# Patient Record
Sex: FEMALE | Race: BLACK OR AFRICAN AMERICAN | Employment: FULL TIME | URBAN - METROPOLITAN AREA
[De-identification: names, ages, dates, MRNs, and addresses within clinical notes are randomized per-mention and may not be internally consistent; named-entity substitution may affect disease eponyms.]

---

## 2017-01-09 ENCOUNTER — TELEPHONE (OUTPATIENT)
Dept: CARDIOLOGY CLINIC | Age: 62
End: 2017-01-09

## 2017-01-29 ENCOUNTER — APPOINTMENT (OUTPATIENT)
Dept: GENERAL RADIOLOGY | Age: 62
End: 2017-01-29
Attending: EMERGENCY MEDICINE
Payer: COMMERCIAL

## 2017-01-29 ENCOUNTER — HOSPITAL ENCOUNTER (EMERGENCY)
Age: 62
Discharge: LEFT BEFORE DISCHARGE | End: 2017-01-30
Attending: EMERGENCY MEDICINE | Admitting: EMERGENCY MEDICINE
Payer: COMMERCIAL

## 2017-01-29 DIAGNOSIS — R07.9 ACUTE CHEST PAIN: Primary | ICD-10-CM

## 2017-01-29 LAB
ALBUMIN SERPL BCP-MCNC: 4.1 G/DL (ref 3.4–5)
ALBUMIN/GLOB SERPL: 1.2 {RATIO} (ref 0.8–1.7)
ALP SERPL-CCNC: 85 U/L (ref 45–117)
ALT SERPL-CCNC: 54 U/L (ref 13–56)
ANION GAP BLD CALC-SCNC: 5 MMOL/L (ref 3–18)
AST SERPL W P-5'-P-CCNC: 19 U/L (ref 15–37)
BASOPHILS # BLD AUTO: 0.1 K/UL (ref 0–0.1)
BASOPHILS # BLD: 1 % (ref 0–2)
BILIRUB SERPL-MCNC: 0.3 MG/DL (ref 0.2–1)
BUN SERPL-MCNC: 10 MG/DL (ref 7–18)
BUN/CREAT SERPL: 14 (ref 12–20)
CALCIUM SERPL-MCNC: 8.9 MG/DL (ref 8.5–10.1)
CHLORIDE SERPL-SCNC: 104 MMOL/L (ref 100–108)
CK MB CFR SERPL CALC: 1 % (ref 0–4)
CK MB SERPL-MCNC: 1.3 NG/ML (ref 0.5–3.6)
CK SERPL-CCNC: 124 U/L (ref 26–192)
CO2 SERPL-SCNC: 31 MMOL/L (ref 21–32)
CREAT SERPL-MCNC: 0.73 MG/DL (ref 0.6–1.3)
DIFFERENTIAL METHOD BLD: ABNORMAL
EOSINOPHIL # BLD: 0.2 K/UL (ref 0–0.4)
EOSINOPHIL NFR BLD: 2 % (ref 0–5)
ERYTHROCYTE [DISTWIDTH] IN BLOOD BY AUTOMATED COUNT: 15 % (ref 11.6–14.5)
GLOBULIN SER CALC-MCNC: 3.4 G/DL (ref 2–4)
GLUCOSE SERPL-MCNC: 133 MG/DL (ref 74–99)
HCT VFR BLD AUTO: 34.9 % (ref 35–45)
HGB BLD-MCNC: 11.3 G/DL (ref 12–16)
LYMPHOCYTES # BLD AUTO: 49 % (ref 21–52)
LYMPHOCYTES # BLD: 4.4 K/UL (ref 0.9–3.6)
MCH RBC QN AUTO: 23.3 PG (ref 24–34)
MCHC RBC AUTO-ENTMCNC: 32.4 G/DL (ref 31–37)
MCV RBC AUTO: 71.8 FL (ref 74–97)
MONOCYTES # BLD: 0.4 K/UL (ref 0.05–1.2)
MONOCYTES NFR BLD AUTO: 5 % (ref 3–10)
NEUTS SEG # BLD: 3.8 K/UL (ref 1.8–8)
NEUTS SEG NFR BLD AUTO: 43 % (ref 40–73)
PLATELET # BLD AUTO: 205 K/UL (ref 135–420)
PLATELET COMMENTS,PCOM: ABNORMAL
PMV BLD AUTO: 11.3 FL (ref 9.2–11.8)
POTASSIUM SERPL-SCNC: 3.7 MMOL/L (ref 3.5–5.5)
PROT SERPL-MCNC: 7.5 G/DL (ref 6.4–8.2)
RBC # BLD AUTO: 4.86 M/UL (ref 4.2–5.3)
RBC MORPH BLD: ABNORMAL
SODIUM SERPL-SCNC: 140 MMOL/L (ref 136–145)
TROPONIN I SERPL-MCNC: <0.02 NG/ML (ref 0–0.04)
WBC # BLD AUTO: 8.9 K/UL (ref 4.6–13.2)

## 2017-01-29 PROCEDURE — 93005 ELECTROCARDIOGRAM TRACING: CPT

## 2017-01-29 PROCEDURE — 80053 COMPREHEN METABOLIC PANEL: CPT | Performed by: EMERGENCY MEDICINE

## 2017-01-29 PROCEDURE — 99284 EMERGENCY DEPT VISIT MOD MDM: CPT

## 2017-01-29 PROCEDURE — 85025 COMPLETE CBC W/AUTO DIFF WBC: CPT | Performed by: EMERGENCY MEDICINE

## 2017-01-29 PROCEDURE — 82550 ASSAY OF CK (CPK): CPT | Performed by: EMERGENCY MEDICINE

## 2017-01-29 PROCEDURE — 71010 XR CHEST PORT: CPT

## 2017-01-30 ENCOUNTER — TELEPHONE (OUTPATIENT)
Dept: CARDIOLOGY CLINIC | Age: 62
End: 2017-01-30

## 2017-01-30 VITALS
HEIGHT: 59 IN | OXYGEN SATURATION: 99 % | RESPIRATION RATE: 21 BRPM | DIASTOLIC BLOOD PRESSURE: 49 MMHG | WEIGHT: 100 LBS | TEMPERATURE: 98 F | BODY MASS INDEX: 20.16 KG/M2 | SYSTOLIC BLOOD PRESSURE: 149 MMHG | HEART RATE: 67 BPM

## 2017-01-30 LAB
ATRIAL RATE: 71 BPM
CALCULATED P AXIS, ECG09: 71 DEGREES
CALCULATED R AXIS, ECG10: -3 DEGREES
CALCULATED T AXIS, ECG11: 61 DEGREES
DIAGNOSIS, 93000: NORMAL
P-R INTERVAL, ECG05: 176 MS
Q-T INTERVAL, ECG07: 382 MS
QRS DURATION, ECG06: 94 MS
QTC CALCULATION (BEZET), ECG08: 415 MS
VENTRICULAR RATE, ECG03: 71 BPM

## 2017-01-30 NOTE — ED NOTES
Pt out of bed of cardiac monitor. Pt states \"Im walking around because I'm cold. How much longer is it going to be?\"  Pt redirected in room and placed back on cardiac monitor. Pt informed labs would be repeated at 0130 to check cardiac enzymes. Pt states, \"I don't know if I can stay. \"

## 2017-01-30 NOTE — ED PROVIDER NOTES
HPI Comments: Patient came in with complaints of chest pain. Prior to being seen patient came up to desk and stated she needed to leave. I discussed with patient I was coming to see her shortly but she states she is feeling better and has an appointment with her cardiologist tomorrow so she is going to go home and get some rest. Denies any pain. I have requested that patient stay for an exam due to risks of her complaint and her past history however pt states verbal understanding and agrees to return if she changes her mind. Patient left prior to signing [employer] paperwork. Romulo Saxena MD      Patient is a 62 y.o. female presenting with chest pain. Chest Pain (Angina)           Past Medical History:   Diagnosis Date    Allergic rhinitis     Cardiac echocardiogram 06/11/2014     EF 60%. No RWMA. RVSP 30 mmHg. Mild AI.  Cardiac Holter monitor, normal 11/05/2014     Benign 48-hr Holter study.  Cardiac nuclear imaging test, mod risk 08/14/2015     Intermediate risk. High anterior artifact vs diagonal artery ischemia. Following Lexiscan, 0.5-mm downsloping inferolateral ST depression, resolving 10 min 30 sec of recovery. Arm heaviness noted.  Cardiovascular lower extremity arterial duplex 07/15/2016     Right leg: Mod peripheral arterial disease in femoral segment at rest.  Left leg:  Mild arterial disease at rest.  R KAREN 0.58. L KAREN 0.95. Similar to study of 10/15/14.  Cardiovascular renal angiography 10/27/2014     Bilateral patent renal arteries. Right CFA occluded but collateralized.  Cardiovascular renal duplex 10/13/2014     Aorta w/irregular walls. Severe >60% bilateral renal artery stenosis. Bilateral intrinsic/med renal disease.  Carotid duplex 10/17/2014     Mild <50% DAVID stenosis. Biphasic signals in both subclavians.     Cervical disc disease 09/2015     MRI of C-spine at [facility] hypertension     Hyperlipidemia     Hypertension     Ill-defined condition     Migraine     Sinusitis, chronic     Spontaneous pneumothorax 1999     2 episodes on the right within several months of each other       Past Surgical History:   Procedure Laterality Date    Hx breast reduction      Hx colposcopy      Hx heart catheterization Bilateral 10/27/14     bilateral lower extremity angiography     Hx cholecystectomy  11/8/14    Hx other surgical Left      shave biopsy ( thigh area)         Family History:   Problem Relation Age of Onset    Deep Vein Thrombosis Father     Coronary Artery Disease Father     Pacemaker Father     Diabetes Father     High Cholesterol Father     High Cholesterol Mother     Hypertension Mother     Heart Attack Brother 40    Heart Attack Maternal Grandmother 100    Heart Attack Other 48    Hypertension Other     Hypertension Maternal Aunt        Social History     Social History    Marital status: SINGLE     Spouse name: N/A    Number of children: N/A    Years of education: N/A     Occupational History    Not on file. Social History Main Topics    Smoking status: Current Every Day Smoker     Packs/day: 0.25     Years: 20.00    Smokeless tobacco: Never Used      Comment: now down to 3 cigarettes per day    Alcohol use No    Drug use: No    Sexual activity: Not on file     Other Topics Concern    Not on file     Social History Narrative         ALLERGIES: Latex; Keflex [cephalexin]; Epinephrine; and Tetracyclines    Review of Systems   Cardiovascular: Positive for chest pain.        Vitals:    01/29/17 2211 01/29/17 2311 01/29/17 2315   BP: 189/62 168/51    Pulse: 73  73   Resp: 16  22   Temp: 98 °F (36.7 °C)     SpO2: 100%  100%   Weight: 45.4 kg (100 lb)     Height: 4' 11\" (1.499 m)              Physical Exam     [facility]  ED Course       Procedures

## 2017-01-30 NOTE — ED TRIAGE NOTES
Pt states she is having an aching in her heart and her blood pressure is high, she is concerned she may be having a stroke.

## 2017-01-30 NOTE — TELEPHONE ENCOUNTER
Pt called to state she had an issue with blood pressure low one morning 93/44. Pt states she held her medications and recheck her blood pressure in one hour. /50 then it steadily went up throughout the day and into the night 205/50 and she went to the ED. BP was still elevated at that time. ED did ekg, labs, and all normal. Pt left hospital due to being tried. Pt went home and slept for 3 hours before going to work. Verbal order and read back per Zahraa Garzon DO  Since it was the first time this has happened to the patient, she should monitor her blood pressure just once a day , 2 hours after her medications.  If bp is consistently elevated then pt will need to call back the office to have medications adjusted      Pt verbalized understanding of instructions

## 2017-04-25 ENCOUNTER — TELEPHONE (OUTPATIENT)
Dept: CARDIOLOGY CLINIC | Age: 62
End: 2017-04-25

## 2017-04-25 DIAGNOSIS — R00.2 PALPITATIONS: ICD-10-CM

## 2017-04-25 DIAGNOSIS — E78.5 HYPERLIPIDEMIA, UNSPECIFIED HYPERLIPIDEMIA TYPE: Primary | ICD-10-CM

## 2017-05-20 ENCOUNTER — HOSPITAL ENCOUNTER (OUTPATIENT)
Dept: LAB | Age: 62
Discharge: HOME | End: 2017-05-20
Payer: COMMERCIAL

## 2017-05-20 DIAGNOSIS — E78.5 HYPERLIPIDEMIA, UNSPECIFIED HYPERLIPIDEMIA TYPE: ICD-10-CM

## 2017-05-20 LAB
ALBUMIN SERPL BCP-MCNC: 3.9 G/DL (ref 3.4–5)
ALBUMIN/GLOB SERPL: 1.3 {RATIO} (ref 0.8–1.7)
ALP SERPL-CCNC: 79 U/L (ref 45–117)
ALT SERPL-CCNC: 33 U/L (ref 13–56)
AST SERPL W P-5'-P-CCNC: 14 U/L (ref 15–37)
BILIRUB DIRECT SERPL-MCNC: 0.1 MG/DL (ref 0–0.2)
BILIRUB SERPL-MCNC: 0.5 MG/DL (ref 0.2–1)
CHOLEST SERPL-MCNC: 102 MG/DL
GLOBULIN SER CALC-MCNC: 3.1 G/DL (ref 2–4)
HDLC SERPL-MCNC: 37 MG/DL (ref 40–60)
HDLC SERPL: 2.8 {RATIO} (ref 0–5)
LDLC SERPL CALC-MCNC: 53.4 MG/DL (ref 0–100)
LIPID PROFILE,FLP: ABNORMAL
PROT SERPL-MCNC: 7 G/DL (ref 6.4–8.2)
TRIGL SERPL-MCNC: 58 MG/DL (ref ?–150)
VLDLC SERPL CALC-MCNC: 11.6 MG/DL

## 2017-05-20 PROCEDURE — 80076 HEPATIC FUNCTION PANEL: CPT | Performed by: INTERNAL MEDICINE

## 2017-05-20 PROCEDURE — 36415 COLL VENOUS BLD VENIPUNCTURE: CPT | Performed by: INTERNAL MEDICINE

## 2017-05-20 PROCEDURE — 80061 LIPID PANEL: CPT | Performed by: INTERNAL MEDICINE

## 2017-05-22 NOTE — PROGRESS NOTES
Per your last office note, \"Her latest lipid profile which was completed on November 19, 2016 showed some cholesterol 96 with an HDL of 37, LDL of 44.8, VLDL of 14.2, and triglycerides of 71 which I think is excellent control of her cholesterol and Lipitor 40 mg daily and I would make any changes in that regard. \"

## 2017-05-25 ENCOUNTER — OFFICE VISIT (OUTPATIENT)
Dept: CARDIOLOGY CLINIC | Age: 62
End: 2017-05-25

## 2017-05-25 VITALS
BODY MASS INDEX: 21.57 KG/M2 | SYSTOLIC BLOOD PRESSURE: 138 MMHG | DIASTOLIC BLOOD PRESSURE: 52 MMHG | HEIGHT: 59 IN | HEART RATE: 63 BPM | WEIGHT: 107 LBS | OXYGEN SATURATION: 98 %

## 2017-05-25 DIAGNOSIS — I10 ESSENTIAL HYPERTENSION: Primary | ICD-10-CM

## 2017-05-25 DIAGNOSIS — M50.90 CERVICAL DISC DISEASE: ICD-10-CM

## 2017-05-25 DIAGNOSIS — E78.5 HYPERLIPIDEMIA, UNSPECIFIED HYPERLIPIDEMIA TYPE: ICD-10-CM

## 2017-05-25 NOTE — PROGRESS NOTES
This lady's lipid profile is excellent and I will discuss it with her when I see her in the office later today.  ES

## 2017-05-25 NOTE — PROGRESS NOTES
Review of Systems   Constitutional: Negative. Respiratory: Positive for cough. Negative for hemoptysis, sputum production, shortness of breath and wheezing. Cardiovascular: Positive for claudication. Negative for chest pain, palpitations, orthopnea, leg swelling and PND. Gastrointestinal: Negative. Musculoskeletal: Negative.

## 2017-05-25 NOTE — PROGRESS NOTES
HPI: I saw Fridaa Handler Eduardo Hernandez in my office today in cardiovascular evaluation regarding her past history of anginal type chest discomfort in the setting of peripheral vascular disease and hypercholesterolemia. Mr. Eduardo Hernandez is a pleasant, small, 62year old  female with history of hypertension, hyperlipidemia and peripheral vascular disease with a femoral bruit and known totally occluded right common femoral artery documented in a catheterization to rule out renal artery stenosis, which was completed on 10/27/14 and demonstrated no evidence of renal artery stenosis with both renal arteries widely patent. She does have history of some bilateral arm heaviness when I saw her in July of 2015, which sounded atypical for angina, but we did do a nuclear myocardial perfusion study on 8/14/15, which was mildly abnormal, showing a mild partially transient perfusion defect involving the high anterior wall with relative sparing of the apex and interventricular septum, most consistent with either soft tissue breast attenuation with breast shifting or possibly some mild ischemia in a diagonal coronary artery. The gated SPECT imaging portion of the study demonstrated normal left ventricular function with an ejection fraction of 71%, but although the stress portion of the test was negative for chest pain, she did develop some downsloping ST depression inferolaterally and complained of some arm heaviness for about five minutes into recovery with EKG changes lasting for 10 minutes and recovery suggesting this might be truly positive. We actually added Toprol XL 25 mg to her regimen for the possibility that this was anginal, and also for her palpitations, even though she was already on Normodyne, which is beta blocker, alpha blocker for her blood pressure, and we gave her some sublingual nitroglycerin for administration for recurrent arm or chest pain.      Interestingly she told me at the time of her 10/8/15 visit that she went to the [facility] in [place] and had an MRI of her cervical spine, which demonstrated multilevel disc disease, most prominent in C6-C7, where there was a moderate spinal canal stenosis and severe bilateral neural foraminal narrowings, which would explain her arm pain issues.       She comes into the office today and relates that she is doing very well. She is having no further problems with palpitations or any chest pain issues. She does relate that she was diagnosed with an echocardiogram at the [facility] of having some aortic insufficiency although on examination I do not hear any significant aortic insufficiency suggesting that if present it is mild. Encounter Diagnoses   Name Primary?  Essential hypertension Yes    Hyperlipidemia, unspecified hyperlipidemia type     Cervical disc disease        Discussion: This patient appears to be doing about as well as we could expect and really have no recommendations for change at this time. She is not having any symptoms to suggest the development of heart failure and her examination does not suggest any aortic insufficiency despite her history of having an echocardiogram at the [facility] which showed aortic insufficiency. Certainly if aortic insufficiency is present it is quite mild. She is not having any further problems with chest pain or palpitations and I do not feel that she needs any further cardiac workup at this time. Is currently on Lipitor 40 mg daily for cholesterol and her latest lipid profile which was completed on May 20, 2017 showed total cholesterol 102 with triglycerides of 58, HDL 37, LDL of 53.4, and VLDL of 11.6 which I think is excellent control and I would make any changes at this time. Her blood pressure appears to be adequately controlled today and her EKG is completely stable so I am simply going to plan to see her again in several months or as needed if any new cardiovascular symptoms surface in the interim.     PCP: Parth Singh, MD      Past Medical History:   Diagnosis Date    Allergic rhinitis     Cardiac echocardiogram 06/11/2014    EF 60%. No RWMA. RVSP 30 mmHg. Mild AI.  Cardiac Holter monitor, normal 11/05/2014    Benign 48-hr Holter study.  Cardiac nuclear imaging test, mod risk 08/14/2015    Intermediate risk. High anterior artifact vs diagonal artery ischemia. Following Lexiscan, 0.5-mm downsloping inferolateral ST depression, resolving 10 min 30 sec of recovery. Arm heaviness noted.  Cardiovascular lower extremity arterial duplex 07/15/2016    Right leg: Mod peripheral arterial disease in femoral segment at rest.  Left leg:  Mild arterial disease at rest.  R KAREN 0.58. L KAREN 0.95. Similar to study of 10/15/14.  Cardiovascular renal angiography 10/27/2014    Bilateral patent renal arteries. Right CFA occluded but collateralized.  Cardiovascular renal duplex 10/13/2014    Aorta w/irregular walls. Severe >60% bilateral renal artery stenosis. Bilateral intrinsic/med renal disease.  Carotid duplex 10/17/2014    Mild <50% DAVID stenosis. Biphasic signals in both subclavians.  Carotid duplex 12/05/2016    Mild < 50% DAVID stenosis.  Cervical disc disease 09/2015    [facility] hypertension     Hyperlipidemia     Hypertension     Ill-defined condition     Migraine     Sinusitis, chronic     Spontaneous pneumothorax 1999    2 episodes on the right within several months of each other       Past Surgical History:   Procedure Laterality Date    HX BREAST REDUCTION      HX CHOLECYSTECTOMY  11/8/14    HX COLPOSCOPY      HX HEART CATHETERIZATION Bilateral 10/27/14    bilateral lower extremity angiography     HX OTHER SURGICAL Left     shave biopsy ( thigh area)       Current Outpatient Rx   Name  Route  Sig  Dispense  Refill    atorvastatin (LIPITOR) 40 mg tablet    Oral    Take 1 Tab by mouth daily.     30 Tab    6      metoprolol succinate (TOPROL-XL) 25 mg XL tablet    Oral    Take 1 Tab by mouth daily. 90 Tab    3      nitroglycerin (NITROSTAT) 0.4 mg SL tablet    SubLINGual    1 Tab by SubLINGual route every five (5) minutes as needed for Chest Pain. 25 Tab    3      cetirizine (ZYRTEC) 10 mg tablet    Oral    Take 10 mg by mouth nightly.  labetalol (NORMODYNE) 100 mg tablet    Oral    Take 100 mg by mouth two (2) times a day.  zolmitriptan (ZOMIG) 2.5 mg tablet    Oral    Take 2.5 mg by mouth as needed for Migraine.  multivitamin (ONE A DAY) tablet    Oral    Take 1 Tab by mouth daily. Allergies   Allergen Reactions    Latex Rash    Keflex [Cephalexin] Rash    Epinephrine Other (comments)     tachycardia    Tetracyclines Nausea Only       Social History :  Social History   Substance Use Topics    Smoking status: Current Every Day Smoker     Packs/day: 0.25     Years: 20.00    Smokeless tobacco: Never Used      Comment: now down to 3 cigarettes per day    Alcohol use No        Family History: family history includes Coronary Artery Disease in her father; Deep Vein Thrombosis in her father; Diabetes in her father; Heart Attack (age of onset: 80) in her maternal grandmother; Heart Attack (age of onset: 40) in her brother; Heart Attack (age of onset: 48) in an other family member; High Cholesterol in her father and mother; Hypertension in her maternal aunt, mother, and another family member; Pacemaker in her father. Review of Systems:  Constitutional: Negative. Respiratory: Positive for cough. Negative for hemoptysis, sputum production, shortness of breath and wheezing. Cardiovascular: Positive for claudication. Negative for chest pain, palpitations, orthopnea, leg swelling and PND. Gastrointestinal: Negative. Musculoskeletal: Negative. Physical Exam:    The patient is a cooperative, alert, well developed, well nourished 62 y.o.   female who is in no acute distress at the time of the examination. Visit Vitals    /52    Pulse 63    Ht 4' 11\" (1.499 m)    Wt 48.5 kg (107 lb)    SpO2 98%    BMI 21.61 kg/m2       HEENT: Conjuctiva white, mucosa moist, no pallor or cyanosis. NECK: Supple without masses, tenderness or thyromegaly. There was no jugular venous distention. Carotid are full bilaterally with bilateral  bruits vs. radiation of murmur from the heart. CHEST: Symmetrical with good excursion. LUNGS: Clear to auscultation in all fields. HEART: The apex is not displaced. There were no lifts, thrills or heaves. There is a normal S1 and S2. There is a grade 2/6 systolic ejection murmur along the left sternal border with radiation to the base without appreciable diastolic murmurs, rubs, clicks, or gallops auscultated. ABDOMEN: Soft without masses, tenderness or organomegaly. There is a fairly loud bruit just above the umbilicus in the midline consistent with possible renal arteries stenosis as well as significant bifemoral bruits  EXTREMITIES: Full peripheral pulses on the left with decrease PT and DP pulses on the right without peripheral edema. Review of Data: See PMH and Cardiology and Imaging sections for cardiac testing      Results for orders placed or performed in visit on 05/25/17   AMB POC EKG ROUTINE W/ 12 LEADS, INTER & REP     Status: None    Narrative    Normal sinus rhythm, rate 63. There is an RSR prime normal variant in leads V1 and V2. This EKG is within normal limits and similar to the EKG of January 29, 2017. Thiago Hawley D.O., F.A.C.C. [facility]  [facility]  [address] [place] [address]. [address]    PLEASE NOTE:  This document has been produced using voice recognition software. Unrecognized errors in transcription may be present.

## 2017-05-25 NOTE — PROGRESS NOTES
1. Have you been to the ER, urgent care clinic since your last visit? Hospitalized since your last visit? Yes 01/29/2017 chest pain     2. Have you seen or consulted any other health care providers outside of the [address] [place] [address] since your last visit? Include any pap smears or colon screening.  No

## 2017-05-25 NOTE — MR AVS SNAPSHOT
Visit Information Date & Time Provider Department Dept. Phone Encounter #  
 5/25/2017  3:20 PM Tyshawn Fraser, [address] [facility] [id number] Follow-up Instructions Return in about 6 months (around 11/25/2017), or if symptoms worsen or fail to improve. Upcoming Health Maintenance Date Due Hepatitis C Screening 1955 Pneumococcal 19-64 Medium Risk (1 of 1 - PPSV23) 10/2/1975 DTaP/Tdap/Td series (1 - Tdap) 10/2/1977 FOBT Q 1 YEAR AGE 50-75 10/2/2006 PAP AKA CERVICAL CYTOLOGY 7/11/2015 ZOSTER VACCINE AGE 60> 10/2/2016 INFLUENZA AGE 9 TO ADULT 8/1/2017 BREAST CANCER SCRN MAMMOGRAM 8/30/2018 Allergies as of 5/25/2017  Review Complete On: 5/25/2017 By: Tyshawn Fraser DO Severity Noted Reaction Type Reactions Latex  10/27/2014   Topical Rash Keflex [Cephalexin] Medium 11/16/2016    Rash Epinephrine    Other (comments)  
 tachycardia Tetracyclines    Nausea Only Current Immunizations  Never Reviewed No immunizations on file. Not reviewed this visit You Were Diagnosed With   
  
 Codes Comments Essential hypertension    -  Primary ICD-10-CM: I10 
ICD-9-CM: 401.9 Hyperlipidemia, unspecified hyperlipidemia type     ICD-10-CM: E78.5 ICD-9-CM: 272.4 Transient cerebral ischemia, unspecified type     ICD-10-CM: G45.9 ICD-9-CM: 435.9 Dizziness     ICD-10-CM: Z14 ICD-9-CM: 780.4 PAD (peripheral artery disease) (HCC)     ICD-10-CM: I73.9 ICD-9-CM: 443. 9 Vitals BP Pulse Height(growth percentile) Weight(growth percentile) SpO2 BMI  
 138/52 63 4' 11\" (1.499 m) 107 lb (48.5 kg) 98% 21.61 kg/m2 OB Status Smoking Status Postmenopausal Current Every Day Smoker Vitals History BMI and BSA Data Body Mass Index Body Surface Area  
 21.61 kg/m 2 1.42 m 2 Preferred Pharmacy Pharmacy Name Phone [facility] [address], [address] [phone] Your Updated Medication List  
  
   
This list is accurate as of: 5/25/17  3:49 PM.  Always use your most recent med list.  
  
  
  
  
 atorvastatin 40 mg tablet Commonly known as:  LIPITOR  
TAKE ONE TABLET BY MOUTH DAILY AUGMENTIN 875-125 mg per tablet Generic drug:  amoxicillin-clavulanate Take  by mouth every twelve (12) hours. carboxymethylcellulose sodium 1 % Dlgl Apply  to eye. ERYTHROMYCIN OP Apply  to eye. FLONASE 50 mcg/actuation nasal spray Generic drug:  fluticasone 2 Sprays by Both Nostrils route daily. labetalol 100 mg tablet Commonly known as:  Fawn Coffin Take 100 mg by mouth two (2) times a day. lamoTRIgine 25 mg tablet Commonly known as: LaMICtal  
Take 1 Tab by mouth two (2) times a day. metoprolol succinate 25 mg XL tablet Commonly known as:  TOPROL-XL Take 1 Tab by mouth daily. montelukast 10 mg tablet Commonly known as:  SINGULAIR Take 10 mg by mouth daily. multivitamin tablet Commonly known as:  ONE A DAY Take 1 Tab by mouth daily. nitroglycerin 0.4 mg SL tablet Commonly known as:  NITROSTAT  
1 Tab by SubLINGual route every five (5) minutes as needed for Chest Pain. VIGAMOX 0.5 % ophthalmic solution Generic drug:  moxifloxacin 1 Drop three (3) times daily. ZyrTEC 10 mg tablet Generic drug:  cetirizine Take 10 mg by mouth nightly. We Performed the Following AMB POC EKG ROUTINE W/ 12 LEADS, INTER & REP [34168 CPT(R)] Follow-up Instructions Return in about 6 months (around 11/25/2017), or if symptoms worsen or fail to improve. Introducing [facility]! Dear Erika Hamilton: Thank you for requesting a Materia account. Our records indicate that you already have an active Materia account. You can access your account anytime at [email or web address] Did you know that you can access your hospital and ER discharge instructions at any time in StowThat? You can also review all of your test results from your hospital stay or ER visit. Additional Information If you have questions, please visit the Frequently Asked Questions section of the StowThat website at [email or web address] Remember, StowThat is NOT to be used for urgent needs. For medical emergencies, dial 911. Now available from your iPhone and Android! Please provide this summary of care documentation to your next provider. Your primary care clinician is listed as Berenice Denise. If you have any questions after today's visit, please call [phone].

## 2017-06-20 DIAGNOSIS — I10 ESSENTIAL HYPERTENSION WITH GOAL BLOOD PRESSURE LESS THAN 130/80: ICD-10-CM

## 2017-06-21 RX ORDER — METOPROLOL SUCCINATE 25 MG/1
TABLET, EXTENDED RELEASE ORAL
Qty: 90 TAB | Refills: 2 | Status: SHIPPED | OUTPATIENT
Start: 2017-06-21 | End: 2017-12-19 | Stop reason: SDUPTHER

## 2017-08-10 ENCOUNTER — TELEPHONE (OUTPATIENT)
Dept: INTERNAL MEDICINE CLINIC | Age: 62
End: 2017-08-10

## 2017-08-10 DIAGNOSIS — I10 ESSENTIAL HYPERTENSION: ICD-10-CM

## 2017-08-10 DIAGNOSIS — E78.5 HYPERLIPIDEMIA, UNSPECIFIED HYPERLIPIDEMIA TYPE: Primary | ICD-10-CM

## 2017-08-10 NOTE — TELEPHONE ENCOUNTER
Patient would like orders put in for lab work   So she can get it Done at [facility] view on 08/12/2017  She is requesting     Liver Profile ALT AST  BLOOD SUGAR  A1C  CBC  CHOLESTEROL   CHEMESTY LABS  URINE     Please call when done [phone]

## 2017-08-11 NOTE — TELEPHONE ENCOUNTER
Confirmed with patient that she had to keep appointment to review labs being ordered--patient confirmed

## 2017-08-12 ENCOUNTER — HOSPITAL ENCOUNTER (OUTPATIENT)
Dept: LAB | Age: 62
Discharge: HOME | End: 2017-08-12
Payer: COMMERCIAL

## 2017-08-12 DIAGNOSIS — E78.5 HYPERLIPIDEMIA, UNSPECIFIED HYPERLIPIDEMIA TYPE: ICD-10-CM

## 2017-08-12 DIAGNOSIS — I10 ESSENTIAL HYPERTENSION: ICD-10-CM

## 2017-08-12 LAB
ALBUMIN SERPL BCP-MCNC: 4.3 G/DL (ref 3.4–5)
ALBUMIN/GLOB SERPL: 1.3 {RATIO} (ref 0.8–1.7)
ALP SERPL-CCNC: 84 U/L (ref 45–117)
ALT SERPL-CCNC: 35 U/L (ref 13–56)
ANION GAP BLD CALC-SCNC: 3 MMOL/L (ref 3–18)
APPEARANCE UR: CLEAR
AST SERPL W P-5'-P-CCNC: 15 U/L (ref 15–37)
BASOPHILS # BLD AUTO: 0.1 K/UL (ref 0–0.06)
BASOPHILS # BLD: 1 % (ref 0–2)
BILIRUB SERPL-MCNC: 0.4 MG/DL (ref 0.2–1)
BILIRUB UR QL: NEGATIVE
BUN SERPL-MCNC: 14 MG/DL (ref 7–18)
BUN/CREAT SERPL: 25 (ref 12–20)
CALCIUM SERPL-MCNC: 9.4 MG/DL (ref 8.5–10.1)
CHLORIDE SERPL-SCNC: 106 MMOL/L (ref 100–108)
CHOLEST SERPL-MCNC: 117 MG/DL
CO2 SERPL-SCNC: 32 MMOL/L (ref 21–32)
COLOR UR: YELLOW
CREAT SERPL-MCNC: 0.57 MG/DL (ref 0.6–1.3)
DIFFERENTIAL METHOD BLD: ABNORMAL
EOSINOPHIL # BLD: 0.2 K/UL (ref 0–0.4)
EOSINOPHIL NFR BLD: 2 % (ref 0–5)
ERYTHROCYTE [DISTWIDTH] IN BLOOD BY AUTOMATED COUNT: 15.2 % (ref 11.6–14.5)
GLOBULIN SER CALC-MCNC: 3.3 G/DL (ref 2–4)
GLUCOSE SERPL-MCNC: 92 MG/DL (ref 74–99)
GLUCOSE UR STRIP.AUTO-MCNC: NEGATIVE MG/DL
HCT VFR BLD AUTO: 36.2 % (ref 35–45)
HDLC SERPL-MCNC: 39 MG/DL (ref 40–60)
HDLC SERPL: 3 {RATIO} (ref 0–5)
HGB BLD-MCNC: 11.6 G/DL (ref 12–16)
HGB UR QL STRIP: NEGATIVE
KETONES UR QL STRIP.AUTO: NEGATIVE MG/DL
LDLC SERPL CALC-MCNC: 64.8 MG/DL (ref 0–100)
LEUKOCYTE ESTERASE UR QL STRIP.AUTO: NEGATIVE
LIPID PROFILE,FLP: ABNORMAL
LYMPHOCYTES # BLD AUTO: 37 % (ref 21–52)
LYMPHOCYTES # BLD: 3.2 K/UL (ref 0.9–3.6)
MCH RBC QN AUTO: 23.2 PG (ref 24–34)
MCHC RBC AUTO-ENTMCNC: 32 G/DL (ref 31–37)
MCV RBC AUTO: 72.5 FL (ref 74–97)
MONOCYTES # BLD: 0.6 K/UL (ref 0.05–1.2)
MONOCYTES NFR BLD AUTO: 7 % (ref 3–10)
NEUTS SEG # BLD: 4.6 K/UL (ref 1.8–8)
NEUTS SEG NFR BLD AUTO: 53 % (ref 40–73)
NITRITE UR QL STRIP.AUTO: NEGATIVE
PH UR STRIP: 5.5 [PH] (ref 5–8)
PLATELET # BLD AUTO: 235 K/UL (ref 135–420)
PMV BLD AUTO: 12.2 FL (ref 9.2–11.8)
POTASSIUM SERPL-SCNC: 4.5 MMOL/L (ref 3.5–5.5)
PROT SERPL-MCNC: 7.6 G/DL (ref 6.4–8.2)
PROT UR STRIP-MCNC: NEGATIVE MG/DL
RBC # BLD AUTO: 4.99 M/UL (ref 4.2–5.3)
SODIUM SERPL-SCNC: 141 MMOL/L (ref 136–145)
SP GR UR REFRACTOMETRY: 1.02 (ref 1–1.03)
TRIGL SERPL-MCNC: 66 MG/DL (ref ?–150)
UROBILINOGEN UR QL STRIP.AUTO: 0.2 EU/DL (ref 0.2–1)
VLDLC SERPL CALC-MCNC: 13.2 MG/DL
WBC # BLD AUTO: 8.7 K/UL (ref 4.6–13.2)

## 2017-08-12 PROCEDURE — 85025 COMPLETE CBC W/AUTO DIFF WBC: CPT | Performed by: INTERNAL MEDICINE

## 2017-08-12 PROCEDURE — 81003 URINALYSIS AUTO W/O SCOPE: CPT | Performed by: INTERNAL MEDICINE

## 2017-08-12 PROCEDURE — 36415 COLL VENOUS BLD VENIPUNCTURE: CPT | Performed by: INTERNAL MEDICINE

## 2017-08-12 PROCEDURE — 80061 LIPID PANEL: CPT | Performed by: INTERNAL MEDICINE

## 2017-08-12 PROCEDURE — 80053 COMPREHEN METABOLIC PANEL: CPT | Performed by: INTERNAL MEDICINE

## 2017-08-25 ENCOUNTER — OFFICE VISIT (OUTPATIENT)
Dept: INTERNAL MEDICINE CLINIC | Age: 62
End: 2017-08-25

## 2017-08-25 VITALS
WEIGHT: 110 LBS | TEMPERATURE: 96.9 F | DIASTOLIC BLOOD PRESSURE: 60 MMHG | HEART RATE: 78 BPM | SYSTOLIC BLOOD PRESSURE: 118 MMHG | RESPIRATION RATE: 16 BRPM | BODY MASS INDEX: 22.18 KG/M2 | HEIGHT: 59 IN | OXYGEN SATURATION: 98 %

## 2017-08-25 DIAGNOSIS — R33.9 URINARY RETENTION: Primary | ICD-10-CM

## 2017-08-25 DIAGNOSIS — I10 ESSENTIAL HYPERTENSION: ICD-10-CM

## 2017-08-25 DIAGNOSIS — I73.9 PAD (PERIPHERAL ARTERY DISEASE) (HCC): ICD-10-CM

## 2017-08-25 RX ORDER — ASPIRIN 81 MG/1
TABLET ORAL
COMMUNITY

## 2017-08-25 RX ORDER — TOPIRAMATE 25 MG/1
TABLET ORAL
Qty: 30 TAB | Refills: 2 | Status: SHIPPED | OUTPATIENT
Start: 2017-08-25 | End: 2018-04-18 | Stop reason: ALTCHOICE

## 2017-08-25 NOTE — PATIENT INSTRUCTIONS
Health Maintenance Due   Topic Date Due    Hepatitis C Test  1955    Pneumococcal Vaccine (1 of 1 - PPSV23) 10/02/1975    DTaP/Tdap/Td  (1 - Tdap) 10/02/1977    Stool testing for trace blood  10/02/2006    Cervical Cancer Screening  07/11/2015    Shingles Vaccine  08/02/2016    Flu Vaccine  08/01/2017

## 2017-08-25 NOTE — MR AVS SNAPSHOT
Visit Information Date & Time Provider Department Dept. Phone Encounter #  
 8/25/2017  3:30 PM Lolis Watson MD [facility] INTERNAL MEDICINE OF Shai Couch [phone] [id number] Your Appointments 10/12/2017  1:15 PM  
Follow Up with Lolis Watson MD  
[facility] INTERNAL MEDICINE OF [facility]) [address], [address] [facility] [address]  
[phone]  
  
   
 [address], [address] [facility] [address]  
  
    
 12/12/2017  3:40 PM  
Follow Up with Merle Harris DO [facility]) Appt Note: 6 month f/up Colby Rao [id number]  
[phone] [address] [place] [address] [address] Upcoming Health Maintenance Date Due Hepatitis C Screening 1955 Pneumococcal 19-64 Medium Risk (1 of 1 - PPSV23) 10/2/1975 DTaP/Tdap/Td series (1 - Tdap) 10/2/1977 FOBT Q 1 YEAR AGE 50-75 10/2/2006 PAP AKA CERVICAL CYTOLOGY 7/11/2015 ZOSTER VACCINE AGE 60> 8/2/2016 INFLUENZA AGE 9 TO ADULT 8/1/2017 BREAST CANCER SCRN MAMMOGRAM 8/30/2018 Allergies as of 8/25/2017  Review Complete On: 8/25/2017 By: Lolis Watson MD  
  
 Severity Noted Reaction Type Reactions Latex  10/27/2014   Topical Rash Keflex [Cephalexin] Medium 11/16/2016    Rash Epinephrine    Other (comments)  
 tachycardia Tetracyclines    Nausea Only Current Immunizations  Never Reviewed No immunizations on file. Not reviewed this visit You Were Diagnosed With   
  
 Codes Comments Urinary retention    -  Primary ICD-10-CM: R33.9 ICD-9-CM: 788.20 Vitals BP Pulse Temp Resp Height(growth percentile)  
 118/60 (BP 1 Location: Left arm, BP Patient Position: Sitting) 78 96.9 °F (36.1 °C) (Tympanic) 16 4' 11\" (1.499 m) Weight(growth percentile) SpO2 BMI OB Status Smoking Status 110 lb (49.9 kg) 98% 22.22 kg/m2 Postmenopausal Current Every Day Smoker BMI and BSA Data Body Mass Index Body Surface Area  
 22.22 kg/m 2 1.44 m 2 Preferred Pharmacy Pharmacy Name Phone [facility] [address], [facility] [phone] Your Updated Medication List  
  
   
This list is accurate as of: 17  4:10 PM.  Always use your most recent med list.  
  
  
  
  
 aspirin delayed-release 81 mg tablet Take  by mouth daily. atorvastatin 40 mg tablet Commonly known as:  LIPITOR  
TAKE ONE TABLET BY MOUTH DAILY  
  
 carboxymethylcellulose sodium 1 % Dlgl Apply  to eye. ERYTHROMYCIN OP Apply  to eye. FLONASE 50 mcg/actuation nasal spray Generic drug:  fluticasone 2 Sprays by Both Nostrils route daily. labetalol 100 mg tablet Commonly known as:  [place] Precious Take 100 mg by mouth two (2) times a day. metoprolol succinate 25 mg XL tablet Commonly known as:  TOPROL-XL  
TAKE ONE TABLET BY MOUTH DAILY  
  
 montelukast 10 mg tablet Commonly known as:  SINGULAIR Take 10 mg by mouth daily. multivitamin tablet Commonly known as:  ONE A DAY Take 1 Tab by mouth daily. nitroglycerin 0.4 mg SL tablet Commonly known as:  NITROSTAT  
1 Tab by SubLINGual route every five (5) minutes as needed for Chest Pain. topiramate 25 mg tablet Commonly known as:  TOPAMAX  
1 tablet twice per day VIGAMOX 0.5 % ophthalmic solution Generic drug:  moxifloxacin 1 Drop three (3) times daily. ZyrTEC 10 mg tablet Generic drug:  cetirizine Take 10 mg by mouth nightly. Prescriptions Sent to Pharmacy Refills  
 topiramate (TOPAMAX) 25 mg tablet 2 Si tablet twice per day Class: Normal  
 Pharmacy: [facility] [address], [address] [place] [address] Road Ph #: [phone] We Performed the Following REFERRAL TO UROLOGY [KMS241 Custom] Comments: Please evaluate for urinary retention To-Do List   
 09/05/2017 5:00 PM  
  Appointment with [facility] 1 at [address] [place] [address] ([phone]) OUTSIDE FILMS  - Any outside films related to the study being scheduled should be brought with you on the day of the exam.  If this cannot be done there may be a delay in the reading of the study. MEDICATIONS  - Patient must bring a complete list of all medications currently taking to include prescriptions, over-the-counter meds, herbals, vitamins & any dietary supplements  GENERAL INSTRUCTIONS  - On the day of your exam do not use any bath powder, deodorant or lotions on the armpit area. -Tenderness of breasts may cause an increase of discomfort during procedure. If you are experiencing breast tenderness on the day of your appointment and would like to reschedule, please call [phone]. Referral Information Referral ID Referred By Referred To  
  
 [id number] Peewee Jacobson MD   
   [address] [place] [address] , [address] [place] [address] [address] Phone: [phone] Fax: [phone] Visits Status Start Date End Date 1 New Request 8/25/17 8/25/18 If your referral has a status of pending review or denied, additional information will be sent to support the outcome of this decision. Patient Instructions Health Maintenance Due Topic Date Due  
 Hepatitis C Test  1955  Pneumococcal Vaccine (1 of 1 - PPSV23) 10/02/1975  
 DTaP/Tdap/Td  (1 - Tdap) 10/02/1977  Stool testing for trace blood  10/02/2006  Cervical Cancer Screening  07/11/2015  Shingles Vaccine  08/02/2016  Flu Vaccine  08/01/2017 [facility]! Dear Amelia Sheridan: Thank you for requesting a [employer] account. Our records indicate that you already have an active [employer] account.   You can access your account anytime at [email or web address] Did you know that you can access your hospital and ER discharge instructions at any time in [employer]? You can also review all of your test results from your hospital stay or ER visit. Additional Information If you have questions, please visit the Frequently Asked Questions section of the [employer] website at [email or web address] Remember, [employer] is NOT to be used for urgent needs. For medical emergencies, dial 911. Now available from your iPhone and Android! Please provide this summary of care documentation to your next provider. Your primary care clinician is listed as Janae Christopher. If you have any questions after today's visit, please call [phone].

## 2017-08-25 NOTE — PROGRESS NOTES
The patient presents to the office today with the chief complaint of difficulty voiding    HPI    The patient remains on medications for hypertension. She is doing well on the medications. The patient complains of finding that she does not totall empty her bladder. The patient has peripheral vascular disease. She is doing well. The patient is a cigarette smoker but she is cutting back dramatically      Review of Systems   Respiratory: Negative for shortness of breath. Cardiovascular: Negative for chest pain and leg swelling. Genitourinary:        As per HPI       Allergies   Allergen Reactions    Latex Rash    Keflex [Cephalexin] Rash    Epinephrine Other (comments)     tachycardia    Tetracyclines Nausea Only       Current Outpatient Prescriptions   Medication Sig Dispense Refill    aspirin delayed-release 81 mg tablet Take  by mouth daily.  topiramate (TOPAMAX) 25 mg tablet 1 tablet twice per day 30 Tab 2    carboxymethylcellulose sodium 1 % dlgl Apply  to eye.  ERYTHROMYCIN BASE (ERYTHROMYCIN OP) Apply  to eye.  moxifloxacin (VIGAMOX) 0.5 % ophthalmic solution 1 Drop three (3) times daily.  montelukast (SINGULAIR) 10 mg tablet Take 10 mg by mouth daily.  fluticasone (FLONASE) 50 mcg/actuation nasal spray 2 Sprays by Both Nostrils route daily.  atorvastatin (LIPITOR) 40 mg tablet TAKE ONE TABLET BY MOUTH DAILY 90 Tab 5    nitroglycerin (NITROSTAT) 0.4 mg SL tablet 1 Tab by SubLINGual route every five (5) minutes as needed for Chest Pain. 25 Tab 3    cetirizine (ZYRTEC) 10 mg tablet Take 10 mg by mouth nightly.  labetalol (NORMODYNE) 100 mg tablet Take 100 mg by mouth two (2) times a day.  multivitamin (ONE A DAY) tablet Take 1 Tab by mouth daily.  metoprolol succinate (TOPROL-XL) 25 mg XL tablet TAKE ONE TABLET BY MOUTH DAILY 90 Tab 2       Past Medical History:   Diagnosis Date    Allergic rhinitis     Cardiac echocardiogram 06/11/2014    EF 60%. No RWMA. RVSP 30 mmHg. Mild AI.  Cardiac Holter monitor, normal 11/05/2014    Benign 48-hr Holter study.  Cardiac nuclear imaging test, mod risk 08/14/2015    Intermediate risk. High anterior artifact vs diagonal artery ischemia. Following Lexiscan, 0.5-mm downsloping inferolateral ST depression, resolving 10 min 30 sec of recovery. Arm heaviness noted.  Cardiovascular lower extremity arterial duplex 07/15/2016    Right leg: Mod peripheral arterial disease in femoral segment at rest.  Left leg:  Mild arterial disease at rest.  R KAREN 0.58. L KAREN 0.95. Similar to study of 10/15/14.  Cardiovascular renal angiography 10/27/2014    Bilateral patent renal arteries. Right CFA occluded but collateralized.  Cardiovascular renal duplex 10/13/2014    Aorta w/irregular walls. Severe >60% bilateral renal artery stenosis. Bilateral intrinsic/med renal disease.  Carotid duplex 10/17/2014    Mild <50% DAVID stenosis. Biphasic signals in both subclavians.  Carotid duplex 12/05/2016    Mild < 50% DAVID stenosis.  Cervical disc disease 09/2015    [facility]spine [facility] 54 hypertension     Hyperlipidemia     Hypertension     Ill-defined condition     Migraine     Sinusitis, chronic     Spontaneous pneumothorax 1999    2 episodes on the right within several months of each other       Past Surgical History:   Procedure Laterality Date    HX BREAST REDUCTION      HX CHOLECYSTECTOMY  11/8/14    HX COLPOSCOPY      HX HEART CATHETERIZATION Bilateral 10/27/14    bilateral lower extremity angiography     HX OTHER SURGICAL Left     shave biopsy ( thigh area)       Social History     Social History    Marital status: SINGLE     Spouse name: N/A    Number of children: N/A    Years of education: N/A     Occupational History    Not on file.      Social History Main Topics    Smoking status: Current Every Day Smoker     Packs/day: 0.25     Years: 20.00    Smokeless tobacco: Never Used      Comment: now down to 3 cigarettes per day    Alcohol use No    Drug use: No    Sexual activity: No     Other Topics Concern    Not on file     Social History Narrative       Patient does not have an advanced directive on file    Visit Vitals    /60 (BP 1 Location: Left arm, BP Patient Position: Sitting)    Pulse 78    Temp 96.9 °F (36.1 °C) (Tympanic)    Resp 16    Ht 4' 11\" (1.499 m)    Wt 110 lb (49.9 kg)    SpO2 98%    BMI 22.22 kg/m2       Physical Exam   No Cervical Lymphadenopathy  No Supraclavicular Lymphadenopathy  Thyroid is Normal  Lungs are clear to ausculation and percussion  Heart:  S1 S2 are normal, No gallops, No mummers  No Carotid Bruits  Abdomen:  Normal Bowel Sounds. No tenderness. No masses. No Hepatomegaly or Splenomegly  LE:  Strong Pedal Pulses. No Edema      BMI:  [facility] Outpatient Visit on 08/12/2017   Component Date Value Ref Range Status    Sodium 08/12/2017 141  136 - 145 mmol/L Final    Potassium 08/12/2017 4.5  3.5 - 5.5 mmol/L Final    Chloride 08/12/2017 106  100 - 108 mmol/L Final    CO2 08/12/2017 32  21 - 32 mmol/L Final    Anion gap 08/12/2017 3  3.0 - 18 mmol/L Final    Glucose 08/12/2017 92  74 - 99 mg/dL Final    BUN 08/12/2017 14  7.0 - 18 MG/DL Final    Creatinine 08/12/2017 0.57* 0.6 - 1.3 MG/DL Final    BUN/Creatinine ratio 08/12/2017 25* 12 - 20   Final    GFR est AA 08/12/2017 >60  >60 ml/min/1.73m2 Final    GFR est non-AA 08/12/2017 >60  >60 ml/min/1.73m2 Final    Comment: (NOTE)  Estimated GFR is calculated using the Modification of Diet in Renal   Disease (MDRD) Study equation, reported for both  Americans   (GFRAA) and non- Americans (GFRNA), and normalized to 1.73m2   body surface area. The physician must decide which value applies to   the patient. The MDRD study equation should only be used in   individuals age 25 or older.  It has not been validated for the   following: pregnant women, patients with serious comorbid conditions,   or on certain medications, or persons with extremes of body size,   muscle mass, or nutritional status.  Calcium 08/12/2017 9.4  8.5 - 10.1 MG/DL Final    Bilirubin, total 08/12/2017 0.4  0.2 - 1.0 MG/DL Final    ALT (SGPT) 08/12/2017 35  13 - 56 U/L Final    AST (SGOT) 08/12/2017 15  15 - 37 U/L Final    Alk. phosphatase 08/12/2017 84  45 - 117 U/L Final    Protein, total 08/12/2017 7.6  6.4 - 8.2 g/dL Final    Albumin 08/12/2017 4.3  3.4 - 5.0 g/dL Final    Globulin 08/12/2017 3.3  2.0 - 4.0 g/dL Final    A-G Ratio 08/12/2017 1.3  0.8 - 1.7   Final    LIPID PROFILE 08/12/2017        Final    Cholesterol, total 08/12/2017 117  <200 MG/DL Final    Triglyceride 08/12/2017 66  <150 MG/DL Final    Comment: The drugs N-acetylcysteine (NAC) and  Metamiszole have been found to cause falsely  low results in this chemical assay. Please  be sure to submit blood samples obtained  BEFORE administration of either of these  drugs to assure correct results.  HDL Cholesterol 08/12/2017 39* 40 - 60 MG/DL Final    LDL, calculated 08/12/2017 64.8  0 - 100 MG/DL Final    VLDL, calculated 08/12/2017 13.2  MG/DL Final    CHOL/HDL Ratio 08/12/2017 3.0  0 - 5.0   Final    WBC 08/12/2017 8.7  4.6 - 13.2 K/uL Final    RBC 08/12/2017 4.99  4.20 - 5.30 M/uL Final    HGB 08/12/2017 11.6* 12.0 - 16.0 g/dL Final    HCT 08/12/2017 36.2  35.0 - 45.0 % Final    MCV 08/12/2017 72.5* 74.0 - 97.0 FL Final    MCH 08/12/2017 23.2* 24.0 - 34.0 PG Final    MCHC 08/12/2017 32.0  31.0 - 37.0 g/dL Final    RDW 08/12/2017 15.2* 11.6 - 14.5 % Final    PLATELET 17/56/9811 079  135 - 420 K/uL Final    MPV 08/12/2017 12.2* 9.2 - 11.8 FL Final    NEUTROPHILS 08/12/2017 53  40 - 73 % Final    LYMPHOCYTES 08/12/2017 37  21 - 52 % Final    MONOCYTES 08/12/2017 7  3 - 10 % Final    EOSINOPHILS 08/12/2017 2  0 - 5 % Final    BASOPHILS 08/12/2017 1  0 - 2 % Final    ABS.  NEUTROPHILS 08/12/2017 4.6  1.8 - 8.0 K/UL Final    ABS. LYMPHOCYTES 08/12/2017 3.2  0.9 - 3.6 K/UL Final    ABS. MONOCYTES 08/12/2017 0.6  0.05 - 1.2 K/UL Final    ABS. EOSINOPHILS 08/12/2017 0.2  0.0 - 0.4 K/UL Final    ABS. BASOPHILS 08/12/2017 0.1* 0.0 - 0.06 K/UL Final    DF 08/12/2017 AUTOMATED    Final    Color 08/12/2017 YELLOW    Final    Appearance 08/12/2017 CLEAR    Final    Specific gravity 08/12/2017 1.017  1.005 - 1.030   Final    pH (UA) 08/12/2017 5.5  5.0 - 8.0   Final    Protein 08/12/2017 NEGATIVE   NEG mg/dL Final    Glucose 08/12/2017 NEGATIVE   NEG mg/dL Final    Ketone 08/12/2017 NEGATIVE   NEG mg/dL Final    Bilirubin 08/12/2017 NEGATIVE   NEG   Final    Blood 08/12/2017 NEGATIVE   NEG   Final    Urobilinogen 08/12/2017 0.2  0.2 - 1.0 EU/dL Final    Nitrites 08/12/2017 NEGATIVE   NEG   Final    Leukocyte Esterase 08/12/2017 NEGATIVE   NEG   Final       .No results found for any visits on 08/25/17. Assessment / Plan      ICD-10-CM ICD-9-CM    1. Urinary retention R33.9 788.20 REFERRAL TO UROLOGY   2. Essential hypertension I10 401.9    3. PAD (peripheral artery disease) (HCC) I73.9 443.9      she was advised to continue her maintenance medications  Labs  To urology  The patient was counseled on the dangers of tobacco use, and was advised to quit. Reviewed strategies to maximize success, including removing cigarettes and smoking materials from environment. Follow-up Disposition:  Return in about 4 months (around 12/25/2017). I asked Amelia Yuan if she has any questions and I answered the questions. Gladys Yuan states that she understands the treatment plan and agrees with the treatment plan              \

## 2017-08-25 NOTE — PROGRESS NOTES
1. Have you been to the ER, urgent care clinic since your last visit? Hospitalized since your last visit? No    2. Have you seen or consulted any other health care providers outside of the [address] [place] [address] since your last visit? Include any pap smears or colon screening.  No

## 2017-08-30 ENCOUNTER — OFFICE VISIT (OUTPATIENT)
Dept: UROLOGY | Age: 62
End: 2017-08-30

## 2017-08-30 VITALS
HEART RATE: 67 BPM | WEIGHT: 112 LBS | BODY MASS INDEX: 22.58 KG/M2 | HEIGHT: 59 IN | DIASTOLIC BLOOD PRESSURE: 61 MMHG | SYSTOLIC BLOOD PRESSURE: 160 MMHG | OXYGEN SATURATION: 97 % | TEMPERATURE: 98.8 F

## 2017-08-30 DIAGNOSIS — R35.1 NOCTURIA: ICD-10-CM

## 2017-08-30 DIAGNOSIS — N34.2 ATROPHIC URETHRITIS: ICD-10-CM

## 2017-08-30 DIAGNOSIS — N81.11 MIDLINE CYSTOCELE: ICD-10-CM

## 2017-08-30 DIAGNOSIS — N95.2 ATROPHIC VAGINITIS: ICD-10-CM

## 2017-08-30 DIAGNOSIS — R35.0 FREQUENT URINATION: Primary | ICD-10-CM

## 2017-08-30 LAB
BILIRUB UR QL STRIP: NEGATIVE
GLUCOSE UR-MCNC: NEGATIVE MG/DL
KETONES P FAST UR STRIP-MCNC: NEGATIVE MG/DL
PH UR STRIP: 5.5 [PH] (ref 4.6–8)
PROT UR QL STRIP: NEGATIVE MG/DL
SP GR UR STRIP: 1.01 (ref 1–1.03)
UA UROBILINOGEN AMB POC: NORMAL (ref 0.2–1)
URINALYSIS CLARITY POC: CLEAR
URINALYSIS COLOR POC: YELLOW
URINE BLOOD POC: NEGATIVE
URINE LEUKOCYTES POC: NEGATIVE
URINE NITRITES POC: NEGATIVE

## 2017-08-30 NOTE — MR AVS SNAPSHOT
Visit Information Date & Time Provider Department Dept. Phone Encounter #  
 8/30/2017  2:15 PM Maine Rossi, [facility] [phone] [id number] Your Appointments 10/12/2017  1:15 PM  
Follow Up with Julien Verma MD  
[facility] [place] [address]) Appt Note: 2 mo f/u  
 [address], [address] [facility] [address]  
[phone]  
  
   
 [address], [address] [facility] [address]  
  
    
 12/12/2017  3:40 PM  
Follow Up with Silvia Cabrera DO [facility] \[facility]\"" ([address]) Appt Note: 6 month f/up [place] [address] [address] [address] [phone] [address] Upcoming Health Maintenance Date Due Hepatitis C Screening 1955 Pneumococcal 19-64 Medium Risk (1 of 1 - PPSV23) 10/2/1975 DTaP/Tdap/Td series (1 - Tdap) 10/2/1977 FOBT Q 1 YEAR AGE 50-75 10/2/2006 PAP AKA CERVICAL CYTOLOGY 7/11/2015 ZOSTER VACCINE AGE 60> 8/2/2016 INFLUENZA AGE 9 TO ADULT 8/1/2017 BREAST CANCER SCRN MAMMOGRAM 8/30/2018 Allergies as of 8/30/2017  Review Complete On: 8/30/2017 By: Dawood Mock LPN Severity Noted Reaction Type Reactions Latex  10/27/2014   Topical Rash Keflex [Cephalexin] Medium 11/16/2016    Rash Epinephrine    Other (comments)  
 tachycardia Tetracyclines    Nausea Only Current Immunizations  Never Reviewed No immunizations on file. Not reviewed this visit You Were Diagnosed With   
  
 Codes Comments Frequent urination    -  Primary ICD-10-CM: R35.0 ICD-9-CM: 788.41 Nocturia     ICD-10-CM: R35.1 ICD-9-CM: 788.43 Vitals  BP Pulse Temp Height(growth percentile) Weight(growth percentile) SpO2  
 160/61 (BP 1 Location: Left arm, BP Patient Position: Sitting) 67 98.8 °F (37.1 °C) 4' 11\" (1.499 m) 112 lb (50.8 kg) 97% BMI OB Status Smoking Status 22.62 kg/m2 Postmenopausal Current Every Day Smoker Vitals History BMI and BSA Data Body Mass Index Body Surface Area  
 22.62 kg/m 2 1.45 m 2 Preferred Pharmacy Pharmacy Name Phone [facility], [address] [phone] Your Updated Medication List  
  
   
This list is accurate as of: 8/30/17  3:12 PM.  Always use your most recent med list.  
  
  
  
  
 aspirin delayed-release 81 mg tablet Take  by mouth daily. atorvastatin 40 mg tablet Commonly known as:  LIPITOR  
TAKE ONE TABLET BY MOUTH DAILY  
  
 carboxymethylcellulose sodium 1 % Dlgl Apply  to eye. ERYTHROMYCIN OP Apply  to eye. FLONASE 50 mcg/actuation nasal spray Generic drug:  fluticasone 2 Sprays by Both Nostrils route daily. labetalol 100 mg tablet Commonly known as:  [place] Take 100 mg by mouth two (2) times a day. metoprolol succinate 25 mg XL tablet Commonly known as:  TOPROL-XL  
TAKE ONE TABLET BY MOUTH DAILY  
  
 montelukast 10 mg tablet Commonly known as:  SINGULAIR Take 10 mg by mouth daily. multivitamin tablet Commonly known as:  ONE A DAY Take 1 Tab by mouth daily. nitroglycerin 0.4 mg SL tablet Commonly known as:  NITROSTAT  
1 Tab by SubLINGual route every five (5) minutes as needed for Chest Pain. topiramate 25 mg tablet Commonly known as:  TOPAMAX  
1 tablet twice per day VIGAMOX 0.5 % ophthalmic solution Generic drug:  moxifloxacin 1 Drop three (3) times daily. ZyrTEC 10 mg tablet Generic drug:  cetirizine Take 10 mg by mouth nightly. We Performed the Following AMB POC URINALYSIS DIP STICK AUTO W/O MICRO [05855 CPT(R)] ME GUERITA,POST-VOID RES,US,NON-IMAGING [id number] CPT(R)] To-Do List   
 09/05/2017 5:00 PM  
 Appointment with ROS MAYER RM 1 at [address] [place] [address] ([phone]) OUTSIDE FILMS  - Any outside films related to the study being scheduled should be brought with you on the day of the exam.  If this cannot be done there may be a delay in the reading of the study. MEDICATIONS  - Patient must bring a complete list of all medications currently taking to include prescriptions, over-the-counter meds, herbals, vitamins & any dietary supplements  GENERAL INSTRUCTIONS  - On the day of your exam do not use any bath powder, deodorant or lotions on the armpit area. -Tenderness of breasts may cause an increase of discomfort during procedure. If you are experiencing breast tenderness on the day of your appointment and would like to reschedule, please call [phone]. Patient Instructions Urine Test: About This Test 
What is it? A urine test checks the color, clarity (clear or cloudy), odor, concentration, and acidity (pH) of your urine. It also checks your levels of protein, sugar, blood cells, or other substances in your urine. This test is sometimes called a urinalysis. Why is this test done? A urine test may be done: · To check for a disease or infection of the urinary tract. The urinary tract includes the kidneys, the tubes that carry urine from the kidneys to the bladder (ureters), and the bladder. It also includes the tube that carries urine from the bladder to outside the body (urethra). · To check the treatment of conditions such as diabetes, kidney stones, a urinary tract infection (UTI), high blood pressure, or some kidney or liver diseases. How can you prepare for the test? 
· Before the test, don't eat foods that can change the color of your urine. Examples of these include blackberries, beets, and rhubarb.  
· Don't do heavy exercise before the test. 
· Tell your doctor if you are menstruating or close to starting your period. Your doctor may want to wait to do the test. 
· Tell your doctor about all the nonprescription and prescription medicines and herbs or other supplements you take. Some of these can affect the results of this test. 
What happens during the test? 
A urine test can be done in your doctor's office, clinic, or lab. Or you may be asked to collect a urine sample at home. Then you can take it to the office or lab for testing. Clean-catch midstream urine collection · Wash your hands before you start. · If the cup you are given has a lid, remove it carefully. Set it down with the inner surface up. Don't touch the inside of the cup with your fingers. · Clean the area around your genitals. ¨ For men: Pull back the foreskin, if present. Clean the head of your penis with medicated towelettes or swabs. ¨ For women: Spread open the genital folds of skin with one hand. Then use medicated towelettes or swabs in your other hand to clean the area where urine comes out (the urethra). Wipe the area from front to back. · Start urinating into the toilet or urinal. A woman should hold apart the genital folds of skin while she urinates. · After the urine has flowed for several seconds, place the cup into the urine stream. Collect about 2 ounces of urine without stopping your flow of urine. · Don't touch the rim of the cup to your genital area. Don't get toilet paper, pubic hair, stool (feces), menstrual blood, or anything else in the urine sample. · Finish urinating into the toilet or urinal. 
· Carefully replace and tighten the lid on the cup, and then return it to the lab. If you are collecting the urine at home and can't get it to the lab in an hour, refrigerate it. Double-voided urine sample collection This method collects the urine your body is making right now. · Urinate into the toilet or urinal. Don't collect any of this urine. · Drink a large glass of water, and wait about 30 to 40 minutes. · Then get a urine sample. Follow the instructions above for collecting a clean-catch urine sample. · Take the urine sample to the lab. If you are collecting the urine at home and can't get it to the lab in an hour, refrigerate it. Follow-up care is a key part of your treatment and safety. Be sure to make and go to all appointments, and call your doctor if you are having problems. It's also a good idea to keep a list of the medicines you take. Ask your doctor when you can expect to have your test results. Where can you learn more? Go to http://dirk-amaris.info/. Enter R266 in the search box to learn more about \"Urine Test: About This Test.\" Current as of: October 14, 2016 Content Version: 11.3 © 0019-5892 [employer]. Care instructions adapted under license by [employer] (which disclaims liability or warranty for this information). If you have questions about a medical condition or this instruction, always ask your healthcare professional. [place] [address] any warranty or liability for your use of this information. Introducing [facility]! Dear Valentino Cota: Thank you for requesting a [employer] account. Our records indicate that you already have an active [employer] account. You can access your account anytime at [email or web address] Did you know that you can access your hospital and ER discharge instructions at any time in [employer]? You can also review all of your test results from your hospital stay or ER visit. Additional Information If you have questions, please visit the Frequently Asked Questions section of the [employer] website at [email or web address] Remember, [employer] is NOT to be used for urgent needs. For medical emergencies, dial 911. Now available from your iPhone and Android! Please provide this summary of care documentation to your next provider. Your primary care clinician is listed as Hedrick Buys. If you have any questions after today's visit, please call [phone].

## 2017-08-30 NOTE — PROGRESS NOTES
Chief Complaint   Patient presents with    New Patient    Urinary Frequency    Nocturia       HISTORY OF PRESENT ILLNESS:  Jennifer Kim is a 62 y.o. female presents to the office today with a long history of some urinary frequency, urgency, double voiding, and hesitancy. She has nocturia ×2 times and occasionally 3 times. She has never had a urinary infection nor has she had any hematuria. She has never had a hysterectomy or oophorectomy and is not on any hormone placement therapy. She has no significant incontinence and does not wear any pads. She is concerned about the urgency and double voiding. Past Medical History:   Diagnosis Date    Allergic rhinitis     Blurred vision     Cardiac echocardiogram 06/11/2014    EF 60%. No RWMA. RVSP 30 mmHg. Mild AI.  Cardiac Holter monitor, normal 11/05/2014    Benign 48-hr Holter study.  Cardiac nuclear imaging test, mod risk 08/14/2015    Intermediate risk. High anterior artifact vs diagonal artery ischemia. Following Lexiscan, 0.5-mm downsloping inferolateral ST depression, resolving 10 min 30 sec of recovery. Arm heaviness noted.  Cardiovascular lower extremity arterial duplex 07/15/2016    Right leg: Mod peripheral arterial disease in femoral segment at rest.  Left leg:  Mild arterial disease at rest.  R KAREN 0.58. L KAREN 0.95. Similar to study of 10/15/14.  Cardiovascular renal angiography 10/27/2014    Bilateral patent renal arteries. Right CFA occluded but collateralized.  Cardiovascular renal duplex 10/13/2014    Aorta w/irregular walls. Severe >60% bilateral renal artery stenosis. Bilateral intrinsic/med renal disease.  Carotid duplex 10/17/2014    Mild <50% DAVID stenosis. Biphasic signals in both subclavians.  Carotid duplex 12/05/2016    Mild < 50% DAVID stenosis.       Cervical disc disease 09/2015    MRI of C-spine at [facility] JO FARAH Dizziness     Ear ache     Essential hypertension     Fainting spell  Frequent headaches     Frequent nosebleeds     Hemorrhoid     Hyperlipidemia     Hypertension     Ill-defined condition     Migraine     Sinus problem     Sinusitis, chronic     Spontaneous pneumothorax 1999    2 episodes on the right within several months of each other    Stomach pain     Weakness of right leg        Past Surgical History:   Procedure Laterality Date    HX BREAST REDUCTION      HX CHOLECYSTECTOMY  11/8/14    HX COLPOSCOPY      HX HEART CATHETERIZATION Bilateral 10/27/14    bilateral lower extremity angiography     HX OTHER SURGICAL Left     shave biopsy ( thigh area)       Social History   Substance Use Topics    Smoking status: Current Every Day Smoker     Packs/day: 0.25     Years: 20.00    Smokeless tobacco: Never Used      Comment: now down to 3 cigarettes per day    Alcohol use No       Allergies   Allergen Reactions    Latex Rash    Keflex [Cephalexin] Rash    Epinephrine Other (comments)     tachycardia    Tetracyclines Nausea Only       Family History   Problem Relation Age of Onset    Deep Vein Thrombosis Father     Coronary Artery Disease Father     Pacemaker Father     Diabetes Father     High Cholesterol Father     Hypertension Father     High Cholesterol Mother     Hypertension Mother     Heart Attack Brother 40    Cancer Brother     Heart Attack Maternal Grandmother 100    Heart Attack Other 48    Hypertension Other     Hypertension Maternal Aunt        Current Outpatient Prescriptions   Medication Sig Dispense Refill    aspirin delayed-release 81 mg tablet Take  by mouth daily.  topiramate (TOPAMAX) 25 mg tablet 1 tablet twice per day 30 Tab 2    metoprolol succinate (TOPROL-XL) 25 mg XL tablet TAKE ONE TABLET BY MOUTH DAILY 90 Tab 2    fluticasone (FLONASE) 50 mcg/actuation nasal spray 2 Sprays by Both Nostrils route daily.       atorvastatin (LIPITOR) 40 mg tablet TAKE ONE TABLET BY MOUTH DAILY 90 Tab 5    labetalol (NORMODYNE) 100 mg tablet Take 100 mg by mouth two (2) times a day.  carboxymethylcellulose sodium 1 % dlgl Apply  to eye.  ERYTHROMYCIN BASE (ERYTHROMYCIN OP) Apply  to eye.  moxifloxacin (VIGAMOX) 0.5 % ophthalmic solution 1 Drop three (3) times daily.  montelukast (SINGULAIR) 10 mg tablet Take 10 mg by mouth daily.  nitroglycerin (NITROSTAT) 0.4 mg SL tablet 1 Tab by SubLINGual route every five (5) minutes as needed for Chest Pain. 25 Tab 3    cetirizine (ZYRTEC) 10 mg tablet Take 10 mg by mouth nightly.  multivitamin (ONE A DAY) tablet Take 1 Tab by mouth daily. REVIEW OF SYSTEMS:   All documented on the chart, refer to that for details. PHYSICAL EXAMINATION:     Visit Vitals    /61 (BP 1 Location: Left arm, BP Patient Position: Sitting)    Pulse 67    Temp 98.8 °F (37.1 °C)    Ht 4' 11\" (1.499 m)    Wt 112 lb (50.8 kg)    SpO2 97%    BMI 22.62 kg/m2     Constitutional: Well developed, well-nourished female in no acute distress. CV:  No peripheral swelling noted  Respiratory: No respiratory distress or difficulties  Abdomen:  Soft and nontender. No masses. No hepatosplenomegaly.  Female: BSU and external genitalia are remarkable primarily for advanced atrophic changes. She does have a little bit of a urethral caruncle but she does not have any significant hyperemia or bleeding associated with that. She does have a moderate midline cystocele but no stress urinary incontinence and she actually has excellent preservation of the vesicourethral angle. I do not detect any pelvic masses, there is no blood in the vagina, and I do not feel any adnexal pathology. Skin:  Normal color. No evidence of jaundice. Neuro/Psych:  Patient with appropriate affect. Alert and oriented. Lymphatic:   No enlargement of supraclavicular lymph nodes.       Results for orders placed or performed in visit on 08/30/17   AMB POC URINALYSIS DIP STICK AUTO W/O MICRO   Result Value Ref Range    Color (UA POC) Yellow     Clarity (UA POC) Clear     Glucose (UA POC) Negative Negative    Bilirubin (UA POC) Negative Negative    Ketones (UA POC) Negative Negative    Specific gravity (UA POC) 1.010 1.001 - 1.035    Blood (UA POC) Negative Negative    pH (UA POC) 5.5 4.6 - 8.0    Protein (UA POC) Negative Negative mg/dL    Urobilinogen (UA POC) 0.2 mg/dL 0.2 - 1    Nitrites (UA POC) Negative Negative    Leukocyte esterase (UA POC) Negative Negative         REVIEW OF LABS AND IMAGING: A bladder scan shows only about 5 cc of postvoid residual.  I do not see any other bladder pathology. Imaging Report Reviewed? YES      Images Reviewed? YES           Other Lab Data Reviewed? YES    ASSESSMENT:     ICD-10-CM ICD-9-CM    1. Frequent urination R35.0 788.41 ID GUERITA,POST-VOID RES,US,NON-IMAGING      AMB POC URINALYSIS DIP STICK AUTO W/O MICRO   2. Nocturia R35.1 788.43 ID GUERITA,POST-VOID RES,US,NON-IMAGING      AMB POC URINALYSIS DIP STICK AUTO W/O MICRO   3. Atrophic vaginitis N95.2 627.3    4. 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 Midline cystocele N81.11 618.01                 PLAN/DISCUSSION: I have gone over with her the findings of her pelvic exam and with her history I think she simply has some mild cystocele and double avoiding associated with that. She has no significant urinary incontinence and my recommendation overall to her would be to simply follow this along. Her last Pap smear and July was perfectly normal and she has an upcoming colonoscopy. I do not think she needs estrogen replacement therapy and she does not want any. I also offered her the option of trying oxybutynin to cut down on the frequency but she preferred to wait on that until she has further problems. In summary I think her changes are on the basis of advanced atrophic vaginitis and urethritis and some pelvic relaxation.   I do not believe any of these are serious at this time and certainly not serious enough to warrant consideration of any surgery. Patient voices understanding and agreement to the plan. Isak Mike MD on 8/30/2017           Please note: This document has been produced using voice recognition software. Unrecognized errors in transcription may be present.

## 2017-08-30 NOTE — PROGRESS NOTES
Ms. Margaux Shukla has a reminder for a \"due or due soon\" health maintenance. I have asked that she contact her primary care provider for follow-up on this health maintenance.

## 2017-08-30 NOTE — PATIENT INSTRUCTIONS
Urine Test: About This Test  What is it? A urine test checks the color, clarity (clear or cloudy), odor, concentration, and acidity (pH) of your urine. It also checks your levels of protein, sugar, blood cells, or other substances in your urine. This test is sometimes called a urinalysis. Why is this test done? A urine test may be done:  · To check for a disease or infection of the urinary tract. The urinary tract includes the kidneys, the tubes that carry urine from the kidneys to the bladder (ureters), and the bladder. It also includes the tube that carries urine from the bladder to outside the body (urethra). · To check the treatment of conditions such as diabetes, kidney stones, a urinary tract infection (UTI), high blood pressure, or some kidney or liver diseases. How can you prepare for the test?  · Before the test, don't eat foods that can change the color of your urine. Examples of these include blackberries, beets, and rhubarb. · Don't do heavy exercise before the test.  · Tell your doctor if you are menstruating or close to starting your period. Your doctor may want to wait to do the test.  · Tell your doctor about all the nonprescription and prescription medicines and herbs or other supplements you take. Some of these can affect the results of this test.  What happens during the test?  A urine test can be done in your doctor's office, clinic, or lab. Or you may be asked to collect a urine sample at home. Then you can take it to the office or lab for testing. Clean-catch midstream urine collection  · Wash your hands before you start. · If the cup you are given has a lid, remove it carefully. Set it down with the inner surface up. Don't touch the inside of the cup with your fingers. · Clean the area around your genitals. ¨ For men: Pull back the foreskin, if present. Clean the head of your penis with medicated towelettes or swabs.   ¨ For women: Spread open the genital folds of skin with one hand. Then use medicated towelettes or swabs in your other hand to clean the area where urine comes out (the urethra). Wipe the area from front to back. · Start urinating into the toilet or urinal. A woman should hold apart the genital folds of skin while she urinates. · After the urine has flowed for several seconds, place the cup into the urine stream. Collect about 2 ounces of urine without stopping your flow of urine. · Don't touch the rim of the cup to your genital area. Don't get toilet paper, pubic hair, stool (feces), menstrual blood, or anything else in the urine sample. · Finish urinating into the toilet or urinal.  · Carefully replace and tighten the lid on the cup, and then return it to the lab. If you are collecting the urine at home and can't get it to the lab in an hour, refrigerate it. Double-voided urine sample collection  This method collects the urine your body is making right now. · Urinate into the toilet or urinal. Don't collect any of this urine. · Drink a large glass of water, and wait about 30 to 40 minutes. · Then get a urine sample. Follow the instructions above for collecting a clean-catch urine sample. · Take the urine sample to the lab. If you are collecting the urine at home and can't get it to the lab in an hour, refrigerate it. Follow-up care is a key part of your treatment and safety. Be sure to make and go to all appointments, and call your doctor if you are having problems. It's also a good idea to keep a list of the medicines you take. Ask your doctor when you can expect to have your test results. Where can you learn more? Go to http://dirk-amaris.info/. Enter R266 in the search box to learn more about \"Urine Test: About This Test.\"  Current as of: October 14, 2016  Content Version: 11.3  © 0202-1067 [employer]  Care instructions adapted under license by [employer] (which disclaims liability or warranty for this information). If you have questions about a medical condition or this instruction, always ask your healthcare professional. [place] [address] any warranty or liability for your use of this information.

## 2017-09-05 ENCOUNTER — HOSPITAL ENCOUNTER (OUTPATIENT)
Dept: MAMMOGRAPHY | Age: 62
Discharge: HOME | End: 2017-09-05
Attending: NURSE PRACTITIONER
Payer: COMMERCIAL

## 2017-09-05 DIAGNOSIS — Z12.31 VISIT FOR SCREENING MAMMOGRAM: ICD-10-CM

## 2017-09-05 PROCEDURE — 77063 BREAST TOMOSYNTHESIS BI: CPT

## 2017-10-12 ENCOUNTER — OFFICE VISIT (OUTPATIENT)
Dept: INTERNAL MEDICINE CLINIC | Age: 62
End: 2017-10-12

## 2017-10-12 VITALS
TEMPERATURE: 96 F | DIASTOLIC BLOOD PRESSURE: 60 MMHG | RESPIRATION RATE: 16 BRPM | HEART RATE: 65 BPM | BODY MASS INDEX: 22.18 KG/M2 | HEIGHT: 59 IN | WEIGHT: 110 LBS | SYSTOLIC BLOOD PRESSURE: 130 MMHG | OXYGEN SATURATION: 98 %

## 2017-10-12 DIAGNOSIS — J01.01 ACUTE RECURRENT MAXILLARY SINUSITIS: ICD-10-CM

## 2017-10-12 DIAGNOSIS — E78.5 HYPERLIPIDEMIA, UNSPECIFIED HYPERLIPIDEMIA TYPE: ICD-10-CM

## 2017-10-12 DIAGNOSIS — I10 ESSENTIAL HYPERTENSION: ICD-10-CM

## 2017-10-12 DIAGNOSIS — R42 DIZZINESS: Primary | ICD-10-CM

## 2017-10-12 RX ORDER — CLARITHROMYCIN 500 MG/1
TABLET, FILM COATED ORAL
Qty: 20 TAB | Refills: 0 | Status: SHIPPED | OUTPATIENT
Start: 2017-10-12 | End: 2017-11-16

## 2017-10-12 RX ORDER — PREDNISONE 20 MG/1
TABLET ORAL
Qty: 20 TAB | Refills: 0 | Status: SHIPPED | OUTPATIENT
Start: 2017-10-12 | End: 2017-11-16

## 2017-10-16 NOTE — PROGRESS NOTES
The patient presents to the office today with the chief complaint of dizziness    HPI    The pateint complains of intermittent dizziness. The work up was negative except for maxillary sinusitis. The patient remains on medications for hypertension and hyperlipidemia. The patient is tolerate ring the medications well. Review of Systems   Respiratory: Negative for shortness of breath. Cardiovascular: Negative for chest pain and leg swelling. Neurological: Positive for dizziness. Allergies   Allergen Reactions    Latex Rash    Keflex [Cephalexin] Rash    Epinephrine Other (comments)     tachycardia    Tetracyclines Nausea Only       Current Outpatient Prescriptions   Medication Sig Dispense Refill    clarithromycin (BIAXIN) 500 mg tablet 1 tablet twice per day with food (Hold Atvorostatin while on Biaxin) 20 Tab 0    predniSONE (DELTASONE) 20 mg tablet 3 tabs daily x 3 days, 2 tabs daily x 3 days, 1 tab daily x 3 days, 1/2 tab daily x 3 days 20 Tab 0    aspirin delayed-release 81 mg tablet Take  by mouth daily.  topiramate (TOPAMAX) 25 mg tablet 1 tablet twice per day 30 Tab 2    metoprolol succinate (TOPROL-XL) 25 mg XL tablet TAKE ONE TABLET BY MOUTH DAILY 90 Tab 2    fluticasone (FLONASE) 50 mcg/actuation nasal spray 2 Sprays by Both Nostrils route daily.  atorvastatin (LIPITOR) 40 mg tablet TAKE ONE TABLET BY MOUTH DAILY 90 Tab 5    nitroglycerin (NITROSTAT) 0.4 mg SL tablet 1 Tab by SubLINGual route every five (5) minutes as needed for Chest Pain. 25 Tab 3    cetirizine (ZYRTEC) 10 mg tablet Take 10 mg by mouth nightly.  labetalol (NORMODYNE) 100 mg tablet Take 100 mg by mouth two (2) times a day.  multivitamin (ONE A DAY) tablet Take 1 Tab by mouth daily.  carboxymethylcellulose sodium 1 % dlgl Apply  to eye.  ERYTHROMYCIN BASE (ERYTHROMYCIN OP) Apply  to eye.  moxifloxacin (VIGAMOX) 0.5 % ophthalmic solution 1 Drop three (3) times daily.       montelukast (SINGULAIR) 10 mg tablet Take 10 mg by mouth daily. Past Medical History:   Diagnosis Date    Allergic rhinitis     Blurred vision     Cardiac echocardiogram 06/11/2014    EF 60%. No RWMA. RVSP 30 mmHg. Mild AI.  Cardiac Holter monitor, normal 11/05/2014    Benign 48-hr Holter study.  Cardiac nuclear imaging test, mod risk 08/14/2015    Intermediate risk. High anterior artifact vs diagonal artery ischemia. Following Lexiscan, 0.5-mm downsloping inferolateral ST depression, resolving 10 min 30 sec of recovery. Arm heaviness noted.  Cardiovascular lower extremity arterial duplex 07/15/2016    Right leg: Mod peripheral arterial disease in femoral segment at rest.  Left leg:  Mild arterial disease at rest.  R KAREN 0.58. L KAREN 0.95. Similar to study of 10/15/14.  Cardiovascular renal angiography 10/27/2014    Bilateral patent renal arteries. Right CFA occluded but collateralized.  Cardiovascular renal duplex 10/13/2014    Aorta w/irregular walls. Severe >60% bilateral renal artery stenosis. Bilateral intrinsic/med renal disease.  Carotid duplex 10/17/2014    Mild <50% DAVID stenosis. Biphasic signals in both subclavians.  Carotid duplex 12/05/2016    Mild < 50% DAVID stenosis.       Cervical disc disease 09/2015    MRI of C-spine at [facility] Dizziness     Ear ache     Essential hypertension     Fainting spell     Frequent headaches     Frequent nosebleeds     Hemorrhoid     Hyperlipidemia     Hypertension     Ill-defined condition     Migraine     Sinus problem     Sinusitis, chronic     Spontaneous pneumothorax 1999    2 episodes on the right within several months of each other    Stomach pain     Weakness of right leg        Past Surgical History:   Procedure Laterality Date    HX BREAST REDUCTION      HX CHOLECYSTECTOMY  11/8/14    HX COLPOSCOPY      HX HEART CATHETERIZATION Bilateral 10/27/14    bilateral lower extremity angiography     HX OTHER SURGICAL Left     shave biopsy ( thigh area)       Social History     Social History    Marital status: SINGLE     Spouse name: N/A    Number of children: N/A    Years of education: N/A     Occupational History    Not on file. Social History Main Topics    Smoking status: Current Every Day Smoker     Packs/day: 0.25     Years: 20.00    Smokeless tobacco: Never Used      Comment: now down to 3 cigarettes per day    Alcohol use No    Drug use: No    Sexual activity: No     Other Topics Concern    Not on file     Social History Narrative       Patient does not have an advanced directive on file    Visit Vitals    /60 (BP 1 Location: Left arm, BP Patient Position: Sitting)    Pulse 65    Temp 96 °F (35.6 °C) (Tympanic)    Resp 16    Ht 4' 11\" (1.499 m)    Wt 110 lb (49.9 kg)    SpO2 98%    BMI 22.22 kg/m2       Physical Exam   No Cervical Lymphadenopathy  No Supraclavicular Lymphadenopathy  Thyroid is Normal  Lungs are clear to ausculation and percussion  Heart:  S1 S2 are normal, No gallops, No mummers  No Carotid Bruits  Abdomen:  Normal Bowel Sounds. No tenderness. No masses. No Hepatomegaly or Splenomegly  LE:  Strong Pedal Pulses.   No Edema      BMI:  OK    Office Visit on 08/30/2017   Component Date Value Ref Range Status    Color (UA POC) 08/30/2017 Yellow   Final    Clarity (UA POC) 08/30/2017 Clear   Final    Glucose (UA POC) 08/30/2017 Negative  Negative Final    Bilirubin (UA POC) 08/30/2017 Negative  Negative Final    Ketones (UA POC) 08/30/2017 Negative  Negative Final    Specific gravity (UA POC) 08/30/2017 1.010  1.001 - 1.035 Final    Blood (UA POC) 08/30/2017 Negative  Negative Final    pH (UA POC) 08/30/2017 5.5  4.6 - 8.0 Final    Protein (UA POC) 08/30/2017 Negative  Negative mg/dL Final    Urobilinogen (UA POC) 08/30/2017 0.2 mg/dL  0.2 - 1 Final    Nitrites (UA POC) 08/30/2017 Negative  Negative Final    Leukocyte esterase (UA POC) 08/30/2017 Negative  Negative Final   Hospital Outpatient Visit on 08/12/2017   Component Date Value Ref Range Status    Sodium 08/12/2017 141  136 - 145 mmol/L Final    Potassium 08/12/2017 4.5  3.5 - 5.5 mmol/L Final    Chloride 08/12/2017 106  100 - 108 mmol/L Final    CO2 08/12/2017 32  21 - 32 mmol/L Final    Anion gap 08/12/2017 3  3.0 - 18 mmol/L Final    Glucose 08/12/2017 92  74 - 99 mg/dL Final    BUN 08/12/2017 14  7.0 - 18 MG/DL Final    Creatinine 08/12/2017 0.57* 0.6 - 1.3 MG/DL Final    BUN/Creatinine ratio 08/12/2017 25* 12 - 20   Final    GFR est AA 08/12/2017 >60  >60 ml/min/1.73m2 Final    GFR est non-AA 08/12/2017 >60  >60 ml/min/1.73m2 Final    Comment: (NOTE)  Estimated GFR is calculated using the Modification of Diet in Renal   Disease (MDRD) Study equation, reported for both  Americans   (GFRAA) and non- Americans (GFRNA), and normalized to 1.73m2   body surface area. The physician must decide which value applies to   the patient. The MDRD study equation should only be used in   individuals age 25 or older. It has not been validated for the   following: pregnant women, patients with serious comorbid conditions,   or on certain medications, or persons with extremes of body size,   muscle mass, or nutritional status.  Calcium 08/12/2017 9.4  8.5 - 10.1 MG/DL Final    Bilirubin, total 08/12/2017 0.4  0.2 - 1.0 MG/DL Final    ALT (SGPT) 08/12/2017 35  13 - 56 U/L Final    AST (SGOT) 08/12/2017 15  15 - 37 U/L Final    Alk.  phosphatase 08/12/2017 84  45 - 117 U/L Final    Protein, total 08/12/2017 7.6  6.4 - 8.2 g/dL Final    Albumin 08/12/2017 4.3  3.4 - 5.0 g/dL Final    Globulin 08/12/2017 3.3  2.0 - 4.0 g/dL Final    A-G Ratio 08/12/2017 1.3  0.8 - 1.7   Final    LIPID PROFILE 08/12/2017        Final    Cholesterol, total 08/12/2017 117  <200 MG/DL Final    Triglyceride 08/12/2017 66  <150 MG/DL Final    Comment: The drugs N-acetylcysteine (NAC) and  Metamiszole have been found to cause falsely  low results in this chemical assay. Please  be sure to submit blood samples obtained  BEFORE administration of either of these  drugs to assure correct results.  HDL Cholesterol 08/12/2017 39* 40 - 60 MG/DL Final    LDL, calculated 08/12/2017 64.8  0 - 100 MG/DL Final    VLDL, calculated 08/12/2017 13.2  MG/DL Final    CHOL/HDL Ratio 08/12/2017 3.0  0 - 5.0   Final    WBC 08/12/2017 8.7  4.6 - 13.2 K/uL Final    RBC 08/12/2017 4.99  4.20 - 5.30 M/uL Final    HGB 08/12/2017 11.6* 12.0 - 16.0 g/dL Final    HCT 08/12/2017 36.2  35.0 - 45.0 % Final    MCV 08/12/2017 72.5* 74.0 - 97.0 FL Final    MCH 08/12/2017 23.2* 24.0 - 34.0 PG Final    MCHC 08/12/2017 32.0  31.0 - 37.0 g/dL Final    RDW 08/12/2017 15.2* 11.6 - 14.5 % Final    PLATELET 55/32/3582 790  135 - 420 K/uL Final    MPV 08/12/2017 12.2* 9.2 - 11.8 FL Final    NEUTROPHILS 08/12/2017 53  40 - 73 % Final    LYMPHOCYTES 08/12/2017 37  21 - 52 % Final    MONOCYTES 08/12/2017 7  3 - 10 % Final    EOSINOPHILS 08/12/2017 2  0 - 5 % Final    BASOPHILS 08/12/2017 1  0 - 2 % Final    ABS. NEUTROPHILS 08/12/2017 4.6  1.8 - 8.0 K/UL Final    ABS. LYMPHOCYTES 08/12/2017 3.2  0.9 - 3.6 K/UL Final    ABS. MONOCYTES 08/12/2017 0.6  0.05 - 1.2 K/UL Final    ABS. EOSINOPHILS 08/12/2017 0.2  0.0 - 0.4 K/UL Final    ABS.  BASOPHILS 08/12/2017 0.1* 0.0 - 0.06 K/UL Final    DF 08/12/2017 AUTOMATED    Final    Color 08/12/2017 YELLOW    Final    Appearance 08/12/2017 CLEAR    Final    Specific gravity 08/12/2017 1.017  1.005 - 1.030   Final    pH (UA) 08/12/2017 5.5  5.0 - 8.0   Final    Protein 08/12/2017 NEGATIVE   NEG mg/dL Final    Glucose 08/12/2017 NEGATIVE   NEG mg/dL Final    Ketone 08/12/2017 NEGATIVE   NEG mg/dL Final    Bilirubin 08/12/2017 NEGATIVE   NEG   Final    Blood 08/12/2017 NEGATIVE   NEG   Final    Urobilinogen 08/12/2017 0.2  0.2 - 1.0 EU/dL Final    Nitrites 08/12/2017 NEGATIVE   NEG   Final    Leukocyte Esterase 08/12/2017 NEGATIVE   NEG   Final       .No results found for any visits on 10/12/17. Assessment / Plan      ICD-10-CM ICD-9-CM    1. Dizziness R42 780.4    2. Hyperlipidemia, unspecified hyperlipidemia type E78.5 272.4    3. Essential hypertension I10 401.9    4. Acute recurrent maxillary sinusitis J01.01 461.0      Biaxin  Prednisone  she was advised to continue her maintenance medications    Follow-up Disposition:  Return in about 5 months (around 3/12/2018). I asked Vinny Servin if she has any questions and I answered the questions. Gladys Servin states that she understands the treatment plan and agrees with the treatment plan

## 2017-10-25 ENCOUNTER — TELEPHONE (OUTPATIENT)
Dept: INTERNAL MEDICINE CLINIC | Age: 62
End: 2017-10-25

## 2017-10-25 DIAGNOSIS — G43.909 MIGRAINE WITHOUT STATUS MIGRAINOSUS, NOT INTRACTABLE, UNSPECIFIED MIGRAINE TYPE: Primary | ICD-10-CM

## 2017-10-26 ENCOUNTER — TELEPHONE (OUTPATIENT)
Dept: INTERNAL MEDICINE CLINIC | Age: 62
End: 2017-10-26

## 2017-10-26 DIAGNOSIS — R06.83 SNORING: Primary | ICD-10-CM

## 2017-11-18 ENCOUNTER — HOSPITAL ENCOUNTER (EMERGENCY)
Age: 62
Discharge: HOME | End: 2017-11-18
Attending: EMERGENCY MEDICINE
Payer: COMMERCIAL

## 2017-11-18 VITALS
DIASTOLIC BLOOD PRESSURE: 76 MMHG | HEART RATE: 84 BPM | SYSTOLIC BLOOD PRESSURE: 102 MMHG | RESPIRATION RATE: 18 BRPM | TEMPERATURE: 98.1 F | OXYGEN SATURATION: 97 %

## 2017-11-18 DIAGNOSIS — I49.1 PAC (PREMATURE ATRIAL CONTRACTION): Primary | ICD-10-CM

## 2017-11-18 DIAGNOSIS — R00.2 PALPITATIONS: ICD-10-CM

## 2017-11-18 LAB
ANION GAP SERPL CALC-SCNC: 8 MMOL/L (ref 3–18)
ATRIAL RATE: 99 BPM
BASOPHILS # BLD: 0 K/UL (ref 0–0.1)
BASOPHILS NFR BLD: 0 % (ref 0–2)
BUN SERPL-MCNC: 10 MG/DL (ref 7–18)
BUN/CREAT SERPL: 15 (ref 12–20)
CALCIUM SERPL-MCNC: 9.2 MG/DL (ref 8.5–10.1)
CALCULATED P AXIS, ECG09: 72 DEGREES
CALCULATED T AXIS, ECG11: 43 DEGREES
CHLORIDE SERPL-SCNC: 110 MMOL/L (ref 100–108)
CO2 SERPL-SCNC: 27 MMOL/L (ref 21–32)
CREAT SERPL-MCNC: 0.67 MG/DL (ref 0.6–1.3)
DIAGNOSIS, 93000: NORMAL
DIFFERENTIAL METHOD BLD: ABNORMAL
EOSINOPHIL # BLD: 0 K/UL (ref 0–0.4)
EOSINOPHIL NFR BLD: 0 % (ref 0–5)
ERYTHROCYTE [DISTWIDTH] IN BLOOD BY AUTOMATED COUNT: 14.9 % (ref 11.6–14.5)
GLUCOSE SERPL-MCNC: 113 MG/DL (ref 74–99)
HCT VFR BLD AUTO: 32.3 % (ref 35–45)
HGB BLD-MCNC: 10.7 G/DL (ref 12–16)
LYMPHOCYTES # BLD: 1.1 K/UL (ref 0.9–3.6)
LYMPHOCYTES NFR BLD: 8 % (ref 21–52)
MAGNESIUM SERPL-MCNC: 2.1 MG/DL (ref 1.6–2.6)
MCH RBC QN AUTO: 23.6 PG (ref 24–34)
MCHC RBC AUTO-ENTMCNC: 33.1 G/DL (ref 31–37)
MCV RBC AUTO: 71.3 FL (ref 74–97)
MONOCYTES # BLD: 1 K/UL (ref 0.05–1.2)
MONOCYTES NFR BLD: 7 % (ref 3–10)
NEUTS SEG # BLD: 12.2 K/UL (ref 1.8–8)
NEUTS SEG NFR BLD: 85 % (ref 40–73)
P-R INTERVAL, ECG05: 192 MS
PLATELET # BLD AUTO: 206 K/UL (ref 135–420)
POTASSIUM SERPL-SCNC: 3.9 MMOL/L (ref 3.5–5.5)
Q-T INTERVAL, ECG07: 344 MS
QRS DURATION, ECG06: 90 MS
QTC CALCULATION (BEZET), ECG08: 441 MS
RBC # BLD AUTO: 4.53 M/UL (ref 4.2–5.3)
RBC MORPH BLD: ABNORMAL
SODIUM SERPL-SCNC: 145 MMOL/L (ref 136–145)
VENTRICULAR RATE, ECG03: 99 BPM
WBC # BLD AUTO: 14.3 K/UL (ref 4.6–13.2)

## 2017-11-18 PROCEDURE — 93005 ELECTROCARDIOGRAM TRACING: CPT

## 2017-11-18 PROCEDURE — 83735 ASSAY OF MAGNESIUM: CPT | Performed by: EMERGENCY MEDICINE

## 2017-11-18 PROCEDURE — 80048 BASIC METABOLIC PNL TOTAL CA: CPT | Performed by: EMERGENCY MEDICINE

## 2017-11-18 PROCEDURE — 85025 COMPLETE CBC W/AUTO DIFF WBC: CPT | Performed by: EMERGENCY MEDICINE

## 2017-11-18 PROCEDURE — 99283 EMERGENCY DEPT VISIT LOW MDM: CPT

## 2017-11-18 NOTE — ED TRIAGE NOTES
Pt in ED on stretcher on monitor in gown with c/o irregular heart beat. Pt had colonoscopy yesterday pt stated she has had diarrhea from the bowel prep. Denies nausea, or vomiting at this time.

## 2017-11-18 NOTE — DISCHARGE INSTRUCTIONS
Cardiac Arrhythmia: Care Instructions  Your Care Instructions    A cardiac arrhythmia is a change in the normal rhythm of the heart. Your heart may beat too fast or too slow or beat with an irregular or skipping rhythm. A change in the heart's rhythm may feel like a really strong heartbeat or a fluttering in your chest. A severe heart rhythm problem can keep the body from getting the blood it needs. This can result in shortness of breath, lightheadedness, and fainting. You may take medicine to treat your condition. Your doctor may recommend a pacemaker or recommend catheter ablation to destroy small parts of the heart that are causing a rhythm problem. Another possible treatment is an implantable cardioverter-defibrillator (ICD). An ICD is a device that gives the heart a shock to return the heart to a normal rhythm. Follow-up care is a key part of your treatment and safety. Be sure to make and go to all appointments, and call your doctor if you are having problems. It's also a good idea to know your test results and keep a list of the medicines you take. How can you care for yourself at home? ?General care  ? · Be safe with medicines. Take your medicines exactly as prescribed. Call your doctor if you think you are having a problem with your medicine. You will get more details on the specific medicines your doctor prescribes. ? · If you received a pacemaker or an ICD, you will get a fact sheet about it. ? · Wear medical alert jewelry that says you have an abnormal heart rhythm. You can buy this at most drugstores. ? Lifestyle changes  ? · Eat a heart-healthy diet. ? · Stay at a healthy weight. Lose weight if you need to. ? · Avoid nicotine, too much alcohol, and illegal drugs (meth, speed, and cocaine). Also, get enough sleep and do not overeat. ? · Ask your doctor whether you can take over-the-counter medicines (such as decongestants).  These can make your heart beat fast.   ? · Talk to your doctor about any limits to activities, such as driving, or tasks where you use power tools or ladders. Activity  ? · Start light exercise if your doctor says you can. Even a small amount will help you get stronger, have more energy, and manage your stress. ? · Get regular exercise. Try for 30 minutes on most days of the week. Ask your doctor what level of exercise is safe for you. If activity is not likely to cause health problems, you probably do not have limits on the type or level of activity that you can do. You may want to walk, swim, bike, or do other activities. ? · When you exercise, watch for signs that your heart is working too hard. You are pushing too hard if you cannot talk while you exercise. If you become short of breath or dizzy or have chest pain, sit down and rest.   ? · Check your pulse daily. Place two fingers on the artery at the palm side of your wrist, in line with your thumb. If your heartbeat seems uneven, talk to your doctor. When should you call for help? Call 911 anytime you think you may need emergency care. For example, call if:  ? · You have symptoms of sudden heart failure. These may include:  ¨ Severe trouble breathing. ¨ A fast or irregular heartbeat. ¨ Coughing up pink, foamy mucus. ¨ You passed out. ? · You have signs of a stroke. These include:  ¨ Sudden numbness, paralysis, or weakness in your face, arm, or leg, especially on only one side of your body. ¨ New problems with walking or balance. ¨ Sudden vision changes. ¨ Drooling or slurred speech. ¨ New problems speaking or understanding simple statements, or feeling confused. ¨ A sudden, severe headache that is different from past headaches. ?Call your doctor now or seek immediate medical care if:  ? · You have new or changed symptoms of heart failure, such as:  ¨ New or increased shortness of breath. ¨ New or worse swelling in your legs, ankles, or feet.   ¨ Sudden weight gain, such as more than 2 to 3 pounds in a day or 5 pounds in a week. (Your doctor may suggest a different range of weight gain.)  ¨ Feeling dizzy or lightheaded or like you may faint. ¨ Feeling so tired or weak that you cannot do your usual activities. ¨ Not sleeping well. Shortness of breath wakes you at night. You need extra pillows to prop yourself up to breathe easier. ? Watch closely for changes in your health, and be sure to contact your doctor if:  ? · You do not get better as expected. Where can you learn more? Go to http://dirk-amaris.info/. Enter A893 in the search box to learn more about \"Cardiac Arrhythmia: Care Instructions. \"  Current as of: September 21, 2016  Content Version: 11.4  © 0702-4199 [employer]. Care instructions adapted under license by [employer] (which disclaims liability or warranty for this information). If you have questions about a medical condition or this instruction, always ask your healthcare professional. [place] [address] any warranty or liability for your use of this information.

## 2017-11-18 NOTE — ED PROVIDER NOTES
HPI Comments: 2:40 AM Grover Santos is a 65 y.o. female with h/o HTN who presents to ED complaining of heart palpatations onset today. Pt states it feels like her heart is pausing every few beats. She states this has happened before but this is the longest an episode of this has lasted she is followed by Dr Elver Trejo. She also had a colonoscopy yesterday and she reports having a burning sensation on her tounge and lips when she was taking the Polyethylene Glycol as part of the prep for it. Pt admits to tobacco use. Pt denies ETOH use, drug use. No other concerns or symptoms at this time. PCP: Lamberto Friedman MD      The history is provided by the patient. Past Medical History:   Diagnosis Date    Allergic rhinitis     Blurred vision     Cardiac echocardiogram 06/11/2014    EF 60%. No RWMA. RVSP 30 mmHg. Mild AI.  Cardiac Holter monitor, normal 11/05/2014    Benign 48-hr Holter study.  Cardiac nuclear imaging test, mod risk 08/14/2015    Intermediate risk. High anterior artifact vs diagonal artery ischemia. Following Lexiscan, 0.5-mm downsloping inferolateral ST depression, resolving 10 min 30 sec of recovery. Arm heaviness noted.  Cardiovascular lower extremity arterial duplex 07/15/2016    Right leg: Mod peripheral arterial disease in femoral segment at rest.  Left leg:  Mild arterial disease at rest.  R KAREN 0.58. L KAREN 0.95. Similar to study of 10/15/14.  Cardiovascular renal angiography 10/27/2014    Bilateral patent renal arteries. Right CFA occluded but collateralized.  Cardiovascular renal duplex 10/13/2014    Aorta w/irregular walls. Severe >60% bilateral renal artery stenosis. Bilateral intrinsic/med renal disease.  Carotid duplex 10/17/2014    Mild <50% DAVID stenosis. Biphasic signals in both subclavians.  Carotid duplex 12/05/2016    Mild < 50% DAVID stenosis.       Cervical disc disease 09/2015    [facility]     Essential hypertension     Fainting spell     Frequent headaches     Frequent nosebleeds     Hemorrhoid     Hyperlipidemia     Hypertension     Ill-defined condition     Migraine     Sinus problem     Sinusitis, chronic     Spontaneous pneumothorax 1999    2 episodes on the right within several months of each other    Stomach pain     Weakness of right leg        Past Surgical History:   Procedure Laterality Date    HX BREAST REDUCTION      HX CHOLECYSTECTOMY  11/8/14    HX COLPOSCOPY      HX HEART CATHETERIZATION Bilateral 10/27/14    bilateral lower extremity angiography     HX OTHER SURGICAL Left     shave biopsy ( thigh area)         Family History:   Problem Relation Age of Onset    Deep Vein Thrombosis Father     Coronary Artery Disease Father     Pacemaker Father     Diabetes Father     High Cholesterol Father     Hypertension Father     High Cholesterol Mother     Hypertension Mother     Heart Attack Brother 40    Cancer Brother     Heart Attack Maternal Grandmother 100    Heart Attack Other 48    Hypertension Other     Hypertension Maternal Aunt        Social History     Social History    Marital status: SINGLE     Spouse name: N/A    Number of children: N/A    Years of education: N/A     Occupational History    Not on file. Social History Main Topics    Smoking status: Current Every Day Smoker     Packs/day: 0.25     Years: 20.00    Smokeless tobacco: Never Used      Comment: now down to 3 cigarettes per day    Alcohol use No    Drug use: No    Sexual activity: No     Other Topics Concern    Not on file     Social History Narrative         ALLERGIES: Latex; Keflex [cephalexin]; Epinephrine; and Tetracyclines    Review of Systems   Constitutional: Negative. Negative for activity change and appetite change. HENT: Negative for congestion, ear discharge, ear pain, facial swelling, nosebleeds, postnasal drip, sinus pressure, sneezing and tinnitus. Eyes: Negative for pain, discharge, redness and visual disturbance. Respiratory: Negative for apnea, cough, choking, chest tightness, shortness of breath, wheezing and stridor. Cardiovascular: Positive for palpitations. Negative for chest pain and leg swelling. Gastrointestinal: Negative for abdominal distention, abdominal pain, anal bleeding, blood in stool, constipation, diarrhea, nausea and vomiting. Genitourinary: Negative for decreased urine volume, difficulty urinating, dyspareunia, dysuria, flank pain, frequency, hematuria, pelvic pain, urgency, vaginal bleeding and vaginal discharge. Musculoskeletal: Negative for arthralgias, back pain, gait problem, joint swelling, myalgias, neck pain and neck stiffness. Skin: Negative for color change. Neurological: Negative for dizziness, tremors, seizures, speech difficulty, weakness, numbness and headaches. Hematological: Negative for adenopathy. Does not bruise/bleed easily. Psychiatric/Behavioral: Negative for agitation, dysphoric mood and self-injury. The patient is not nervous/anxious. All other systems reviewed and are negative. Vitals:    11/18/17 0200   BP: 102/76   Pulse: 84   Resp: 18   Temp: 98.1 °F (36.7 °C)   SpO2: 97%            Physical Exam   Constitutional: She is oriented to person, place, and time. She appears well-developed and well-nourished. HENT:   Head: Normocephalic and atraumatic. Right Ear: External ear normal.   Left Ear: External ear normal.   Nose: Nose normal.   Mouth/Throat: Oropharynx is clear and moist.   Eyes: Conjunctivae and EOM are normal. Pupils are equal, round, and reactive to light. Neck: Normal range of motion. Neck supple. Cardiovascular: Regular rhythm, normal heart sounds and intact distal pulses. Pulmonary/Chest: Effort normal and breath sounds normal. No respiratory distress. She has no wheezes. She has no rales. She exhibits no tenderness. Abdominal: Soft.  Bowel sounds are normal. She exhibits no distension and no mass. There is no tenderness. There is no rebound and no guarding. Musculoskeletal: Normal range of motion. She exhibits no edema. Neurological: She is alert and oriented to person, place, and time. Skin: Skin is warm and dry. No rash noted. No erythema. Psychiatric: She has a normal mood and affect. Her behavior is normal. Judgment normal.   Nursing note and vitals reviewed. MDM  Number of Diagnoses or Management Options  Diagnosis management comments: Palpitations differential diagnosis includes, but is not limited to CAD, blood clot, anemia, thyroid disease, infection, other etiologies Patient began a new med,Diflucan  She sees Dr. Tanesha Menjivar for palpitations   I will begin the workup, obtain labs and diagnostics, treat as indicated and follow the the patient's condition and response.            Amount and/or Complexity of Data Reviewed  Clinical lab tests: ordered and reviewed      ED Course       Procedures    Vitals:  Patient Vitals for the past 12 hrs:   Temp Pulse Resp BP SpO2   11/18/17 0200 98.1 °F (36.7 °C) 84 18 102/76 97 %       Medications ordered:   Medications - No data to display      Lab findings:  Recent Results (from the past 12 hour(s))   EKG, 12 LEAD, INITIAL    Collection Time: 11/18/17 12:58 AM   Result Value Ref Range    Ventricular Rate 99 BPM    Atrial Rate 99 BPM    P-R Interval 192 ms    QRS Duration 90 ms    Q-T Interval 344 ms    QTC Calculation (Bezet) 441 ms    Calculated P Axis 72 degrees    Calculated T Axis 43 degrees    Diagnosis       Sinus rhythm with marked sinus arrhythmia with premature atrial complexes  Otherwise normal ECG  When compared with ECG of 29-JAN-2017 22:25,  premature atrial complexes are now present         EKG interpretation by ED Physician:  01:03 sinus arrhythmia with PAC, no STEMI     X-Ray, CT or other radiology findings or impressions:  No orders to display       Progress notes, Consult notes or additional Procedure notes:     Reevaluation of patient:   I have reassessed the patient. Patient is feeling better. Patient was willing to wait for the results of her studies     Disposition:  Diagnosis: No diagnosis found. Disposition: discharge    Follow-up Information     None           Patient's Medications   Start Taking    No medications on file   Continue Taking    ASPIRIN DELAYED-RELEASE 81 MG TABLET    Take  by mouth nightly. ATORVASTATIN (LIPITOR) 40 MG TABLET    TAKE ONE TABLET BY MOUTH DAILY    CETIRIZINE (ZYRTEC) 10 MG TABLET    Take 10 mg by mouth nightly. FLUTICASONE (FLONASE) 50 MCG/ACTUATION NASAL SPRAY    2 Sprays by Both Nostrils route daily. LABETALOL (NORMODYNE) 100 MG TABLET    Take 100 mg by mouth two (2) times a day. METOPROLOL SUCCINATE (TOPROL-XL) 25 MG XL TABLET    TAKE ONE TABLET BY MOUTH DAILY    NITROGLYCERIN (NITROSTAT) 0.4 MG SL TABLET    1 Tab by SubLINGual route every five (5) minutes as needed for Chest Pain. TOPIRAMATE (TOPAMAX) 25 MG TABLET    1 tablet twice per day   These Medications have changed    No medications on file   Stop Taking    No medications on file         Scribe Kim Vasquez 89. acting as a scribe for and in the presence of Shayy Peralta MD      November 18, 2017 at 2:38 AM       Provider Attestation:      I personally performed the services described in the documentation, reviewed the documentation, as recorded by the scribe in my presence, and it accurately and completely records my words and actions.  November 18, 2017 at 2:38 AM - Shayy Peralta MD

## 2017-11-18 NOTE — ED NOTES
I have reviewed discharge instructions with the patient. The patient verbalized understanding. Patient armband removed and shredded. Pt signed paper discharge instructions removed all belongings and ambulated without distress or discomfort.

## 2017-11-21 ENCOUNTER — OFFICE VISIT (OUTPATIENT)
Dept: INTERNAL MEDICINE CLINIC | Age: 62
End: 2017-11-21

## 2017-11-21 VITALS
RESPIRATION RATE: 16 BRPM | BODY MASS INDEX: 21.37 KG/M2 | DIASTOLIC BLOOD PRESSURE: 62 MMHG | HEART RATE: 64 BPM | OXYGEN SATURATION: 98 % | HEIGHT: 59 IN | WEIGHT: 106 LBS | SYSTOLIC BLOOD PRESSURE: 120 MMHG | TEMPERATURE: 97.6 F

## 2017-11-21 DIAGNOSIS — J06.9 ACUTE URI: Primary | ICD-10-CM

## 2017-11-21 DIAGNOSIS — R00.2 PALPITATIONS: ICD-10-CM

## 2017-11-21 DIAGNOSIS — I10 ESSENTIAL HYPERTENSION: ICD-10-CM

## 2017-11-21 RX ORDER — AZITHROMYCIN 250 MG/1
TABLET, FILM COATED ORAL
Qty: 6 TAB | Refills: 0 | Status: SHIPPED | OUTPATIENT
Start: 2017-11-21 | End: 2017-11-26

## 2017-11-21 NOTE — PROGRESS NOTES
1. Have you been to the ER, urgent care clinic since your last visit? Hospitalized since your last visit? Yes When: last week Where: [facility] Reason for visit: atrial contractions    2. Have you seen or consulted any other health care providers outside of the [address] [place] [address] since your last visit? Include any pap smears or colon screening.  Yes--colo done within last month

## 2017-11-23 NOTE — PROGRESS NOTES
The patient presents to the office today with the chief complaint of palpitations    HPI    The patient complains of palpitations. A long standing problem but it became worse 3 days ago. The patient was seen in the ER with findings of sinus arrhythmia and PACs. The patient complains of sinus congestion and sinus drainage      Review of Systems   HENT: Positive for congestion. Respiratory: Negative for shortness of breath. Cardiovascular: Positive for palpitations. Negative for chest pain and leg swelling. Allergies   Allergen Reactions    Latex Rash    Keflex [Cephalexin] Rash    Epinephrine Other (comments)     tachycardia    Tetracyclines Nausea Only       Current Outpatient Prescriptions   Medication Sig Dispense Refill    azithromycin (ZITHROMAX) 250 mg tablet Take 2 tablets today, then take 1 tablet daily 6 Tab 0    aspirin delayed-release 81 mg tablet Take  by mouth nightly.  topiramate (TOPAMAX) 25 mg tablet 1 tablet twice per day 30 Tab 2    metoprolol succinate (TOPROL-XL) 25 mg XL tablet TAKE ONE TABLET BY MOUTH DAILY 90 Tab 2    fluticasone (FLONASE) 50 mcg/actuation nasal spray 2 Sprays by Both Nostrils route daily.  atorvastatin (LIPITOR) 40 mg tablet TAKE ONE TABLET BY MOUTH DAILY (Patient taking differently: hs) 90 Tab 5    nitroglycerin (NITROSTAT) 0.4 mg SL tablet 1 Tab by SubLINGual route every five (5) minutes as needed for Chest Pain. 25 Tab 3    cetirizine (ZYRTEC) 10 mg tablet Take 10 mg by mouth nightly.  labetalol (NORMODYNE) 100 mg tablet Take 100 mg by mouth two (2) times a day. Past Medical History:   Diagnosis Date    Allergic rhinitis     Blurred vision     Cardiac echocardiogram 06/11/2014    EF 60%. No RWMA. RVSP 30 mmHg. Mild AI.  Cardiac Holter monitor, normal 11/05/2014    Benign 48-hr Holter study.  Cardiac nuclear imaging test, mod risk 08/14/2015    Intermediate risk.   High anterior artifact vs diagonal artery ischemia. Following Lexiscan, 0.5-mm downsloping inferolateral ST depression, resolving 10 min 30 sec of recovery. Arm heaviness noted.  Cardiovascular lower extremity arterial duplex 07/15/2016    Right leg: Mod peripheral arterial disease in femoral segment at rest.  Left leg:  Mild arterial disease at rest.  R KAREN 0.58. L KAREN 0.95. Similar to study of 10/15/14.  Cardiovascular renal angiography 10/27/2014    Bilateral patent renal arteries. Right CFA occluded but collateralized.  Cardiovascular renal duplex 10/13/2014    Aorta w/irregular walls. Severe >60% bilateral renal artery stenosis. Bilateral intrinsic/med renal disease.  Carotid duplex 10/17/2014    Mild <50% DAVID stenosis. Biphasic signals in both subclavians.  Carotid duplex 12/05/2016    Mild < 50% DAVID stenosis.  Cervical disc disease 09/2015    [facility] D[facility]     Ear ache     Essential hypertension     Fainting spell     Frequent headaches     Frequent nosebleeds     Hemorrhoid     Hyperlipidemia     Hypertension     Ill-defined condition     Migraine     Sinus problem     Sinusitis, chronic     Spontaneous pneumothorax 1999    2 episodes on the right within several months of each other    Stomach pain     Weakness of right leg        Past Surgical History:   Procedure Laterality Date    COLONOSCOPY N/A 11/17/2017    COLONOSCOPY, SCREENING with hot bx polypectomy performed by Ivan Simmons MD at [address] [place] [address] HX BREAST REDUCTION      HX CHOLECYSTECTOMY  11/8/14    HX COLPOSCOPY      HX HEART CATHETERIZATION Bilateral 10/27/14    bilateral lower extremity angiography     HX OTHER SURGICAL Left     shave biopsy ( thigh area)       Social History     Social History    Marital status: SINGLE     Spouse name: N/A    Number of children: N/A    Years of education: N/A     Occupational History    Not on file.      Social History Main Topics    Smoking status: Current Every Day Smoker     Packs/day: 0.25     Years: 20.00    Smokeless tobacco: Never Used      Comment: now down to 3 cigarettes per day    Alcohol use No    Drug use: No    Sexual activity: No     Other Topics Concern    Not on file     Social History Narrative       Patient does not have an advanced directive on file    Visit Vitals    /62 (BP 1 Location: Left arm, BP Patient Position: Sitting)    Pulse 64    Temp 97.6 °F (36.4 °C) (Tympanic)    Resp 16    Ht 4' 11\" (1.499 m)    Wt 106 lb (48.1 kg)    SpO2 98%    BMI 21.41 kg/m2       Physical Exam   Ears:  Normal  Oral pharynx:  Normal  No Cervical Lymphadenopathy  No Supraclavicular Lymphadenopathy  Thyroid is Normal  Lungs are normal to percussion. Clear to auscultation   Heart:  S1 S2 are normal, No gallops, No mummers  No Carotid Bruits  Abdomen:  Normal Bowel Sounds. No tenderness. No masses. No Hepatomegaly or Splenomegly  LE:  Strong Pedal Pulses.   No Edema      BMI:  OK    Admission on 11/18/2017, Discharged on 11/18/2017   Component Date Value Ref Range Status    Ventricular Rate 11/18/2017 99  BPM Final    Atrial Rate 11/18/2017 99  BPM Final    P-R Interval 11/18/2017 192  ms Final    QRS Duration 11/18/2017 90  ms Final    Q-T Interval 11/18/2017 344  ms Final    QTC Calculation (Bezet) 11/18/2017 441  ms Final    Calculated P Axis 11/18/2017 72  degrees Final    Calculated T Axis 11/18/2017 43  degrees Final    Diagnosis 11/18/2017    Final                    Value:Sinus rhythm with marked sinus arrhythmia with premature atrial complexes  Otherwise normal ECG  When compared with ECG of 29-JAN-2017 22:25,  premature atrial complexes are now present  Confirmed by Jaya Conteh MD, Poonam Benavidez ([id number]) on 11/18/2017 8:56:11 AM      WBC 11/18/2017 14.3* 4.6 - 13.2 K/uL Final    RBC 11/18/2017 4.53  4.20 - 5.30 M/uL Final    HGB 11/18/2017 10.7* 12.0 - 16.0 g/dL Final    HCT 11/18/2017 32.3* 35.0 - 45.0 % Final    MCV 11/18/2017 71.3* 74.0 - 97.0 FL Final    MCH 11/18/2017 23.6* 24.0 - 34.0 PG Final    MCHC 11/18/2017 33.1  31.0 - 37.0 g/dL Final    RDW 11/18/2017 14.9* 11.6 - 14.5 % Final    PLATELET 41/46/0048 382  135 - 420 K/uL Final    VERIFIED BY SMEAR    NEUTROPHILS 11/18/2017 85* 40 - 73 % Final    LYMPHOCYTES 11/18/2017 8* 21 - 52 % Final    MONOCYTES 11/18/2017 7  3 - 10 % Final    EOSINOPHILS 11/18/2017 0  0 - 5 % Final    BASOPHILS 11/18/2017 0  0 - 2 % Final    ABS. NEUTROPHILS 11/18/2017 12.2* 1.8 - 8.0 K/UL Final    ABS. LYMPHOCYTES 11/18/2017 1.1  0.9 - 3.6 K/UL Final    ABS. MONOCYTES 11/18/2017 1.0  0.05 - 1.2 K/UL Final    ABS. EOSINOPHILS 11/18/2017 0.0  0.0 - 0.4 K/UL Final    ABS. BASOPHILS 11/18/2017 0.0  0.0 - 0.1 K/UL Final    DF 11/18/2017 AUTOMATED    Final    SMEAR SCANNED    RBC COMMENTS 11/18/2017     Final                    Value:MICROCYTOSIS  2+      RBC COMMENTS 11/18/2017     Final                    Value:HYPOCHROMIA  1+      RBC COMMENTS 11/18/2017     Final                    Value:TARGET CELLS  1+      Sodium 11/18/2017 145  136 - 145 mmol/L Final    Potassium 11/18/2017 3.9  3.5 - 5.5 mmol/L Final    Chloride 11/18/2017 110* 100 - 108 mmol/L Final    CO2 11/18/2017 27  21 - 32 mmol/L Final    Anion gap 11/18/2017 8  3.0 - 18 mmol/L Final    Glucose 11/18/2017 113* 74 - 99 mg/dL Final    BUN 11/18/2017 10  7.0 - 18 MG/DL Final    Creatinine 11/18/2017 0.67  0.6 - 1.3 MG/DL Final    BUN/Creatinine ratio 11/18/2017 15  12 - 20   Final    GFR est AA 11/18/2017 >60  >60 ml/min/1.73m2 Final    GFR est non-AA 11/18/2017 >60  >60 ml/min/1.73m2 Final    Comment: (NOTE)  Estimated GFR is calculated using the Modification of Diet in Renal   Disease (MDRD) Study equation, reported for both  Americans   (GFRAA) and non- Americans (GFRNA), and normalized to 1.73m2   body surface area. The physician must decide which value applies to   the patient.  The MDRD study equation should only be used in   individuals age 25 or older. It has not been validated for the   following: pregnant women, patients with serious comorbid conditions,   or on certain medications, or persons with extremes of body size,   muscle mass, or nutritional status.  Calcium 11/18/2017 9.2  8.5 - 10.1 MG/DL Final    Magnesium 11/18/2017 2.1  1.6 - 2.6 mg/dL Final   Office Visit on 08/30/2017   Component Date Value Ref Range Status    Color (UA POC) 08/30/2017 Yellow   Final    Clarity (UA POC) 08/30/2017 Clear   Final    Glucose (UA POC) 08/30/2017 Negative  Negative Final    Bilirubin (UA POC) 08/30/2017 Negative  Negative Final    Ketones (UA POC) 08/30/2017 Negative  Negative Final    Specific gravity (UA POC) 08/30/2017 1.010  1.001 - 1.035 Final    Blood (UA POC) 08/30/2017 Negative  Negative Final    pH (UA POC) 08/30/2017 5.5  4.6 - 8.0 Final    Protein (UA POC) 08/30/2017 Negative  Negative mg/dL Final    Urobilinogen (UA POC) 08/30/2017 0.2 mg/dL  0.2 - 1 Final    Nitrites (UA POC) 08/30/2017 Negative  Negative Final    Leukocyte esterase (UA POC) 08/30/2017 Negative  Negative Final       .No results found for any visits on 11/21/17. Assessment / Plan      ICD-10-CM ICD-9-CM    1. Acute URI J06.9 465.9    2. Essential hypertension I10 401.9    3. Palpitations R00.2 785.1      Z Emmanuel  she was advised to continue her maintenance medications  she is to call if symptoms persist over five days. Follow-up Disposition:  Return in about 4 months (around 3/21/2018). I asked Molli Essex I. Kara Hoit if she has any questions and I answered the questions. Gladys Ordoñez states that she understands the treatment plan and agrees with the treatment plan

## 2017-11-29 ENCOUNTER — TELEPHONE (OUTPATIENT)
Dept: CARDIOLOGY CLINIC | Age: 62
End: 2017-11-29

## 2017-11-29 NOTE — TELEPHONE ENCOUNTER
Spoke with patient . .. She states that she is feeling fine now and just wants to wait until her appointment with Dr. Anselmo Alegria on 12/12/17 to be seen. Nina Galicia, [address]  Female, 65 y.o., 1955  Weight:   48.1 kg (106 lb)  Phone:   [phone]  PCP:   Lamberto Friedman MD  MRN:   [id number]  MyChart: Active  Next Appt (With Me)  None  Next Appt (Any Provider)  12/12/2017    Follow up  Received:  Today       Elsie Holley, DO Angel De Anda                     This patient I believe is a patient of mine who apparently went to the ER with PACs so we need to take a look at having her followed up probably with Manju florentino. ES

## 2017-12-12 ENCOUNTER — OFFICE VISIT (OUTPATIENT)
Dept: CARDIOLOGY CLINIC | Age: 62
End: 2017-12-12

## 2017-12-12 VITALS
SYSTOLIC BLOOD PRESSURE: 134 MMHG | OXYGEN SATURATION: 97 % | BODY MASS INDEX: 22.18 KG/M2 | HEIGHT: 59 IN | HEART RATE: 63 BPM | WEIGHT: 110 LBS | DIASTOLIC BLOOD PRESSURE: 62 MMHG

## 2017-12-12 DIAGNOSIS — M48.02 SPINAL STENOSIS OF CERVICAL REGION: ICD-10-CM

## 2017-12-12 DIAGNOSIS — R00.2 PALPITATIONS: ICD-10-CM

## 2017-12-12 DIAGNOSIS — I35.1 NONRHEUMATIC AORTIC VALVE INSUFFICIENCY: ICD-10-CM

## 2017-12-12 DIAGNOSIS — I10 ESSENTIAL HYPERTENSION: Primary | ICD-10-CM

## 2017-12-12 DIAGNOSIS — J32.9 CHRONIC SINUSITIS, UNSPECIFIED LOCATION: ICD-10-CM

## 2017-12-12 DIAGNOSIS — E78.5 HYPERLIPIDEMIA, UNSPECIFIED HYPERLIPIDEMIA TYPE: ICD-10-CM

## 2017-12-12 NOTE — PROGRESS NOTES
HPI: I saw Kirti Keen in my office today in cardiovascular evaluation regarding her past history of anginal type chest discomfort in the setting of peripheral vascular disease and hypercholesterolemia. Mr. Keron Keen is a pleasant, small, 65year old  female with history of hypertension, hyperlipidemia and peripheral vascular disease with a femoral bruit and known totally occluded right common femoral artery documented in a catheterization to rule out renal artery stenosis, which was completed on 10/27/14 and demonstrated no evidence of renal artery stenosis with both renal arteries widely patent.      She does have history of some bilateral arm heaviness when I saw her in July of 2015, which sounded atypical for angina, but we did do a nuclear myocardial perfusion study on 8/14/15, which was mildly abnormal, showing a mild partially transient perfusion defect involving the high anterior wall with relative sparing of the apex and interventricular septum, most consistent with either soft tissue breast attenuation with breast shifting or possibly some mild ischemia in a diagonal coronary artery. The gated SPECT imaging portion of the study demonstrated normal left ventricular function with an ejection fraction of 71%, but although the stress portion of the test was negative for chest pain, she did develop some downsloping ST depression inferolaterally and complained of some arm heaviness for about five minutes into recovery with EKG changes lasting for 10 minutes and recovery suggesting this might be truly positive.      We actually added Toprol XL 25 mg to her regimen for the possibility that this was anginal, and also for her palpitations, even though she was already on Normodyne, which is beta blocker, alpha blocker for her blood pressure, and we gave her some sublingual nitroglycerin for administration for recurrent arm or chest pain.      Interestingly, she told me at the time of her 10/8/15 visit that she went to the [facility] in [place] and had an MRI of her cervical spine, which demonstrated multilevel disc disease, most prominent in C6-C7, where there was a moderate spinal canal stenosis and severe bilateral neural foraminal narrowings, which would explain her arm pain issues. She comes in today and relates that she is doing reasonably well, although she does complain of some fluttering in her chest from time to time. These are not terribly frequent, but she did bring me an article about the fact that sinusitis can cause arrhythmias and she has been having a lot of problems with sinusitis for which a rather extensive surgery is being contemplated by Dr. Zunilda Levy. It should be noted that she has continued on Toprol 25 mg daily as well as Normodyne 100 mg twice daily for her palpitations and blood pressure. She denies any anginal type chest discomforts or any other cardiovascular complaints at this time. It should note that the patient did have an echocardiogram completed through the [facility] on December 6, 2017 which was essentially completely normal except for what appeared to be moderate aortic insufficiency. Encounter Diagnoses   Name Primary?  Essential hypertension Yes    Hyperlipidemia, unspecified hyperlipidemia type     Palpitations     Spinal stenosis of cervical region     Chronic sinusitis, unspecified location     Nonrheumatic aortic valve insufficiency, mild to moderate        Discussion: This patient appears to be doing reasonably well at this juncture without any symptomatology to suggest the development of symptomatic obstructive coronary artery disease and I do not feel that she should be at any significant increased cardiovascular risk at the time of her planned extensive sinus surgery that is being tentatively planned by Dr. Cecilio Flores.     She did ask me if there would be any problem in her going to [employer] to help with some relief work for couple of weeks and I do not see any cardiovascular reason that she could not do that. Her latest lipid profile that have a copy of was completed on August 12, 2017 which demonstrated total cholesterol 117 with triglycerides of 66, HDL 39, LDL of 64.8, and VLDL of 13.2 which I think is excellent control on Lipitor 40 mg daily. She reportedly had moderate aortic insufficiency on her recent echocardiogram as indicated above, but on examination I only hear a grade 1 diastolic murmur and her heart size and function was completely normal at the time of the echo so at this time I do not think her aortic insufficiency is of any clinical concern. Her blood pressure is well-controlled today and her EKG is stable so I am simply going to plan to see her again in several months or as needed if any new cardiovascular symptoms surface in the interim. PCP: Martina Slaughter MD      Past Medical History:   Diagnosis Date    Allergic rhinitis     Blurred vision     Cardiac echocardiogram 06/11/2014    EF 60%. No RWMA. RVSP 30 mmHg. Mild AI.  Cardiac Holter monitor, normal 11/05/2014    Benign 48-hr Holter study.  Cardiac nuclear imaging test, mod risk 08/14/2015    Intermediate risk. High anterior artifact vs diagonal artery ischemia. Following Lexiscan, 0.5-mm downsloping inferolateral ST depression, resolving 10 min 30 sec of recovery. Arm heaviness noted.  Cardiovascular lower extremity arterial duplex 07/15/2016    Right leg: Mod peripheral arterial disease in femoral segment at rest.  Left leg:  Mild arterial disease at rest.  R KAREN 0.58. L KAREN 0.95. Similar to study of 10/15/14.  Cardiovascular renal angiography 10/27/2014    Bilateral patent renal arteries. Right CFA occluded but collateralized.  Cardiovascular renal duplex 10/13/2014    Aorta w/irregular walls. Severe >60% bilateral renal artery stenosis. Bilateral intrinsic/med renal disease.     Carotid duplex 10/17/2014    Mild <50% DAVID stenosis. Biphasic signals in both subclavians.  Carotid duplex 12/05/2016    Mild < 50% DAVID stenosis.  Cervical disc disease 09/2015    MRI of C-spine at [facility] JO FARAH Dizziness     Ear ache     Essential hypertension     Fainting spell     Frequent headaches     Frequent nosebleeds     Hemorrhoid     Hyperlipidemia     Hypertension     Ill-defined condition     Migraine     Sinus problem     Sinusitis, chronic     Spontaneous pneumothorax 1999    2 episodes on the right within several months of each other    Stomach pain     Weakness of right leg        Past Surgical History:   Procedure Laterality Date    COLONOSCOPY N/A 11/17/2017    COLONOSCOPY, SCREENING with hot bx polypectomy performed by Elizabeth Combs MD at [address] [place] [address] HX CHOLECYSTECTOMY  11/8/14    HX COLPOSCOPY      HX HEART CATHETERIZATION Bilateral 10/27/14    bilateral lower extremity angiography     HX OTHER SURGICAL Left     shave biopsy ( thigh area)       Current Outpatient Rx   Name  Route  Sig  Dispense  Refill    atorvastatin (LIPITOR) 40 mg tablet    Oral    Take 1 Tab by mouth daily. 30 Tab    6      metoprolol succinate (TOPROL-XL) 25 mg XL tablet    Oral    Take 1 Tab by mouth daily. 90 Tab    3      nitroglycerin (NITROSTAT) 0.4 mg SL tablet    SubLINGual    1 Tab by SubLINGual route every five (5) minutes as needed for Chest Pain. 25 Tab    3      cetirizine (ZYRTEC) 10 mg tablet    Oral    Take 10 mg by mouth nightly.  labetalol (NORMODYNE) 100 mg tablet    Oral    Take 100 mg by mouth two (2) times a day.  zolmitriptan (ZOMIG) 2.5 mg tablet    Oral    Take 2.5 mg by mouth as needed for Migraine.  multivitamin (ONE A DAY) tablet    Oral    Take 1 Tab by mouth daily.                  Allergies   Allergen Reactions    Latex Rash    Keflex [Cephalexin] Rash    Epinephrine Other (comments)     tachycardia    Tetracyclines Nausea Only       Social History :  Social History   Substance Use Topics    Smoking status: Current Every Day Smoker     Packs/day: 0.25     Years: 20.00    Smokeless tobacco: Never Used      Comment: now down to 3 cigarettes per day    Alcohol use No        Family History: family history includes Cancer in her brother; Coronary Artery Disease in her father; Deep Vein Thrombosis in her father; Diabetes in her father; Heart Attack (age of onset: Juan Ramon Stuart 1560) in her maternal grandmother; Heart Attack (age of onset: 40) in her brother; Heart Attack (age of onset: 48) in an other family member; High Cholesterol in her father and mother; Hypertension in her father, maternal aunt, mother, and another family member; Pacemaker in her father. Review of Systems:  Constitutional: Negative for chills, fever, malaise/fatigue and weight loss. Respiratory: Negative for cough, hemoptysis, shortness of breath and wheezing. Cardiovascular: Positive for palpitations. Negative for chest pain, orthopnea and leg swelling. Gastrointestinal: Negative. Musculoskeletal: Negative for falls, joint pain and myalgias. Neurological: Negative for dizziness. Physical Exam:    The patient is a cooperative, alert, well developed, well nourished 65 y.o.  female who is in no acute distress at the time of the examination. Visit Vitals    /62    Pulse 63    Ht 4' 11\" (1.499 m)    Wt 49.9 kg (110 lb)    SpO2 97%    BMI 22.22 kg/m2       HEENT: Conjuctiva white, mucosa moist, no pallor or cyanosis. NECK: Supple without masses, tenderness or thyromegaly. There was no jugular venous distention. Carotid are full bilaterally with bilateral  bruits vs. radiation of murmur from the heart. CHEST: Symmetrical with good excursion. LUNGS: Clear to auscultation in all fields. HEART: The apex is not displaced. There were no lifts, thrills or heaves. There is a normal S1 and S2.  There is a grade 2/6 systolic ejection murmur along the left sternal border with radiation to the base with a grade 1/6 diastolic decrescendo murmur along the LSB without appreciable rubs, clicks, or gallops auscultated. ABDOMEN: Soft without masses, tenderness or organomegaly. There is a fairly loud bruit just above the umbilicus in the midline consistent with possible renal arteries stenosis as well as significant bifemoral bruits  EXTREMITIES: Full peripheral pulses on the left with decrease PT and DP pulses on the right without peripheral edema. Review of Data: See PMH and Cardiology and Imaging sections for cardiac testing      Results for orders placed or performed in visit on 12/12/17   AMB POC EKG ROUTINE W/ 12 LEADS, INTER & REP     Status: None    Narrative    Normal sinus rhythm, rate 63. This EKG is within normal limits. Compared to the EKG of November 18, 2017 the frequent PACs at that time are no longer seen. John Latham D.O., F.A.C.C. [facility]  [facility] [place]  [place] [address]. [address] [address]    PLEASE NOTE:  This document has been produced using voice recognition software. Unrecognized errors in transcription may be present.

## 2017-12-12 NOTE — PROGRESS NOTES
Review of Systems   Constitutional: Negative for chills, fever, malaise/fatigue and weight loss. Respiratory: Negative for cough, hemoptysis, shortness of breath and wheezing. Cardiovascular: Positive for palpitations. Negative for chest pain, orthopnea and leg swelling. Gastrointestinal: Negative. Musculoskeletal: Negative for falls, joint pain and myalgias. Neurological: Negative for dizziness.

## 2017-12-12 NOTE — MR AVS SNAPSHOT
Visit Information Date & Time Provider Department Dept. Phone Encounter #  
 12/12/2017  3:40 PM Mackenzie Janet, [address] [place] [facility] [address] [id number] Your Appointments 12/19/2017  4:15 PM  
Office Visit with Yarely Murrieta MD  
[facility] INTERNAL MEDICINE OF [facility]) Appt Note: 2MONTH F/U  
 [address], [address] [address]  
  
   
 [address], [address] [place] [address] Upcoming Health Maintenance Date Due Hepatitis C Screening 1955 Pneumococcal 19-64 Medium Risk (1 of 1 - PPSV23) 10/2/1975 DTaP/Tdap/Td series (1 - Tdap) 10/2/1977 ZOSTER VACCINE AGE 60> 8/2/2016 Influenza Age 5 to Adult 8/1/2017 PAP AKA CERVICAL CYTOLOGY 7/31/2020 COLONOSCOPY 11/17/2027 Allergies as of 12/12/2017  Review Complete On: 11/23/2017 By: Yarely Murrieta MD  
  
 Severity Noted Reaction Type Reactions Latex  10/27/2014   Topical Rash Keflex [Cephalexin] Medium 11/16/2016    Rash Epinephrine    Other (comments)  
 tachycardia Tetracyclines    Nausea Only Current Immunizations  Reviewed on 10/10/2017 No immunizations on file. Not reviewed this visit You Were Diagnosed With   
  
 Codes Comments Essential hypertension    -  Primary ICD-10-CM: I10 
ICD-9-CM: 401.9 Hyperlipidemia, unspecified hyperlipidemia type     ICD-10-CM: E78.5 ICD-9-CM: 272.4 Palpitations     ICD-10-CM: R00.2 ICD-9-CM: 785.1 Vitals BP Pulse Height(growth percentile) Weight(growth percentile) SpO2 BMI  
 134/62 63 4' 11\" (1.499 m) 110 lb (49.9 kg) 97% 22.22 kg/m2 OB Status Smoking Status Postmenopausal Current Every Day Smoker Vitals History BMI and BSA Data Body Mass Index Body Surface Area  
 22.22 kg/m 2 1.44 m 2 Preferred Pharmacy Pharmacy Name Phone [facility] [address], [address] [phone] Your Updated Medication List  
  
   
This list is accurate as of: 12/12/17  4:14 PM.  Always use your most recent med list.  
  
  
  
  
 aspirin delayed-release 81 mg tablet Take  by mouth nightly. atorvastatin 40 mg tablet Commonly known as:  LIPITOR  
TAKE ONE TABLET BY MOUTH DAILY  
  
 FLONASE 50 mcg/actuation nasal spray Generic drug:  fluticasone 2 Sprays by Both Nostrils route daily. labetalol 100 mg tablet Commonly known as:  Charline Bump Take 100 mg by mouth two (2) times a day. metoprolol succinate 25 mg XL tablet Commonly known as:  TOPROL-XL  
TAKE ONE TABLET BY MOUTH DAILY  
  
 nitroglycerin 0.4 mg SL tablet Commonly known as:  NITROSTAT  
1 Tab by SubLINGual route every five (5) minutes as needed for Chest Pain. topiramate 25 mg tablet Commonly known as:  TOPAMAX  
1 tablet twice per day ZyrTEC 10 mg tablet Generic drug:  cetirizine Take 10 mg by mouth nightly. We Performed the Following AMB POC EKG ROUTINE W/ 12 LEADS, INTER & REP [13038 CPT(R)] Introducing [facility]! Dear Ruby Kidd: Thank you for requesting a [employer] account. Our records indicate that you already have an active [employer] account. You can access your account anytime at [email or web address] Did you know that you can access your hospital and ER discharge instructions at any time in [employer]? You can also review all of your test results from your hospital stay or ER visit. Additional Information If you have questions, please visit the Frequently Asked Questions section of the [employer] website at [email or web address] Remember, [employer] is NOT to be used for urgent needs. For medical emergencies, dial 911. Now available from your iPhone and Android! Please provide this summary of care documentation to your next provider. Your primary care clinician is listed as Keven Celis. If you have any questions after today's visit, please call [phone].

## 2017-12-12 NOTE — PROGRESS NOTES
1. Have you been to the ER, urgent care clinic since your last visit? Hospitalized since your last visit? yes, irregular heart beat 11-18-17, screening of malignant neoplasm of colon    2. Have you seen or consulted any other health care providers outside of the [address] [place] [address] since your last visit? Include any pap smears or colon screening.  no

## 2017-12-12 NOTE — LETTER
12/12/2017 4:11 PM 
 
Ms. Gerry Denton [address] [facility] [address] Gerry Denton was seen in our office on 12/12/2017 for cardiac evaluation. From a cardiac standpoint she is ok to work and to travel out of the country. Please feel free to contact our office if you have any questions regarding this patient.   
 
 
Sincerely, 
 
 
Kory Sterling, DO

## 2017-12-13 ENCOUNTER — DOCUMENTATION ONLY (OUTPATIENT)
Dept: CARDIOLOGY CLINIC | Age: 62
End: 2017-12-13

## 2017-12-13 NOTE — PROGRESS NOTES
Office note sent . .. Per Dr. Triny Centeno. Gricelda Ordoñez, [address]  Female, 65 y.o., 1955  Weight:   49.9 kg (110 lb)  Phone:   [phone]  PCP:   Gregory Liriano MD  MRN:   [id number]  MyChart:    Active  Next Appt (With Me)  None  Next Appt (Any Provider)  12/19/2017    Message  Received: Yesterday       Zahraa Garzon, DO Lili De Anda                     Copy to Sammie Silva MD              Office Visit for Chest Pain

## 2017-12-19 ENCOUNTER — OFFICE VISIT (OUTPATIENT)
Dept: INTERNAL MEDICINE CLINIC | Age: 62
End: 2017-12-19

## 2017-12-19 VITALS
HEIGHT: 59 IN | OXYGEN SATURATION: 98 % | DIASTOLIC BLOOD PRESSURE: 50 MMHG | WEIGHT: 110 LBS | SYSTOLIC BLOOD PRESSURE: 124 MMHG | RESPIRATION RATE: 16 BRPM | HEART RATE: 68 BPM | TEMPERATURE: 98.2 F | BODY MASS INDEX: 22.18 KG/M2

## 2017-12-19 DIAGNOSIS — J01.01 ACUTE RECURRENT MAXILLARY SINUSITIS: ICD-10-CM

## 2017-12-19 DIAGNOSIS — I10 ESSENTIAL HYPERTENSION WITH GOAL BLOOD PRESSURE LESS THAN 130/80: ICD-10-CM

## 2017-12-19 DIAGNOSIS — I10 ESSENTIAL HYPERTENSION: ICD-10-CM

## 2017-12-19 DIAGNOSIS — E78.00 PURE HYPERCHOLESTEROLEMIA: ICD-10-CM

## 2017-12-19 DIAGNOSIS — R09.89 FEMORAL BRUIT: ICD-10-CM

## 2017-12-19 DIAGNOSIS — R42 DIZZINESS: Primary | ICD-10-CM

## 2017-12-19 RX ORDER — MECLIZINE HYDROCHLORIDE 25 MG/1
TABLET ORAL
Qty: 60 TAB | Refills: 1 | Status: SHIPPED | OUTPATIENT
Start: 2017-12-19 | End: 2019-05-03 | Stop reason: ALTCHOICE

## 2017-12-19 RX ORDER — CLINDAMYCIN HYDROCHLORIDE 300 MG/1
300 CAPSULE ORAL 3 TIMES DAILY
Qty: 30 CAP | Refills: 0 | Status: SHIPPED | OUTPATIENT
Start: 2017-12-19 | End: 2017-12-29

## 2017-12-19 RX ORDER — PREDNISONE 20 MG/1
TABLET ORAL
Qty: 20 TAB | Refills: 0 | Status: SHIPPED | OUTPATIENT
Start: 2017-12-19 | End: 2018-01-28 | Stop reason: ALTCHOICE

## 2017-12-19 NOTE — PATIENT INSTRUCTIONS
Health Maintenance Due   Topic    Hepatitis C Screening     Pneumococcal 19-64 Medium Risk (1 of 1 - PPSV23)    DTaP/Tdap/Td series (1 - Tdap)    ZOSTER VACCINE AGE 60>     Influenza Age 5 to Adult

## 2017-12-19 NOTE — PROGRESS NOTES
1. Have you been to the ER, urgent care clinic since your last visit? Hospitalized since your last visit? No    2. Have you seen or consulted any other health care providers outside of the [address] [place] [address] since your last visit? Include any pap smears or colon screening.  ENT - Sinus

## 2017-12-19 NOTE — MR AVS SNAPSHOT
Sukhjinder Huerta 
 
 
 [address] [facility], [address] [facility] [address] 
[phone] Patient: Katrin Rea MRN: [id number] :1955 Visit Information Date & Time Provider Department Dept. Phone Encounter #  
 2017  4:15 PM Jessi Lira MD [facility] INTERNAL MEDICINE OF [address] [id number] Follow-up Instructions Return in about 3 months (around 3/19/2018). Follow-up and Disposition History Your Appointments 2018  4:15 PM  
Follow Up with Jessi Lira MD  
[facility] INTERNAL MEDICINE OF [address]) Appt Note: 1 mo f/u  
 [address] [facility], [address] [place] [address]  
[phone]  
  
   
 [address] [facility], [address] [facility] [address]  
  
    
 2018  3:00 PM  
Follow Up with Alexandra Mars DO [facility] ([facility]) Appt Note: 6 month follow up [place] [address] [address] [address] [phone] [address] [place] [address] [address] Upcoming Health Maintenance Date Due Hepatitis C Screening 1955 Pneumococcal 19-64 Medium Risk (1 of 1 - PPSV23) 10/2/1975 DTaP/Tdap/Td series (1 - Tdap) 10/2/1977 ZOSTER VACCINE AGE 60> 2016 Influenza Age 5 to Adult 2017 BREAST CANCER SCRN MAMMOGRAM 2019 PAP AKA CERVICAL CYTOLOGY 2020 COLONOSCOPY 2027 Allergies as of 2017  Review Complete On: 2017 By: Jessi Lira MD  
  
 Severity Noted Reaction Type Reactions Latex  10/27/2014   Topical Rash Keflex [Cephalexin] Medium 2016    Rash Epinephrine    Other (comments)  
 tachycardia Tetracyclines    Nausea Only Current Immunizations  Reviewed on 10/10/2017 No immunizations on file. Not reviewed this visit You Were Diagnosed With   
  
 Codes Comments  Dizziness    -  Primary ICD-10-CM: X76 
 ICD-9-CM: 780.4 Essential hypertension     ICD-10-CM: I10 
ICD-9-CM: 401.9 Acute recurrent maxillary sinusitis     ICD-10-CM: J01.01 
ICD-9-CM: 461.0 Vitals BP Pulse Temp Resp Height(growth percentile) Weight(growth percentile) 124/50 (BP 1 Location: Left arm, BP Patient Position: Sitting) 68 98.2 °F (36.8 °C) 16 4' 11\" (1.499 m) 110 lb (49.9 kg) SpO2 BMI OB Status Smoking Status 98% 22.22 kg/m2 Postmenopausal Current Every Day Smoker Vitals History BMI and BSA Data Body Mass Index Body Surface Area  
 22.22 kg/m 2 1.44 m 2 Preferred Pharmacy Pharmacy Name Phone [facility] [address], [facility] [phone] Your Updated Medication List  
  
   
This list is accurate as of: 12/19/17 11:59 PM.  Always use your most recent med list.  
  
  
  
  
 aspirin delayed-release 81 mg tablet Take  by mouth nightly. atorvastatin 40 mg tablet Commonly known as:  LIPITOR  
TAKE ONE TABLET BY MOUTH DAILY  
  
 clindamycin 300 mg capsule Commonly known as:  CLEOCIN Take 1 Cap by mouth three (3) times daily for 10 days. FLONASE 50 mcg/actuation nasal spray Generic drug:  fluticasone 2 Sprays by Both Nostrils route daily. labetalol 100 mg tablet Commonly known as:  Kylie President Take 100 mg by mouth two (2) times a day. meclizine 25 mg tablet Commonly known as:  ANTIVERT  
1 tablet three times per day  
  
 metoprolol succinate 25 mg XL tablet Commonly known as:  TOPROL-XL  
TAKE ONE TABLET BY MOUTH DAILY  
  
 nitroglycerin 0.4 mg SL tablet Commonly known as:  NITROSTAT  
1 Tab by SubLINGual route every five (5) minutes as needed for Chest Pain. predniSONE 20 mg tablet Commonly known as:  DELTASONE  
3 tabs daily x 3 days, 2 tabs daily x 3 days, 1 tab daily x 3 days, 1/2 tab daily x 3 days  
  
 topiramate 25 mg tablet Commonly known as:  TOPAMAX  
1 tablet twice per day ZyrTEC 10 mg tablet Generic drug:  cetirizine Take 10 mg by mouth nightly. Prescriptions Sent to Pharmacy Refills  
 clindamycin (CLEOCIN) 300 mg capsule 0 Sig: Take 1 Cap by mouth three (3) times daily for 10 days. Class: Normal  
 Pharmacy: T[facility] [address] [facility], [address] [place] [address] Ph #: [phone] Route: Oral  
 predniSONE (DELTASONE) 20 mg tablet 0 Sig: 3 tabs daily x 3 days, 2 tabs daily x 3 days, 1 tab daily x 3 days, 1/2 tab daily x 3 days Class: Normal  
 Pharmacy: [facility]ana Montes Ph #: [phone]  
 meclizine (ANTIVERT) 25 mg tablet 1 Si tablet three times per day Class: Normal  
 Pharmacy: Tana Suggs [facility] [address] [facility], [address] [place] [address] Ph #: [phone] Follow-up Instructions Return in about 3 months (around 3/19/2018). To-Do List   
 2018 8:30 PM  
  Appointment with [address] [place] [address] Road [address] at [address] [place], [place] [phone] [address] [facility]:      [facility] [phone]: [facility], 4th floor, [place], [facility] [address]      DR. LYS [facility] [phone]; [address] [place] [address], [address]  Patient instructions ·  Please do not arrive prior to your scheduled appointment time as your room may not be ready. ·  Avoid afternoon naps, caffeine and alcoholic beverages the day of your study. ·  Please bring pajamas men bottoms, women tops and bottoms. We   ask that you do not bring a one piece nightgown to sleep in. ·  Please do not apply lotion after shower the day of your appointment  ·  Please do not apply leave in hair products, such as, oils, conditioners or hairspray. ·  Remove any hairpieces, such as, extensions, weaves & sewn in wigs prior to your appointment. If you arrive with sewn in hairpieces, we will   reschedule your procedure.   The [facility] are outpatient testing department. ·  We encourage you to bring a non-alcoholic/ non-caffeinated beverage and snack, if desired. The cafeteria is closed at night. ·  Please bring any medications that are routinely taken prior to bed. If you have been given a sedative for the study,  DO NOT TAKE THE SEDATIVE BEFORE ARRIVAL. Be advised that if the sedative is taken, we recommend that you not drive for 10 hours after taking it. ·  Diabetic patients should bring testing device, snack and any medications that may be needed. ·  Patients who require breathing treatments should bring the unit with them. ·  The person having the sleep study is the only person allowed in the testing room. If another individual needs to be present throughout the night to assist in the patients care, arrangements must be made prior to the scheduled study date. ·  During the study, we encourage a time free environment. Please refrain from checking the time. ·  The technologist will ask you to turn off your cell phone. ·  Televisions are available in each room but cannot remain on during the study; it interferes with monitoring equipment. ·  During the study, the technologist will ask you to sleep on your back for a portion of the night. ·  Showers are available following your sleep study, please bring any toiletry items. We will provide washcloths and towels. Thank you for choosing the [address] [facility]. If you have any questions prior to your appointment, please do not hesitate to contact us at [phone].  
  
 03/06/2018 7:30 AM  
  Appointment with [address] at [address] [place] ([phone]) Patient Instructions Health Maintenance Due Topic  Hepatitis C Screening  Pneumococcal 19-64 Medium Risk (1 of 1 - PPSV23)  DTaP/Tdap/Td series (1 - Tdap)  ZOSTER VACCINE AGE 60>   
 Influenza Age 5 to Adult Introducing [facility]!    
 Dear Ruby Kidd: 
 Thank you for requesting a Swarm account. Our records indicate that you already have an active Swarm account. You can access your account anytime at [email or web address] Did you know that you can access your hospital and ER discharge instructions at any time in Swarm? You can also review all of your test results from your hospital stay or ER visit. Additional Information If you have questions, please visit the Frequently Asked Questions section of the Swarm website at [email or web address] Remember, Swarm is NOT to be used for urgent needs. For medical emergencies, dial 911. Now available from your iPhone and Android! Please provide this summary of care documentation to your next provider. Your primary care clinician is listed as Ellen Peñaloza. If you have any questions after today's visit, please call [phone].

## 2017-12-21 RX ORDER — METOPROLOL SUCCINATE 25 MG/1
TABLET, EXTENDED RELEASE ORAL
Qty: 90 TAB | Refills: 3 | Status: SHIPPED | OUTPATIENT
Start: 2017-12-21 | End: 2019-01-12 | Stop reason: SDUPTHER

## 2017-12-21 RX ORDER — ATORVASTATIN CALCIUM 40 MG/1
TABLET, FILM COATED ORAL
Qty: 90 TAB | Refills: 3 | Status: SHIPPED | OUTPATIENT
Start: 2017-12-21 | End: 2019-01-12 | Stop reason: SDUPTHER

## 2017-12-21 NOTE — PROGRESS NOTES
The patient presents to the office today with the chief complaint of Sinus Congestion    HPI    The patient complains of sinus congestion with facial fullness. she denies fever. The patient continues with cigarette use. ROS    Allergies   Allergen Reactions    Latex Rash    Keflex [Cephalexin] Rash    Epinephrine Other (comments)     tachycardia    Tetracyclines Nausea Only       Current Outpatient Prescriptions   Medication Sig Dispense Refill    clindamycin (CLEOCIN) 300 mg capsule Take 1 Cap by mouth three (3) times daily for 10 days. 30 Cap 0    predniSONE (DELTASONE) 20 mg tablet 3 tabs daily x 3 days, 2 tabs daily x 3 days, 1 tab daily x 3 days, 1/2 tab daily x 3 days 20 Tab 0    meclizine (ANTIVERT) 25 mg tablet 1 tablet three times per day 60 Tab 1    aspirin delayed-release 81 mg tablet Take  by mouth nightly.  topiramate (TOPAMAX) 25 mg tablet 1 tablet twice per day 30 Tab 2    metoprolol succinate (TOPROL-XL) 25 mg XL tablet TAKE ONE TABLET BY MOUTH DAILY 90 Tab 2    fluticasone (FLONASE) 50 mcg/actuation nasal spray 2 Sprays by Both Nostrils route daily.  atorvastatin (LIPITOR) 40 mg tablet TAKE ONE TABLET BY MOUTH DAILY (Patient taking differently: hs) 90 Tab 5    nitroglycerin (NITROSTAT) 0.4 mg SL tablet 1 Tab by SubLINGual route every five (5) minutes as needed for Chest Pain. 25 Tab 3    cetirizine (ZYRTEC) 10 mg tablet Take 10 mg by mouth nightly.  labetalol (NORMODYNE) 100 mg tablet Take 100 mg by mouth two (2) times a day. Past Medical History:   Diagnosis Date    Allergic rhinitis     Blurred vision     Cardiac echocardiogram 06/11/2014    EF 60%. No RWMA. RVSP 30 mmHg. Mild AI.  Cardiac Holter monitor, normal 11/05/2014    Benign 48-hr Holter study.  Cardiac nuclear imaging test, mod risk 08/14/2015    Intermediate risk. High anterior artifact vs diagonal artery ischemia.   Following Lexiscan, 0.5-mm downsloping inferolateral ST depression, resolving 10 min 30 sec of recovery. Arm heaviness noted.  Cardiovascular lower extremity arterial duplex 07/15/2016    Right leg: Mod peripheral arterial disease in femoral segment at rest.  Left leg:  Mild arterial disease at rest.  R KAREN 0.58. L KAREN 0.95. Similar to study of 10/15/14.  Cardiovascular renal angiography 10/27/2014    Bilateral patent renal arteries. Right CFA occluded but collateralized.  Cardiovascular renal duplex 10/13/2014    Aorta w/irregular walls. Severe >60% bilateral renal artery stenosis. Bilateral intrinsic/med renal disease.  Carotid duplex 10/17/2014    Mild <50% DAVID stenosis. Biphasic signals in both subclavians.  Carotid duplex 12/05/2016    Mild < 50% DAVID stenosis.  Cervical disc disease 09/2015    [facility] D[facility]     Ear ache     Essential hypertension     Fainting spell     Frequent headaches     Frequent nosebleeds     Hemorrhoid     Hyperlipidemia     Hypertension     Ill-defined condition     Migraine     Sinus problem     Sinusitis, chronic     Spontaneous pneumothorax 1999    2 episodes on the right within several months of each other    Stomach pain     Weakness of right leg        Past Surgical History:   Procedure Laterality Date    COLONOSCOPY N/A 11/17/2017    COLONOSCOPY, SCREENING with hot bx polypectomy performed by Chinedu Greer MD at [address] [place] [address] HX BREAST REDUCTION      HX CHOLECYSTECTOMY  11/8/14    HX COLPOSCOPY      HX HEART CATHETERIZATION Bilateral 10/27/14    bilateral lower extremity angiography     HX OTHER SURGICAL Left     shave biopsy ( thigh area)       Social History     Social History    Marital status: SINGLE     Spouse name: N/A    Number of children: N/A    Years of education: N/A     Occupational History    Not on file.      Social History Main Topics    Smoking status: Current Every Day Smoker     Packs/day: 0.25     Years: 20.00    Smokeless tobacco: Never Used      Comment: now down to 3 cigarettes per day    Alcohol use No    Drug use: No    Sexual activity: No     Other Topics Concern    Not on file     Social History Narrative       Patient does not have an advanced directive on file    Visit Vitals    /50 (BP 1 Location: Left arm, BP Patient Position: Sitting)    Pulse 68    Temp 98.2 °F (36.8 °C)    Resp 16    Ht 4' 11\" (1.499 m)    Wt 110 lb (49.9 kg)    SpO2 98%    BMI 22.22 kg/m2       Physical Exam   Ears:  Normal on the left. Fluid behind right TM  No Cervical Lymphadenopathy  No Supraclavicular Lymphadenopathy  Thyroid is Normal  Lungs are normal to percussion. Clear to auscultation   Heart:  S1 S2 are normal, No gallops, No mummers  No Carotid Bruits  Abdomen:  Normal Bowel Sounds. No tenderness. No masses. No Hepatomegaly or Splenomegly  LE:  Strong Pedal Pulses.   No Edema      BMI:  OK    Admission on 11/18/2017, Discharged on 11/18/2017   Component Date Value Ref Range Status    Ventricular Rate 11/18/2017 99  BPM Final    Atrial Rate 11/18/2017 99  BPM Final    P-R Interval 11/18/2017 192  ms Final    QRS Duration 11/18/2017 90  ms Final    Q-T Interval 11/18/2017 344  ms Final    QTC Calculation (Bezet) 11/18/2017 441  ms Final    Calculated P Axis 11/18/2017 72  degrees Final    Calculated T Axis 11/18/2017 43  degrees Final    Diagnosis 11/18/2017    Final                    Value:Sinus rhythm with marked sinus arrhythmia with premature atrial complexes  Otherwise normal ECG  When compared with ECG of 29-JAN-2017 22:25,  premature atrial complexes are now present  Confirmed by Javier Keane MD, Roger Peña ([id number]) on 11/18/2017 8:56:11 AM      WBC 11/18/2017 14.3* 4.6 - 13.2 K/uL Final    RBC 11/18/2017 4.53  4.20 - 5.30 M/uL Final    HGB 11/18/2017 10.7* 12.0 - 16.0 g/dL Final    HCT 11/18/2017 32.3* 35.0 - 45.0 % Final    MCV 11/18/2017 71.3* 74.0 - 97.0 FL Final    MCH 11/18/2017 23.6* 24.0 - 34.0 PG Final  MCHC 11/18/2017 33.1  31.0 - 37.0 g/dL Final    RDW 11/18/2017 14.9* 11.6 - 14.5 % Final    PLATELET 86/06/6935 213  135 - 420 K/uL Final    VERIFIED BY SMEAR    NEUTROPHILS 11/18/2017 85* 40 - 73 % Final    LYMPHOCYTES 11/18/2017 8* 21 - 52 % Final    MONOCYTES 11/18/2017 7  3 - 10 % Final    EOSINOPHILS 11/18/2017 0  0 - 5 % Final    BASOPHILS 11/18/2017 0  0 - 2 % Final    ABS. NEUTROPHILS 11/18/2017 12.2* 1.8 - 8.0 K/UL Final    ABS. LYMPHOCYTES 11/18/2017 1.1  0.9 - 3.6 K/UL Final    ABS. MONOCYTES 11/18/2017 1.0  0.05 - 1.2 K/UL Final    ABS. EOSINOPHILS 11/18/2017 0.0  0.0 - 0.4 K/UL Final    ABS. BASOPHILS 11/18/2017 0.0  0.0 - 0.1 K/UL Final    DF 11/18/2017 AUTOMATED    Final    SMEAR SCANNED    RBC COMMENTS 11/18/2017     Final                    Value:MICROCYTOSIS  2+      RBC COMMENTS 11/18/2017     Final                    Value:HYPOCHROMIA  1+      RBC COMMENTS 11/18/2017     Final                    Value:TARGET CELLS  1+      Sodium 11/18/2017 145  136 - 145 mmol/L Final    Potassium 11/18/2017 3.9  3.5 - 5.5 mmol/L Final    Chloride 11/18/2017 110* 100 - 108 mmol/L Final    CO2 11/18/2017 27  21 - 32 mmol/L Final    Anion gap 11/18/2017 8  3.0 - 18 mmol/L Final    Glucose 11/18/2017 113* 74 - 99 mg/dL Final    BUN 11/18/2017 10  7.0 - 18 MG/DL Final    Creatinine 11/18/2017 0.67  0.6 - 1.3 MG/DL Final    BUN/Creatinine ratio 11/18/2017 15  12 - 20   Final    GFR est AA 11/18/2017 >60  >60 ml/min/1.73m2 Final    GFR est non-AA 11/18/2017 >60  >60 ml/min/1.73m2 Final    Comment: (NOTE)  Estimated GFR is calculated using the Modification of Diet in Renal   Disease (MDRD) Study equation, reported for both  Americans   (GFRAA) and non- Americans (GFRNA), and normalized to 1.73m2   body surface area. The physician must decide which value applies to   the patient. The MDRD study equation should only be used in   individuals age 25 or older.  It has not been validated for the   following: pregnant women, patients with serious comorbid conditions,   or on certain medications, or persons with extremes of body size,   muscle mass, or nutritional status.  Calcium 11/18/2017 9.2  8.5 - 10.1 MG/DL Final    Magnesium 11/18/2017 2.1  1.6 - 2.6 mg/dL Final       .No results found for any visits on 12/19/17. Assessment / Plan      ICD-10-CM ICD-9-CM    1. Dizziness R42 780.4    2. Essential hypertension I10 401.9    3. Acute recurrent maxillary sinusitis J01.01 461.0        Augmentin  Prednisone  Meclizine  she was advised to continue her maintenance medications  The patient was counseled on the dangers of tobacco use, and was advised to quit. Reviewed strategies to maximize success, including substitution of other forms of reinforcement. Follow-up Disposition:  Return in about 3 months (around 3/19/2018). I asked Myrna Marnie Calzada Reveal if she has any questions and I answered the questions. Gladys De Jesus states that she understands the treatment plan and agrees with the treatment plan

## 2018-01-26 ENCOUNTER — OFFICE VISIT (OUTPATIENT)
Dept: INTERNAL MEDICINE CLINIC | Age: 63
End: 2018-01-26

## 2018-01-26 VITALS
HEIGHT: 59 IN | BODY MASS INDEX: 22.18 KG/M2 | HEART RATE: 60 BPM | WEIGHT: 110 LBS | OXYGEN SATURATION: 99 % | TEMPERATURE: 97.6 F | SYSTOLIC BLOOD PRESSURE: 100 MMHG | DIASTOLIC BLOOD PRESSURE: 60 MMHG | RESPIRATION RATE: 16 BRPM

## 2018-01-26 DIAGNOSIS — G43.909 MIGRAINE WITHOUT STATUS MIGRAINOSUS, NOT INTRACTABLE, UNSPECIFIED MIGRAINE TYPE: ICD-10-CM

## 2018-01-26 DIAGNOSIS — I73.9 PAD (PERIPHERAL ARTERY DISEASE) (HCC): Primary | ICD-10-CM

## 2018-01-26 DIAGNOSIS — E78.00 PURE HYPERCHOLESTEROLEMIA: ICD-10-CM

## 2018-01-26 DIAGNOSIS — I10 ESSENTIAL HYPERTENSION: ICD-10-CM

## 2018-01-26 RX ORDER — DIVALPROEX SODIUM 500 MG/1
TABLET, DELAYED RELEASE ORAL 3 TIMES DAILY
COMMUNITY
End: 2018-09-20 | Stop reason: ALTCHOICE

## 2018-01-26 NOTE — PROGRESS NOTES
1. Have you been to the ER, urgent care clinic since your last visit? Hospitalized since your last visit? Yes When: jan 25 Where: [facility] Reason for visit: dizziness and fell    2. Have you seen or consulted any other health care providers outside of the [facility] since your last visit? Include any pap smears or colon screening.  No

## 2018-01-27 ENCOUNTER — HOSPITAL ENCOUNTER (OUTPATIENT)
Dept: LAB | Age: 63
Discharge: HOME | End: 2018-01-27
Payer: COMMERCIAL

## 2018-01-27 DIAGNOSIS — I73.9 PAD (PERIPHERAL ARTERY DISEASE) (HCC): ICD-10-CM

## 2018-01-27 DIAGNOSIS — E78.00 PURE HYPERCHOLESTEROLEMIA: ICD-10-CM

## 2018-01-27 DIAGNOSIS — I10 ESSENTIAL HYPERTENSION: ICD-10-CM

## 2018-01-27 DIAGNOSIS — G43.909 MIGRAINE WITHOUT STATUS MIGRAINOSUS, NOT INTRACTABLE, UNSPECIFIED MIGRAINE TYPE: ICD-10-CM

## 2018-01-27 LAB
ALBUMIN SERPL-MCNC: 4 G/DL (ref 3.4–5)
ALBUMIN/GLOB SERPL: 1.4 {RATIO} (ref 0.8–1.7)
ALP SERPL-CCNC: 73 U/L (ref 45–117)
ALT SERPL-CCNC: 32 U/L (ref 13–56)
ANION GAP SERPL CALC-SCNC: 4 MMOL/L (ref 3–18)
AST SERPL-CCNC: 16 U/L (ref 15–37)
BASOPHILS # BLD: 0 K/UL (ref 0–0.06)
BASOPHILS NFR BLD: 1 % (ref 0–2)
BILIRUB SERPL-MCNC: 0.5 MG/DL (ref 0.2–1)
BUN SERPL-MCNC: 14 MG/DL (ref 7–18)
BUN/CREAT SERPL: 26 (ref 12–20)
CALCIUM SERPL-MCNC: 9 MG/DL (ref 8.5–10.1)
CHLORIDE SERPL-SCNC: 109 MMOL/L (ref 100–108)
CHOLEST SERPL-MCNC: 96 MG/DL
CO2 SERPL-SCNC: 30 MMOL/L (ref 21–32)
CREAT SERPL-MCNC: 0.54 MG/DL (ref 0.6–1.3)
DIFFERENTIAL METHOD BLD: ABNORMAL
EOSINOPHIL # BLD: 0.2 K/UL (ref 0–0.4)
EOSINOPHIL NFR BLD: 2 % (ref 0–5)
ERYTHROCYTE [DISTWIDTH] IN BLOOD BY AUTOMATED COUNT: 15.2 % (ref 11.6–14.5)
GLOBULIN SER CALC-MCNC: 2.9 G/DL (ref 2–4)
GLUCOSE SERPL-MCNC: 94 MG/DL (ref 74–99)
HCT VFR BLD AUTO: 35.3 % (ref 35–45)
HDLC SERPL-MCNC: 42 MG/DL (ref 40–60)
HDLC SERPL: 2.3 {RATIO} (ref 0–5)
HGB BLD-MCNC: 11.2 G/DL (ref 12–16)
LDLC SERPL CALC-MCNC: 41.4 MG/DL (ref 0–100)
LIPID PROFILE,FLP: NORMAL
LYMPHOCYTES # BLD: 2.7 K/UL (ref 0.9–3.6)
LYMPHOCYTES NFR BLD: 30 % (ref 21–52)
MCH RBC QN AUTO: 23.1 PG (ref 24–34)
MCHC RBC AUTO-ENTMCNC: 31.7 G/DL (ref 31–37)
MCV RBC AUTO: 72.8 FL (ref 74–97)
MONOCYTES # BLD: 0.6 K/UL (ref 0.05–1.2)
MONOCYTES NFR BLD: 6 % (ref 3–10)
NEUTS SEG # BLD: 5.4 K/UL (ref 1.8–8)
NEUTS SEG NFR BLD: 61 % (ref 40–73)
PLATELET # BLD AUTO: 222 K/UL (ref 135–420)
POTASSIUM SERPL-SCNC: 4.4 MMOL/L (ref 3.5–5.5)
PROT SERPL-MCNC: 6.9 G/DL (ref 6.4–8.2)
RBC # BLD AUTO: 4.85 M/UL (ref 4.2–5.3)
SODIUM SERPL-SCNC: 143 MMOL/L (ref 136–145)
TRIGL SERPL-MCNC: 63 MG/DL (ref ?–150)
VALPROATE SERPL-MCNC: <3 UG/ML (ref 50–100)
VLDLC SERPL CALC-MCNC: 12.6 MG/DL
WBC # BLD AUTO: 8.8 K/UL (ref 4.6–13.2)

## 2018-01-27 PROCEDURE — 36415 COLL VENOUS BLD VENIPUNCTURE: CPT | Performed by: INTERNAL MEDICINE

## 2018-01-27 PROCEDURE — 80061 LIPID PANEL: CPT | Performed by: INTERNAL MEDICINE

## 2018-01-27 PROCEDURE — 85025 COMPLETE CBC W/AUTO DIFF WBC: CPT | Performed by: INTERNAL MEDICINE

## 2018-01-27 PROCEDURE — 80164 ASSAY DIPROPYLACETIC ACD TOT: CPT | Performed by: INTERNAL MEDICINE

## 2018-01-27 PROCEDURE — 80053 COMPREHEN METABOLIC PANEL: CPT | Performed by: INTERNAL MEDICINE

## 2018-01-28 NOTE — PROGRESS NOTES
The patient presents to the office today with the chief complaint of dizziness    HPI    The patient has had 2 other episodes of vertigo. One occurred at work. The patient was seen at the ER with a negative work up. She has been seen in follow up by neurology with a differential diagnosis of of migraines vs seizure disorder. The patient is now on Depakote. The patient remains on medications for hypertension and hyperlipidemia. The patient has peripheral vascular disease. She denies claudication. The patient continues with cigarette use. Review of Systems   Respiratory: Negative for shortness of breath. Cardiovascular: Negative for chest pain and leg swelling. Neurological: Positive for dizziness. Allergies   Allergen Reactions    Latex Rash    Keflex [Cephalexin] Rash    Epinephrine Other (comments)     tachycardia    Tetracyclines Nausea Only       Current Outpatient Prescriptions   Medication Sig Dispense Refill    divalproex DR (DEPAKOTE) 500 mg tablet Take  by mouth three (3) times daily.  atorvastatin (LIPITOR) 40 mg tablet TAKE ONE TABLET BY MOUTH DAILY 90 Tab 3    metoprolol succinate (TOPROL-XL) 25 mg XL tablet TAKE ONE TABLET BY MOUTH DAILY 90 Tab 3    meclizine (ANTIVERT) 25 mg tablet 1 tablet three times per day 60 Tab 1    aspirin delayed-release 81 mg tablet Take  by mouth nightly.  topiramate (TOPAMAX) 25 mg tablet 1 tablet twice per day 30 Tab 2    fluticasone (FLONASE) 50 mcg/actuation nasal spray 2 Sprays by Both Nostrils route daily.  nitroglycerin (NITROSTAT) 0.4 mg SL tablet 1 Tab by SubLINGual route every five (5) minutes as needed for Chest Pain. 25 Tab 3    cetirizine (ZYRTEC) 10 mg tablet Take 10 mg by mouth nightly.  labetalol (NORMODYNE) 100 mg tablet Take 100 mg by mouth two (2) times a day. Past Medical History:   Diagnosis Date    Allergic rhinitis     Blurred vision     Cardiac echocardiogram 06/11/2014    EF 60%.   No RWMA.  RVSP 30 mmHg. Mild AI.  Cardiac Holter monitor, normal 11/05/2014    Benign 48-hr Holter study.  Cardiac nuclear imaging test, mod risk 08/14/2015    Intermediate risk. High anterior artifact vs diagonal artery ischemia. Following Lexiscan, 0.5-mm downsloping inferolateral ST depression, resolving 10 min 30 sec of recovery. Arm heaviness noted.  Cardiovascular lower extremity arterial duplex 07/15/2016    Right leg: Mod peripheral arterial disease in femoral segment at rest.  Left leg:  Mild arterial disease at rest.  R KAREN 0.58. L KAREN 0.95. Similar to study of 10/15/14.  Cardiovascular renal angiography 10/27/2014    Bilateral patent renal arteries. Right CFA occluded but collateralized.  Cardiovascular renal duplex 10/13/2014    Aorta w/irregular walls. Severe >60% bilateral renal artery stenosis. Bilateral intrinsic/med renal disease.  Carotid duplex 10/17/2014    Mild <50% DAVID stenosis. Biphasic signals in both subclavians.  Carotid duplex 12/05/2016    Mild < 50% DAVID stenosis.       Cervical disc disease 09/2015    [facility]spine [facility] Dizziness     Ear ache     Essential hypertension     Fainting spell     Frequent headaches     Frequent nosebleeds     Hemorrhoid     Hyperlipidemia     Hypertension     Ill-defined condition     Migraine     Sinus problem     Sinusitis, chronic     Spontaneous pneumothorax 1999    2 episodes on the right within several months of each other    Stomach pain     Weakness of right leg        Past Surgical History:   Procedure Laterality Date    COLONOSCOPY N/A 11/17/2017    COLONOSCOPY, SCREENING with hot bx polypectomy performed by Jarad Sabillon MD at [address] [place] [address] HX CHOLECYSTECTOMY  11/8/14    HX COLPOSCOPY      HX HEART CATHETERIZATION Bilateral 10/27/14    bilateral lower extremity angiography     HX OTHER SURGICAL Left     shave biopsy ( thigh area)       Social History     Social History    Marital status: SINGLE     Spouse name: N/A    Number of children: N/A    Years of education: N/A     Occupational History    Not on file. Social History Main Topics    Smoking status: Current Every Day Smoker     Packs/day: 0.25     Years: 20.00    Smokeless tobacco: Never Used      Comment: now down to 3 cigarettes per day    Alcohol use No    Drug use: No    Sexual activity: No     Other Topics Concern    Not on file     Social History Narrative       Patient does not have an advanced directive on file    Visit Vitals    /60 (BP 1 Location: Right arm, BP Patient Position: Sitting)    Pulse 60    Temp 97.6 °F (36.4 °C) (Tympanic)    Resp 16    Ht 4' 11\" (1.499 m)    Wt 110 lb (49.9 kg)    SpO2 99%    BMI 22.22 kg/m2       Physical Exam   No Cervical Lymphadenopathy  No Supraclavicular Lymphadenopathy  Thyroid is Normal  Lungs are normal to percussion. Clear to auscultation   Heart:  S1 S2 are normal, No gallops, No mummers  No Carotid Bruits  Abdomen:  Normal Bowel Sounds. No tenderness. No masses. No Hepatomegaly or Splenomegly  LE:  Weak Pedal Pulses.   No Edema      BMI:  OK    Admission on 11/18/2017, Discharged on 11/18/2017   Component Date Value Ref Range Status    Ventricular Rate 11/18/2017 99  BPM Final    Atrial Rate 11/18/2017 99  BPM Final    P-R Interval 11/18/2017 192  ms Final    QRS Duration 11/18/2017 90  ms Final    Q-T Interval 11/18/2017 344  ms Final    QTC Calculation (Bezet) 11/18/2017 441  ms Final    Calculated P Axis 11/18/2017 72  degrees Final    Calculated T Axis 11/18/2017 43  degrees Final    Diagnosis 11/18/2017    Final                    Value:Sinus rhythm with marked sinus arrhythmia with premature atrial complexes  Otherwise normal ECG  When compared with ECG of 29-JAN-2017 22:25,  premature atrial complexes are now present  Confirmed by Ariadna Menchaca MD, Miguel Angel Dodge ([id number]) on 11/18/2017 8:56:11 AM      WBC 11/18/2017 14.3* 4.6 - 13.2 K/uL Final    RBC 11/18/2017 4.53  4.20 - 5.30 M/uL Final    HGB 11/18/2017 10.7* 12.0 - 16.0 g/dL Final    HCT 11/18/2017 32.3* 35.0 - 45.0 % Final    MCV 11/18/2017 71.3* 74.0 - 97.0 FL Final    MCH 11/18/2017 23.6* 24.0 - 34.0 PG Final    MCHC 11/18/2017 33.1  31.0 - 37.0 g/dL Final    RDW 11/18/2017 14.9* 11.6 - 14.5 % Final    PLATELET 48/97/0172 850  135 - 420 K/uL Final    VERIFIED BY SMEAR    NEUTROPHILS 11/18/2017 85* 40 - 73 % Final    LYMPHOCYTES 11/18/2017 8* 21 - 52 % Final    MONOCYTES 11/18/2017 7  3 - 10 % Final    EOSINOPHILS 11/18/2017 0  0 - 5 % Final    BASOPHILS 11/18/2017 0  0 - 2 % Final    ABS. NEUTROPHILS 11/18/2017 12.2* 1.8 - 8.0 K/UL Final    ABS. LYMPHOCYTES 11/18/2017 1.1  0.9 - 3.6 K/UL Final    ABS. MONOCYTES 11/18/2017 1.0  0.05 - 1.2 K/UL Final    ABS. EOSINOPHILS 11/18/2017 0.0  0.0 - 0.4 K/UL Final    ABS.  BASOPHILS 11/18/2017 0.0  0.0 - 0.1 K/UL Final    DF 11/18/2017 AUTOMATED    Final    SMEAR SCANNED    RBC COMMENTS 11/18/2017     Final                    Value:MICROCYTOSIS  2+      RBC COMMENTS 11/18/2017     Final                    Value:HYPOCHROMIA  1+      RBC COMMENTS 11/18/2017     Final                    Value:TARGET CELLS  1+      Sodium 11/18/2017 145  136 - 145 mmol/L Final    Potassium 11/18/2017 3.9  3.5 - 5.5 mmol/L Final    Chloride 11/18/2017 110* 100 - 108 mmol/L Final    CO2 11/18/2017 27  21 - 32 mmol/L Final    Anion gap 11/18/2017 8  3.0 - 18 mmol/L Final    Glucose 11/18/2017 113* 74 - 99 mg/dL Final    BUN 11/18/2017 10  7.0 - 18 MG/DL Final    Creatinine 11/18/2017 0.67  0.6 - 1.3 MG/DL Final    BUN/Creatinine ratio 11/18/2017 15  12 - 20   Final    GFR est AA 11/18/2017 >60  >60 ml/min/1.73m2 Final    GFR est non-AA 11/18/2017 >60  >60 ml/min/1.73m2 Final    Comment: (NOTE)  Estimated GFR is calculated using the Modification of Diet in Renal   Disease (MDRD) Study equation, reported for both  Americans   (GFRAA) and non- Americans (GFRNA), and normalized to 1.73m2   body surface area. The physician must decide which value applies to   the patient. The MDRD study equation should only be used in   individuals age 25 or older. It has not been validated for the   following: pregnant women, patients with serious comorbid conditions,   or on certain medications, or persons with extremes of body size,   muscle mass, or nutritional status.  Calcium 11/18/2017 9.2  8.5 - 10.1 MG/DL Final    Magnesium 11/18/2017 2.1  1.6 - 2.6 mg/dL Final       .No results found for any visits on 01/26/18. Assessment / Plan      ICD-10-CM ICD-9-CM    1. PAD (peripheral artery disease) (HCC) I73.9 443.9 CBC WITH AUTOMATED DIFF      METABOLIC PANEL, COMPREHENSIVE   2. Migraine without status migrainosus, not intractable, unspecified migraine type G43.909 346.90 VALPROIC ACID   3. Essential hypertension I10 401.9 CBC WITH AUTOMATED DIFF      METABOLIC PANEL, COMPREHENSIVE   4. Pure hypercholesterolemia E78.00 272.0 LIPID PANEL       Labs  she was advised to continue her maintenance medications  The patient was counseled on the dangers of tobacco use, and was advised to quit. Reviewed strategies to maximize success, including removing cigarettes and smoking materials from environment. Follow-up Disposition:  Return in about 4 weeks (around 2/23/2018). I asked Edin López if she has any questions and I answered the questions. Gladys López states that she understands the treatment plan and agrees with the treatment plan

## 2018-03-05 ENCOUNTER — HOSPITAL ENCOUNTER (OUTPATIENT)
Dept: SLEEP MEDICINE | Age: 63
Discharge: HOME | End: 2018-03-05
Payer: COMMERCIAL

## 2018-03-05 VITALS — HEART RATE: 74 BPM | DIASTOLIC BLOOD PRESSURE: 54 MMHG | SYSTOLIC BLOOD PRESSURE: 118 MMHG

## 2018-03-05 DIAGNOSIS — G47.10 HYPERSOMNOLENCE: ICD-10-CM

## 2018-03-05 DIAGNOSIS — R06.83 SNORING: ICD-10-CM

## 2018-03-05 PROCEDURE — 95810 POLYSOM 6/> YRS 4/> PARAM: CPT

## 2018-03-06 ENCOUNTER — HOSPITAL ENCOUNTER (OUTPATIENT)
Dept: SLEEP MEDICINE | Age: 63
Discharge: HOME | End: 2018-03-06
Payer: COMMERCIAL

## 2018-03-06 VITALS — HEART RATE: 80 BPM | DIASTOLIC BLOOD PRESSURE: 61 MMHG | SYSTOLIC BLOOD PRESSURE: 132 MMHG

## 2018-03-06 DIAGNOSIS — G47.10 HYPERSOMNOLENCE: ICD-10-CM

## 2018-03-06 LAB
AMPHET UR QL SCN: NEGATIVE
BARBITURATES UR QL SCN: NEGATIVE
BENZODIAZ UR QL: NEGATIVE
CANNABINOIDS UR QL SCN: NEGATIVE
COCAINE UR QL SCN: NEGATIVE
HDSCOM,HDSCOM: NORMAL
METHADONE UR QL: NEGATIVE
OPIATES UR QL: NEGATIVE
PCP UR QL: NEGATIVE

## 2018-03-06 PROCEDURE — 95805 MULTIPLE SLEEP LATENCY TEST: CPT

## 2018-03-06 PROCEDURE — 80307 DRUG TEST PRSMV CHEM ANLYZR: CPT | Performed by: INTERNAL MEDICINE

## 2018-03-07 ENCOUNTER — OFFICE VISIT (OUTPATIENT)
Dept: INTERNAL MEDICINE CLINIC | Age: 63
End: 2018-03-07

## 2018-03-07 DIAGNOSIS — I10 ESSENTIAL HYPERTENSION: ICD-10-CM

## 2018-03-07 DIAGNOSIS — E78.00 PURE HYPERCHOLESTEROLEMIA: ICD-10-CM

## 2018-03-07 DIAGNOSIS — J40 BRONCHITIS: Primary | ICD-10-CM

## 2018-03-07 RX ORDER — CLARITHROMYCIN 250 MG/1
250 TABLET, FILM COATED ORAL 2 TIMES DAILY
Qty: 20 TAB | Refills: 0 | Status: SHIPPED | OUTPATIENT
Start: 2018-03-07 | End: 2018-03-17

## 2018-03-07 RX ORDER — IBUPROFEN 200 MG
1 TABLET ORAL EVERY 24 HOURS
Qty: 30 PATCH | Refills: 0 | Status: SHIPPED | OUTPATIENT
Start: 2018-03-07 | End: 2018-04-06

## 2018-03-07 NOTE — MR AVS SNAPSHOT
[facility] 
 
 
 [address], [address] [facility] [address] 
[phone] Patient: Dot Goldmann MRN: [id number] Visit Information Date & Time Provider Department Dept. Phone Encounter #  
 3/7/2018 12:30 PM Ana Bush MD [facility] [place] [address] [phone] [id number] Follow-up Instructions Return in about 3 months (around 2018). Follow-up and Disposition History Your Appointments 5/10/2018  9:00 AM  
Follow Up with Ana Bush MD  
[address] [place] [address] [facility] Appt Note: 1mo  
 [address], [address] [place] [address]  
[phone]  
  
   
 [address], [address] [facility] [address]  
  
    
 2018  3:00 PM  
Follow Up with Braydon Fulton DO [facility] Appt Note: 6 month follow up; Rescheduling Appointment From 2018 [place] [phone]  
[phone] [address] [place] [address] [address] Upcoming Health Maintenance Date Due Hepatitis C Screening 1955 Pneumococcal 19-64 Medium Risk (1 of 1 - PPSV23) 10/2/1975 DTaP/Tdap/Td series (1 - Tdap) 10/2/1977 ZOSTER VACCINE AGE 60> 2016 Influenza Age 5 to Adult 2017 BREAST CANCER SCRN MAMMOGRAM 2019 PAP AKA CERVICAL CYTOLOGY 2020 COLONOSCOPY 2027 Allergies as of 3/7/2018  Review Complete On: 3/7/2018 By: Ana Bush MD  
  
 Severity Noted Reaction Type Reactions Latex  10/27/2014   Topical Rash Keflex [Cephalexin] Medium 2016    Rash Epinephrine    Other (comments)  
 tachycardia Tetracyclines    Nausea Only Current Immunizations  Reviewed on 10/10/2017 No immunizations on file. Not reviewed this visit You Were Diagnosed With   
  
 Codes Comments Bronchitis    -  Primary ICD-10-CM: W48 ICD-9-CM: 155 Essential hypertension     ICD-10-CM: I10 
ICD-9-CM: 401.9 Pure hypercholesterolemia     ICD-10-CM: E78.00 ICD-9-CM: 272.0 Vitals BP Pulse Height(growth percentile) Weight(growth percentile) BMI OB Status 142/78 (BP 1 Location: Right arm, BP Patient Position: Sitting) 78 4' 11\" (1.499 m) 111 lb (50.3 kg) 22.42 kg/m2 Postmenopausal  
 Smoking Status Current Every Day Smoker BMI and BSA Data Body Mass Index Body Surface Area  
 22.42 kg/m 2 1.45 m 2 Preferred Pharmacy Pharmacy Name Phone [facility] [address], [address] [phone] Your Updated Medication List  
  
   
This list is accurate as of 3/7/18 11:59 PM.  Always use your most recent med list.  
  
  
  
  
 aspirin delayed-release 81 mg tablet Take  by mouth nightly. atorvastatin 40 mg tablet Commonly known as:  LIPITOR  
TAKE ONE TABLET BY MOUTH DAILY  
  
 clarithromycin 250 mg tablet Commonly known as:  Sylvia Fent Take 1 Tab by mouth two (2) times a day for 10 days. divalproex  mg tablet Commonly known as:  DEPAKOTE Take  by mouth three (3) times daily. FLONASE 50 mcg/actuation nasal spray Generic drug:  fluticasone 2 Sprays by Both Nostrils route daily. labetalol 100 mg tablet Commonly known as:  Pamplico Caroli Take 100 mg by mouth two (2) times a day. meclizine 25 mg tablet Commonly known as:  ANTIVERT  
1 tablet three times per day  
  
 metoprolol succinate 25 mg XL tablet Commonly known as:  TOPROL-XL  
TAKE ONE TABLET BY MOUTH DAILY  
  
 nicotine 21 mg/24 hr  
Commonly known as:  NICODERM CQ  
1 Patch by TransDERmal route every twenty-four (24) hours for 30 days. nitroglycerin 0.4 mg SL tablet Commonly known as:  NITROSTAT  
1 Tab by SubLINGual route every five (5) minutes as needed for Chest Pain. topiramate 25 mg tablet Commonly known as:  TOPAMAX  
1 tablet twice per day ZyrTEC 10 mg tablet Generic drug:  cetirizine Take 10 mg by mouth nightly. Prescriptions Sent to Pharmacy Refills  
 clarithromycin (BIAXIN) 250 mg tablet 0 Sig: Take 1 Tab by mouth two (2) times a day for 10 days. Class: Normal  
 Pharmacy: [facility] [address], [facility] Ph #: [phone] Route: Oral  
 nicotine (NICODERM CQ) 21 mg/24 hr 0 Si Patch by TransDERmal route every twenty-four (24) hours for 30 days. Class: Normal  
 Pharmacy: [facility] [address], [address] Ph #: [phone] Route: TransDERmal  
  
Follow-up Instructions Return in about 3 months (around 2018). Introducing [facility] SERVICES! Dear Jordi Lopez: Thank you for requesting a [employer] account. Our records indicate that you already have an active [employer] account. You can access your account anytime at [email or web address] Did you know that you can access your hospital and ER discharge instructions at any time in [employer]? You can also review all of your test results from your hospital stay or ER visit. Additional Information If you have questions, please visit the Frequently Asked Questions section of the [employer] website at [email or web address] Remember, [employer] is NOT to be used for urgent needs. For medical emergencies, dial 911. Now available from your iPhone and Android! Please provide this summary of care documentation to your next provider. Your primary care clinician is listed as Ashley Mueller. If you have any questions after today's visit, please call [phone].

## 2018-03-09 VITALS
HEART RATE: 78 BPM | SYSTOLIC BLOOD PRESSURE: 142 MMHG | DIASTOLIC BLOOD PRESSURE: 78 MMHG | WEIGHT: 111 LBS | BODY MASS INDEX: 22.38 KG/M2 | HEIGHT: 59 IN

## 2018-03-10 NOTE — PROGRESS NOTES
The patient presents to the office today with the chief complaint of cough    HPI    The patient continues with cigarette use. The patient complains of chest congestion with a cough. The problem has gone on for one week. The patient remains on medications for hypertension and hyplipidemia  She is tolerating the medications well. Review of Systems   Respiratory: Positive for cough. Negative for shortness of breath. Cardiovascular: Negative for chest pain and leg swelling. Allergies   Allergen Reactions    Latex Rash    Keflex [Cephalexin] Rash    Epinephrine Other (comments)     tachycardia    Tetracyclines Nausea Only       Current Outpatient Prescriptions   Medication Sig Dispense Refill    clarithromycin (BIAXIN) 250 mg tablet Take 1 Tab by mouth two (2) times a day for 10 days. 20 Tab 0    nicotine (NICODERM CQ) 21 mg/24 hr 1 Patch by TransDERmal route every twenty-four (24) hours for 30 days. 30 Patch 0    divalproex DR (DEPAKOTE) 500 mg tablet Take  by mouth three (3) times daily.  atorvastatin (LIPITOR) 40 mg tablet TAKE ONE TABLET BY MOUTH DAILY 90 Tab 3    metoprolol succinate (TOPROL-XL) 25 mg XL tablet TAKE ONE TABLET BY MOUTH DAILY 90 Tab 3    meclizine (ANTIVERT) 25 mg tablet 1 tablet three times per day 60 Tab 1    aspirin delayed-release 81 mg tablet Take  by mouth nightly.  topiramate (TOPAMAX) 25 mg tablet 1 tablet twice per day 30 Tab 2    fluticasone (FLONASE) 50 mcg/actuation nasal spray 2 Sprays by Both Nostrils route daily.  nitroglycerin (NITROSTAT) 0.4 mg SL tablet 1 Tab by SubLINGual route every five (5) minutes as needed for Chest Pain. 25 Tab 3    cetirizine (ZYRTEC) 10 mg tablet Take 10 mg by mouth nightly.  labetalol (NORMODYNE) 100 mg tablet Take 100 mg by mouth two (2) times a day. Past Medical History:   Diagnosis Date    Allergic rhinitis     Blurred vision     Cardiac echocardiogram 06/11/2014    EF 60%. No RWMA.   RVSP 30 mmHg.  Mild AI.  Cardiac Holter monitor, normal 11/05/2014    Benign 48-hr Holter study.  Cardiac nuclear imaging test, mod risk 08/14/2015    Intermediate risk. High anterior artifact vs diagonal artery ischemia. Following Lexiscan, 0.5-mm downsloping inferolateral ST depression, resolving 10 min 30 sec of recovery. Arm heaviness noted.  Cardiovascular lower extremity arterial duplex 07/15/2016    Right leg: Mod peripheral arterial disease in femoral segment at rest.  Left leg:  Mild arterial disease at rest.  R KAREN 0.58. L KAREN 0.95. Similar to study of 10/15/14.  Cardiovascular renal angiography 10/27/2014    Bilateral patent renal arteries. Right CFA occluded but collateralized.  Cardiovascular renal duplex 10/13/2014    Aorta w/irregular walls. Severe >60% bilateral renal artery stenosis. Bilateral intrinsic/med renal disease.  Carotid duplex 10/17/2014    Mild <50% DAVID stenosis. Biphasic signals in both subclavians.  Carotid duplex 12/05/2016    Mild < 50% DAVID stenosis.       Cervical disc disease 09/2015    [facility] C[facility]spine at [facility]AN Dizziness     Ear ache     Essential hypertension     Fainting spell     Frequent headaches     Frequent nosebleeds     Hemorrhoid     Hyperlipidemia     Hypertension     Ill-defined condition     Migraine     Sinus problem     Sinusitis, chronic     Spontaneous pneumothorax 1999    2 episodes on the right within several months of each other    Stomach pain     Weakness of right leg        Past Surgical History:   Procedure Laterality Date    COLONOSCOPY N/A 11/17/2017    COLONOSCOPY, SCREENING with hot bx polypectomy performed by Foster Inman MD at [address] [place] [address] HX CHOLECYSTECTOMY  11/8/14    HX COLPOSCOPY      HX HEART CATHETERIZATION Bilateral 10/27/14    bilateral lower extremity angiography     HX OTHER SURGICAL Left     shave biopsy ( thigh area)       Social History     Social History    Marital status: SINGLE     Spouse name: N/A    Number of children: N/A    Years of education: N/A     Occupational History    Not on file. Social History Main Topics    Smoking status: Current Every Day Smoker     Packs/day: 0.25     Years: 20.00    Smokeless tobacco: Never Used      Comment: now down to 3 cigarettes per day    Alcohol use No    Drug use: No    Sexual activity: No     Other Topics Concern    Not on file     Social History Narrative       Patient does not have an advanced directive on file    Visit Vitals    /78 (BP 1 Location: Right arm, BP Patient Position: Sitting)    Pulse 78    Ht 4' 11\" (1.499 m)    Wt 111 lb (50.3 kg)    BMI 22.42 kg/m2       Physical Exam   No Cervical Lymphadenopathy  No Supraclavicular Lymphadenopathy  Thyroid is Normal  Lungs are normal to percussion. Scattered rhonci to auscultation   Heart:  S1 S2 are normal, No gallops, No mummers  No Carotid Bruits  Abdomen:  Normal Bowel Sounds. No tenderness. No masses. No Hepatomegaly or Splenomegly  LE:  Strong Pedal Pulses. No Edema      BMI:  [facility] Outpatient Visit on 03/06/2018   Component Date Value Ref Range Status    BENZODIAZEPINES 03/06/2018 NEGATIVE   NEG   Final    BARBITURATES 03/06/2018 NEGATIVE   NEG   Final    THC (TH-CANNABINOL) 03/06/2018 NEGATIVE   NEG   Final    OPIATES 03/06/2018 NEGATIVE   NEG   Final    PCP(PHENCYCLIDINE) 03/06/2018 NEGATIVE   NEG   Final    COCAINE 03/06/2018 NEGATIVE   NEG   Final    AMPHETAMINES 03/06/2018 NEGATIVE   NEG   Final    METHADONE 03/06/2018 NEGATIVE   NEG   Final    HDSCOM 03/06/2018 (NOTE)   Final    Comment: Specimen analysis was performed without chain of custody handling. These results should be used for medical purposes only and not for   legal or employment purposes. Unconfirmed screening results must not   be used for non-medical purposes.     The cut-off concentration for positive results are as follows:    AMPH 1000 ng/mL  ASHISH      200 ng/mL  WAQAS      200 ng/mL  WHIT       300 ng/mL  METH      300 ng/mL  OPI       300 ng/mL  PCP        25 ng/mL  THC        50 ng/mL       Hospital Outpatient Visit on 01/27/2018   Component Date Value Ref Range Status    WBC 01/27/2018 8.8  4.6 - 13.2 K/uL Final    RBC 01/27/2018 4.85  4.20 - 5.30 M/uL Final    HGB 01/27/2018 11.2* 12.0 - 16.0 g/dL Final    HCT 01/27/2018 35.3  35.0 - 45.0 % Final    MCV 01/27/2018 72.8* 74.0 - 97.0 FL Final    MCH 01/27/2018 23.1* 24.0 - 34.0 PG Final    MCHC 01/27/2018 31.7  31.0 - 37.0 g/dL Final    RDW 01/27/2018 15.2* 11.6 - 14.5 % Final    PLATELET 39/50/0521 712  135 - 420 K/uL Final    NEUTROPHILS 01/27/2018 61  40 - 73 % Final    LYMPHOCYTES 01/27/2018 30  21 - 52 % Final    MONOCYTES 01/27/2018 6  3 - 10 % Final    EOSINOPHILS 01/27/2018 2  0 - 5 % Final    BASOPHILS 01/27/2018 1  0 - 2 % Final    ABS. NEUTROPHILS 01/27/2018 5.4  1.8 - 8.0 K/UL Final    ABS. LYMPHOCYTES 01/27/2018 2.7  0.9 - 3.6 K/UL Final    ABS. MONOCYTES 01/27/2018 0.6  0.05 - 1.2 K/UL Final    ABS. EOSINOPHILS 01/27/2018 0.2  0.0 - 0.4 K/UL Final    ABS.  BASOPHILS 01/27/2018 0.0  0.0 - 0.06 K/UL Final    DF 01/27/2018 AUTOMATED    Final    Sodium 01/27/2018 143  136 - 145 mmol/L Final    Potassium 01/27/2018 4.4  3.5 - 5.5 mmol/L Final    Chloride 01/27/2018 109* 100 - 108 mmol/L Final    CO2 01/27/2018 30  21 - 32 mmol/L Final    Anion gap 01/27/2018 4  3.0 - 18 mmol/L Final    Glucose 01/27/2018 94  74 - 99 mg/dL Final    BUN 01/27/2018 14  7.0 - 18 MG/DL Final    Creatinine 01/27/2018 0.54* 0.6 - 1.3 MG/DL Final    BUN/Creatinine ratio 01/27/2018 26* 12 - 20   Final    GFR est AA 01/27/2018 >60  >60 ml/min/1.73m2 Final    GFR est non-AA 01/27/2018 >60  >60 ml/min/1.73m2 Final    Comment: (NOTE)  Estimated GFR is calculated using the Modification of Diet in Renal   Disease (MDRD) Study equation, reported for both  Americans   (GFRAA) and non- Americans (GFRNA), and normalized to 1.73m2   body surface area. The physician must decide which value applies to   the patient. The MDRD study equation should only be used in   individuals age 25 or older. It has not been validated for the   following: pregnant women, patients with serious comorbid conditions,   or on certain medications, or persons with extremes of body size,   muscle mass, or nutritional status.  Calcium 01/27/2018 9.0  8.5 - 10.1 MG/DL Final    Bilirubin, total 01/27/2018 0.5  0.2 - 1.0 MG/DL Final    ALT (SGPT) 01/27/2018 32  13 - 56 U/L Final    AST (SGOT) 01/27/2018 16  15 - 37 U/L Final    Alk. phosphatase 01/27/2018 73  45 - 117 U/L Final    Protein, total 01/27/2018 6.9  6.4 - 8.2 g/dL Final    Albumin 01/27/2018 4.0  3.4 - 5.0 g/dL Final    Globulin 01/27/2018 2.9  2.0 - 4.0 g/dL Final    A-G Ratio 01/27/2018 1.4  0.8 - 1.7   Final    LIPID PROFILE 01/27/2018        Final    Cholesterol, total 01/27/2018 96  <200 MG/DL Final    Triglyceride 01/27/2018 63  <150 MG/DL Final    Comment: The drugs N-acetylcysteine (NAC) and  Metamiszole have been found to cause falsely  low results in this chemical assay. Please  be sure to submit blood samples obtained  BEFORE administration of either of these  drugs to assure correct results.  HDL Cholesterol 01/27/2018 42  40 - 60 MG/DL Final    LDL, calculated 01/27/2018 41.4  0 - 100 MG/DL Final    VLDL, calculated 01/27/2018 12.6  MG/DL Final    CHOL/HDL Ratio 01/27/2018 2.3  0 - 5.0   Final    Valproic acid 01/27/2018 <3* 50 - 100 ug/ml Final       .No results found for any visits on 03/07/18. Assessment / Plan      ICD-10-CM ICD-9-CM    1. Bronchitis J40 490    2. Essential hypertension I10 401.9    3.  Pure hypercholesterolemia E78.00 272.0        Biaxin  she was advised to continue her maintenance medications  she is to call if symptoms persist over five days    The patient was counseled on the dangers of tobacco use, and was advised to quit. Reviewed strategies to maximize success, including pharmacotherapy (Nicoderm 21). Follow-up Disposition:  Return in about 3 months (around 6/7/2018). I asked Bhaskar Powell if she has any questions and I answered the questions. Gladys Powell states that she understands the treatment plan and agrees with the treatment plan

## 2018-04-17 ENCOUNTER — OFFICE VISIT (OUTPATIENT)
Dept: INTERNAL MEDICINE CLINIC | Age: 63
End: 2018-04-17

## 2018-04-17 VITALS
BODY MASS INDEX: 22.38 KG/M2 | WEIGHT: 111 LBS | RESPIRATION RATE: 18 BRPM | HEART RATE: 66 BPM | DIASTOLIC BLOOD PRESSURE: 56 MMHG | OXYGEN SATURATION: 98 % | TEMPERATURE: 97.5 F | HEIGHT: 59 IN | SYSTOLIC BLOOD PRESSURE: 118 MMHG

## 2018-04-17 DIAGNOSIS — G47.33 OBSTRUCTIVE SLEEP APNEA SYNDROME: ICD-10-CM

## 2018-04-17 DIAGNOSIS — I10 ESSENTIAL HYPERTENSION: ICD-10-CM

## 2018-04-17 DIAGNOSIS — R40.0 DROWSINESS: Primary | ICD-10-CM

## 2018-04-17 DIAGNOSIS — I73.9 PAD (PERIPHERAL ARTERY DISEASE) (HCC): ICD-10-CM

## 2018-04-17 RX ORDER — ALBUTEROL SULFATE 90 UG/1
2 AEROSOL, METERED RESPIRATORY (INHALATION)
COMMUNITY
Start: 2018-04-02

## 2018-04-17 RX ORDER — MODAFINIL 100 MG/1
100 TABLET ORAL
Qty: 30 TAB | Refills: 0 | Status: SHIPPED | OUTPATIENT
Start: 2018-04-17 | End: 2018-05-20 | Stop reason: SDUPTHER

## 2018-04-17 NOTE — MR AVS SNAPSHOT
[facility] 
 
 
 [address] [facility] [place] [facility] [place] [address] 
[phone] Patient: Kiran Rolon MRN: [id number] :1955 Visit Information Date & Time Provider Department Dept. Phone Encounter #  
 2018  4:00 PM Marycruz Garcia MD [facility] [phone] [id number] Your Appointments 5/10/2018  9:00 AM  
Follow Up with Marycruz Garcia MD  
[address] [place] [address] [address]) Appt Note: 1mo  
 [address] [facility] [facility]  
[phone]  
  
   
 [address] [facility] [facility] [address]  
  
    
 2018  3:00 PM  
Follow Up with Meghana Sy DO [facility] ([address]) Appt Note: 6 month follow up; Rescheduling Appointment From 2018 [place] [id number]  
[phone] [address] [place] [address] [address] Upcoming Health Maintenance Date Due Hepatitis C Screening 1955 Pneumococcal 19-64 Medium Risk (1 of 1 - PPSV23) 10/2/1975 DTaP/Tdap/Td series (1 - Tdap) 10/2/1977 ZOSTER VACCINE AGE 60> 2016 Influenza Age 5 to Adult 2017 BREAST CANCER SCRN MAMMOGRAM 2019 PAP AKA CERVICAL CYTOLOGY 2020 COLONOSCOPY 2027 Allergies as of 2018  Review Complete On: 2018 By: Lizette Su LPN Severity Noted Reaction Type Reactions Latex  10/27/2014   Topical Rash Keflex [Cephalexin] Medium 2016    Rash Epinephrine    Other (comments)  
 tachycardia Tetracyclines    Nausea Only Current Immunizations  Reviewed on 10/10/2017 No immunizations on file. Not reviewed this visit You Were Diagnosed With   
  
 Codes Comments Drowsiness    -  Primary ICD-10-CM: R40.0 ICD-9-CM: 780.09 Vitals BP Pulse Temp Resp Height(growth percentile) 118/56 (BP 1 Location: Right arm, BP Patient Position: Sitting) 66 97.5 °F (36.4 °C) (Tympanic) 18 4' 11\" (1.499 m) Weight(growth percentile) SpO2 BMI OB Status Smoking Status 111 lb (50.3 kg) 98% 22.42 kg/m2 Postmenopausal Current Every Day Smoker BMI and BSA Data Body Mass Index Body Surface Area  
 22.42 kg/m 2 1.45 m 2 Preferred Pharmacy Pharmacy Name Phone [facility] [address], [address] [place] [address] Road [phone] Your Updated Medication List  
  
   
This list is accurate as of 4/17/18  4:44 PM.  Always use your most recent med list.  
  
  
  
  
 aspirin delayed-release 81 mg tablet Take  by mouth nightly. atorvastatin 40 mg tablet Commonly known as:  LIPITOR  
TAKE ONE TABLET BY MOUTH DAILY  
  
 divalproex  mg tablet Commonly known as:  DEPAKOTE Take  by mouth three (3) times daily. FLONASE 50 mcg/actuation nasal spray Generic drug:  fluticasone 2 Sprays by Both Nostrils route daily. labetalol 100 mg tablet Commonly known as:  Silke Nasim Take 100 mg by mouth two (2) times a day. meclizine 25 mg tablet Commonly known as:  ANTIVERT  
1 tablet three times per day  
  
 metoprolol succinate 25 mg XL tablet Commonly known as:  TOPROL-XL  
TAKE ONE TABLET BY MOUTH DAILY  
  
 modafinil 100 mg tablet Commonly known as:  PROVIGIL Take 1 Tab by mouth every morning. Max Daily Amount: 100 mg.  
  
 nitroglycerin 0.4 mg SL tablet Commonly known as:  NITROSTAT  
1 Tab by SubLINGual route every five (5) minutes as needed for Chest Pain. PROAIR HFA 90 mcg/actuation inhaler Generic drug:  albuterol  
  
 topiramate 25 mg tablet Commonly known as:  TOPAMAX  
1 tablet twice per day ZyrTEC 10 mg tablet Generic drug:  cetirizine Take 10 mg by mouth nightly. Prescriptions Printed  Refills  
 modafinil (PROVIGIL) 100 mg tablet 0  
 Sig: Take 1 Tab by mouth every morning. Max Daily Amount: 100 mg. Class: Print Route: Oral  
  
Introducing [facility]! Dear Francie Hugo: Thank you for requesting a [employer] account. Our records indicate that you already have an active [employer] account. You can access your account anytime at [email or web address] Did you know that you can access your hospital and ER discharge instructions at any time in [employer]? You can also review all of your test results from your hospital stay or ER visit. Additional Information If you have questions, please visit the Frequently Asked Questions section of the [employer] website at [email or web address] Remember, [employer] is NOT to be used for urgent needs. For medical emergencies, dial 911. Now available from your iPhone and Android! Please provide this summary of care documentation to your next provider. Your primary care clinician is listed as Katherine Blake. If you have any questions after today's visit, please call [phone].

## 2018-04-18 NOTE — PROGRESS NOTES
The patient presents to the office today with the chief complaint of daytime drowsiness    HPI    The patient remains on medications for hypertension. She is tolerating the medications well. She is under evaluation for probable sleep apnea. The findings so far are suggestive with the final testing to be completed. The patient notes excessive day time drowsiness      Review of Systems   Respiratory: Negative for shortness of breath. Cardiovascular: Negative for chest pain and leg swelling. Neurological:        Daytime Drowiness       Allergies   Allergen Reactions    Latex Rash    Keflex [Cephalexin] Rash    Epinephrine Other (comments)     tachycardia    Tetracyclines Nausea Only       Current Outpatient Prescriptions   Medication Sig Dispense Refill    PROAIR HFA 90 mcg/actuation inhaler       modafinil (PROVIGIL) 100 mg tablet Take 1 Tab by mouth every morning. Max Daily Amount: 100 mg. 30 Tab 0    divalproex DR (DEPAKOTE) 500 mg tablet Take  by mouth three (3) times daily.  atorvastatin (LIPITOR) 40 mg tablet TAKE ONE TABLET BY MOUTH DAILY 90 Tab 3    metoprolol succinate (TOPROL-XL) 25 mg XL tablet TAKE ONE TABLET BY MOUTH DAILY 90 Tab 3    meclizine (ANTIVERT) 25 mg tablet 1 tablet three times per day 60 Tab 1    aspirin delayed-release 81 mg tablet Take  by mouth nightly.  fluticasone (FLONASE) 50 mcg/actuation nasal spray 2 Sprays by Both Nostrils route daily.  nitroglycerin (NITROSTAT) 0.4 mg SL tablet 1 Tab by SubLINGual route every five (5) minutes as needed for Chest Pain. 25 Tab 3    cetirizine (ZYRTEC) 10 mg tablet Take 10 mg by mouth nightly.  labetalol (NORMODYNE) 100 mg tablet Take 100 mg by mouth two (2) times a day. Past Medical History:   Diagnosis Date    Allergic rhinitis     Blurred vision     Cardiac echocardiogram 06/11/2014    EF 60%. No RWMA. RVSP 30 mmHg. Mild AI.       Cardiac Holter monitor, normal 11/05/2014    Benign 48-hr Holter study.      Cardiac nuclear imaging test, mod risk 08/14/2015    Intermediate risk. High anterior artifact vs diagonal artery ischemia. Following Lexiscan, 0.5-mm downsloping inferolateral ST depression, resolving 10 min 30 sec of recovery. Arm heaviness noted.  Cardiovascular lower extremity arterial duplex 07/15/2016    Right leg: Mod peripheral arterial disease in femoral segment at rest.  Left leg:  Mild arterial disease at rest.  R KAREN 0.58. L KAREN 0.95. Similar to study of 10/15/14.  Cardiovascular renal angiography 10/27/2014    Bilateral patent renal arteries. Right CFA occluded but collateralized.  Cardiovascular renal duplex 10/13/2014    Aorta w/irregular walls. Severe >60% bilateral renal artery stenosis. Bilateral intrinsic/med renal disease.  Carotid duplex 10/17/2014    Mild <50% DAVID stenosis. Biphasic signals in both subclavians.  Carotid duplex 12/05/2016    Mild < 50% DAVID stenosis.       Cervical disc disease 09/2015    [facility]sp[facility] GAGAN Dizziness     Ear ache     Essential hypertension     Fainting spell     Frequent headaches     Frequent nosebleeds     Hemorrhoid     Hyperlipidemia     Hypertension     Ill-defined condition     Migraine     Sinus problem     Sinusitis, chronic     Spontaneous pneumothorax 1999    2 episodes on the right within several months of each other    Stomach pain     Weakness of right leg        Past Surgical History:   Procedure Laterality Date    COLONOSCOPY N/A 11/17/2017    COLONOSCOPY, SCREENING with hot bx polypectomy performed by Jaxson Holliday MD at [address] [place] [address] HX CHOLECYSTECTOMY  11/8/14    HX COLPOSCOPY      HX HEART CATHETERIZATION Bilateral 10/27/14    bilateral lower extremity angiography     HX OTHER SURGICAL Left     shave biopsy ( thigh area)       Social History     Social History    Marital status: SINGLE     Spouse name: N/A    Number of children: N/A  Years of education: N/A     Occupational History    Not on file. Social History Main Topics    Smoking status: Current Every Day Smoker     Packs/day: 0.25     Years: 20.00    Smokeless tobacco: Never Used      Comment: now down to 3 cigarettes per day    Alcohol use No    Drug use: No    Sexual activity: No     Other Topics Concern    Not on file     Social History Narrative       Patient does not have an advanced directive on file    Visit Vitals    /56 (BP 1 Location: Right arm, BP Patient Position: Sitting)    Pulse 66    Temp 97.5 °F (36.4 °C) (Tympanic)    Resp 18    Ht 4' 11\" (1.499 m)    Wt 111 lb (50.3 kg)    SpO2 98%    BMI 22.42 kg/m2       Physical Exam   No Cervical Lymphadenopathy  No Supraclavicular Lymphadenopathy  Thyroid is Normal  Lungs are normal to percussion. Clear to auscultation   Heart:  S1 S2 are normal, No gallops, No mummers  No Carotid Bruits  Abdomen:  Normal Bowel Sounds. No tenderness. No masses. No Hepatomegaly or Splenomegly  LE:  Strong Pedal Pulses. No Edema      BMI:  [facility] Outpatient Visit on 03/06/2018   Component Date Value Ref Range Status    BENZODIAZEPINES 03/06/2018 NEGATIVE   NEG   Final    BARBITURATES 03/06/2018 NEGATIVE   NEG   Final    THC (TH-CANNABINOL) 03/06/2018 NEGATIVE   NEG   Final    OPIATES 03/06/2018 NEGATIVE   NEG   Final    PCP(PHENCYCLIDINE) 03/06/2018 NEGATIVE   NEG   Final    COCAINE 03/06/2018 NEGATIVE   NEG   Final    AMPHETAMINES 03/06/2018 NEGATIVE   NEG   Final    METHADONE 03/06/2018 NEGATIVE   NEG   Final    HDSCOM 03/06/2018 (NOTE)   Final    Comment: Specimen analysis was performed without chain of custody handling. These results should be used for medical purposes only and not for   legal or employment purposes. Unconfirmed screening results must not   be used for non-medical purposes.     The cut-off concentration for positive results are as follows:    AMPH     1000 ng/mL  ASHISH      200 ng/mL  WAQAS      200 ng/mL  WHIT       300 ng/mL  METH      300 ng/mL  OPI       300 ng/mL  PCP        25 ng/mL  THC        50 ng/mL       Hospital Outpatient Visit on 01/27/2018   Component Date Value Ref Range Status    WBC 01/27/2018 8.8  4.6 - 13.2 K/uL Final    RBC 01/27/2018 4.85  4.20 - 5.30 M/uL Final    HGB 01/27/2018 11.2* 12.0 - 16.0 g/dL Final    HCT 01/27/2018 35.3  35.0 - 45.0 % Final    MCV 01/27/2018 72.8* 74.0 - 97.0 FL Final    MCH 01/27/2018 23.1* 24.0 - 34.0 PG Final    MCHC 01/27/2018 31.7  31.0 - 37.0 g/dL Final    RDW 01/27/2018 15.2* 11.6 - 14.5 % Final    PLATELET 69/37/3010 396  135 - 420 K/uL Final    NEUTROPHILS 01/27/2018 61  40 - 73 % Final    LYMPHOCYTES 01/27/2018 30  21 - 52 % Final    MONOCYTES 01/27/2018 6  3 - 10 % Final    EOSINOPHILS 01/27/2018 2  0 - 5 % Final    BASOPHILS 01/27/2018 1  0 - 2 % Final    ABS. NEUTROPHILS 01/27/2018 5.4  1.8 - 8.0 K/UL Final    ABS. LYMPHOCYTES 01/27/2018 2.7  0.9 - 3.6 K/UL Final    ABS. MONOCYTES 01/27/2018 0.6  0.05 - 1.2 K/UL Final    ABS. EOSINOPHILS 01/27/2018 0.2  0.0 - 0.4 K/UL Final    ABS.  BASOPHILS 01/27/2018 0.0  0.0 - 0.06 K/UL Final    DF 01/27/2018 AUTOMATED    Final    Sodium 01/27/2018 143  136 - 145 mmol/L Final    Potassium 01/27/2018 4.4  3.5 - 5.5 mmol/L Final    Chloride 01/27/2018 109* 100 - 108 mmol/L Final    CO2 01/27/2018 30  21 - 32 mmol/L Final    Anion gap 01/27/2018 4  3.0 - 18 mmol/L Final    Glucose 01/27/2018 94  74 - 99 mg/dL Final    BUN 01/27/2018 14  7.0 - 18 MG/DL Final    Creatinine 01/27/2018 0.54* 0.6 - 1.3 MG/DL Final    BUN/Creatinine ratio 01/27/2018 26* 12 - 20   Final    GFR est AA 01/27/2018 >60  >60 ml/min/1.73m2 Final    GFR est non-AA 01/27/2018 >60  >60 ml/min/1.73m2 Final    Comment: (NOTE)  Estimated GFR is calculated using the Modification of Diet in Renal   Disease (MDRD) Study equation, reported for both  Americans   (GFRAA) and non- Americans [facility]), and normalized to 1.73m2   body surface area. The physician must decide which value applies to   the patient. The MDRD study equation should only be used in   individuals age 25 or older. It has not been validated for the   following: pregnant women, patients with serious comorbid conditions,   or on certain medications, or persons with extremes of body size,   muscle mass, or nutritional status.  Calcium 01/27/2018 9.0  8.5 - 10.1 MG/DL Final    Bilirubin, total 01/27/2018 0.5  0.2 - 1.0 MG/DL Final    ALT (SGPT) 01/27/2018 32  13 - 56 U/L Final    AST (SGOT) 01/27/2018 16  15 - 37 U/L Final    Alk. phosphatase 01/27/2018 73  45 - 117 U/L Final    Protein, total 01/27/2018 6.9  6.4 - 8.2 g/dL Final    Albumin 01/27/2018 4.0  3.4 - 5.0 g/dL Final    Globulin 01/27/2018 2.9  2.0 - 4.0 g/dL Final    A-G Ratio 01/27/2018 1.4  0.8 - 1.7   Final    LIPID PROFILE 01/27/2018        Final    Cholesterol, total 01/27/2018 96  <200 MG/DL Final    Triglyceride 01/27/2018 63  <150 MG/DL Final    Comment: The drugs N-acetylcysteine (NAC) and  Metamiszole have been found to cause falsely  low results in this chemical assay. Please  be sure to submit blood samples obtained  BEFORE administration of either of these  drugs to assure correct results.  HDL Cholesterol 01/27/2018 42  40 - 60 MG/DL Final    LDL, calculated 01/27/2018 41.4  0 - 100 MG/DL Final    VLDL, calculated 01/27/2018 12.6  MG/DL Final    CHOL/HDL Ratio 01/27/2018 2.3  0 - 5.0   Final    Valproic acid 01/27/2018 <3* 50 - 100 ug/ml Final       .No results found for any visits on 04/17/18. Assessment / Plan      ICD-10-CM ICD-9-CM    1. Drowsiness R40.0 780.09 PROAIR HFA 90 mcg/actuation inhaler      modafinil (PROVIGIL) 100 mg tablet   2. PAD (peripheral artery disease) (HCC) I73.9 443.9    3. Essential hypertension I10 401.9    4.  Obstructive sleep apnea syndrome G47.33 327.23        Add Provigil  Complete evaulation and therapy for sleep apnea    Follow-up Disposition:  Return in about 3 months (around 7/17/2018). I asked Jordyn Guerrero if she has any questions and I answered the questions. Gladys Guerrero states that she understands the treatment plan and agrees with the treatment plan

## 2018-04-23 ENCOUNTER — TELEPHONE (OUTPATIENT)
Dept: INTERNAL MEDICINE CLINIC | Age: 63
End: 2018-04-23

## 2018-04-23 NOTE — TELEPHONE ENCOUNTER
Prior Auth request received from Pharmacy for modafinil. PA initiated on [email or web address]. Approval received from insurance.

## 2018-05-10 ENCOUNTER — OFFICE VISIT (OUTPATIENT)
Dept: INTERNAL MEDICINE CLINIC | Age: 63
End: 2018-05-10

## 2018-05-10 VITALS
TEMPERATURE: 96.1 F | HEIGHT: 59 IN | RESPIRATION RATE: 22 BRPM | OXYGEN SATURATION: 98 % | WEIGHT: 111 LBS | BODY MASS INDEX: 22.38 KG/M2 | SYSTOLIC BLOOD PRESSURE: 126 MMHG | HEART RATE: 57 BPM | DIASTOLIC BLOOD PRESSURE: 60 MMHG

## 2018-05-10 DIAGNOSIS — I10 ESSENTIAL HYPERTENSION: ICD-10-CM

## 2018-05-10 DIAGNOSIS — I73.9 PAD (PERIPHERAL ARTERY DISEASE) (HCC): ICD-10-CM

## 2018-05-10 DIAGNOSIS — R42 DIZZINESS: Primary | ICD-10-CM

## 2018-05-10 DIAGNOSIS — E78.00 PURE HYPERCHOLESTEROLEMIA: ICD-10-CM

## 2018-05-10 RX ORDER — CLARITHROMYCIN 500 MG/1
TABLET, FILM COATED ORAL
Qty: 20 TAB | Refills: 0 | Status: SHIPPED | OUTPATIENT
Start: 2018-05-10 | End: 2018-09-16 | Stop reason: ALTCHOICE

## 2018-05-10 RX ORDER — PREDNISONE 20 MG/1
TABLET ORAL
Qty: 20 TAB | Refills: 0 | Status: SHIPPED | OUTPATIENT
Start: 2018-05-10 | End: 2018-08-14 | Stop reason: ALTCHOICE

## 2018-05-10 NOTE — PROGRESS NOTES
Chief Complaint   Patient presents with    Well Male     1 month follow up         Is someone accompanying this pt? no    Is the patient using any DME equipment during OV? no    Depression Screening:  PHQ over the last two weeks 8/30/2017 11/16/2016   Little interest or pleasure in doing things Not at all Not at all   Feeling down, depressed or hopeless Not at all Not at all   Total Score PHQ 2 0 0       Learning Assessment:  Learning Assessment 4/20/2016 10/13/2014 6/3/2014   PRIMARY LEARNER Patient Patient Patient   HIGHEST LEVEL OF EDUCATION - PRIMARY LEARNER  - 4 YEARS OF COLLEGE > 4 [place] LEARNER - - [address] CAREGIVER - No No   PRIMARY LANGUAGE ENGLISH ENGLISH ENGLISH   LEARNER PREFERENCE PRIMARY READING OTHER (COMMENT) DEMONSTRATION     DEMONSTRATION - READING     VIDEOS - VIDEOS   ANSWERED BY patient patient patient   RELATIONSHIP SELF SELF SELF       Abuse Screening:  Abuse Screening Questionnaire 8/30/2017   Do you ever feel afraid of your partner? N   Are you in a relationship with someone who physically or mentally threatens you? N   Is it safe for you to go home? Levon Daigle is updated not on all     Advance Directive:  1. Do you have an advance directive in place? Patient Reply:no    Coordination of Care:  1. Have you been to the ER, urgent care clinic since your last visit? Hospitalized since your last visit? no    2. Have you seen or consulted any other health care providers outside of the [address] [place] [address] since your last visit? Include any pap smears or colon screening.  no

## 2018-05-10 NOTE — MR AVS SNAPSHOT
[address] [facility] [address] 
 
 
 [address], [address] [facility] [address] 
[phone] Patient: Cherelle Farfan MRN: [id number] Visit Information Date & Time Provider Department Dept. Phone Encounter #  
 5/10/2018  9:00 AM Sarthak Vargas MD [facility]IC[facility] OF [facility] [phone] [id number] Your Appointments 2018  3:00 PM  
Follow Up with Laurie Ritchie DO [facility] Appt Note: 6 month follow up; Rescheduling Appointment From 2018 [place] [phone] Danyel Prakash  
  
    
 2018  3:15 PM  
Follow Up with Sarthak Vargas MD  
[address] [place] [address]) Appt Note: 3 month [address], [address] [facility]  
  
   
 [address], [address] [facility] [address] Upcoming Health Maintenance Date Due Hepatitis C Screening 1955 Pneumococcal 19-64 Medium Risk (1 of 1 - PPSV23) 10/2/1975 DTaP/Tdap/Td series (1 - Tdap) 10/2/1977 ZOSTER VACCINE AGE 60> 2016 Influenza Age 5 to Adult 2018 BREAST CANCER SCRN MAMMOGRAM 2019 PAP AKA CERVICAL CYTOLOGY 2020 COLONOSCOPY 2027 Allergies as of 5/10/2018  Review Complete On: 5/10/2018 By: Sarthak Vargas MD  
  
 Severity Noted Reaction Type Reactions Latex  10/27/2014   Topical Rash Keflex [Cephalexin] Medium 2016    Rash Epinephrine    Other (comments)  
 tachycardia Tetracyclines    Nausea Only Current Immunizations  Reviewed on 2018 No immunizations on file. Not reviewed this visit Vitals BP Pulse Temp Resp Height(growth percentile) 126/60 (BP 1 Location: Right arm, BP Patient Position: Sitting) (!) 57 96.1 °F (35.6 °C) (Tympanic) 22 4' 11\" (1.499 m) Weight(growth percentile) SpO2 BMI OB Status Smoking Status 111 lb (50.3 kg) 98% 22.42 kg/m2 Postmenopausal Current Every Day Smoker BMI and BSA Data Body Mass Index Body Surface Area  
 22.42 kg/m 2 1.45 m 2 Preferred Pharmacy Pharmacy Name Phone [facility], [address] [phone] Your Updated Medication List  
  
   
This list is accurate as of 5/10/18 10:20 AM.  Always use your most recent med list.  
  
  
  
  
 aspirin delayed-release 81 mg tablet Take  by mouth nightly. atorvastatin 40 mg tablet Commonly known as:  LIPITOR  
TAKE ONE TABLET BY MOUTH DAILY  
  
 clarithromycin 500 mg tablet Commonly known as:  BIAXIN  
1 tablet twice per day with food  
  
 divalproex  mg tablet Commonly known as:  DEPAKOTE Take  by mouth three (3) times daily. FLONASE 50 mcg/actuation nasal spray Generic drug:  fluticasone 2 Sprays by Both Nostrils route daily. labetalol 100 mg tablet Commonly known as:  Colon Ninfa Take 100 mg by mouth two (2) times a day. meclizine 25 mg tablet Commonly known as:  ANTIVERT  
1 tablet three times per day  
  
 metoprolol succinate 25 mg XL tablet Commonly known as:  TOPROL-XL  
TAKE ONE TABLET BY MOUTH DAILY  
  
 modafinil 100 mg tablet Commonly known as:  PROVIGIL Take 1 Tab by mouth every morning. Max Daily Amount: 100 mg.  
  
 nitroglycerin 0.4 mg SL tablet Commonly known as:  NITROSTAT  
1 Tab by SubLINGual route every five (5) minutes as needed for Chest Pain. predniSONE 20 mg tablet Commonly known as:  DELTASONE  
3 tabs daily x 3 days, 2 tabs daily x 3 days, 1 tab daily x 3 days, 1/2 tab daily x 3 days PROAIR HFA 90 mcg/actuation inhaler Generic drug:  albuterol ZyrTEC 10 mg tablet Generic drug:  cetirizine Take 10 mg by mouth nightly. Prescriptions Sent to Pharmacy Refills clarithromycin (BIAXIN) 500 mg tablet 0 Si tablet twice per day with food Class: Normal  
 Pharmacy: [facility] Ph #: [phone]  
 predniSONE (DELTASONE) 20 mg tablet 0 Sig: 3 tabs daily x 3 days, 2 tabs daily x 3 days, 1 tab daily x 3 days, 1/2 tab daily x 3 days Class: Normal  
 Pharmacy: [facility] [address] [facility] [address], [address] [facility] Ph #: [phone] [facility] SERVICES! Dear Ileana Christopher: Thank you for requesting a [employer] account. Our records indicate that you already have an active [employer] account. You can access your account anytime at [email or web address] Did you know that you can access your hospital and ER discharge instructions at any time in [employer]? You can also review all of your test results from your hospital stay or ER visit. Additional Information If you have questions, please visit the Frequently Asked Questions section of the [employer] website at [email or web address] Remember, [employer] is NOT to be used for urgent needs. For medical emergencies, dial 911. Now available from your iPhone and Android! Please provide this summary of care documentation to your next provider. Your primary care clinician is listed as Pam Winchester. If you have any questions after today's visit, please call [phone].

## 2018-05-13 ENCOUNTER — HOSPITAL ENCOUNTER (EMERGENCY)
Age: 63
Discharge: LEFT BEFORE DISCHARGE | End: 2018-05-13
Attending: EMERGENCY MEDICINE
Payer: COMMERCIAL

## 2018-05-13 VITALS
BODY MASS INDEX: 22.38 KG/M2 | OXYGEN SATURATION: 100 % | HEART RATE: 65 BPM | DIASTOLIC BLOOD PRESSURE: 54 MMHG | WEIGHT: 111 LBS | HEIGHT: 59 IN | TEMPERATURE: 97.6 F | RESPIRATION RATE: 24 BRPM | SYSTOLIC BLOOD PRESSURE: 150 MMHG

## 2018-05-13 PROCEDURE — 75810000275 HC EMERGENCY DEPT VISIT NO LEVEL OF CARE

## 2018-05-13 NOTE — PROGRESS NOTES
The patient presents to the office today with the chief complaint of dizziness    HPI    The patient remains on medications for hypertension and hyperlipidemia. The patient is tolerating the medications well. The pateint has peripheral vascular disease. The legs are doing well. The patient remains off cigarettes. The patient has intermittent vertigo. This is quieting. Review of Systems   Respiratory: Negative for shortness of breath. Cardiovascular: Negative for chest pain and leg swelling. Allergies   Allergen Reactions    Latex Rash    Keflex [Cephalexin] Rash    Epinephrine Other (comments)     tachycardia    Tetracyclines Nausea Only       Current Outpatient Prescriptions   Medication Sig Dispense Refill    clarithromycin (BIAXIN) 500 mg tablet 1 tablet twice per day with food 20 Tab 0    predniSONE (DELTASONE) 20 mg tablet 3 tabs daily x 3 days, 2 tabs daily x 3 days, 1 tab daily x 3 days, 1/2 tab daily x 3 days 20 Tab 0    PROAIR HFA 90 mcg/actuation inhaler       modafinil (PROVIGIL) 100 mg tablet Take 1 Tab by mouth every morning. Max Daily Amount: 100 mg. 30 Tab 0    divalproex DR (DEPAKOTE) 500 mg tablet Take  by mouth three (3) times daily.  atorvastatin (LIPITOR) 40 mg tablet TAKE ONE TABLET BY MOUTH DAILY 90 Tab 3    metoprolol succinate (TOPROL-XL) 25 mg XL tablet TAKE ONE TABLET BY MOUTH DAILY 90 Tab 3    meclizine (ANTIVERT) 25 mg tablet 1 tablet three times per day 60 Tab 1    aspirin delayed-release 81 mg tablet Take  by mouth nightly.  fluticasone (FLONASE) 50 mcg/actuation nasal spray 2 Sprays by Both Nostrils route daily.  nitroglycerin (NITROSTAT) 0.4 mg SL tablet 1 Tab by SubLINGual route every five (5) minutes as needed for Chest Pain. 25 Tab 3    cetirizine (ZYRTEC) 10 mg tablet Take 10 mg by mouth nightly.  labetalol (NORMODYNE) 100 mg tablet Take 100 mg by mouth two (2) times a day.          Past Medical History:   Diagnosis Date    Allergic rhinitis     Blurred vision     Cardiac echocardiogram 06/11/2014    EF 60%. No RWMA. RVSP 30 mmHg. Mild AI.  Cardiac Holter monitor, normal 11/05/2014    Benign 48-hr Holter study.  Cardiac nuclear imaging test, mod risk 08/14/2015    Intermediate risk. High anterior artifact vs diagonal artery ischemia. Following Lexiscan, 0.5-mm downsloping inferolateral ST depression, resolving 10 min 30 sec of recovery. Arm heaviness noted.  Cardiovascular lower extremity arterial duplex 07/15/2016    Right leg: Mod peripheral arterial disease in femoral segment at rest.  Left leg:  Mild arterial disease at rest.  R KAREN 0.58. L KAREN 0.95. Similar to study of 10/15/14.  Cardiovascular renal angiography 10/27/2014    Bilateral patent renal arteries. Right CFA occluded but collateralized.  Cardiovascular renal duplex 10/13/2014    Aorta w/irregular walls. Severe >60% bilateral renal artery stenosis. Bilateral intrinsic/med renal disease.  Carotid duplex 10/17/2014    Mild <50% DAVID stenosis. Biphasic signals in both subclavians.  Carotid duplex 12/05/2016    Mild < 50% DAVID stenosis.       Cervical disc disease 09/2015    [facility] GAGAN Dizziness     Ear ache     Essential hypertension     Fainting spell     Frequent headaches     Frequent nosebleeds     Hemorrhoid     Hyperlipidemia     Hypertension     Ill-defined condition     Migraine     Sinus problem     Sinusitis, chronic     Spontaneous pneumothorax 1999    2 episodes on the right within several months of each other    Stomach pain     Weakness of right leg        Past Surgical History:   Procedure Laterality Date    COLONOSCOPY N/A 11/17/2017    COLONOSCOPY, SCREENING with hot bx polypectomy performed by Yolanda Yarbrough MD at [address] [place] [address] Road HX CHOLECYSTECTOMY  11/8/14    HX COLPOSCOPY      HX HEART CATHETERIZATION Bilateral 10/27/14    bilateral lower extremity angiography     HX OTHER SURGICAL Left     shave biopsy ( thigh area)       Social History     Social History    Marital status: SINGLE     Spouse name: N/A    Number of children: N/A    Years of education: N/A     Occupational History    Not on file. Social History Main Topics    Smoking status: Former Smoker     Packs/day: 0.25     Years: 20.00     Quit date: 12/31/2017    Smokeless tobacco: Never Used      Comment: now down to 3 cigarettes per day    Alcohol use No    Drug use: No    Sexual activity: No     Other Topics Concern    Not on file     Social History Narrative       Patient does not have an advanced directive on file    Visit Vitals    /60 (BP 1 Location: Right arm, BP Patient Position: Sitting)    Pulse (!) 57    Temp 96.1 °F (35.6 °C) (Tympanic)    Resp 22    Ht 4' 11\" (1.499 m)    Wt 111 lb (50.3 kg)    SpO2 98%    BMI 22.42 kg/m2       Physical Exam   No Cervical Lymphadenopathy  No Supraclavicular Lymphadenopathy  Thyroid is Normal  Lungs are normal to percussion. Clear to auscultation   Heart:  S1 S2 are normal, No gallops, No mummers  No Carotid Bruits  Abdomen:  Normal Bowel Sounds. No tenderness. No masses. No Hepatomegaly or Splenomegly  LE:  Strong Pedal Pulses. No Edema      BMI:  [facility] Outpatient Visit on 03/06/2018   Component Date Value Ref Range Status    BENZODIAZEPINES 03/06/2018 NEGATIVE   NEG   Final    BARBITURATES 03/06/2018 NEGATIVE   NEG   Final    THC (TH-CANNABINOL) 03/06/2018 NEGATIVE   NEG   Final    OPIATES 03/06/2018 NEGATIVE   NEG   Final    PCP(PHENCYCLIDINE) 03/06/2018 NEGATIVE   NEG   Final    COCAINE 03/06/2018 NEGATIVE   NEG   Final    AMPHETAMINES 03/06/2018 NEGATIVE   NEG   Final    METHADONE 03/06/2018 NEGATIVE   NEG   Final    HDSCOM 03/06/2018 (NOTE)   Final    Comment: Specimen analysis was performed without chain of custody handling.     These results should be used for medical purposes only and not for   legal or employment purposes. Unconfirmed screening results must not   be used for non-medical purposes. The cut-off concentration for positive results are as follows:    AMPH     1000 ng/mL  ASHISH      200 ng/mL  WAQAS      200 ng/mL  WHIT       300 ng/mL  METH      300 ng/mL  OPI       300 ng/mL  PCP        25 ng/mL  THC        50 ng/mL           . No results found for any visits on 05/10/18. Assessment / Plan      ICD-10-CM ICD-9-CM    1. Dizziness R42 780.4    2. PAD (peripheral artery disease) (HCC) I73.9 443.9    3. Essential hypertension I10 401.9    4. Pure hypercholesterolemia E78.00 272.0        she was advised to continue her maintenance medications      Follow-up Disposition:  Return in about 3 months (around 8/10/2018). I asked Gladys Schulte if she has any questions and I answered the questions. Gladys Schulte states that she understands the treatment plan and agrees with the treatment plan

## 2018-05-13 NOTE — ED TRIAGE NOTES
Woke up  Out of bed,chest tightness, difficulty breathing, c/o feeling anxious, BP taken 200/60 on own, Rx Labtelol 100 mg taken po prior to arrival. [facility] Rx ProAir 3xday, Flonase voices left inh @ work. Current being treated by PCP Sinus Infection Rx Erythromycin, Prednisone.

## 2018-05-20 DIAGNOSIS — R40.0 DROWSINESS: ICD-10-CM

## 2018-05-21 RX ORDER — MODAFINIL 100 MG/1
TABLET ORAL
Qty: 30 TAB | Refills: 0 | Status: SHIPPED | OUTPATIENT
Start: 2018-05-21 | End: 2018-07-01 | Stop reason: SDUPTHER

## 2018-06-20 ENCOUNTER — OFFICE VISIT (OUTPATIENT)
Dept: CARDIOLOGY CLINIC | Age: 63
End: 2018-06-20

## 2018-06-20 VITALS
OXYGEN SATURATION: 96 % | WEIGHT: 114 LBS | BODY MASS INDEX: 22.98 KG/M2 | HEART RATE: 66 BPM | SYSTOLIC BLOOD PRESSURE: 118 MMHG | HEIGHT: 59 IN | DIASTOLIC BLOOD PRESSURE: 56 MMHG

## 2018-06-20 DIAGNOSIS — G45.9 TRANSIENT CEREBRAL ISCHEMIA, UNSPECIFIED TYPE: ICD-10-CM

## 2018-06-20 DIAGNOSIS — R42 DIZZINESS: ICD-10-CM

## 2018-06-20 DIAGNOSIS — I73.9 PAD (PERIPHERAL ARTERY DISEASE) (HCC): ICD-10-CM

## 2018-06-20 DIAGNOSIS — E78.00 PURE HYPERCHOLESTEROLEMIA: ICD-10-CM

## 2018-06-20 DIAGNOSIS — I10 ESSENTIAL HYPERTENSION: Primary | ICD-10-CM

## 2018-06-20 NOTE — PROGRESS NOTES
1. Have you been to the ER, urgent care clinic since your last visit? Hospitalized since your last visit?no    2. Have you seen or consulted any other health care providers outside of the [facility] since your last visit? Include any pap smears or colon screening.  no

## 2018-06-20 NOTE — PROGRESS NOTES
HPI: I saw Felipe Pablo in my office today in cardiovascular evaluation regarding her past history of anginal type chest discomfort in the setting of peripheral vascular disease and hypercholesterolemia. Mr. Taye Pablo is a pleasant, small, 65year old  female with history of hypertension, hyperlipidemia and peripheral vascular disease with a femoral bruit and known totally occluded right common femoral artery documented in a catheterization to rule out renal artery stenosis, which was completed on 10/27/14 and demonstrated no evidence of renal artery stenosis with both renal arteries widely patent.       She does have history of some bilateral arm heaviness when I saw her in July of 2015, which sounded atypical for angina, but we did do a nuclear myocardial perfusion study on 8/14/15, which was mildly abnormal, showing a mild partially transient perfusion defect involving the high anterior wall with relative sparing of the apex and interventricular septum, most consistent with either soft tissue breast attenuation with breast shifting or possibly some mild ischemia in a diagonal coronary artery.  The gated SPECT imaging portion of the study demonstrated normal left ventricular function with an ejection fraction of 71%, but although the stress portion of the test was negative for chest pain, she did develop some downsloping ST depression inferolaterally and complained of some arm heaviness for about five minutes into recovery with EKG changes lasting for 10 minutes and recovery suggesting this might be truly positive.      We actually added Toprol XL 25 mg to her regimen for the possibility that this was anginal, and also for her palpitations, even though she was already on Normodyne, which is beta blocker, alpha blocker for her blood pressure, and we gave her some sublingual nitroglycerin for administration for recurrent arm or chest pain.       Interestingly, she told me at the time of her 10/8/15 visit that she went to the [facility] in [place] and had an MRI of her cervical spine, which demonstrated multilevel disc disease, most prominent in C6-C7, where there was a moderate spinal canal stenosis and severe bilateral neural foraminal narrowings, which would explain her arm pain issues. He comes in today relates that she is doing reasonably well. She has had some increased shortness of breath recently secondary to asthma issues and presumably some underlying COPD and relates that she quit smoking recently. She denies any cardiovascular symptomatology at this time with her only real complaint being a little bit of dizziness which is mild and largely resolved recently.     Encounter Diagnoses   Name Primary?  Dizziness     Essential hypertension Yes    Pure hypercholesterolemia     Transient cerebral ischemia, unspecified type     PAD (peripheral artery disease) ([facility])        Discussion: This lady appears to be doing about as well as we could expect and really of no recommendations for change at this time. She is not having any symptoms to suggest the development of symptomatic obstructive coronary disease or any signs of heart failure. Her latest lipid profile which was completed back on January 27, 2018 showed total cholesterol of 96 with triglycerides of 63, HDL 42, LDL of 41.4, and VLDL of 12.6 which I think is excellent control on Lipitor 40 mg daily. Her blood pressure is well-controlled today and her EKG is within normal limits and since she is otherwise doing well I will simply plan to see her again in several months or sooner if any new cardiovascular symptoms develop in the interim. PCP: Kierra Webber MD      Past Medical History:   Diagnosis Date    Allergic rhinitis     Blurred vision     Cardiac echocardiogram 06/11/2014    EF 60%. No RWMA. RVSP 30 mmHg. Mild AI.  Cardiac Holter monitor, normal 11/05/2014    Benign 48-hr Holter study.       Cardiac nuclear imaging test, mod risk 08/14/2015    Intermediate risk. High anterior artifact vs diagonal artery ischemia. Following Lexiscan, 0.5-mm downsloping inferolateral ST depression, resolving 10 min 30 sec of recovery. Arm heaviness noted.  Cardiovascular lower extremity arterial duplex 07/15/2016    Right leg: Mod peripheral arterial disease in femoral segment at rest.  Left leg:  Mild arterial disease at rest.  R KAREN 0.58. L KAREN 0.95. Similar to study of 10/15/14.  Cardiovascular renal angiography 10/27/2014    Bilateral patent renal arteries. Right CFA occluded but collateralized.  Cardiovascular renal duplex 10/13/2014    Aorta w/irregular walls. Severe >60% bilateral renal artery stenosis. Bilateral intrinsic/med renal disease.  Carotid duplex 10/17/2014    Mild <50% DAVID stenosis. Biphasic signals in both subclavians.  Carotid duplex 12/05/2016    Mild < 50% DAVID stenosis.  Cervical disc disease 09/2015    MRI of C-spine at [facility] GAGAN Dizziness     Ear ache     Essential hypertension     Fainting spell     Frequent headaches     Frequent nosebleeds     Hemorrhoid     Hyperlipidemia     Hypertension     Ill-defined condition     Migraine     Sinus problem     Sinusitis, chronic     Spontaneous pneumothorax 1999    2 episodes on the right within several months of each other    Stomach pain     Weakness of right leg        Past Surgical History:   Procedure Laterality Date    COLONOSCOPY N/A 11/17/2017    COLONOSCOPY, SCREENING with hot bx polypectomy performed by Kaya Diaz MD at [address] [place] [address] HX CHOLECYSTECTOMY  11/8/14    HX COLPOSCOPY      HX HEART CATHETERIZATION Bilateral 10/27/14    bilateral lower extremity angiography     HX OTHER SURGICAL Left     shave biopsy ( thigh area)       Current Outpatient Rx   Name  Route  Sig  Dispense  Refill    atorvastatin (LIPITOR) 40 mg tablet    Oral    Take 1 Tab by mouth daily.     30 Tab    6  metoprolol succinate (TOPROL-XL) 25 mg XL tablet    Oral    Take 1 Tab by mouth daily. 90 Tab    3      nitroglycerin (NITROSTAT) 0.4 mg SL tablet    SubLINGual    1 Tab by SubLINGual route every five (5) minutes as needed for Chest Pain. 25 Tab    3      cetirizine (ZYRTEC) 10 mg tablet    Oral    Take 10 mg by mouth nightly.  labetalol (NORMODYNE) 100 mg tablet    Oral    Take 100 mg by mouth two (2) times a day.  zolmitriptan (ZOMIG) 2.5 mg tablet    Oral    Take 2.5 mg by mouth as needed for Migraine.  multivitamin (ONE A DAY) tablet    Oral    Take 1 Tab by mouth daily. Allergies   Allergen Reactions    Latex Rash    Keflex [Cephalexin] Rash    Epinephrine Other (comments)     tachycardia    Tetracyclines Nausea Only       Social History :  Social History   Substance Use Topics    Smoking status: Former Smoker     Packs/day: 0.25     Years: 20.00     Quit date: 12/31/2017    Smokeless tobacco: Never Used      Comment: now down to 3 cigarettes per day    Alcohol use No        Family History: family history includes Cancer in her brother; Coronary Artery Disease in her father; Deep Vein Thrombosis in her father; Diabetes in her father; Heart Attack (age of onset: 80) in her maternal grandmother; Heart Attack (age of onset: 40) in her brother; Heart Attack (age of onset: 48) in an other family member; High Cholesterol in her father and mother; Hypertension in her father, maternal aunt, mother, and another family member; Pacemaker in her father. Review of Systems:   Constitutional: Negative for chills, fever, malaise/fatigue and weight loss. Respiratory: Positive for cough, shortness of breath and wheezing. Negative for hemoptysis. Cardiovascular: Positive for orthopnea. Negative for chest pain, palpitations and leg swelling. Gastrointestinal: Positive for abdominal pain.  Negative for blood in stool, constipation, diarrhea, heartburn, nausea and vomiting. Musculoskeletal: Negative for falls, joint pain and myalgias. Neurological: Positive for dizziness. Physical Exam:    The patient is a cooperative, alert, well developed, well nourished 65 y.o.  female who is in no acute distress at the time of the examination. Visit Vitals    /56    Pulse 66    Ht 4' 11\" (1.499 m)    Wt 51.7 kg (114 lb)    SpO2 96%    BMI 23.03 kg/m2       HEENT: Conjuctiva white, mucosa moist, no pallor or cyanosis. NECK: Supple without masses, tenderness or thyromegaly. There was no jugular venous distention. Carotid are full bilaterally with bilateral  bruits vs. radiation of murmur from the heart. CHEST: Symmetrical with good excursion. LUNGS: Clear to auscultation in all fields. HEART: The apex is not displaced. There were no lifts, thrills or heaves. There is a normal S1 and S2. There is a grade 2/6 systolic ejection murmur along the left sternal border with radiation to the base with a grade 1/6 diastolic decrescendo murmur along the LSB without appreciable rubs, clicks, or gallops auscultated. ABDOMEN: Soft without masses, tenderness or organomegaly. There is a fairly loud bruit just above the umbilicus in the midline consistent with possible renal arteries stenosis as well as significant bifemoral bruits  EXTREMITIES: Full peripheral pulses on the left with decrease PT and DP pulses on the right without peripheral edema. Review of Data: See PMH and Cardiology and Imaging sections for cardiac testing      Results for orders placed or performed in visit on 06/20/18   AMB POC EKG ROUTINE W/ 12 LEADS, INTER & REP     Status: None    Narrative    Normal sinus rhythm, rate 66. RSR prime normal variant in V1 and V2. This EKG is otherwise within normal limits and similar to the EKG of December 12, 2017. Lori Antoine D.O., F.A.C.C.   [facility]  [facility] [place]  [address] [place] [address] [employer]. [address] [address] [facility]    PLEASE NOTE:  This document has been produced using voice recognition software. Unrecognized errors in transcription may be present.

## 2018-06-20 NOTE — MR AVS SNAPSHOT
[facility] [address] [address] [address] [address] [phone] Patient: Timmy rBadford MRN: [id number] :1955 Visit Information Date & Time Provider Department Dept. Phone Encounter #  
 2018  3:00 PM Beatriz Vega, [address] [facility] [id number] Follow-up Instructions Return in about 6 months (around 2018). Your Appointments 2018  3:15 PM  
Follow Up with Do Cisse MD  
55 [facility] Appt Note: 3 month [address], [address] [facility] [address]  
  
   
 [address], [address] [facility] [address] Upcoming Health Maintenance Date Due Hepatitis C Screening 1955 DTaP/Tdap/Td series (1 - Tdap) 10/2/1977 ZOSTER VACCINE AGE 60> 2016 Influenza Age 5 to Adult 2018 BREAST CANCER SCRN MAMMOGRAM 2019 PAP AKA CERVICAL CYTOLOGY 2020 COLONOSCOPY 2027 Allergies as of 2018  Review Complete On: 2018 By: Beatriz Vega,  Severity Noted Reaction Type Reactions Latex  10/27/2014   Topical Rash Keflex [Cephalexin] Medium 2016    Rash Epinephrine    Other (comments)  
 tachycardia Tetracyclines    Nausea Only Current Immunizations  Reviewed on 2018 No immunizations on file. Not reviewed this visit You Were Diagnosed With   
  
 Codes Comments Essential hypertension    -  Primary ICD-10-CM: I10 
ICD-9-CM: 401.9 Dizziness     ICD-10-CM: G55 ICD-9-CM: 780.4 Palpitations     ICD-10-CM: R00.2 ICD-9-CM: 785.1 Pure hypercholesterolemia     ICD-10-CM: E78.00 ICD-9-CM: 272.0 Transient cerebral ischemia, unspecified type     ICD-10-CM: G45.9 ICD-9-CM: 435.9 PAD (peripheral artery disease) (HCC)     ICD-10-CM: I73.9 ICD-9-CM: 443. 9 Vitals BP Pulse Height(growth percentile) Weight(growth percentile) SpO2 BMI  
 118/56 66 4' 11\" (1.499 m) 114 lb (51.7 kg) 96% 23.03 kg/m2 OB Status Smoking Status Postmenopausal Former Smoker Vitals History BMI and BSA Data Body Mass Index Body Surface Area 23.03 kg/m 2 1.47 m 2 Preferred Pharmacy Pharmacy Name Phone [facility] [address], [address] [phone] Your Updated Medication List  
  
   
This list is accurate as of 6/20/18  3:56 PM.  Always use your most recent med list.  
  
  
  
  
 aspirin delayed-release 81 mg tablet Take  by mouth nightly. atorvastatin 40 mg tablet Commonly known as:  LIPITOR  
TAKE ONE TABLET BY MOUTH DAILY  
  
 clarithromycin 500 mg tablet Commonly known as:  BIAXIN  
1 tablet twice per day with food  
  
 divalproex  mg tablet Commonly known as:  DEPAKOTE Take  by mouth three (3) times daily. FLONASE 50 mcg/actuation nasal spray Generic drug:  fluticasone 2 Sprays by Both Nostrils route daily. labetalol 100 mg tablet Commonly known as:  Kori Listen Take 100 mg by mouth two (2) times a day. meclizine 25 mg tablet Commonly known as:  ANTIVERT  
1 tablet three times per day  
  
 metoprolol succinate 25 mg XL tablet Commonly known as:  TOPROL-XL  
TAKE ONE TABLET BY MOUTH DAILY  
  
 modafinil 100 mg tablet Commonly known as:  PROVIGIL  
TAKE ONE TABLET BY MOUTH EVERY MORNING ( MAX DAILY AMOUNT: 100 MG)  
  
 nitroglycerin 0.4 mg SL tablet Commonly known as:  NITROSTAT  
1 Tab by SubLINGual route every five (5) minutes as needed for Chest Pain. predniSONE 20 mg tablet Commonly known as:  DELTASONE  
3 tabs daily x 3 days, 2 tabs daily x 3 days, 1 tab daily x 3 days, 1/2 tab daily x 3 days PROAIR HFA 90 mcg/actuation inhaler Generic drug:  albuterol ZyrTEC 10 mg tablet Generic drug:  cetirizine Take 10 mg by mouth nightly. We Performed the Following AMB POC EKG ROUTINE W/ 12 LEADS, INTER & REP [72591 CPT(R)] Follow-up Instructions Return in about 6 months (around 12/20/2018). Introducing [facility] SERVICES! Dear Lauren Grimes: Thank you for requesting a [employer] account. Our records indicate that you already have an active [employer] account. You can access your account anytime at [email or web address] Did you know that you can access your hospital and ER discharge instructions at any time in [employer]? You can also review all of your test results from your hospital stay or ER visit. Additional Information If you have questions, please visit the Frequently Asked Questions section of the [employer] website at [email or web address] Remember, [employer] is NOT to be used for urgent needs. For medical emergencies, dial 911. Now available from your iPhone and Android! Please provide this summary of care documentation to your next provider. Your primary care clinician is listed as Mally Elizondo. If you have any questions after today's visit, please call [phone].

## 2018-07-01 DIAGNOSIS — R40.0 DROWSINESS: ICD-10-CM

## 2018-07-02 RX ORDER — MODAFINIL 100 MG/1
TABLET ORAL
Qty: 30 TAB | Refills: 0 | Status: SHIPPED | OUTPATIENT
Start: 2018-07-02 | End: 2018-07-29 | Stop reason: SDUPTHER

## 2018-07-29 DIAGNOSIS — R40.0 DROWSINESS: ICD-10-CM

## 2018-07-29 RX ORDER — MODAFINIL 100 MG/1
TABLET ORAL
Qty: 30 TAB | Refills: 0 | Status: SHIPPED | OUTPATIENT
Start: 2018-07-29 | End: 2018-09-13 | Stop reason: SDUPTHER

## 2018-08-14 ENCOUNTER — OFFICE VISIT (OUTPATIENT)
Dept: INTERNAL MEDICINE CLINIC | Age: 63
End: 2018-08-14

## 2018-08-14 VITALS
HEIGHT: 59 IN | TEMPERATURE: 98.8 F | RESPIRATION RATE: 20 BRPM | HEART RATE: 85 BPM | SYSTOLIC BLOOD PRESSURE: 118 MMHG | DIASTOLIC BLOOD PRESSURE: 66 MMHG | OXYGEN SATURATION: 97 %

## 2018-08-14 DIAGNOSIS — I10 ESSENTIAL HYPERTENSION: ICD-10-CM

## 2018-08-14 DIAGNOSIS — I10 ESSENTIAL HYPERTENSION: Primary | ICD-10-CM

## 2018-08-14 DIAGNOSIS — E78.00 PURE HYPERCHOLESTEROLEMIA: ICD-10-CM

## 2018-08-14 RX ORDER — PREDNISONE 20 MG/1
TABLET ORAL
Qty: 8 TAB | Refills: 0 | Status: SHIPPED | OUTPATIENT
Start: 2018-08-14 | End: 2018-09-13 | Stop reason: ALTCHOICE

## 2018-08-14 NOTE — PROGRESS NOTES
Chief Complaint   Patient presents with    Well Woman       Depression Screening:  PHQ over the last two weeks 8/30/2017   Little interest or pleasure in doing things Not at all   Feeling down, depressed, irritable, or hopeless Not at all   Total Score PHQ 2 0       Learning Assessment:  Learning Assessment 4/20/2016   PRIMARY LEARNER Patient   HIGHEST LEVEL OF EDUCATION - PRIMARY LEARNER  -   BARRIERS PRIMARY LEARNER -   CO-LEARNER CAREGIVER -   PRIMARY LANGUAGE ENGLISH   LEARNER PREFERENCE PRIMARY READING     DEMONSTRATION     VIDEOS   ANSWERED BY patient   RELATIONSHIP SELF       Abuse Screening:  Abuse Screening Questionnaire 8/30/2017   Do you ever feel afraid of your partner? N   Are you in a relationship with someone who physically or mentally threatens you? N   Is it safe for you to go home? Y             1. Have you been to the ER, urgent care clinic since your last visit? Hospitalized since your last visit? yes    2. Have you seen or consulted any other health care providers outside of the [address] [place] [address] since your last visit? Include any pap smears or colon screening.  [facility]

## 2018-08-15 NOTE — PROGRESS NOTES
The patient presents to the office today with the chief complaint of hypertension    HPI    The patient remains on medications for hypertension. she is tolerating the medications well. The patient remains on medications for hyperlipidemia. She is tolerating the medications well. The patient has no complaints. The patient has an abnormal Pap Smear and she is due for a colpscopy. The patient denies chest pain or dyspnea        Review of Systems   Respiratory: Negative for shortness of breath. Cardiovascular: Negative for chest pain and leg swelling. Allergies   Allergen Reactions    Latex Rash    Keflex [Cephalexin] Rash    Epinephrine Other (comments)     tachycardia    Tetracyclines Nausea Only       Current Outpatient Prescriptions   Medication Sig Dispense Refill    predniSONE (DELTASONE) 20 mg tablet 2 tabs daily x 2 days, 1 tab daily x 2 days, 1/2 tab daily x 4 days 8 Tab 0    modafinil (PROVIGIL) 100 mg tablet TAKE ONE TABLET BY MOUTH EVERY MORNING 30 Tab 0    PROAIR HFA 90 mcg/actuation inhaler       divalproex DR (DEPAKOTE) 500 mg tablet Take  by mouth three (3) times daily.  atorvastatin (LIPITOR) 40 mg tablet TAKE ONE TABLET BY MOUTH DAILY 90 Tab 3    metoprolol succinate (TOPROL-XL) 25 mg XL tablet TAKE ONE TABLET BY MOUTH DAILY 90 Tab 3    meclizine (ANTIVERT) 25 mg tablet 1 tablet three times per day 60 Tab 1    aspirin delayed-release 81 mg tablet Take  by mouth nightly.  fluticasone (FLONASE) 50 mcg/actuation nasal spray 2 Sprays by Both Nostrils route daily.  cetirizine (ZYRTEC) 10 mg tablet Take 10 mg by mouth nightly.  labetalol (NORMODYNE) 100 mg tablet Take 100 mg by mouth two (2) times a day.  clarithromycin (BIAXIN) 500 mg tablet 1 tablet twice per day with food 20 Tab 0    nitroglycerin (NITROSTAT) 0.4 mg SL tablet 1 Tab by SubLINGual route every five (5) minutes as needed for Chest Pain.  25 Tab 3       Past Medical History:   Diagnosis Date    Allergic rhinitis     Blurred vision     Cardiac echocardiogram 06/11/2014    EF 60%. No RWMA. RVSP 30 mmHg. Mild AI.  Cardiac Holter monitor, normal 11/05/2014    Benign 48-hr Holter study.  Cardiac nuclear imaging test, mod risk 08/14/2015    Intermediate risk. High anterior artifact vs diagonal artery ischemia. Following Lexiscan, 0.5-mm downsloping inferolateral ST depression, resolving 10 min 30 sec of recovery. Arm heaviness noted.  Cardiovascular lower extremity arterial duplex 07/15/2016    Right leg: Mod peripheral arterial disease in femoral segment at rest.  Left leg:  Mild arterial disease at rest.  R KAREN 0.58. L KAREN 0.95. Similar to study of 10/15/14.  Cardiovascular renal angiography 10/27/2014    Bilateral patent renal arteries. Right CFA occluded but collateralized.  Cardiovascular renal duplex 10/13/2014    Aorta w/irregular walls. Severe >60% bilateral renal artery stenosis. Bilateral intrinsic/med renal disease.  Carotid duplex 10/17/2014    Mild <50% DAVID stenosis. Biphasic signals in both subclavians.  Carotid duplex 12/05/2016    Mild < 50% DAVID stenosis.       Cervical disc disease 09/2015    [facility] GAGAN Dizziness     Ear ache     Essential hypertension     Fainting spell     Frequent headaches     Frequent nosebleeds     Hemorrhoid     Hyperlipidemia     Hypertension     Ill-defined condition     Migraine     Sinus problem     Sinusitis, chronic     Spontaneous pneumothorax 1999    2 episodes on the right within several months of each other    Stomach pain     Weakness of right leg        Past Surgical History:   Procedure Laterality Date    COLONOSCOPY N/A 11/17/2017    COLONOSCOPY, SCREENING with hot bx polypectomy performed by Krista Anderson MD at [address] [place] [address] HX CHOLECYSTECTOMY  11/8/14    HX COLPOSCOPY      HX HEART CATHETERIZATION Bilateral 10/27/14    bilateral lower extremity angiography     HX OTHER SURGICAL Left     shave biopsy ( thigh area)       Social History     Social History    Marital status: SINGLE     Spouse name: N/A    Number of children: N/A    Years of education: N/A     Occupational History    Not on file. Social History Main Topics    Smoking status: Former Smoker     Packs/day: 0.25     Years: 20.00     Quit date: 12/31/2017    Smokeless tobacco: Never Used      Comment: now down to 3 cigarettes per day    Alcohol use No    Drug use: No    Sexual activity: No     Other Topics Concern    Not on file     Social History Narrative       Patient does not have an advanced directive on file    Visit Vitals    /66 (BP 1 Location: Right arm, BP Patient Position: Sitting)    Pulse 85    Temp 98.8 °F (37.1 °C) (Tympanic)    Resp 20    Ht 4' 11\" (1.499 m)    SpO2 97%       Physical Exam   No Cervical Lymphadenopathy  No Supraclavicular Lymphadenopathy  Thyroid is Normal  Lungs are normal to percussion. Clear to auscultation   Heart:  S1 S2 are normal, No gallops, No mummers  No Carotid Bruits  Abdomen:  Normal Bowel Sounds. No tenderness. No masses. No Hepatomegaly or Splenomegly  LE:  Strong Pedal Pulses. No Edema      BMI:  OK    No visits with results within 3 Month(s) from this visit. Latest known visit with results is:    Hospital Outpatient Visit on 03/06/2018   Component Date Value Ref Range Status    BENZODIAZEPINES 03/06/2018 NEGATIVE   NEG   Final    BARBITURATES 03/06/2018 NEGATIVE   NEG   Final    THC (TH-CANNABINOL) 03/06/2018 NEGATIVE   NEG   Final    OPIATES 03/06/2018 NEGATIVE   NEG   Final    PCP(PHENCYCLIDINE) 03/06/2018 NEGATIVE   NEG   Final    COCAINE 03/06/2018 NEGATIVE   NEG   Final    AMPHETAMINES 03/06/2018 NEGATIVE   NEG   Final    METHADONE 03/06/2018 NEGATIVE   NEG   Final    HDSCOM 03/06/2018 (NOTE)   Final    Comment: Specimen analysis was performed without chain of custody handling.     These results should be used for medical purposes only and not for   legal or employment purposes. Unconfirmed screening results must not   be used for non-medical purposes. The cut-off concentration for positive results are as follows:    AMPH     1000 ng/mL  ASHISH      200 ng/mL  WAQAS      200 ng/mL  WHIT       300 ng/mL  METH      300 ng/mL  OPI       300 ng/mL  PCP        25 ng/mL  THC        50 ng/mL           . No results found for any visits on 08/14/18. Assessment / Plan      ICD-10-CM ICD-9-CM    1. Essential hypertension I10 401.9 CBC WITH AUTOMATED DIFF      METABOLIC PANEL, COMPREHENSIVE   2. Pure hypercholesterolemia E78.00 272.0 CBC WITH AUTOMATED DIFF      METABOLIC PANEL, COMPREHENSIVE      LIPID PANEL       Labs ordered  she was advised to continue her maintenance medications  Based on history or physical the patient is ok for the proposed procedure    Follow-up Disposition:  Return in about 3 months (around 11/14/2018). I asked Thee Keen if she has any questions and I answered the questions. Gladys Keen states that she understands the treatment plan and agrees with the treatment plan

## 2018-09-08 ENCOUNTER — HOSPITAL ENCOUNTER (OUTPATIENT)
Dept: LAB | Age: 63
Discharge: HOME | End: 2018-09-08
Payer: COMMERCIAL

## 2018-09-08 LAB
ALBUMIN SERPL-MCNC: 4 G/DL (ref 3.4–5)
ALBUMIN/GLOB SERPL: 1.3 {RATIO} (ref 0.8–1.7)
ALP SERPL-CCNC: 83 U/L (ref 45–117)
ALT SERPL-CCNC: 46 U/L (ref 13–56)
ANION GAP SERPL CALC-SCNC: 6 MMOL/L (ref 3–18)
AST SERPL-CCNC: 17 U/L (ref 15–37)
BASOPHILS # BLD: 0.1 K/UL (ref 0–0.1)
BASOPHILS NFR BLD: 1 % (ref 0–2)
BILIRUB SERPL-MCNC: 0.6 MG/DL (ref 0.2–1)
BUN SERPL-MCNC: 13 MG/DL (ref 7–18)
BUN/CREAT SERPL: 22 (ref 12–20)
CALCIUM SERPL-MCNC: 9.3 MG/DL (ref 8.5–10.1)
CHLORIDE SERPL-SCNC: 109 MMOL/L (ref 100–108)
CHOLEST SERPL-MCNC: 106 MG/DL
CO2 SERPL-SCNC: 29 MMOL/L (ref 21–32)
CREAT SERPL-MCNC: 0.6 MG/DL (ref 0.6–1.3)
DIFFERENTIAL METHOD BLD: ABNORMAL
EOSINOPHIL # BLD: 0.2 K/UL (ref 0–0.4)
EOSINOPHIL NFR BLD: 2 % (ref 0–5)
ERYTHROCYTE [DISTWIDTH] IN BLOOD BY AUTOMATED COUNT: 14.8 % (ref 11.6–14.5)
GLOBULIN SER CALC-MCNC: 3 G/DL (ref 2–4)
GLUCOSE SERPL-MCNC: 92 MG/DL (ref 74–99)
HCT VFR BLD AUTO: 33.6 % (ref 35–45)
HDLC SERPL-MCNC: 37 MG/DL (ref 40–60)
HDLC SERPL: 2.9 {RATIO} (ref 0–5)
HGB BLD-MCNC: 11 G/DL (ref 12–16)
LDLC SERPL CALC-MCNC: 52.2 MG/DL (ref 0–100)
LIPID PROFILE,FLP: ABNORMAL
LYMPHOCYTES # BLD: 3.1 K/UL (ref 0.9–3.6)
LYMPHOCYTES NFR BLD: 40 % (ref 21–52)
MCH RBC QN AUTO: 23 PG (ref 24–34)
MCHC RBC AUTO-ENTMCNC: 32.7 G/DL (ref 31–37)
MCV RBC AUTO: 70.3 FL (ref 74–97)
MONOCYTES # BLD: 0.4 K/UL (ref 0.05–1.2)
MONOCYTES NFR BLD: 5 % (ref 3–10)
NEUTS SEG # BLD: 3.9 K/UL (ref 1.8–8)
NEUTS SEG NFR BLD: 52 % (ref 40–73)
PLATELET # BLD AUTO: 231 K/UL (ref 135–420)
PLATELET COMMENTS,PCOM: ABNORMAL
PMV BLD AUTO: 10.9 FL (ref 9.2–11.8)
POTASSIUM SERPL-SCNC: 4.9 MMOL/L (ref 3.5–5.5)
PROT SERPL-MCNC: 7 G/DL (ref 6.4–8.2)
RBC # BLD AUTO: 4.78 M/UL (ref 4.2–5.3)
RBC MORPH BLD: ABNORMAL
SODIUM SERPL-SCNC: 144 MMOL/L (ref 136–145)
TRIGL SERPL-MCNC: 84 MG/DL (ref ?–150)
VLDLC SERPL CALC-MCNC: 16.8 MG/DL
WBC # BLD AUTO: 7.7 K/UL (ref 4.6–13.2)

## 2018-09-08 PROCEDURE — 80061 LIPID PANEL: CPT | Performed by: INTERNAL MEDICINE

## 2018-09-08 PROCEDURE — 85025 COMPLETE CBC W/AUTO DIFF WBC: CPT | Performed by: INTERNAL MEDICINE

## 2018-09-08 PROCEDURE — 80053 COMPREHEN METABOLIC PANEL: CPT | Performed by: INTERNAL MEDICINE

## 2018-09-08 PROCEDURE — 36415 COLL VENOUS BLD VENIPUNCTURE: CPT | Performed by: INTERNAL MEDICINE

## 2018-09-13 ENCOUNTER — OFFICE VISIT (OUTPATIENT)
Dept: INTERNAL MEDICINE CLINIC | Age: 63
End: 2018-09-13

## 2018-09-13 ENCOUNTER — TELEPHONE (OUTPATIENT)
Dept: INTERNAL MEDICINE CLINIC | Age: 63
End: 2018-09-13

## 2018-09-13 VITALS
SYSTOLIC BLOOD PRESSURE: 152 MMHG | HEIGHT: 59 IN | DIASTOLIC BLOOD PRESSURE: 60 MMHG | HEART RATE: 64 BPM | OXYGEN SATURATION: 99 % | TEMPERATURE: 97.4 F

## 2018-09-13 DIAGNOSIS — J01.00 ACUTE NON-RECURRENT MAXILLARY SINUSITIS: Primary | ICD-10-CM

## 2018-09-13 DIAGNOSIS — R40.0 DROWSINESS: ICD-10-CM

## 2018-09-13 DIAGNOSIS — E78.00 PURE HYPERCHOLESTEROLEMIA: ICD-10-CM

## 2018-09-13 DIAGNOSIS — I10 ESSENTIAL HYPERTENSION: ICD-10-CM

## 2018-09-13 RX ORDER — AMOXICILLIN AND CLAVULANATE POTASSIUM 875; 125 MG/1; MG/1
TABLET, FILM COATED ORAL
Qty: 20 TAB | Refills: 0 | Status: SHIPPED | OUTPATIENT
Start: 2018-09-13 | End: 2018-10-15

## 2018-09-13 RX ORDER — PREDNISONE 20 MG/1
TABLET ORAL
Qty: 26 TAB | Refills: 0 | Status: SHIPPED | OUTPATIENT
Start: 2018-09-13 | End: 2018-09-18

## 2018-09-13 RX ORDER — MODAFINIL 100 MG/1
TABLET ORAL
Qty: 30 TAB | Refills: 0 | Status: SHIPPED | OUTPATIENT
Start: 2018-09-13 | End: 2018-09-20 | Stop reason: ALTCHOICE

## 2018-09-13 NOTE — PROGRESS NOTES
Chief Complaint   Patient presents with    Mass     both side of neck     Nasal Congestion       Depression Screening:  PHQ over the last two weeks 8/30/2017   Little interest or pleasure in doing things Not at all   Feeling down, depressed, irritable, or hopeless Not at all   Total Score PHQ 2 0       Learning Assessment:  Learning Assessment 4/20/2016   PRIMARY LEARNER Patient   HIGHEST LEVEL OF EDUCATION - PRIMARY LEARNER  -   BARRIERS PRIMARY LEARNER -   CO-LEARNER CAREGIVER -   PRIMARY LANGUAGE ENGLISH   LEARNER PREFERENCE PRIMARY READING     DEMONSTRATION     VIDEOS   ANSWERED BY patient   RELATIONSHIP SELF       Abuse Screening:  Abuse Screening Questionnaire 8/30/2017   Do you ever feel afraid of your partner? N   Are you in a relationship with someone who physically or mentally threatens you? N   Is it safe for you to go home? Y         1. Have you been to the ER, urgent care clinic since your last visit? Hospitalized since your last visit? No    2. Have you seen or consulted any other health care providers outside of the [address] [place] [address] since your last visit? Include any pap smears or colon screening.  No

## 2018-09-13 NOTE — MR AVS SNAPSHOT
[address] [facility] 
 
 
 [address] [facility], [address] [facility] [address] 
[phone] Patient: Ayla Torres MRN: [id number] Visit Information Date & Time Provider Department Dept. Phone Encounter #  
 2018  2:15 PM Ajay Villagomez MD [facility] INTERNAL MEDICINE OF Delilah Hernandez [phone] [id number] Follow-up Instructions Return in about 4 weeks (around 10/11/2018). Follow-up and Disposition History Your Appointments 10/19/2018  8:15 AM  
Follow Up with Ajay Villagomez MD  
[address] [place] [address] Gio Dick) Appt Note: 1mo  
 [address] [place] [address], [address]  
[phone]  
  
   
 [address] [place] [address], [address] [facility] [address]  
  
    
 10/24/2018 10:30 AM  
Nurse Visit with [facility]PA [facility] [address] (Gio Dick) Appt Note: NP [email or web address]  
 [address] [place] [address], [address] [address]  
[phone]  
  
   
 [address] [place] [address], [address] [place] [id number]  
  
    
 10/24/2018 11:30 AM  
New Patient with Alejandro Edmonds MD  
[address] (Gio Kapil) Appt Note: DR Ballard Goodell WENTZEL(HBV ER)-ASTHMA-CXR [facility] [email or web address] [email or web address]  
 [address] [place] [address], [address] [address]  
[phone]  
  
   
 [address] [place] [address], [address] [facility] [place] [phone]  
  
    
 2019  3:20 PM  
Follow Up with Joretta Favre, DO [facility] [address] [facility] (Gio Dick) Appt Note: 6 month follow up [place] [address] [address] [address] [phone] [address] [place] [address] [address] Upcoming Health Maintenance Date Due Hepatitis C Screening 1955 DTaP/Tdap/Td series (1 - Tdap) 10/2/1977 ZOSTER VACCINE AGE 60> 2016 Influenza Age 5 to Adult 2018 PAP AKA CERVICAL CYTOLOGY 2020 BREAST CANCER SCRN MAMMOGRAM 9/18/2020 COLONOSCOPY 11/17/2027 Allergies as of 9/13/2018  Review Complete On: 9/13/2018 By: Gregg Christensen LPN Severity Noted Reaction Type Reactions Latex  10/27/2014   Topical Rash Keflex [Cephalexin] Medium 11/16/2016    Rash Epinephrine    Other (comments)  
 tachycardia Tetracyclines    Nausea Only Current Immunizations  Reviewed on 5/8/2018 No immunizations on file. Not reviewed this visit You Were Diagnosed With   
  
 Codes Comments Acute non-recurrent maxillary sinusitis    -  Primary ICD-10-CM: J01.00 ICD-9-CM: 461.0 Essential hypertension     ICD-10-CM: I10 
ICD-9-CM: 401.9 Pure hypercholesterolemia     ICD-10-CM: E78.00 ICD-9-CM: 272.0 Vitals BP Pulse Temp Height(growth percentile) SpO2 OB Status 152/60 (BP 1 Location: Right arm, BP Patient Position: Sitting) 64 97.4 °F (36.3 °C) (Tympanic) 4' 11\" (1.499 m) 99% Postmenopausal  
 Smoking Status Former Smoker Preferred Pharmacy Pharmacy Name Phone Sumanth Guillory [address] [facility], [address] [place] [address] [phone] Your Updated Medication List  
  
   
This list is accurate as of 9/13/18 11:59 PM.  Always use your most recent med list.  
  
  
  
  
 amoxicillin-clavulanate 875-125 mg per tablet Commonly known as:  AUGMENTIN  
1 tab twice per day with food  
  
 aspirin delayed-release 81 mg tablet Take  by mouth nightly. atorvastatin 40 mg tablet Commonly known as:  LIPITOR  
TAKE ONE TABLET BY MOUTH DAILY  
  
 divalproex  mg tablet Commonly known as:  DEPAKOTE Take  by mouth three (3) times daily. FLONASE 50 mcg/actuation nasal spray Generic drug:  fluticasone 2 Sprays by Both Nostrils route daily. labetalol 100 mg tablet Commonly known as:  Marlane Angelucci Take 100 mg by mouth two (2) times a day. meclizine 25 mg tablet Commonly known as:  ANTIVERT  
 1 tablet three times per day  
  
 metoprolol succinate 25 mg XL tablet Commonly known as:  TOPROL-XL  
TAKE ONE TABLET BY MOUTH DAILY  
  
 modafinil 100 mg tablet Commonly known as:  PROVIGIL  
TAKE ONE TABLET BY MOUTH EVERY MORNING  
  
 nitroglycerin 0.4 mg SL tablet Commonly known as:  NITROSTAT  
1 Tab by SubLINGual route every five (5) minutes as needed for Chest Pain. predniSONE 20 mg tablet Commonly known as:  DELTASONE  
4 tab daily x 2 days, 3 tab daily x 2 days, 2 tab daily x 4 days, 1 tab daily x 4 days PROAIR HFA 90 mcg/actuation inhaler Generic drug:  albuterol ZyrTEC 10 mg tablet Generic drug:  cetirizine Take 10 mg by mouth nightly. Prescriptions Sent to Pharmacy Refills  
 predniSONE (DELTASONE) 20 mg tablet (Discontinued) 0 Si tab daily x 2 days, 3 tab daily x 2 days, 2 tab daily x 4 days, 1 tab daily x 4 days Class: Normal  
 Pharmacy: [facility] [place] Ph #: [phone]  
 amoxicillin-clavulanate (AUGMENTIN) 875-125 mg per tablet 0 Si tab twice per day with food Class: Normal  
 Pharmacy: [facility] [address] Ph #: [phone] Follow-up Instructions Return in about 4 weeks (around 10/11/2018). Introducing [facility]! Dear Ruby Kidd: Thank you for requesting a [employer] account. Our records indicate that you already have an active [employer] account. You can access your account anytime at [email or web address] Did you know that you can access your hospital and ER discharge instructions at any time in [employer]? You can also review all of your test results from your hospital stay or ER visit. Additional Information If you have questions, please visit the Frequently Asked Questions section of the [employer] website at [email or web address] Remember, [employer] is NOT to be used for urgent needs. For medical emergencies, dial 911. Now available from your iPhone and Android! Please provide this summary of care documentation to your next provider. Your primary care clinician is listed as Earl November. If you have any questions after today's visit, please call [phone].

## 2018-09-14 ENCOUNTER — HOSPITAL ENCOUNTER (EMERGENCY)
Age: 63
Discharge: HOME | End: 2018-09-14
Attending: EMERGENCY MEDICINE
Payer: COMMERCIAL

## 2018-09-14 VITALS
HEIGHT: 59 IN | RESPIRATION RATE: 18 BRPM | BODY MASS INDEX: 23.39 KG/M2 | WEIGHT: 116 LBS | HEART RATE: 73 BPM | OXYGEN SATURATION: 98 % | DIASTOLIC BLOOD PRESSURE: 51 MMHG | TEMPERATURE: 98.8 F | SYSTOLIC BLOOD PRESSURE: 159 MMHG

## 2018-09-14 DIAGNOSIS — R22.1 NECK SWELLING: Primary | ICD-10-CM

## 2018-09-14 PROCEDURE — 99282 EMERGENCY DEPT VISIT SF MDM: CPT

## 2018-09-14 NOTE — ED TRIAGE NOTES
Pt presents with neck swelling x 3 weeks, pt points to base of neck on both right and left side. Denies difficulty swallowing. Pt states seen by pcp yesterday advised to see pulmonology md. Pt states can not breath when lays down due to nasal congestion, states has had sleep study done.

## 2018-09-14 NOTE — ED PROVIDER NOTES
EMERGENCY DEPARTMENT HISTORY AND PHYSICAL EXAM 
 
12:16 PM 
 
 
Date: 9/14/2018 Patient Name: Katrin Rea History of Presenting Illness Chief Complaint Patient presents with  Neck Swelling History Provided By: Patient Chief Complaint: Neck Swelling Duration: 3 Weeks Timing:  Constant Location: Generalized neck area Quality: Swollen Severity: Moderate Modifying Factors: On Prednisone and Augmentin Associated Symptoms: SOB (chronic) Additional History (Context): Katrin Rea is a 65 y.o. female with PMHx of sinusitis, HTN who presents with constant moderate generalized neck swelling that started x 3 weeks ago. Pt states she has Hx of chronic sinus problems where she developed chronic SOB and postnasal drip. She reports that she will have to have surgery per ENT. She has had recent imaging which showed that she had asthma and other respiratory problems. Recently saw PCP and was prescribed Prednisone and Augmentin. She has only been on the medication for x 1 day. States she will need to follow up with a Pulmonologist. Denies being in any pain, trouble swallowing. Denies any further complaints or symptoms at the moment. PCP: Jessi Lira MD 
 
Current Outpatient Prescriptions Medication Sig Dispense Refill  modafinil (PROVIGIL) 100 mg tablet TAKE ONE TABLET BY MOUTH EVERY MORNING 30 Tab 0  predniSONE (DELTASONE) 20 mg tablet 4 tab daily x 2 days, 3 tab daily x 2 days, 2 tab daily x 4 days, 1 tab daily x 4 days 26 Tab 0  
 amoxicillin-clavulanate (AUGMENTIN) 875-125 mg per tablet 1 tab twice per day with food 20 Tab 0  
 clarithromycin (BIAXIN) 500 mg tablet 1 tablet twice per day with food 20 Tab 0  
 PROAIR HFA 90 mcg/actuation inhaler  divalproex DR (DEPAKOTE) 500 mg tablet Take  by mouth three (3) times daily.     
 atorvastatin (LIPITOR) 40 mg tablet TAKE ONE TABLET BY MOUTH DAILY 90 Tab 3  
  metoprolol succinate (TOPROL-XL) 25 mg XL tablet TAKE ONE TABLET BY MOUTH DAILY 90 Tab 3  
 meclizine (ANTIVERT) 25 mg tablet 1 tablet three times per day 60 Tab 1  
 aspirin delayed-release 81 mg tablet Take  by mouth nightly.  fluticasone (FLONASE) 50 mcg/actuation nasal spray 2 Sprays by Both Nostrils route daily.  nitroglycerin (NITROSTAT) 0.4 mg SL tablet 1 Tab by SubLINGual route every five (5) minutes as needed for Chest Pain. 25 Tab 3  cetirizine (ZYRTEC) 10 mg tablet Take 10 mg by mouth nightly.  labetalol (NORMODYNE) 100 mg tablet Take 100 mg by mouth two (2) times a day. Past History Past Medical History: 
Past Medical History:  
Diagnosis Date  Allergic rhinitis  Blurred vision  Cardiac echocardiogram 06/11/2014 EF 60%. No RWMA. RVSP 30 mmHg. Mild AI.  Cardiac Holter monitor, normal 11/05/2014 Benign 48-hr Holter study.  Cardiac nuclear imaging test, mod risk 08/14/2015 Intermediate risk. High anterior artifact vs diagonal artery ischemia. Following Lexiscan, 0.5-mm downsloping inferolateral ST depression, resolving 10 min 30 sec of recovery. Arm heaviness noted.  Cardiovascular lower extremity arterial duplex 07/15/2016 Right leg: Mod peripheral arterial disease in femoral segment at rest.  Left leg:  Mild arterial disease at rest.  R KAREN 0.58. L KAREN 0.95. Similar to study of 10/15/14.  Cardiovascular renal angiography 10/27/2014 Bilateral patent renal arteries. Right CFA occluded but collateralized.  Cardiovascular renal duplex 10/13/2014 Aorta w/irregular walls. Severe >60% bilateral renal artery stenosis. Bilateral intrinsic/med renal disease.  Carotid duplex 10/17/2014 Mild <50% DAVID stenosis. Biphasic signals in both subclavians.  Carotid duplex 12/05/2016 Mild < 50% DAVID stenosis.  Cervical disc disease 09/2015 MRI [facility] C-spine at [facility] AND [facility] [place]  Dizziness  Ear ache  Essential hypertension  Fainting spell  Frequent headaches  Frequent nosebleeds  Hemorrhoid  Hyperlipidemia  Hypertension  Ill-defined condition  Migraine  Sinus problem  Sinusitis, chronic  Spontaneous pneumothorax 1999  
 2 episodes on the right within several months of each other  Stomach pain  Weakness of right leg Past Surgical History: 
Past Surgical History:  
Procedure Laterality Date  COLONOSCOPY N/A 11/17/2017 COLONOSCOPY, SCREENING with hot bx polypectomy performed by Luz Stuart MD at [address] [facility] HX CHOLECYSTECTOMY  11/8/14  HX COLPOSCOPY    
 HX HEART CATHETERIZATION Bilateral 10/27/14  
 bilateral lower extremity angiography  HX OTHER SURGICAL Left   
 shave biopsy ( thigh area) Family History: 
Family History Problem Relation Age of Onset  Deep Vein Thrombosis Father  Coronary Artery Disease Father  Pacemaker Father  Diabetes Father  High Cholesterol Father  Hypertension Father  High Cholesterol Mother  Hypertension Mother  Heart Attack Brother 40  Cancer Brother  Heart Attack Maternal Grandmother 100  
 Heart Attack Other 50  Hypertension Other  Hypertension Maternal Aunt Social History: 
Social History Substance Use Topics  Smoking status: Former Smoker Packs/day: 0.25 Years: 20.00 Quit date: 12/31/2017  Smokeless tobacco: Never Used Comment: now down to 3 cigarettes per day  Alcohol use No  
 
 
Allergies: Allergies Allergen Reactions  Latex Rash  Keflex [Cephalexin] Rash  Epinephrine Other (comments)  
  tachycardia  Tetracyclines Nausea Only Review of Systems Review of Systems Constitutional: Negative for chills, fatigue and fever. HENT: Negative. Negative for sore throat and trouble swallowing. Eyes: Negative. Respiratory: Positive for shortness of breath. Negative for cough. Cardiovascular: Negative for chest pain and palpitations. Gastrointestinal: Negative for abdominal pain, nausea and vomiting. Genitourinary: Negative for dysuria. Musculoskeletal: Negative. Positive for neck swelling Skin: Negative. Neurological: Negative for dizziness, weakness, light-headedness and headaches. Psychiatric/Behavioral: Negative. All other systems reviewed and are negative. Physical Exam  
 
Visit Vitals  /51 (BP 1 Location: Left arm, BP Patient Position: Sitting)  Pulse 73  Temp 98.8 °F (37.1 °C)  Resp 18  Ht 4' 11\" (1.499 m)  Wt 52.6 kg (116 lb)  SpO2 98%  BMI 23.43 kg/m2 Physical Exam  
Constitutional: She is oriented to person, place, and time. She appears well-developed and well-nourished. HENT:  
Head: Normocephalic and atraumatic. Mouth/Throat: Oropharynx is clear and moist.  
Eyes: Conjunctivae are normal. Pupils are equal, round, and reactive to light. No scleral icterus. Neck: Normal range of motion. Neck supple. No JVD present. No tracheal deviation present. Cardiovascular: Normal rate, regular rhythm and normal heart sounds. Pulmonary/Chest: Effort normal and breath sounds normal. No respiratory distress. She has no wheezes. Abdominal: Soft. Bowel sounds are normal.  
Musculoskeletal: Normal range of motion. Neurological: She is alert and oriented to person, place, and time. She has normal strength. Gait normal. GCS eye subscore is 4. GCS verbal subscore is 5. GCS motor subscore is 6. Skin: Skin is warm and dry. Psychiatric: She has a normal mood and affect. Nursing note and vitals reviewed. Diagnostic Study Results Labs - No results found for this or any previous visit (from the past 12 hour(s)). Radiologic Studies - No orders to display Medical Decision Making I am the first provider for this patient. I reviewed the vital signs, available nursing notes, past medical history, past surgical history, family history and social history. Vital Signs-Reviewed the patient's vital signs. Pulse Oximetry Analysis -  98 % on RA, normal  
 
Records Reviewed: Nursing Notes (Time of Review: 12:16 PM) 
 
ED Course: Progress Notes, Reevaluation, and Consults: 
 
Provider Notes (Medical Decision Making):  
 
Diagnosis Clinical Impression: 1. Neck swelling Disposition: DC Follow-up Information Follow up With Details Comments Contact Info Sebastián Alvarenga MD Schedule an appointment as soon as possible for a visit in 2 weeks For Dashawn Almanza [address] [address] 
[phone] [facility] EMERGENCY DEPT Go to As needed, If symptoms worsen [address] [place] [id number] [phone] Patient's Medications Start Taking No medications on file Continue Taking AMOXICILLIN-CLAVULANATE (AUGMENTIN) 875-125 MG PER TABLET    1 tab twice per day with food ASPIRIN DELAYED-RELEASE 81 MG TABLET    Take  by mouth nightly. ATORVASTATIN (LIPITOR) 40 MG TABLET    TAKE ONE TABLET BY MOUTH DAILY CETIRIZINE (ZYRTEC) 10 MG TABLET    Take 10 mg by mouth nightly. CLARITHROMYCIN (BIAXIN) 500 MG TABLET    1 tablet twice per day with food DIVALPROEX DR (DEPAKOTE) 500 MG TABLET    Take  by mouth three (3) times daily. FLUTICASONE (FLONASE) 50 MCG/ACTUATION NASAL SPRAY    2 Sprays by Both Nostrils route daily. LABETALOL (NORMODYNE) 100 MG TABLET    Take 100 mg by mouth two (2) times a day. MECLIZINE (ANTIVERT) 25 MG TABLET    1 tablet three times per day METOPROLOL SUCCINATE (TOPROL-XL) 25 MG XL TABLET    TAKE ONE TABLET BY MOUTH DAILY MODAFINIL (PROVIGIL) 100 MG TABLET    TAKE ONE TABLET BY MOUTH EVERY MORNING  
 NITROGLYCERIN (NITROSTAT) 0.4 MG SL TABLET    1 Tab by SubLINGual route every five (5) minutes as needed for Chest Pain. PREDNISONE (DELTASONE) 20 MG TABLET    4 tab daily x 2 days, 3 tab daily x 2 days, 2 tab daily x 4 days, 1 tab daily x 4 days PROAIR HFA 90 MCG/ACTUATION INHALER These Medications have changed No medications on file Stop Taking No medications on file  
 
_______________________________ Attestations: 
Scribe Attestation Nadia Dobbs (Aj) acting as a scribe for and in the presence of Andres Ma MD     
September 14, 2018 at 12:16 PM 
    
Provider Attestation:     
I personally performed the services described in the documentation, reviewed the documentation, as recorded by the scribe in my presence, and it accurately and completely records my words and actions. September 14, 2018 at 12:16 PM - Andres Ma MD   
_______________________________ Results of recent tests reviewed with pt, answered manly questions that she had. Pt was feeling better and less anxious after talking with MD, she wanted a pulm referral. Pt agrees with dispo and F/U plan.  
Andres Ma MD 
12:37 PM

## 2018-09-16 NOTE — PROGRESS NOTES
The patient presents to the office today with the chief complaint of Sinus Congestion    HPI    The patient complains of 5 days of sinus congestion and facial fullness. There is moderate drainage and cough. she denies fever. This has been a reoccuring problem over the past year. The patient remains on medications for hypertension and hyperlipidemia. She is tolerating the medications well. Review of Systems   HENT: Positive for congestion. Respiratory: Positive for cough. Negative for shortness of breath. Cardiovascular: Negative for chest pain and leg swelling. Allergies   Allergen Reactions    Latex Rash    Keflex [Cephalexin] Rash    Epinephrine Other (comments)     tachycardia    Tetracyclines Nausea Only       Current Outpatient Prescriptions   Medication Sig Dispense Refill    modafinil (PROVIGIL) 100 mg tablet TAKE ONE TABLET BY MOUTH EVERY MORNING 30 Tab 0    predniSONE (DELTASONE) 20 mg tablet 4 tab daily x 2 days, 3 tab daily x 2 days, 2 tab daily x 4 days, 1 tab daily x 4 days 26 Tab 0    amoxicillin-clavulanate (AUGMENTIN) 875-125 mg per tablet 1 tab twice per day with food 20 Tab 0    PROAIR HFA 90 mcg/actuation inhaler       divalproex DR (DEPAKOTE) 500 mg tablet Take  by mouth three (3) times daily.  atorvastatin (LIPITOR) 40 mg tablet TAKE ONE TABLET BY MOUTH DAILY 90 Tab 3    metoprolol succinate (TOPROL-XL) 25 mg XL tablet TAKE ONE TABLET BY MOUTH DAILY 90 Tab 3    meclizine (ANTIVERT) 25 mg tablet 1 tablet three times per day 60 Tab 1    aspirin delayed-release 81 mg tablet Take  by mouth nightly.  fluticasone (FLONASE) 50 mcg/actuation nasal spray 2 Sprays by Both Nostrils route daily.  nitroglycerin (NITROSTAT) 0.4 mg SL tablet 1 Tab by SubLINGual route every five (5) minutes as needed for Chest Pain. 25 Tab 3    cetirizine (ZYRTEC) 10 mg tablet Take 10 mg by mouth nightly.       labetalol (NORMODYNE) 100 mg tablet Take 100 mg by mouth two (2) times a day. Past Medical History:   Diagnosis Date    Allergic rhinitis     Blurred vision     Cardiac echocardiogram 06/11/2014    EF 60%. No RWMA. RVSP 30 mmHg. Mild AI.  Cardiac Holter monitor, normal 11/05/2014    Benign 48-hr Holter study.  Cardiac nuclear imaging test, mod risk 08/14/2015    Intermediate risk. High anterior artifact vs diagonal artery ischemia. Following Lexiscan, 0.5-mm downsloping inferolateral ST depression, resolving 10 min 30 sec of recovery. Arm heaviness noted.  Cardiovascular lower extremity arterial duplex 07/15/2016    Right leg: Mod peripheral arterial disease in femoral segment at rest.  Left leg:  Mild arterial disease at rest.  R AKREN 0.58. L KAREN 0.95. Similar to study of 10/15/14.  Cardiovascular renal angiography 10/27/2014    Bilateral patent renal arteries. Right CFA occluded but collateralized.  Cardiovascular renal duplex 10/13/2014    Aorta w/irregular walls. Severe >60% bilateral renal artery stenosis. Bilateral intrinsic/med renal disease.  Carotid duplex 10/17/2014    Mild <50% DAVID stenosis. Biphasic signals in both subclavians.  Carotid duplex 12/05/2016    Mild < 50% DAVID stenosis.       Cervical disc disease 09/2015    [facility]spine at [facility] Dizziness     Ear ache     Essential hypertension     Fainting spell     Frequent headaches     Frequent nosebleeds     Hemorrhoid     Hyperlipidemia     Hypertension     Ill-defined condition     Migraine     Sinus problem     Sinusitis, chronic     Spontaneous pneumothorax 1999    2 episodes on the right within several months of each other    Stomach pain     Weakness of right leg        Past Surgical History:   Procedure Laterality Date    COLONOSCOPY N/A 11/17/2017    COLONOSCOPY, SCREENING with hot bx polypectomy performed by Ladonna Zimmerman MD at [address] [place] [address] HX CHOLECYSTECTOMY  11/8/14    HX COLPOSCOPY      HX HEART CATHETERIZATION Bilateral 10/27/14    bilateral lower extremity angiography     HX OTHER SURGICAL Left     shave biopsy ( thigh area)       Social History     Social History    Marital status: SINGLE     Spouse name: N/A    Number of children: N/A    Years of education: N/A     Occupational History    Not on file. Social History Main Topics    Smoking status: Former Smoker     Packs/day: 0.25     Years: 20.00     Quit date: 12/31/2017    Smokeless tobacco: Never Used      Comment: now down to 3 cigarettes per day    Alcohol use No    Drug use: No    Sexual activity: No     Other Topics Concern    Not on file     Social History Narrative       Patient does not have an advanced directive on file    Visit Vitals    /60 (BP 1 Location: Right arm, BP Patient Position: Sitting)    Pulse 64    Temp 97.4 °F (36.3 °C) (Tympanic)    Ht 4' 11\" (1.499 m)    SpO2 99%       Physical Exam   Ears:  Slight retraction on the left. Moderate retraction on the right without erythema  No Cervical Lymphadenopathy  No Supraclavicular Lymphadenopathy  Thyroid is Normal  Lungs are normal to percussion. Clear to auscultation   Heart:  S1 S2 are normal, No gallops, No mummers  No Carotid Bruits  Abdomen:  Normal Bowel Sounds. No tenderness. No masses. No Hepatomegaly or Splenomegaly  LE:  Strong Pedal Pulses.   No Edema      BMI:  [facility] Outpatient Visit on 09/08/2018   Component Date Value Ref Range Status    WBC 09/08/2018 7.7  4.6 - 13.2 K/uL Final    RBC 09/08/2018 4.78  4.20 - 5.30 M/uL Final    HGB 09/08/2018 11.0* 12.0 - 16.0 g/dL Final    HCT 09/08/2018 33.6* 35.0 - 45.0 % Final    MCV 09/08/2018 70.3* 74.0 - 97.0 FL Final    MCH 09/08/2018 23.0* 24.0 - 34.0 PG Final    MCHC 09/08/2018 32.7  31.0 - 37.0 g/dL Final    RDW 09/08/2018 14.8* 11.6 - 14.5 % Final    PLATELET 18/49/6688 825  135 - 420 K/uL Final    MPV 09/08/2018 10.9  9.2 - 11.8 FL Final    NEUTROPHILS 09/08/2018 52  40 - 73 % Final    LYMPHOCYTES 09/08/2018 40  21 - 52 % Final    MONOCYTES 09/08/2018 5  3 - 10 % Final    EOSINOPHILS 09/08/2018 2  0 - 5 % Final    BASOPHILS 09/08/2018 1  0 - 2 % Final    ABS. NEUTROPHILS 09/08/2018 3.9  1.8 - 8.0 K/UL Final    ABS. LYMPHOCYTES 09/08/2018 3.1  0.9 - 3.6 K/UL Final    ABS. MONOCYTES 09/08/2018 0.4  0.05 - 1.2 K/UL Final    ABS. EOSINOPHILS 09/08/2018 0.2  0.0 - 0.4 K/UL Final    ABS. BASOPHILS 09/08/2018 0.1  0.0 - 0.1 K/UL Final    DF 09/08/2018 AUTOMATED    Final    SMEAR SCANNED    PLATELET COMMENTS 09/11/4633 ADEQUATE PLATELETS    Final    RBC COMMENTS 09/08/2018     Final                    Value:ANISOCYTOSIS  1+      RBC COMMENTS 09/08/2018     Final                    Value:MICROCYTOSIS  2+      RBC COMMENTS 09/08/2018     Final                    Value:OVALOCYTES  2+      RBC COMMENTS 09/08/2018     Final                    Value:TARGET CELLS  1+      LIPID PROFILE 09/08/2018        Final    Cholesterol, total 09/08/2018 106  <200 MG/DL Final    Triglyceride 09/08/2018 84  <150 MG/DL Final    Comment: The drugs N-acetylcysteine (NAC) and  Metamiszole have been found to cause falsely  low results in this chemical assay. Please  be sure to submit blood samples obtained  BEFORE administration of either of these  drugs to assure correct results.       HDL Cholesterol 09/08/2018 37* 40 - 60 MG/DL Final    LDL, calculated 09/08/2018 52.2  0 - 100 MG/DL Final    VLDL, calculated 09/08/2018 16.8  MG/DL Final    CHOL/HDL Ratio 09/08/2018 2.9  0 - 5.0   Final    Sodium 09/08/2018 144  136 - 145 mmol/L Final    Potassium 09/08/2018 4.9  3.5 - 5.5 mmol/L Final    Chloride 09/08/2018 109* 100 - 108 mmol/L Final    CO2 09/08/2018 29  21 - 32 mmol/L Final    Anion gap 09/08/2018 6  3.0 - 18 mmol/L Final    Glucose 09/08/2018 92  74 - 99 mg/dL Final    BUN 09/08/2018 13  7.0 - 18 MG/DL Final    Creatinine 09/08/2018 0.60  0.6 - 1.3 MG/DL Final    BUN/Creatinine ratio 09/08/2018 22* 12 - 20   Final    GFR est AA 09/08/2018 >60  >60 ml/min/1.73m2 Final    GFR est non-AA 09/08/2018 >60  >60 ml/min/1.73m2 Final    Comment: (NOTE)  Estimated GFR is calculated using the Modification of Diet in Renal   Disease (MDRD) Study equation, reported for both  Americans   (GFRAA) and non- Americans (GFRNA), and normalized to 1.73m2   body surface area. The physician must decide which value applies to   the patient. The MDRD study equation should only be used in   individuals age 25 or older. It has not been validated for the   following: pregnant women, patients with serious comorbid conditions,   or on certain medications, or persons with extremes of body size,   muscle mass, or nutritional status.  Calcium 09/08/2018 9.3  8.5 - 10.1 MG/DL Final    Bilirubin, total 09/08/2018 0.6  0.2 - 1.0 MG/DL Final    ALT (SGPT) 09/08/2018 46  13 - 56 U/L Final    AST (SGOT) 09/08/2018 17  15 - 37 U/L Final    Alk. phosphatase 09/08/2018 83  45 - 117 U/L Final    Protein, total 09/08/2018 7.0  6.4 - 8.2 g/dL Final    Albumin 09/08/2018 4.0  3.4 - 5.0 g/dL Final    Globulin 09/08/2018 3.0  2.0 - 4.0 g/dL Final    A-G Ratio 09/08/2018 1.3  0.8 - 1.7   Final       .No results found for any visits on 09/13/18. Assessment / Plan      ICD-10-CM ICD-9-CM    1. Acute non-recurrent maxillary sinusitis J01.00 461.0    2. Essential hypertension I10 401.9    3. Pure hypercholesterolemia E78.00 272.0        Augmentin  Prednisone  she was advised to continue her maintenance medications  she is to call if symptoms persist over five days      Follow-up Disposition:  Return in about 4 weeks (around 10/11/2018). I asked Sandra Santoyo if she has any questions and I answered the questions. Gladys Santoyo states that she understands the treatment plan and agrees with the treatment plan

## 2018-09-17 ENCOUNTER — HOSPITAL ENCOUNTER (EMERGENCY)
Age: 63
Discharge: HOME | End: 2018-09-18
Attending: EMERGENCY MEDICINE
Payer: COMMERCIAL

## 2018-09-17 ENCOUNTER — APPOINTMENT (OUTPATIENT)
Dept: GENERAL RADIOLOGY | Age: 63
End: 2018-09-17
Attending: PHYSICIAN ASSISTANT
Payer: COMMERCIAL

## 2018-09-17 DIAGNOSIS — T50.905A ADVERSE EFFECT OF DRUG, INITIAL ENCOUNTER: ICD-10-CM

## 2018-09-17 DIAGNOSIS — R00.2 PALPITATIONS: Primary | ICD-10-CM

## 2018-09-17 LAB
ALBUMIN SERPL-MCNC: 4.6 G/DL (ref 3.4–5)
ALBUMIN/GLOB SERPL: 1 {RATIO} (ref 0.8–1.7)
ALP SERPL-CCNC: 95 U/L (ref 45–117)
ALT SERPL-CCNC: 41 U/L (ref 13–56)
ANION GAP SERPL CALC-SCNC: 10 MMOL/L (ref 3–18)
APPEARANCE UR: CLEAR
AST SERPL-CCNC: 16 U/L (ref 15–37)
BASOPHILS # BLD: 0 K/UL (ref 0–0.1)
BASOPHILS NFR BLD: 0 % (ref 0–2)
BILIRUB SERPL-MCNC: 0.4 MG/DL (ref 0.2–1)
BILIRUB UR QL: NEGATIVE
BNP SERPL-MCNC: 98 PG/ML (ref 0–900)
BUN SERPL-MCNC: 9 MG/DL (ref 7–18)
BUN/CREAT SERPL: 13 (ref 12–20)
CALCIUM SERPL-MCNC: 10.4 MG/DL (ref 8.5–10.1)
CHLORIDE SERPL-SCNC: 109 MMOL/L (ref 100–108)
CO2 SERPL-SCNC: 27 MMOL/L (ref 21–32)
COLOR UR: YELLOW
CREAT SERPL-MCNC: 0.68 MG/DL (ref 0.6–1.3)
DIFFERENTIAL METHOD BLD: ABNORMAL
EOSINOPHIL # BLD: 0 K/UL (ref 0–0.4)
EOSINOPHIL NFR BLD: 0 % (ref 0–5)
ERYTHROCYTE [DISTWIDTH] IN BLOOD BY AUTOMATED COUNT: 14.9 % (ref 11.6–14.5)
GLOBULIN SER CALC-MCNC: 4.4 G/DL (ref 2–4)
GLUCOSE SERPL-MCNC: 173 MG/DL (ref 74–99)
GLUCOSE UR STRIP.AUTO-MCNC: NEGATIVE MG/DL
HCT VFR BLD AUTO: 35.5 % (ref 35–45)
HGB BLD-MCNC: 12 G/DL (ref 12–16)
HGB UR QL STRIP: NEGATIVE
KETONES UR QL STRIP.AUTO: NEGATIVE MG/DL
LEUKOCYTE ESTERASE UR QL STRIP.AUTO: NEGATIVE
LYMPHOCYTES # BLD: 1 K/UL (ref 0.9–3.6)
LYMPHOCYTES NFR BLD: 10 % (ref 21–52)
MAGNESIUM SERPL-MCNC: 2.3 MG/DL (ref 1.6–2.6)
MCH RBC QN AUTO: 23.6 PG (ref 24–34)
MCHC RBC AUTO-ENTMCNC: 33.8 G/DL (ref 31–37)
MCV RBC AUTO: 69.9 FL (ref 74–97)
MONOCYTES # BLD: 0.5 K/UL (ref 0.05–1.2)
MONOCYTES NFR BLD: 5 % (ref 3–10)
NEUTS SEG # BLD: 8 K/UL (ref 1.8–8)
NEUTS SEG NFR BLD: 85 % (ref 40–73)
NITRITE UR QL STRIP.AUTO: NEGATIVE
PH UR STRIP: 6.5 [PH] (ref 5–8)
PLATELET # BLD AUTO: 234 K/UL (ref 135–420)
POTASSIUM SERPL-SCNC: 4.1 MMOL/L (ref 3.5–5.5)
PROT SERPL-MCNC: 9 G/DL (ref 6.4–8.2)
PROT UR STRIP-MCNC: NEGATIVE MG/DL
RBC # BLD AUTO: 5.08 M/UL (ref 4.2–5.3)
SODIUM SERPL-SCNC: 146 MMOL/L (ref 136–145)
SP GR UR REFRACTOMETRY: 1 (ref 1–1.03)
UROBILINOGEN UR QL STRIP.AUTO: 0.2 EU/DL (ref 0.2–1)
WBC # BLD AUTO: 9.4 K/UL (ref 4.6–13.2)

## 2018-09-17 PROCEDURE — 84443 ASSAY THYROID STIM HORMONE: CPT | Performed by: PHYSICIAN ASSISTANT

## 2018-09-17 PROCEDURE — 81003 URINALYSIS AUTO W/O SCOPE: CPT | Performed by: PHYSICIAN ASSISTANT

## 2018-09-17 PROCEDURE — 82550 ASSAY OF CK (CPK): CPT | Performed by: PHYSICIAN ASSISTANT

## 2018-09-17 PROCEDURE — 83735 ASSAY OF MAGNESIUM: CPT | Performed by: PHYSICIAN ASSISTANT

## 2018-09-17 PROCEDURE — 71046 X-RAY EXAM CHEST 2 VIEWS: CPT

## 2018-09-17 PROCEDURE — 80053 COMPREHEN METABOLIC PANEL: CPT | Performed by: PHYSICIAN ASSISTANT

## 2018-09-17 PROCEDURE — 99285 EMERGENCY DEPT VISIT HI MDM: CPT

## 2018-09-17 PROCEDURE — 83880 ASSAY OF NATRIURETIC PEPTIDE: CPT | Performed by: PHYSICIAN ASSISTANT

## 2018-09-17 PROCEDURE — 85025 COMPLETE CBC W/AUTO DIFF WBC: CPT | Performed by: PHYSICIAN ASSISTANT

## 2018-09-17 PROCEDURE — 93005 ELECTROCARDIOGRAM TRACING: CPT

## 2018-09-17 NOTE — LETTER
NOTIFICATION OF RETURN TO WORK / SCHOOL 
 
9/18/2018 Ms. Katrin Rea [address] [facility] [address] To Whom It May Concern: 
 
Katrin Rea was seen in the ED on 9/18/18 and may return to work 9/19/18. Sincerely, April Guthrie [facility]

## 2018-09-18 ENCOUNTER — HOSPITAL ENCOUNTER (OUTPATIENT)
Dept: MAMMOGRAPHY | Age: 63
Discharge: HOME | End: 2018-09-18
Attending: NURSE PRACTITIONER
Payer: COMMERCIAL

## 2018-09-18 ENCOUNTER — OFFICE VISIT (OUTPATIENT)
Dept: INTERNAL MEDICINE CLINIC | Age: 63
End: 2018-09-18

## 2018-09-18 VITALS
RESPIRATION RATE: 23 BRPM | DIASTOLIC BLOOD PRESSURE: 61 MMHG | HEART RATE: 88 BPM | OXYGEN SATURATION: 98 % | SYSTOLIC BLOOD PRESSURE: 152 MMHG

## 2018-09-18 VITALS
SYSTOLIC BLOOD PRESSURE: 130 MMHG | HEIGHT: 59 IN | OXYGEN SATURATION: 96 % | HEART RATE: 82 BPM | RESPIRATION RATE: 18 BRPM | WEIGHT: 114 LBS | DIASTOLIC BLOOD PRESSURE: 58 MMHG | TEMPERATURE: 97.4 F | BODY MASS INDEX: 22.98 KG/M2

## 2018-09-18 DIAGNOSIS — Z12.31 VISIT FOR SCREENING MAMMOGRAM: ICD-10-CM

## 2018-09-18 DIAGNOSIS — R00.2 PALPITATIONS: Primary | ICD-10-CM

## 2018-09-18 DIAGNOSIS — I10 ESSENTIAL HYPERTENSION: ICD-10-CM

## 2018-09-18 LAB
ATRIAL RATE: 97 BPM
CALCULATED P AXIS, ECG09: 66 DEGREES
CALCULATED R AXIS, ECG10: 9 DEGREES
CALCULATED T AXIS, ECG11: 52 DEGREES
CK MB CFR SERPL CALC: 1 % (ref 0–4)
CK MB SERPL-MCNC: 1.6 NG/ML (ref 5–25)
CK SERPL-CCNC: 162 U/L (ref 26–192)
DIAGNOSIS, 93000: NORMAL
P-R INTERVAL, ECG05: 168 MS
Q-T INTERVAL, ECG07: 344 MS
QRS DURATION, ECG06: 84 MS
QTC CALCULATION (BEZET), ECG08: 436 MS
TROPONIN I SERPL-MCNC: <0.02 NG/ML (ref 0–0.04)
TSH SERPL DL<=0.05 MIU/L-ACNC: 0.33 UIU/ML (ref 0.36–3.74)
VENTRICULAR RATE, ECG03: 97 BPM

## 2018-09-18 PROCEDURE — 77063 BREAST TOMOSYNTHESIS BI: CPT

## 2018-09-18 NOTE — ED PROVIDER NOTES
EMERGENCY DEPARTMENT HISTORY AND PHYSICAL EXAM 
 
Date: 9/17/2018 Patient Name: Radha Sarabia History of Presenting Illness Chief Complaint Patient presents with  Irregular Heart Beat History Provided By:patient Chief Complaint: palpitations Duration: several hours Timing: acute Location: chest 
Quality:n/a Severity moderate Modifying Factors: none Associated Symptoms: cough, congestion, sinus pressure, post nasal drainage Additional History (Context): Radha Sarabia is a 65 y.o. female with PMH htn, hyperlipidemia, and chronic sinusitis who presents with complaints of palpitations that began several hours ago. Pt states she is currently being treated for a sinus infection with augmentin and prednisone. Pt states the palpitations began after taking the prednisone and she thinks this may be a reaction to this medication. Denies chest pain and SOB. No other complaints PCP: Gabriel Mcfarlane MD 
 
Current Outpatient Prescriptions Medication Sig Dispense Refill  modafinil (PROVIGIL) 100 mg tablet TAKE ONE TABLET BY MOUTH EVERY MORNING 30 Tab 0  
 amoxicillin-clavulanate (AUGMENTIN) 875-125 mg per tablet 1 tab twice per day with food 20 Tab 0  
 PROAIR HFA 90 mcg/actuation inhaler  divalproex DR (DEPAKOTE) 500 mg tablet Take  by mouth three (3) times daily.  atorvastatin (LIPITOR) 40 mg tablet TAKE ONE TABLET BY MOUTH DAILY 90 Tab 3  
 metoprolol succinate (TOPROL-XL) 25 mg XL tablet TAKE ONE TABLET BY MOUTH DAILY 90 Tab 3  
 meclizine (ANTIVERT) 25 mg tablet 1 tablet three times per day 60 Tab 1  
 aspirin delayed-release 81 mg tablet Take  by mouth nightly.  fluticasone (FLONASE) 50 mcg/actuation nasal spray 2 Sprays by Both Nostrils route daily.  nitroglycerin (NITROSTAT) 0.4 mg SL tablet 1 Tab by SubLINGual route every five (5) minutes as needed for Chest Pain. 25 Tab 3  cetirizine (ZYRTEC) 10 mg tablet Take 10 mg by mouth nightly.  labetalol (NORMODYNE) 100 mg tablet Take 100 mg by mouth two (2) times a day. Past History Past Medical History: 
Past Medical History:  
Diagnosis Date  Allergic rhinitis  Blurred vision  Cardiac echocardiogram 06/11/2014 EF 60%. No RWMA. RVSP 30 mmHg. Mild AI.  Cardiac Holter monitor, normal 11/05/2014 Benign 48-hr Holter study.  Cardiac nuclear imaging test, mod risk 08/14/2015 Intermediate risk. High anterior artifact vs diagonal artery ischemia. Following Lexiscan, 0.5-mm downsloping inferolateral ST depression, resolving 10 min 30 sec of recovery. Arm heaviness noted.  Cardiovascular lower extremity arterial duplex 07/15/2016 Right leg: Mod peripheral arterial disease in femoral segment at rest.  Left leg:  Mild arterial disease at rest.  R KAREN 0.58. L KAREN 0.95. Similar to study of 10/15/14.  Cardiovascular renal angiography 10/27/2014 Bilateral patent renal arteries. Right CFA occluded but collateralized.  Cardiovascular renal duplex 10/13/2014 Aorta w/irregular walls. Severe >60% bilateral renal artery stenosis. Bilateral intrinsic/med renal disease.  Carotid duplex 10/17/2014 Mild <50% DAVID stenosis. Biphasic signals in both subclavians.  Carotid duplex 12/05/2016 Mild < 50% DAVID stenosis.  Cervical disc disease 09/2015 MRI of C[facility]spine at [facility] [place]  Dizziness  Ear ache  Essential hypertension  Fainting spell  Frequent headaches  Frequent nosebleeds  Hemorrhoid  Hyperlipidemia  Hypertension  Ill-defined condition  Migraine  Sinus problem  Sinusitis, chronic  Spontaneous pneumothorax 1999  
 2 episodes on the right within several months of each other  Stomach pain  Weakness of right leg Past Surgical History: 
Past Surgical History:  
Procedure Laterality Date  COLONOSCOPY N/A 11/17/2017 COLONOSCOPY, SCREENING with hot bx polypectomy performed by Ambika Bender MD at [address] [place] HX CHOLECYSTECTOMY  11/8/14  HX COLPOSCOPY    
 HX HEART CATHETERIZATION Bilateral 10/27/14  
 bilateral lower extremity angiography  HX OTHER SURGICAL Left   
 shave biopsy ( thigh area) Family History: 
Family History Problem Relation Age of Onset  Deep Vein Thrombosis Father  Coronary Artery Disease Father  Pacemaker Father  Diabetes Father  High Cholesterol Father  Hypertension Father  High Cholesterol Mother  Hypertension Mother  Heart Attack Brother 40  Cancer Brother  Heart Attack Maternal Grandmother 100  
 Heart Attack Other 50  Hypertension Other  Hypertension Maternal Aunt Social History: 
Social History Substance Use Topics  Smoking status: Former Smoker Packs/day: 0.25 Years: 20.00 Quit date: 12/31/2017  Smokeless tobacco: Never Used Comment: now down to 3 cigarettes per day  Alcohol use No  
 
 
Allergies: Allergies Allergen Reactions  Latex Rash  Keflex [Cephalexin] Rash  Epinephrine Other (comments)  
  tachycardia  Tetracyclines Nausea Only Review of Systems Review of Systems Constitutional: Negative. Negative for chills and fever. HENT: Positive for congestion, postnasal drip, sinus pain and sinus pressure. Negative for ear pain and rhinorrhea. Eyes: Negative. Negative for pain and redness. Respiratory: Positive for cough. Negative for shortness of breath, wheezing and stridor. Cardiovascular: Positive for palpitations. Negative for chest pain and leg swelling. Gastrointestinal: Negative. Negative for abdominal pain, constipation, diarrhea, nausea and vomiting. Genitourinary: Negative. Negative for dysuria and frequency. Musculoskeletal: Negative. Negative for back pain and neck pain. Skin: Negative. Negative for rash and wound. Neurological: Negative. Negative for dizziness, seizures, syncope and headaches. All other systems reviewed and are negative. All Other Systems Negative Physical Exam  
 
Vitals:  
 09/17/18 2345 09/18/18 0000 09/18/18 0002 09/18/18 0012 BP: 154/53 152/61 Pulse: 87 91 88 Resp: 20 20 23 SpO2: 96% 98% 98% 98% Physical Exam  
Constitutional: She is oriented to person, place, and time. She appears well-developed and well-nourished. No distress. HENT:  
Head: Normocephalic and atraumatic. Bilateral nasal congestion noted Eyes: Conjunctivae are normal. Right eye exhibits no discharge. Left eye exhibits no discharge. No scleral icterus. Neck: Normal range of motion. Neck supple. Cardiovascular: Normal rate, regular rhythm and normal heart sounds. Exam reveals no gallop and no friction rub. No murmur heard. Pulmonary/Chest: Effort normal and breath sounds normal. No stridor. No respiratory distress. She has no wheezes. She has no rales. Musculoskeletal: Normal range of motion. Neurological: She is alert and oriented to person, place, and time. Coordination normal.  
Gait is steady. Able to ambulate without difficulty. Skin: Skin is warm and dry. No rash noted. She is not diaphoretic. No erythema. Psychiatric: She has a normal mood and affect. Her behavior is normal. Thought content normal.  
Nursing note and vitals reviewed. Diagnostic Study Results Labs - Recent Results (from the past 12 hour(s)) EKG, 12 LEAD, INITIAL Collection Time: 09/17/18 10:01 PM  
Result Value Ref Range Ventricular Rate 97 BPM  
 Atrial Rate 97 BPM  
 P-R Interval 168 ms QRS Duration 84 ms Q-T Interval 344 ms QTC Calculation (Bezet) 436 ms Calculated P Axis 66 degrees Calculated R Axis 9 degrees Calculated T Axis 52 degrees Diagnosis Normal sinus rhythm Nonspecific ST abnormality Abnormal ECG 
 When compared with ECG of 18-NOV-2017 00:58, 
premature atrial complexes are no longer present URINALYSIS W/ RFLX MICROSCOPIC Collection Time: 09/17/18 10:25 PM  
Result Value Ref Range Color YELLOW Appearance CLEAR Specific gravity 1.005 1.005 - 1.030    
 pH (UA) 6.5 5.0 - 8.0 Protein NEGATIVE  NEG mg/dL Glucose NEGATIVE  NEG mg/dL Ketone NEGATIVE  NEG mg/dL Bilirubin NEGATIVE  NEG Blood NEGATIVE  NEG Urobilinogen 0.2 0.2 - 1.0 EU/dL Nitrites NEGATIVE  NEG Leukocyte Esterase NEGATIVE  NEG    
CBC WITH AUTOMATED DIFF Collection Time: 09/17/18 10:30 PM  
Result Value Ref Range WBC 9.4 4.6 - 13.2 K/uL  
 RBC 5.08 4.20 - 5.30 M/uL  
 HGB 12.0 12.0 - 16.0 g/dL HCT 35.5 35.0 - 45.0 % MCV 69.9 (L) 74.0 - 97.0 FL  
 MCH 23.6 (L) 24.0 - 34.0 PG  
 MCHC 33.8 31.0 - 37.0 g/dL  
 RDW 14.9 (H) 11.6 - 14.5 % PLATELET 022 424 - 390 K/uL NEUTROPHILS 85 (H) 40 - 73 % LYMPHOCYTES 10 (L) 21 - 52 % MONOCYTES 5 3 - 10 % EOSINOPHILS 0 0 - 5 % BASOPHILS 0 0 - 2 %  
 ABS. NEUTROPHILS 8.0 1.8 - 8.0 K/UL  
 ABS. LYMPHOCYTES 1.0 0.9 - 3.6 K/UL  
 ABS. MONOCYTES 0.5 0.05 - 1.2 K/UL  
 ABS. EOSINOPHILS 0.0 0.0 - 0.4 K/UL  
 ABS. BASOPHILS 0.0 0.0 - 0.1 K/UL  
 DF AUTOMATED METABOLIC PANEL, COMPREHENSIVE Collection Time: 09/17/18 10:30 PM  
Result Value Ref Range Sodium 146 (H) 136 - 145 mmol/L Potassium 4.1 3.5 - 5.5 mmol/L Chloride 109 (H) 100 - 108 mmol/L  
 CO2 27 21 - 32 mmol/L Anion gap 10 3.0 - 18 mmol/L Glucose 173 (H) 74 - 99 mg/dL BUN 9 7.0 - 18 MG/DL Creatinine 0.68 0.6 - 1.3 MG/DL  
 BUN/Creatinine ratio 13 12 - 20 GFR est AA >60 >60 ml/min/1.73m2 GFR est non-AA >60 >60 ml/min/1.73m2 Calcium 10.4 (H) 8.5 - 10.1 MG/DL Bilirubin, total 0.4 0.2 - 1.0 MG/DL  
 ALT (SGPT) 41 13 - 56 U/L  
 AST (SGOT) 16 15 - 37 U/L Alk. phosphatase 95 45 - 117 U/L Protein, total 9.0 (H) 6.4 - 8.2 g/dL Albumin 4.6 3.4 - 5.0 g/dL Globulin 4.4 (H) 2.0 - 4.0 g/dL A-G Ratio 1.0 0.8 - 1.7 MAGNESIUM Collection Time: 09/17/18 10:30 PM  
Result Value Ref Range Magnesium 2.3 1.6 - 2.6 mg/dL NT-PRO BNP Collection Time: 09/17/18 10:30 PM  
Result Value Ref Range NT pro-BNP 98 0 - 900 PG/ML  
CARDIAC PANEL,(CK, CKMB & TROPONIN) Collection Time: 09/17/18 10:30 PM  
Result Value Ref Range  26 - 192 U/L  
 CK - MB 1.6 <3.6 ng/ml CK-MB Index 1.0 0.0 - 4.0 % Troponin-I, Qt. <0.02 0.0 - 0.045 NG/ML Radiologic Studies -  
XR CHEST PA LAT    (Results Pending)  
no acute process or definite infiltrates noted CT Results  (Last 48 hours) None CXR Results  (Last 48 hours) None Medical Decision Making I am the first provider for this patient. I reviewed the vital signs, available nursing notes, past medical history, past surgical history, family history and social history. Vital Signs-Reviewed the patient's vital signs. Records Reviewed:Dara Pena PA-C 1:07 AM  
 
Procedures: 
Procedures Provider Notes (Medical Decision Making): Impression:  Palpitations, sinusitis, medication reaction TSH still pending, pt ready to leave the ED. MED RECONCILIATION: 
No current facility-administered medications for this encounter. Current Outpatient Prescriptions Medication Sig  
 modafinil (PROVIGIL) 100 mg tablet TAKE ONE TABLET BY MOUTH EVERY MORNING  
 amoxicillin-clavulanate (AUGMENTIN) 875-125 mg per tablet 1 tab twice per day with food  PROAIR HFA 90 mcg/actuation inhaler  divalproex DR (DEPAKOTE) 500 mg tablet Take  by mouth three (3) times daily.  atorvastatin (LIPITOR) 40 mg tablet TAKE ONE TABLET BY MOUTH DAILY  metoprolol succinate (TOPROL-XL) 25 mg XL tablet TAKE ONE TABLET BY MOUTH DAILY  meclizine (ANTIVERT) 25 mg tablet 1 tablet three times per day  aspirin delayed-release 81 mg tablet Take  by mouth nightly.  fluticasone (FLONASE) 50 mcg/actuation nasal spray 2 Sprays by Both Nostrils route daily.  nitroglycerin (NITROSTAT) 0.4 mg SL tablet 1 Tab by SubLINGual route every five (5) minutes as needed for Chest Pain.  cetirizine (ZYRTEC) 10 mg tablet Take 10 mg by mouth nightly.  labetalol (NORMODYNE) 100 mg tablet Take 100 mg by mouth two (2) times a day. Disposition: D/c 
 
DISCHARGE NOTE:  
 
Pt has been reexamined. Patient has no new complaints, changes, or physical findings. Care plan outlined and precautions discussed. Results of the labs and imaging were reviewed with the patient. All medications were reviewed with the patient; will d/c home with no new rx. Recommended to stop prednisone. All of pt's questions and concerns were addressed. Patient was instructed and agrees to follow up with PCP, as well as to return to the ED upon further deterioration. Patient is ready to go home. Dara Pena PA-C 1:09 AM  
 
Follow-up Information Follow up With Details Comments Contact Info Alondra Schmitt MD Schedule an appointment as soon as possible for a visit in 1 week  [address] [facility] [address] [phone] Gera Vasquez DO Schedule an appointment as soon as possible for a visit in 1 week  [address] [facility] [address] [facility] 
[phone] [address] [facility] EMERGENCY DEPT  As needed, If symptoms worsen [address] [place] [address] [address] Current Discharge Medication List  
  
CONTINUE these medications which have NOT CHANGED Details  
modafinil (PROVIGIL) 100 mg tablet TAKE ONE TABLET BY MOUTH EVERY MORNING Qty: 30 Tab, Refills: 0 Associated Diagnoses: Drowsiness  
  
amoxicillin-clavulanate (AUGMENTIN) 875-125 mg per tablet 1 tab twice per day with food 
Qty: 20 Tab, Refills: 0 Comments: Patient with a history of rash to Keflex but she has tolerated Augmentin in the past  
  
PROAIR HFA 90 mcg/actuation inhaler Associated Diagnoses: Drowsiness  
  
divalproex DR (DEPAKOTE) 500 mg tablet Take  by mouth three (3) times daily. atorvastatin (LIPITOR) 40 mg tablet TAKE ONE TABLET BY MOUTH DAILY Qty: 90 Tab, Refills: 3  
  
metoprolol succinate (TOPROL-XL) 25 mg XL tablet TAKE ONE TABLET BY MOUTH DAILY Qty: 90 Tab, Refills: 3  
  
meclizine (ANTIVERT) 25 mg tablet 1 tablet three times per day 
Qty: 60 Tab, Refills: 1  
  
aspirin delayed-release 81 mg tablet Take  by mouth nightly. fluticasone (FLONASE) 50 mcg/actuation nasal spray 2 Sprays by Both Nostrils route daily. nitroglycerin (NITROSTAT) 0.4 mg SL tablet 1 Tab by SubLINGual route every five (5) minutes as needed for Chest Pain. Qty: 25 Tab, Refills: 3  
  
cetirizine (ZYRTEC) 10 mg tablet Take 10 mg by mouth nightly. labetalol (NORMODYNE) 100 mg tablet Take 100 mg by mouth two (2) times a day. STOP taking these medications  
  
 predniSONE (DELTASONE) 20 mg tablet Comments:  
Reason for Stopping:   
   
  
 
 
 
 
Diagnosis Clinical Impression: 1. Palpitations 2. Adverse effect of drug, initial encounter

## 2018-09-18 NOTE — DISCHARGE INSTRUCTIONS
[facility] Activation    Thank you for requesting access to [facility]. Please follow the instructions below to securely access and download your online medical record. [facility] allows you to send messages to your doctor, view your test results, renew your prescriptions, schedule appointments, and more. How Do I Sign Up? 1. In your internet browser, go to [email or web address]  2. Click on the First Time User? Click Here link in the Sign In box. You will be redirect to the New Member Sign Up page. 3. Enter your [facility] Access Code exactly as it appears below. You will not need to use this code after youve completed the sign-up process. If you do not sign up before the expiration date, you must request a new code. [facility] Access Code: Activation code not generated  Current [facility] Status: Active (This is the date your [facility] access code will )    4. Enter the last four digits of your Social Security Number (xxxx) and Date of Birth (mm/dd/yyyy) as indicated and click Submit. You will be taken to the next sign-up page. 5. Create a [facility] ID. This will be your [facility] login ID and cannot be changed, so think of one that is secure and easy to remember. 6. Create a [facility] password. You can change your password at any time. 7. Enter your Password Reset Question and Answer. This can be used at a later time if you forget your password. 8. Enter your e-mail address. You will receive e-mail notification when new information is available in [address] 9. Click Sign Up. You can now view and download portions of your medical record. 10. Click the Download Summary menu link to download a portable copy of your medical information. Additional Information    If you have questions, please visit the Frequently Asked Questions section of the [employer] website at [email or web address] Remember, [employer] is NOT to be used for urgent needs. For medical emergencies, dial 911.

## 2018-09-18 NOTE — ED NOTES
Jean TANNER reviewed discharge instructions with the patient. The patient verbalized understanding. Patient armband removed and given to patient to take home. Patient was informed of the privacy risks if armband lost or stolen.

## 2018-09-18 NOTE — ED TRIAGE NOTES
Pt c/o chest palpiations that started a few hours ago. Pt was started on Prednisone today and she began experiencing palpitations. Pt states she has a hx of irregular heart beats.

## 2018-09-18 NOTE — PROGRESS NOTES
Reymundo Read is a 65 y.o. female presenting today for Irregular Heart Beat (ER follow up)  . HPI:  Reymundo Read presents to the office today for heart palpitations. Patient was seen in the ED yesterday for palpitation. Pt states she is currently being treated for a sinus infection with augmentin and prednisone. Pt states the palpitations began after taking the prednisone and she thinks this may be the cause of her reaction. She also thinks it could be associated with her continues post-nasal drip when lying down. She notes when she lies down she intermittently gets palpitations. Review of Systems   Respiratory: Negative for cough. Cardiovascular: Positive for palpitations. Negative for chest pain. Allergies   Allergen Reactions    Latex Rash    Keflex [Cephalexin] Rash    Epinephrine Other (comments)     tachycardia    Tetracyclines Nausea Only       Current Outpatient Prescriptions   Medication Sig Dispense Refill    modafinil (PROVIGIL) 100 mg tablet TAKE ONE TABLET BY MOUTH EVERY MORNING 30 Tab 0    amoxicillin-clavulanate (AUGMENTIN) 875-125 mg per tablet 1 tab twice per day with food 20 Tab 0    PROAIR HFA 90 mcg/actuation inhaler       divalproex DR (DEPAKOTE) 500 mg tablet Take  by mouth three (3) times daily.  atorvastatin (LIPITOR) 40 mg tablet TAKE ONE TABLET BY MOUTH DAILY 90 Tab 3    metoprolol succinate (TOPROL-XL) 25 mg XL tablet TAKE ONE TABLET BY MOUTH DAILY 90 Tab 3    meclizine (ANTIVERT) 25 mg tablet 1 tablet three times per day 60 Tab 1    aspirin delayed-release 81 mg tablet Take  by mouth nightly.  fluticasone (FLONASE) 50 mcg/actuation nasal spray 2 Sprays by Both Nostrils route daily.  nitroglycerin (NITROSTAT) 0.4 mg SL tablet 1 Tab by SubLINGual route every five (5) minutes as needed for Chest Pain. 25 Tab 3    cetirizine (ZYRTEC) 10 mg tablet Take 10 mg by mouth nightly.       labetalol (NORMODYNE) 100 mg tablet Take 100 mg by mouth two (2) times a day. Past Medical History:   Diagnosis Date    Allergic rhinitis     Blurred vision     Cardiac echocardiogram 06/11/2014    EF 60%. No RWMA. RVSP 30 mmHg. Mild AI.  Cardiac Holter monitor, normal 11/05/2014    Benign 48-hr Holter study.  Cardiac nuclear imaging test, mod risk 08/14/2015    Intermediate risk. High anterior artifact vs diagonal artery ischemia. Following Lexiscan, 0.5-mm downsloping inferolateral ST depression, resolving 10 min 30 sec of recovery. Arm heaviness noted.  Cardiovascular lower extremity arterial duplex 07/15/2016    Right leg: Mod peripheral arterial disease in femoral segment at rest.  Left leg:  Mild arterial disease at rest.  R KAREN 0.58. L KAREN 0.95. Similar to study of 10/15/14.  Cardiovascular renal angiography 10/27/2014    Bilateral patent renal arteries. Right CFA occluded but collateralized.  Cardiovascular renal duplex 10/13/2014    Aorta w/irregular walls. Severe >60% bilateral renal artery stenosis. Bilateral intrinsic/med renal disease.  Carotid duplex 10/17/2014    Mild <50% DAVID stenosis. Biphasic signals in both subclavians.  Carotid duplex 12/05/2016    Mild < 50% DAVID stenosis.       Cervical disc disease 09/2015    [facility]spine [facility] GAGAN Dizziness     Ear ache     Essential hypertension     Fainting spell     Frequent headaches     Frequent nosebleeds     Hemorrhoid     Hyperlipidemia     Hypertension     Ill-defined condition     Migraine     Sinus problem     Sinusitis, chronic     Spontaneous pneumothorax 1999    2 episodes on the right within several months of each other    Stomach pain     Weakness of right leg        Past Surgical History:   Procedure Laterality Date    COLONOSCOPY N/A 11/17/2017    COLONOSCOPY, SCREENING with hot bx polypectomy performed by Lety Fischer MD at [address] [place] [address] HX CHOLECYSTECTOMY  11/8/14    HX COLPOSCOPY      HX HEART CATHETERIZATION Bilateral 10/27/14    bilateral lower extremity angiography     HX OTHER SURGICAL Left     shave biopsy ( thigh area)       Social History     Social History    Marital status: SINGLE     Spouse name: N/A    Number of children: N/A    Years of education: N/A     Occupational History    Not on file. Social History Main Topics    Smoking status: Former Smoker     Packs/day: 0.25     Years: 20.00     Quit date: 12/31/2017    Smokeless tobacco: Never Used      Comment: now down to 3 cigarettes per day    Alcohol use No    Drug use: No    Sexual activity: No     Other Topics Concern    Not on file     Social History Narrative       Patient does not have an advanced directive on file    Vitals:    09/18/18 1346 09/18/18 1434   BP: 150/48 130/58   Pulse: 82    Resp: 18    Temp: 97.4 °F (36.3 °C)    TempSrc: Tympanic    SpO2: 96%    Weight: 114 lb (51.7 kg)    Height: 4' 11\" (1.499 m)    PainSc:   0 - No pain        Physical Exam   Cardiovascular: Normal rate, regular rhythm and normal heart sounds. Pulmonary/Chest: Effort normal and breath sounds normal.   Nursing note and vitals reviewed.       Admission on 09/17/2018, Discharged on 09/18/2018   Component Date Value Ref Range Status    Ventricular Rate 09/17/2018 97  BPM Final    Atrial Rate 09/17/2018 97  BPM Final    P-R Interval 09/17/2018 168  ms Final    QRS Duration 09/17/2018 84  ms Final    Q-T Interval 09/17/2018 344  ms Final    QTC Calculation (Bezet) 09/17/2018 436  ms Final    Calculated P Axis 09/17/2018 66  degrees Final    Calculated R Axis 09/17/2018 9  degrees Final    Calculated T Axis 09/17/2018 52  degrees Final    Diagnosis 09/17/2018    Final                    Value:Normal sinus rhythm  Nonspecific ST abnormality  Abnormal ECG  When compared with ECG of 18-NOV-2017 00:58,  premature atrial complexes are no longer present  Confirmed by Jordan Avelar ([id number]) on 9/18/2018 12:34:47 PM      WBC 09/17/2018 9.4  4.6 - 13.2 K/uL Final    RBC 09/17/2018 5.08  4.20 - 5.30 M/uL Final    HGB 09/17/2018 12.0  12.0 - 16.0 g/dL Final    HCT 09/17/2018 35.5  35.0 - 45.0 % Final    MCV 09/17/2018 69.9* 74.0 - 97.0 FL Final    MCH 09/17/2018 23.6* 24.0 - 34.0 PG Final    MCHC 09/17/2018 33.8  31.0 - 37.0 g/dL Final    RDW 09/17/2018 14.9* 11.6 - 14.5 % Final    PLATELET 34/21/1412 862  135 - 420 K/uL Final    NEUTROPHILS 09/17/2018 85* 40 - 73 % Final    LYMPHOCYTES 09/17/2018 10* 21 - 52 % Final    MONOCYTES 09/17/2018 5  3 - 10 % Final    EOSINOPHILS 09/17/2018 0  0 - 5 % Final    BASOPHILS 09/17/2018 0  0 - 2 % Final    ABS. NEUTROPHILS 09/17/2018 8.0  1.8 - 8.0 K/UL Final    ABS. LYMPHOCYTES 09/17/2018 1.0  0.9 - 3.6 K/UL Final    ABS. MONOCYTES 09/17/2018 0.5  0.05 - 1.2 K/UL Final    ABS. EOSINOPHILS 09/17/2018 0.0  0.0 - 0.4 K/UL Final    ABS. BASOPHILS 09/17/2018 0.0  0.0 - 0.1 K/UL Final    DF 09/17/2018 AUTOMATED    Final    Sodium 09/17/2018 146* 136 - 145 mmol/L Final    Potassium 09/17/2018 4.1  3.5 - 5.5 mmol/L Final    Chloride 09/17/2018 109* 100 - 108 mmol/L Final    CO2 09/17/2018 27  21 - 32 mmol/L Final    Anion gap 09/17/2018 10  3.0 - 18 mmol/L Final    Glucose 09/17/2018 173* 74 - 99 mg/dL Final    BUN 09/17/2018 9  7.0 - 18 MG/DL Final    Creatinine 09/17/2018 0.68  0.6 - 1.3 MG/DL Final    BUN/Creatinine ratio 09/17/2018 13  12 - 20   Final    GFR est AA 09/17/2018 >60  >60 ml/min/1.73m2 Final    GFR est non-AA 09/17/2018 >60  >60 ml/min/1.73m2 Final    Comment: (NOTE)  Estimated GFR is calculated using the Modification of Diet in Renal   Disease (MDRD) Study equation, reported for both  Americans   (GFRAA) and non- Americans (GFRNA), and normalized to 1.73m2   body surface area. The physician must decide which value applies to   the patient. The MDRD study equation should only be used in   individuals age 25 or older.  It has not been validated for the   following: pregnant women, patients with serious comorbid conditions,   or on certain medications, or persons with extremes of body size,   muscle mass, or nutritional status.  Calcium 09/17/2018 10.4* 8.5 - 10.1 MG/DL Final    Bilirubin, total 09/17/2018 0.4  0.2 - 1.0 MG/DL Final    ALT (SGPT) 09/17/2018 41  13 - 56 U/L Final    AST (SGOT) 09/17/2018 16  15 - 37 U/L Final    Alk. phosphatase 09/17/2018 95  45 - 117 U/L Final    Protein, total 09/17/2018 9.0* 6.4 - 8.2 g/dL Final    Albumin 09/17/2018 4.6  3.4 - 5.0 g/dL Final    Globulin 09/17/2018 4.4* 2.0 - 4.0 g/dL Final    A-G Ratio 09/17/2018 1.0  0.8 - 1.7   Final    Magnesium 09/17/2018 2.3  1.6 - 2.6 mg/dL Final    NT pro-BNP 09/17/2018 98  0 - 900 PG/ML Final    Comment:           For patients with dyspnea, NT proBNP is highly sensitive for detecting acute congestive heart failure. Also, an NT proBNP <300 pg/mL effectively rules out acute congestive heart failure, with 99% negative predictive value. Our reference ranges are for acute dyspnea. Age              Range (pg/ml)                            0-49                0-450        50-75               0-900        >75                 0-1800       For ambulatory office patients, the ranges below apply: For patients with dyspnea, NT proBNP is highly sensitive for detecting acute congestive heart failure. Also, an NT proBNP <300 pg/mL effectively rules out acute congestive heart failure, with 99% negative predictive value. Our reference ranges are for acute dyspnea.       Color 09/17/2018 YELLOW    Final    Appearance 09/17/2018 CLEAR    Final    Specific gravity 09/17/2018 1.005  1.005 - 1.030   Final    pH (UA) 09/17/2018 6.5  5.0 - 8.0   Final    Protein 09/17/2018 NEGATIVE   NEG mg/dL Final    Glucose 09/17/2018 NEGATIVE   NEG mg/dL Final    Ketone 09/17/2018 NEGATIVE   NEG mg/dL Final    Bilirubin 09/17/2018 NEGATIVE   NEG   Final    Blood 09/17/2018 NEGATIVE NEG   Final    Urobilinogen 09/17/2018 0.2  0.2 - 1.0 EU/dL Final    Nitrites 09/17/2018 NEGATIVE   NEG   Final    Leukocyte Esterase 09/17/2018 NEGATIVE   NEG   Final    TSH 09/17/2018 0.33* 0.36 - 3.74 uIU/mL Final    CK 09/17/2018 162  26 - 192 U/L Final    CK - MB 09/17/2018 1.6  <3.6 ng/ml Final    CK-MB Index 09/17/2018 1.0  0.0 - 4.0 % Final    Troponin-I, Qt. 09/17/2018 <0.02  0.0 - 0.045 NG/ML Final    Comment: The presence of detectable troponin above the reference range indicates myocardial injury which may be due to ischemia, myocarditis, trauma, etc.  Clinical correlation is necessary to establish the significance of this finding. Sequential testing is recommended to determine if the typical rise and fall of cTnI is demonstrated. Note:  Cardiac troponin I has a relatively long half life and may be present well after the CK MB has returned to baseline. The reference range is based on the 99th percentile of the referent population. Hospital Outpatient Visit on 09/08/2018   Component Date Value Ref Range Status    WBC 09/08/2018 7.7  4.6 - 13.2 K/uL Final    RBC 09/08/2018 4.78  4.20 - 5.30 M/uL Final    HGB 09/08/2018 11.0* 12.0 - 16.0 g/dL Final    HCT 09/08/2018 33.6* 35.0 - 45.0 % Final    MCV 09/08/2018 70.3* 74.0 - 97.0 FL Final    MCH 09/08/2018 23.0* 24.0 - 34.0 PG Final    MCHC 09/08/2018 32.7  31.0 - 37.0 g/dL Final    RDW 09/08/2018 14.8* 11.6 - 14.5 % Final    PLATELET 04/23/3260 399  135 - 420 K/uL Final    MPV 09/08/2018 10.9  9.2 - 11.8 FL Final    NEUTROPHILS 09/08/2018 52  40 - 73 % Final    LYMPHOCYTES 09/08/2018 40  21 - 52 % Final    MONOCYTES 09/08/2018 5  3 - 10 % Final    EOSINOPHILS 09/08/2018 2  0 - 5 % Final    BASOPHILS 09/08/2018 1  0 - 2 % Final    ABS. NEUTROPHILS 09/08/2018 3.9  1.8 - 8.0 K/UL Final    ABS. LYMPHOCYTES 09/08/2018 3.1  0.9 - 3.6 K/UL Final    ABS. MONOCYTES 09/08/2018 0.4  0.05 - 1.2 K/UL Final    ABS.  EOSINOPHILS 09/08/2018 0.2  0.0 - 0.4 K/UL Final    ABS. BASOPHILS 09/08/2018 0.1  0.0 - 0.1 K/UL Final    DF 09/08/2018 AUTOMATED    Final    SMEAR SCANNED    PLATELET COMMENTS 40/76/0143 ADEQUATE PLATELETS    Final    RBC COMMENTS 09/08/2018     Final                    Value:ANISOCYTOSIS  1+      RBC COMMENTS 09/08/2018     Final                    Value:MICROCYTOSIS  2+      RBC COMMENTS 09/08/2018     Final                    Value:OVALOCYTES  2+      RBC COMMENTS 09/08/2018     Final                    Value:TARGET CELLS  1+      LIPID PROFILE 09/08/2018        Final    Cholesterol, total 09/08/2018 106  <200 MG/DL Final    Triglyceride 09/08/2018 84  <150 MG/DL Final    Comment: The drugs N-acetylcysteine (NAC) and  Metamiszole have been found to cause falsely  low results in this chemical assay. Please  be sure to submit blood samples obtained  BEFORE administration of either of these  drugs to assure correct results.       HDL Cholesterol 09/08/2018 37* 40 - 60 MG/DL Final    LDL, calculated 09/08/2018 52.2  0 - 100 MG/DL Final    VLDL, calculated 09/08/2018 16.8  MG/DL Final    CHOL/HDL Ratio 09/08/2018 2.9  0 - 5.0   Final    Sodium 09/08/2018 144  136 - 145 mmol/L Final    Potassium 09/08/2018 4.9  3.5 - 5.5 mmol/L Final    Chloride 09/08/2018 109* 100 - 108 mmol/L Final    CO2 09/08/2018 29  21 - 32 mmol/L Final    Anion gap 09/08/2018 6  3.0 - 18 mmol/L Final    Glucose 09/08/2018 92  74 - 99 mg/dL Final    BUN 09/08/2018 13  7.0 - 18 MG/DL Final    Creatinine 09/08/2018 0.60  0.6 - 1.3 MG/DL Final    BUN/Creatinine ratio 09/08/2018 22* 12 - 20   Final    GFR est AA 09/08/2018 >60  >60 ml/min/1.73m2 Final    GFR est non-AA 09/08/2018 >60  >60 ml/min/1.73m2 Final    Comment: (NOTE)  Estimated GFR is calculated using the Modification of Diet in Renal   Disease (MDRD) Study equation, reported for both  Americans   (GFRAA) and non- Americans (GFRNA), and normalized to 1.73m2   body surface area. The physician must decide which value applies to   the patient. The MDRD study equation should only be used in   individuals age 25 or older. It has not been validated for the   following: pregnant women, patients with serious comorbid conditions,   or on certain medications, or persons with extremes of body size,   muscle mass, or nutritional status.  Calcium 09/08/2018 9.3  8.5 - 10.1 MG/DL Final    Bilirubin, total 09/08/2018 0.6  0.2 - 1.0 MG/DL Final    ALT (SGPT) 09/08/2018 46  13 - 56 U/L Final    AST (SGOT) 09/08/2018 17  15 - 37 U/L Final    Alk. phosphatase 09/08/2018 83  45 - 117 U/L Final    Protein, total 09/08/2018 7.0  6.4 - 8.2 g/dL Final    Albumin 09/08/2018 4.0  3.4 - 5.0 g/dL Final    Globulin 09/08/2018 3.0  2.0 - 4.0 g/dL Final    A-G Ratio 09/08/2018 1.3  0.8 - 1.7   Final       .  Results for orders placed or performed during the hospital encounter of 09/17/18   XR CHEST PA LAT    Narrative    Chest  PA and lateral views    INDICATION:  Arrhythmia    COMPARISON:  Prior chest x-rays, most recent 1/29/2017    FINDINGS:  The cardiac silhouette is normal in size. The pulmonary vasculature  is unremarkable. No focal consolidation, pleural effusion, or pneumothorax. No  acute osseous abnormalities are identified. Surgical clips noted at the right  upper quadrant abdomen. Impression    IMPRESSION:   No radiographic evidence for acute cardiopulmonary process. .       CBC WITH AUTOMATED DIFF   Result Value Ref Range    WBC 9.4 4.6 - 13.2 K/uL    RBC 5.08 4.20 - 5.30 M/uL    HGB 12.0 12.0 - 16.0 g/dL    HCT 35.5 35.0 - 45.0 %    MCV 69.9 (L) 74.0 - 97.0 FL    MCH 23.6 (L) 24.0 - 34.0 PG    MCHC 33.8 31.0 - 37.0 g/dL    RDW 14.9 (H) 11.6 - 14.5 %    PLATELET 867 167 - 057 K/uL    NEUTROPHILS 85 (H) 40 - 73 %    LYMPHOCYTES 10 (L) 21 - 52 %    MONOCYTES 5 3 - 10 %    EOSINOPHILS 0 0 - 5 %    BASOPHILS 0 0 - 2 %    ABS. NEUTROPHILS 8.0 1.8 - 8.0 K/UL    ABS.  LYMPHOCYTES 1.0 0.9 - 3.6 K/UL    ABS. MONOCYTES 0.5 0.05 - 1.2 K/UL    ABS. EOSINOPHILS 0.0 0.0 - 0.4 K/UL    ABS. BASOPHILS 0.0 0.0 - 0.1 K/UL    DF AUTOMATED     METABOLIC PANEL, COMPREHENSIVE   Result Value Ref Range    Sodium 146 (H) 136 - 145 mmol/L    Potassium 4.1 3.5 - 5.5 mmol/L    Chloride 109 (H) 100 - 108 mmol/L    CO2 27 21 - 32 mmol/L    Anion gap 10 3.0 - 18 mmol/L    Glucose 173 (H) 74 - 99 mg/dL    BUN 9 7.0 - 18 MG/DL    Creatinine 0.68 0.6 - 1.3 MG/DL    BUN/Creatinine ratio 13 12 - 20      GFR est AA >60 >60 ml/min/1.73m2    GFR est non-AA >60 >60 ml/min/1.73m2    Calcium 10.4 (H) 8.5 - 10.1 MG/DL    Bilirubin, total 0.4 0.2 - 1.0 MG/DL    ALT (SGPT) 41 13 - 56 U/L    AST (SGOT) 16 15 - 37 U/L    Alk.  phosphatase 95 45 - 117 U/L    Protein, total 9.0 (H) 6.4 - 8.2 g/dL    Albumin 4.6 3.4 - 5.0 g/dL    Globulin 4.4 (H) 2.0 - 4.0 g/dL    A-G Ratio 1.0 0.8 - 1.7     MAGNESIUM   Result Value Ref Range    Magnesium 2.3 1.6 - 2.6 mg/dL   NT-PRO BNP   Result Value Ref Range    NT pro-BNP 98 0 - 900 PG/ML   URINALYSIS W/ RFLX MICROSCOPIC   Result Value Ref Range    Color YELLOW      Appearance CLEAR      Specific gravity 1.005 1.005 - 1.030      pH (UA) 6.5 5.0 - 8.0      Protein NEGATIVE  NEG mg/dL    Glucose NEGATIVE  NEG mg/dL    Ketone NEGATIVE  NEG mg/dL    Bilirubin NEGATIVE  NEG      Blood NEGATIVE  NEG      Urobilinogen 0.2 0.2 - 1.0 EU/dL    Nitrites NEGATIVE  NEG      Leukocyte Esterase NEGATIVE  NEG     TSH 3RD GENERATION   Result Value Ref Range    TSH 0.33 (L) 0.36 - 3.74 uIU/mL   CARDIAC PANEL,(CK, CKMB & TROPONIN)   Result Value Ref Range     26 - 192 U/L    CK - MB 1.6 <3.6 ng/ml    CK-MB Index 1.0 0.0 - 4.0 %    Troponin-I, Qt. <0.02 0.0 - 0.045 NG/ML   EKG, 12 LEAD, INITIAL   Result Value Ref Range    Ventricular Rate 97 BPM    Atrial Rate 97 BPM    P-R Interval 168 ms    QRS Duration 84 ms    Q-T Interval 344 ms    QTC Calculation (Bezet) 436 ms    Calculated P Axis 66 degrees    Calculated R Axis 9 degrees    Calculated T Axis 52 degrees    Diagnosis       Normal sinus rhythm  Nonspecific ST abnormality  Abnormal ECG  When compared with ECG of 18-NOV-2017 00:58,  premature atrial complexes are no longer present  Confirmed by Prisca Jarquin ([id number]) on 9/18/2018 12:34:47 PM         Assessment / Plan:      ICD-10-CM ICD-9-CM    1. Palpitations R00.2 785.1    2. Essential hypertension I10 401.9      Palpitations- patient had a history of palpitations. She is scheduled to see her Cardiologist on October 2, 2018    She is also scheduled to see Pulmonary on October 1, 2014  HTN- controlled      Follow-up Disposition:  Return if symptoms worsen or fail to improve. I asked the patient if she  had any questions and answered her  questions.   The patient stated that she understands the treatment plan and agrees with the treatment plan

## 2018-09-20 ENCOUNTER — OFFICE VISIT (OUTPATIENT)
Dept: CARDIOLOGY CLINIC | Age: 63
End: 2018-09-20

## 2018-09-20 VITALS
HEART RATE: 74 BPM | OXYGEN SATURATION: 98 % | SYSTOLIC BLOOD PRESSURE: 138 MMHG | HEIGHT: 59 IN | WEIGHT: 116 LBS | DIASTOLIC BLOOD PRESSURE: 52 MMHG | BODY MASS INDEX: 23.39 KG/M2

## 2018-09-20 DIAGNOSIS — E78.00 PURE HYPERCHOLESTEROLEMIA: ICD-10-CM

## 2018-09-20 DIAGNOSIS — R42 DIZZINESS: ICD-10-CM

## 2018-09-20 DIAGNOSIS — R00.2 PALPITATIONS: Primary | ICD-10-CM

## 2018-09-20 DIAGNOSIS — I10 ESSENTIAL HYPERTENSION: ICD-10-CM

## 2018-09-20 DIAGNOSIS — I73.9 PAD (PERIPHERAL ARTERY DISEASE) (HCC): ICD-10-CM

## 2018-09-20 RX ORDER — NITROGLYCERIN 0.4 MG/1
0.4 TABLET SUBLINGUAL
Qty: 25 TAB | Refills: 3 | Status: SHIPPED | OUTPATIENT
Start: 2018-09-20 | End: 2018-11-10

## 2018-09-20 NOTE — PROGRESS NOTES
Kristine Sepulveda presents today for a post-ER follow-up of complaints of an \"irregular heart beat. \"  She states that the palpitations kept her from sleeping and when they continued throughout the night, she went to the ER. At that time she presented to the ER, she was being treated for sinus issues and was taking Augmentin and prednisone. Initially, she thought that the palpitations began to occur more frequently after she began taking the prednisone. She states that she seems to have the palpitations at night. She has a longstanding history of sinus problems/congestion which she feels is a contributing factor to the palpitations. She is a 65year old female with a history of palpitations, hypertension, hyperlipidemia, PVD (femoral bruit and known totally occluded right common femoral artery as documented during a catheterization to rule out renal artery stenosis which is completed in October 2014). She had a stress test done in August 2015 which was mildly abnormal and she did develop some downsloping ST depressions inferolaterally and complained of some arm heaviness for about 5 minutes into recovery with EKGs changes lasting for about 10 minutes into recovery. Toprol XL 25 mg daily was added as an anti-anginal medication. In October 2015, she had an MRI done of her cervical spine which demonstrated multilevel disc disease, most prominent in C6 and C7, where there was moderate spinal canal stenosis and severe bilateral neural foraminal narrowings which would explain her arm pain. She was last seen by Dr. Almaz Aguiar in June 2018. He is no longer smoking. She states that she recently had a CT scan done of her chest which showed numerous calcified pulmonary nodules consistent with granulomatous disease. She is feeling better. She denies chest pain, tightness, heaviness, and admits to occasional palpitations which occurs mainly at night.   She denies shortness of breath at rest, dyspnea on exertion, orthopnea and PND. She denies abdominal bloating. She denies lightheadedness, dizziness, and syncope. She denies lower extremity edema and claudication. Denies nausea, vomiting, diarrhea, fever, chills. She remains physically active and is able to perform activities of daily living without difficulty. She admits to chronic sinus congestion, post nasal drip and productive cough. PMH:  Past Medical History:   Diagnosis Date    Allergic rhinitis     Blurred vision     Cardiac echocardiogram 06/11/2014    EF 60%. No RWMA. RVSP 30 mmHg. Mild AI.  Cardiac Holter monitor, normal 11/05/2014    Benign 48-hr Holter study.  Cardiac nuclear imaging test, mod risk 08/14/2015    Intermediate risk. High anterior artifact vs diagonal artery ischemia. Following Lexiscan, 0.5-mm downsloping inferolateral ST depression, resolving 10 min 30 sec of recovery. Arm heaviness noted.  Cardiovascular lower extremity arterial duplex 07/15/2016    Right leg: Mod peripheral arterial disease in femoral segment at rest.  Left leg:  Mild arterial disease at rest.  R KAREN 0.58. L KAREN 0.95. Similar to study of 10/15/14.  Cardiovascular renal angiography 10/27/2014    Bilateral patent renal arteries. Right CFA occluded but collateralized.  Cardiovascular renal duplex 10/13/2014    Aorta w/irregular walls. Severe >60% bilateral renal artery stenosis. Bilateral intrinsic/med renal disease.  Carotid duplex 10/17/2014    Mild <50% DAVID stenosis. Biphasic signals in both subclavians.  Carotid duplex 12/05/2016    Mild < 50% DAVID stenosis.       Cervical disc disease 09/2015    MRI of C[facility]spine at [facility] JO FARAH Dizziness     Ear ache     Essential hypertension     Fainting spell     Frequent headaches     Frequent nosebleeds     Hemorrhoid     Hyperlipidemia     Hypertension     Ill-defined condition     Migraine     Sinus problem     Sinusitis, chronic     Spontaneous pneumothorax 1999 2 episodes on the right within several months of each other    Stomach pain     Weakness of right leg        PSH:  Past Surgical History:   Procedure Laterality Date    COLONOSCOPY N/A 11/17/2017    COLONOSCOPY, SCREENING with hot bx polypectomy performed by Bridget Hartmann MD at [address] [place] [address] HX CHOLECYSTECTOMY  11/8/14    HX COLPOSCOPY      HX HEART CATHETERIZATION Bilateral 10/27/14    bilateral lower extremity angiography     HX OTHER SURGICAL Left     shave biopsy ( thigh area)       MEDS:  Current Outpatient Prescriptions   Medication Sig    amoxicillin-clavulanate (AUGMENTIN) 875-125 mg per tablet 1 tab twice per day with food    PROAIR HFA 90 mcg/actuation inhaler     atorvastatin (LIPITOR) 40 mg tablet TAKE ONE TABLET BY MOUTH DAILY    metoprolol succinate (TOPROL-XL) 25 mg XL tablet TAKE ONE TABLET BY MOUTH DAILY    meclizine (ANTIVERT) 25 mg tablet 1 tablet three times per day    aspirin delayed-release 81 mg tablet Take  by mouth nightly.  fluticasone (FLONASE) 50 mcg/actuation nasal spray 2 Sprays by Both Nostrils route daily.  nitroglycerin (NITROSTAT) 0.4 mg SL tablet 1 Tab by SubLINGual route every five (5) minutes as needed for Chest Pain.  cetirizine (ZYRTEC) 10 mg tablet Take 10 mg by mouth nightly.  labetalol (NORMODYNE) 100 mg tablet Take 100 mg by mouth two (2) times a day. No current facility-administered medications for this visit.         Allergies and Sensitivities:  Allergies   Allergen Reactions    Latex Rash    Keflex [Cephalexin] Rash    Epinephrine Other (comments)     tachycardia    Tetracyclines Nausea Only       Family History:  Family History   Problem Relation Age of Onset    Deep Vein Thrombosis Father     Coronary Artery Disease Father     Pacemaker Father     Diabetes Father     High Cholesterol Father     Hypertension Father     High Cholesterol Mother     Hypertension Mother     Heart Attack Brother 79    Cancer Brother     Heart Attack Maternal Grandmother 100    Heart Attack Other 48    Hypertension Other     Hypertension Maternal Aunt          Physical:  Visit Vitals    /52    Pulse 74    Ht 4' 11\" (1.499 m)    Wt 52.6 kg (116 lb)    SpO2 98%    BMI 23.43 kg/m2       Exam:  Neck:  Supple, no JVD, no carotid bruits  CV:  Normal S1 and  S2, grade II/VI CHEIKH noted, no rubs or gallops noted  Lungs:  Clear to ausculation throughout, no wheezes or rales  Abd:  Soft, non-tender, non-distended with good bowel sounds. No hepatosplenomegaly  Extremities:  No edema      Data:  EKG:    Read by Valdo Jones, DO - RRR, 74.  Leftward axis.  Nonspecific ST abnormality. LABS:  Lab Results   Component Value Date/Time    Sodium 146 (H) 09/17/2018 10:30 PM    Potassium 4.1 09/17/2018 10:30 PM    Chloride 109 (H) 09/17/2018 10:30 PM    CO2 27 09/17/2018 10:30 PM    Glucose 173 (H) 09/17/2018 10:30 PM    BUN 9 09/17/2018 10:30 PM    Creatinine 0.68 09/17/2018 10:30 PM       Lab Results   Component Value Date/Time    ALT (SGPT) 41 09/17/2018 10:30 PM     Impression / Plan:  1. Hypertension, blood pressure  controlled  2. Palpitations, increased frequency, occurring mainly at night  3. Hyperlipidemia, on atorvastatin 40mg      Ms. Deepti Mahoney was seen today for follow-up after a recent emergency room visit for complaints of palpitations that prevented her from sleeping one night. She states that she has experienced the palpitations a little bit more frequently lately since she has been having problems with her sinus congestion. She states that they seem to occur when her sinus issues are more severe. The palpitations tend to occur at night. She denies any chest pain and shortness of breath. She remains on labetalol 100 mg twice a day as well as Toprol XL 25 mg daily. Her blood pressure and heart rate are stable. Her EKG shows no ectopics or arrhythmias.   Will request that she wear a 30 day event monitor to evaluate her complaints of palpitations. She wore a 48 hour Holter monitor in November 2014 which was normal.      She will follow-up with Dr. Bonnita Kussmaul as scheduled and PRN. Demetrius Ambrocio MSN, FNP-BC    Please note:  Portions of this chart were created with Dragon medical speech to text program.  Unrecognized errors may be present.

## 2018-09-20 NOTE — PATIENT INSTRUCTIONS
30 day event monitor; dx: palpitations  Follow-up with Dr. Demarcus Cintron as scheduled and as needed

## 2018-10-06 ENCOUNTER — HOSPITAL ENCOUNTER (OUTPATIENT)
Dept: LAB | Age: 63
Discharge: HOME | End: 2018-10-06
Payer: COMMERCIAL

## 2018-10-06 LAB
BASOPHILS # BLD: 0.1 K/UL (ref 0–0.1)
BASOPHILS NFR BLD: 1 % (ref 0–2)
DIFFERENTIAL METHOD BLD: ABNORMAL
EOSINOPHIL # BLD: 0.2 K/UL (ref 0–0.4)
EOSINOPHIL NFR BLD: 2 % (ref 0–5)
ERYTHROCYTE [DISTWIDTH] IN BLOOD BY AUTOMATED COUNT: 14.8 % (ref 11.6–14.5)
HCT VFR BLD AUTO: 33.6 % (ref 35–45)
HGB BLD-MCNC: 11 G/DL (ref 12–16)
LYMPHOCYTES # BLD: 2.3 K/UL (ref 0.9–3.6)
LYMPHOCYTES NFR BLD: 30 % (ref 21–52)
MCH RBC QN AUTO: 23.2 PG (ref 24–34)
MCHC RBC AUTO-ENTMCNC: 32.7 G/DL (ref 31–37)
MCV RBC AUTO: 70.9 FL (ref 74–97)
MONOCYTES # BLD: 0.5 K/UL (ref 0.05–1.2)
MONOCYTES NFR BLD: 6 % (ref 3–10)
NEUTS SEG # BLD: 4.7 K/UL (ref 1.8–8)
NEUTS SEG NFR BLD: 61 % (ref 40–73)
PLATELET # BLD AUTO: 240 K/UL (ref 135–420)
PMV BLD AUTO: 12.1 FL (ref 9.2–11.8)
RBC # BLD AUTO: 4.74 M/UL (ref 4.2–5.3)
RBC MORPH BLD: ABNORMAL
WBC # BLD AUTO: 7.8 K/UL (ref 4.6–13.2)

## 2018-10-06 PROCEDURE — 85025 COMPLETE CBC W/AUTO DIFF WBC: CPT | Performed by: INTERNAL MEDICINE

## 2018-10-06 PROCEDURE — 36415 COLL VENOUS BLD VENIPUNCTURE: CPT | Performed by: INTERNAL MEDICINE

## 2018-10-06 PROCEDURE — 86003 ALLG SPEC IGE CRUDE XTRC EA: CPT | Performed by: INTERNAL MEDICINE

## 2018-10-06 PROCEDURE — 82785 ASSAY OF IGE: CPT | Performed by: INTERNAL MEDICINE

## 2018-10-08 LAB
A ALTERNATA IGE QN: <0.1 KU/L
A FUMIGATUS IGE QN: <0.1 KU/L
AMER ROACH IGE QN: <0.1 KU/L
AMER SYCAMORE IGE QN: <0.1 KU/L
BAHIA GRASS IGE QN: <0.1 KU/L
BERMUDA GRASS IGE QN: <0.1 KU/L
BOXELDER IGE QN: <0.1 KU/L
C HERBARUM IGE QN: <0.1 KU/L
CAT DANDER IGG QN: <0.1 KU/L
CLASS DESCRIPTION, 600268: NORMAL
COMMON RAGWEED IGE QN: <0.1 KU/L
D FARINAE IGE QN: <0.1 KU/L
D PTERONYSS IGE QN: <0.1 KU/L
DEPRECATED IGE QN: <0.1 KU/L
DOG DANDER IGE QN: <0.1 KU/L
ENGL PLANTAIN IGE QN: <0.1 KU/L
IGE SERPL-ACNC: 66 IU/ML (ref 0–100)
JOHNSON GRASS IGE QN: <0.1 KU/L
M RACEMOSUS IGE QN: <0.1 KU/L
MT JUNIPER IGE QN: <0.1 KU/L
MUGWORT IGE QN: <0.1 KU/L
NETTLE IGE QN: <0.1 KU/L
P NOTATUM IGE QN: <0.1 KU/L
S BOTRYOSUM IGE QN: <0.1 KU/L
SHEEP SORREL IGE QN: <0.1 KU/L
SWEET GUM IGE QN: <0.1 KU/L
TIMOTHY IGE QN: <0.1 KU/L
WHITE BIRCH IGE QN: <0.1 KU/L
WHITE ELM IGG QN: <0.1 KU/L
WHITE HICKORY IGE QN: <0.1 KU/L
WHITE MULBERRY IGE QN: <0.1 KU/L
WHITE OAK IGE QN: <0.1 KU/L

## 2018-10-12 ENCOUNTER — OFFICE VISIT (OUTPATIENT)
Dept: INTERNAL MEDICINE CLINIC | Age: 63
End: 2018-10-12

## 2018-10-12 VITALS
HEIGHT: 59 IN | DIASTOLIC BLOOD PRESSURE: 60 MMHG | OXYGEN SATURATION: 96 % | SYSTOLIC BLOOD PRESSURE: 130 MMHG | TEMPERATURE: 98 F | HEART RATE: 68 BPM

## 2018-10-12 DIAGNOSIS — J32.0 CHRONIC MAXILLARY SINUSITIS: ICD-10-CM

## 2018-10-12 DIAGNOSIS — I73.9 PAD (PERIPHERAL ARTERY DISEASE) (HCC): ICD-10-CM

## 2018-10-12 DIAGNOSIS — I10 ESSENTIAL HYPERTENSION: Primary | ICD-10-CM

## 2018-10-12 RX ORDER — CLINDAMYCIN HYDROCHLORIDE 300 MG/1
300 CAPSULE ORAL 3 TIMES DAILY
Qty: 30 CAP | Refills: 0 | Status: SHIPPED | OUTPATIENT
Start: 2018-10-12 | End: 2018-10-22

## 2018-10-12 RX ORDER — MONTELUKAST SODIUM 10 MG/1
10 TABLET ORAL DAILY
Qty: 90 TAB | Refills: 1 | Status: SHIPPED | OUTPATIENT
Start: 2018-10-12

## 2018-10-12 RX ORDER — IPRATROPIUM BROMIDE 42 UG/1
SPRAY, METERED NASAL
Qty: 15 ML | Refills: 2 | Status: SHIPPED | OUTPATIENT
Start: 2018-10-12

## 2018-10-15 ENCOUNTER — HOSPITAL ENCOUNTER (EMERGENCY)
Age: 63
Discharge: HOME | End: 2018-10-15
Attending: EMERGENCY MEDICINE
Payer: COMMERCIAL

## 2018-10-15 VITALS
BODY MASS INDEX: 23.39 KG/M2 | OXYGEN SATURATION: 100 % | SYSTOLIC BLOOD PRESSURE: 176 MMHG | HEART RATE: 68 BPM | HEIGHT: 59 IN | TEMPERATURE: 98.3 F | RESPIRATION RATE: 22 BRPM | DIASTOLIC BLOOD PRESSURE: 63 MMHG | WEIGHT: 116 LBS

## 2018-10-15 DIAGNOSIS — J45.21 MILD INTERMITTENT ASTHMA WITH ACUTE EXACERBATION: Primary | ICD-10-CM

## 2018-10-15 PROCEDURE — 94640 AIRWAY INHALATION TREATMENT: CPT

## 2018-10-15 PROCEDURE — 99282 EMERGENCY DEPT VISIT SF MDM: CPT

## 2018-10-15 PROCEDURE — 74011000250 HC RX REV CODE- 250: Performed by: PHYSICIAN ASSISTANT

## 2018-10-15 PROCEDURE — 77030029684 HC NEB SM VOL KT MONA -A

## 2018-10-15 RX ORDER — PREDNISONE 20 MG/1
60 TABLET ORAL
Status: DISCONTINUED | OUTPATIENT
Start: 2018-10-15 | End: 2018-10-15

## 2018-10-15 RX ORDER — IPRATROPIUM BROMIDE AND ALBUTEROL SULFATE 2.5; .5 MG/3ML; MG/3ML
3 SOLUTION RESPIRATORY (INHALATION) ONCE
Status: COMPLETED | OUTPATIENT
Start: 2018-10-15 | End: 2018-10-15

## 2018-10-15 RX ADMIN — IPRATROPIUM BROMIDE AND ALBUTEROL SULFATE 3 ML: .5; 3 SOLUTION RESPIRATORY (INHALATION) at 18:47

## 2018-10-15 NOTE — ED NOTES
Patient states that her breathing is much improved. Discharge instructions reviewed with patient. Patient voices understanding. Patient advised to follow up as directed on discharge instructions. Patient denies questions, needs or concerns at discharge regarding discharge instructions. Patient voiced understanding. No s/sx of distress noted. Armband removed and placed in shredder box.

## 2018-10-15 NOTE — ED TRIAGE NOTES
Pt c/o coughing & wheezing; states she has chronic sinusitis which triggers her asthma. Pt states she has been using her albuterol inhaler without relief. Has a pulmonology appt scheduled for tomorrow.

## 2018-10-15 NOTE — ED NOTES
I performed a brief evaluation, including history and physical, of the patient here in triage and I have determined that pt will need further treatment and evaluation from the main side ER physician. I have placed initial orders to help in expediting patients care. October 15, 2018 at 6:36 PM - CIRO Beatty Visit Vitals  /63 (BP 1 Location: Left arm, BP Patient Position: At rest;Sitting)  Pulse 77  Temp 98.3 °F (36.8 °C)  Resp 22  
 Ht 4' 11\" (1.499 m)  Wt 52.6 kg (116 lb)  SpO2 100%  BMI 23.43 kg/m2 Pt is declining Prednisone or CXR to me currently.

## 2018-10-15 NOTE — DISCHARGE INSTRUCTIONS

## 2018-10-15 NOTE — ED PROVIDER NOTES
EMERGENCY DEPARTMENT HISTORY AND PHYSICAL EXAM 
 
7:23 PM 
 
 
Date: 10/15/2018 Patient Name: Charley Pantoja History of Presenting Illness Chief Complaint Patient presents with  Wheezing  Cough History Provided By: Patient Chief Complaint: wheezing, SOB Additional History (Context): Charley Pantoja is a 59 y.o. female with HTN, HLD, chronic sinusitis, and asthma who presents with wheezing and coughing. She has chronic sinusitis and asthma. She has frequent exacerbations of her asthma when her sinus infections start draining down her throat. She has surgery planned with ENT. She has been diagnosed with asbestosis and has appt with Dr. Cedrick Pacheco her pulmonologist tomorrow. She responds better to nebs and feels like the inhaler does not work for her. She denies fever. She has shortness of breath and chest tightness but no chest pain. She had CXR 3 days ago with PCP. She cannot take steroids because they have caused an arrhythmia and she is wearing a holter monitor. PCP: Siva Briggs MD 
 
Current Outpatient Prescriptions Medication Sig Dispense Refill  budesonide/formoterol fumarate (SYMBICORT IN) Take  by inhalation.  ipratropium (ATROVENT) 42 mcg (0.06 %) nasal spray 2 sprays up each nostril twice per day 15 mL 2  
 montelukast (SINGULAIR) 10 mg tablet Take 1 Tab by mouth daily. 90 Tab 1  clindamycin (CLEOCIN) 300 mg capsule Take 1 Cap by mouth three (3) times daily for 10 days. 30 Cap 0  
 nitroglycerin (NITROSTAT) 0.4 mg SL tablet 1 Tab by SubLINGual route every five (5) minutes as needed for Chest Pain. 25 Tab 3  
 PROAIR HFA 90 mcg/actuation inhaler  atorvastatin (LIPITOR) 40 mg tablet TAKE ONE TABLET BY MOUTH DAILY 90 Tab 3  
 metoprolol succinate (TOPROL-XL) 25 mg XL tablet TAKE ONE TABLET BY MOUTH DAILY 90 Tab 3  
 meclizine (ANTIVERT) 25 mg tablet 1 tablet three times per day 60 Tab 1  
  aspirin delayed-release 81 mg tablet Take  by mouth nightly.  fluticasone (FLONASE) 50 mcg/actuation nasal spray 2 Sprays by Both Nostrils route daily.  cetirizine (ZYRTEC) 10 mg tablet Take 10 mg by mouth nightly.  labetalol (NORMODYNE) 100 mg tablet Take 100 mg by mouth two (2) times a day. Past History Past Medical History: 
Past Medical History:  
Diagnosis Date  Allergic rhinitis  Blurred vision  Cardiac echocardiogram 06/11/2014 EF 60%. No RWMA. RVSP 30 mmHg. Mild AI.  Cardiac Holter monitor, normal 11/05/2014 Benign 48-hr Holter study.  Cardiac nuclear imaging test, mod risk 08/14/2015 Intermediate risk. High anterior artifact vs diagonal artery ischemia. Following Lexiscan, 0.5-mm downsloping inferolateral ST depression, resolving 10 min 30 sec of recovery. Arm heaviness noted.  Cardiovascular lower extremity arterial duplex 07/15/2016 Right leg: Mod peripheral arterial disease in femoral segment at rest.  Left leg:  Mild arterial disease at rest.  R KAREN 0.58. L KAREN 0.95. Similar to study of 10/15/14.  Cardiovascular renal angiography 10/27/2014 Bilateral patent renal arteries. Right CFA occluded but collateralized.  Cardiovascular renal duplex 10/13/2014 Aorta w/irregular walls. Severe >60% bilateral renal artery stenosis. Bilateral intrinsic/med renal disease.  Carotid duplex 10/17/2014 Mild <50% DAVID stenosis. Biphasic signals in both subclavians.  Carotid duplex 12/05/2016 Mild < 50% DAVID stenosis.  Cervical disc disease 09/2015 MRI of C[facility]spine at [facility] [place]  Dizziness  Ear ache  Essential hypertension  Fainting spell  Frequent headaches  Frequent nosebleeds  Hemorrhoid  Hyperlipidemia  Hypertension  Ill-defined condition  Migraine  Sinus problem  Sinusitis, chronic  Spontaneous pneumothorax 1999 2 episodes on the right within several months of each other  Stomach pain  Weakness of right leg Past Surgical History: 
Past Surgical History:  
Procedure Laterality Date  COLONOSCOPY N/A 11/17/2017 COLONOSCOPY, SCREENING with hot bx polypectomy performed by Sanjay Lockwood MD at  [facility] HX CHOLECYSTECTOMY  11/8/14  HX COLPOSCOPY    
 HX HEART CATHETERIZATION Bilateral 10/27/14  
 bilateral lower extremity angiography  HX OTHER SURGICAL Left   
 shave biopsy ( thigh area) Family History: 
Family History Problem Relation Age of Onset  Deep Vein Thrombosis Father  Coronary Artery Disease Father  Pacemaker Father  Diabetes Father  High Cholesterol Father  Hypertension Father  High Cholesterol Mother  Hypertension Mother  Heart Attack Brother 40  Cancer Brother  Heart Attack Maternal Grandmother 100  
 Heart Attack Other 50  Hypertension Other  Hypertension Maternal Aunt Social History: 
Social History Substance Use Topics  Smoking status: Former Smoker Packs/day: 0.25 Years: 20.00 Quit date: 12/31/2017  Smokeless tobacco: Never Used Comment: now down to 3 cigarettes per day  Alcohol use No  
 
 
Allergies: Allergies Allergen Reactions  Latex Rash  Keflex [Cephalexin] Rash  Epinephrine Other (comments)  
  tachycardia  Tetracyclines Nausea Only Review of Systems Review of Systems Constitutional: Negative for fever. HENT: Negative for facial swelling. Eyes: Negative for visual disturbance. Respiratory: Positive for cough, chest tightness, shortness of breath and wheezing. Cardiovascular: Negative for chest pain. Gastrointestinal: Negative for abdominal pain. Genitourinary: Negative for dysuria. Musculoskeletal: Negative for neck pain. Skin: Negative for rash. Neurological: Negative for dizziness. Psychiatric/Behavioral: Negative for confusion. All other systems reviewed and are negative. Physical Exam  
 
Visit Vitals  /63 (BP 1 Location: Left arm, BP Patient Position: At rest;Sitting)  Pulse 68  Temp 98.3 °F (36.8 °C)  Resp 22  
 Ht 4' 11\" (1.499 m)  Wt 52.6 kg (116 lb)  SpO2 100%  BMI 23.43 kg/m2 Physical Exam  
Constitutional: She is oriented to person, place, and time. She appears well-developed and well-nourished. No distress. HENT:  
Head: Normocephalic and atraumatic. Eyes: Conjunctivae are normal.  
Neck: Normal range of motion. Cardiovascular: Normal rate and regular rhythm. Pulmonary/Chest: Effort normal and breath sounds normal. She has no wheezes. She has no rales. Abdominal: She exhibits no distension. Musculoskeletal: Normal range of motion. Neurological: She is alert and oriented to person, place, and time. Skin: Skin is warm and dry. She is not diaphoretic. Psychiatric: She has a normal mood and affect. Nursing note and vitals reviewed. Diagnostic Study Results Labs - No results found for this or any previous visit (from the past 12 hour(s)). Radiologic Studies - No orders to display Medical Decision Making I am the first provider for this patient. I reviewed the vital signs, available nursing notes, past medical history, past surgical history, family history and social history. Vital Signs-Reviewed the patient's vital signs. Records Reviewed: Nursing Notes (Time of Review: 7:23 PM) ED Course: Progress Notes, Reevaluation, and Consults: 
 
Provider Notes (Medical Decision Making): MDM Number of Diagnoses or Management Options Mild intermittent asthma with acute exacerbation:  
Diagnosis management comments: REquests neb for treatment of asthma. She declines CXR and steroids.   She has had recent CXR 3 days ago which was normal.  She has appt with Pulmonology tomorrow. She denies chest pain. After the neb, her lungs were clear and she was asymptomatic. Not necessary to pursue further work up. This is a recurrent issue for her and she is asymptomatic after treatment. Diagnosis Clinical Impression: 1. Mild intermittent asthma with acute exacerbation Disposition:  
 
Follow-up Information Follow up With Details Comments Contact Info Martín Mueller MD In 1 day  [facility] [address] [id number] 
[phone] [id number] [facility] EMERGENCY DEPT  Immediately if symptoms worsen Marisol Chaudhari [id number] [phone] Patient's Medications Start Taking No medications on file Continue Taking ASPIRIN DELAYED-RELEASE 81 MG TABLET    Take  by mouth nightly. ATORVASTATIN (LIPITOR) 40 MG TABLET    TAKE ONE TABLET BY MOUTH DAILY BUDESONIDE/FORMOTEROL FUMARATE (SYMBICORT IN)    Take  by inhalation. CETIRIZINE (ZYRTEC) 10 MG TABLET    Take 10 mg by mouth nightly. CLINDAMYCIN (CLEOCIN) 300 MG CAPSULE    Take 1 Cap by mouth three (3) times daily for 10 days. FLUTICASONE (FLONASE) 50 MCG/ACTUATION NASAL SPRAY    2 Sprays by Both Nostrils route daily. IPRATROPIUM (ATROVENT) 42 MCG (0.06 %) NASAL SPRAY    2 sprays up each nostril twice per day LABETALOL (NORMODYNE) 100 MG TABLET    Take 100 mg by mouth two (2) times a day. MECLIZINE (ANTIVERT) 25 MG TABLET    1 tablet three times per day METOPROLOL SUCCINATE (TOPROL-XL) 25 MG XL TABLET    TAKE ONE TABLET BY MOUTH DAILY MONTELUKAST (SINGULAIR) 10 MG TABLET    Take 1 Tab by mouth daily. NITROGLYCERIN (NITROSTAT) 0.4 MG SL TABLET    1 Tab by SubLINGual route every five (5) minutes as needed for Chest Pain. PROAIR HFA 90 MCG/ACTUATION INHALER These Medications have changed No medications on file Stop Taking  AMOXICILLIN-CLAVULANATE (AUGMENTIN) 875-125 MG PER TABLET    1 tab twice per day with food  
 
_______________________________ Attestations: 
Scribe Attestation Morgan MOHINI Hester PA-C acting as a scribe for and in the presence of [place], [employer] October 15, 2018 at 8:03 PM 
    
Provider Attestation:     
I personally performed the services described in the documentation, reviewed the documentation, as recorded by the scribe in my presence, and it accurately and completely records my words and actions. October 15, 2018 at 8:03 PM - CASSIE Chavis 
_______________________________

## 2018-10-16 NOTE — PROGRESS NOTES
The patient presents to the office today with the chief complaint of Sinus Congestion    HPI    The patient complains of several months of sinus congestion with some drainage. she denies fever. The patient has some improvement with antibiotics but it never resolves. The sinus congestion and drainage are so severe at times that she feels that her breathing is impaired. The patient has been to the ER several times. Review of Systems   Respiratory: Negative for shortness of breath. Cardiovascular: Negative for chest pain and leg swelling. Allergies   Allergen Reactions    Latex Rash    Keflex [Cephalexin] Rash    Epinephrine Other (comments)     tachycardia    Tetracyclines Nausea Only       Current Outpatient Prescriptions   Medication Sig Dispense Refill    ipratropium (ATROVENT) 42 mcg (0.06 %) nasal spray 2 sprays up each nostril twice per day 15 mL 2    montelukast (SINGULAIR) 10 mg tablet Take 1 Tab by mouth daily. 90 Tab 1    clindamycin (CLEOCIN) 300 mg capsule Take 1 Cap by mouth three (3) times daily for 10 days. 30 Cap 0    nitroglycerin (NITROSTAT) 0.4 mg SL tablet 1 Tab by SubLINGual route every five (5) minutes as needed for Chest Pain. 25 Tab 3    PROAIR HFA 90 mcg/actuation inhaler       atorvastatin (LIPITOR) 40 mg tablet TAKE ONE TABLET BY MOUTH DAILY 90 Tab 3    metoprolol succinate (TOPROL-XL) 25 mg XL tablet TAKE ONE TABLET BY MOUTH DAILY 90 Tab 3    meclizine (ANTIVERT) 25 mg tablet 1 tablet three times per day 60 Tab 1    aspirin delayed-release 81 mg tablet Take  by mouth nightly.  fluticasone (FLONASE) 50 mcg/actuation nasal spray 2 Sprays by Both Nostrils route daily.  cetirizine (ZYRTEC) 10 mg tablet Take 10 mg by mouth nightly.  labetalol (NORMODYNE) 100 mg tablet Take 100 mg by mouth two (2) times a day.  budesonide/formoterol fumarate (SYMBICORT IN) Take  by inhalation.          Past Medical History:   Diagnosis Date    Allergic rhinitis  Blurred vision     Cardiac echocardiogram 06/11/2014    EF 60%. No RWMA. RVSP 30 mmHg. Mild AI.  Cardiac Holter monitor, normal 11/05/2014    Benign 48-hr Holter study.  Cardiac nuclear imaging test, mod risk 08/14/2015    Intermediate risk. High anterior artifact vs diagonal artery ischemia. Following Lexiscan, 0.5-mm downsloping inferolateral ST depression, resolving 10 min 30 sec of recovery. Arm heaviness noted.  Cardiovascular lower extremity arterial duplex 07/15/2016    Right leg: Mod peripheral arterial disease in femoral segment at rest.  Left leg:  Mild arterial disease at rest.  R KAREN 0.58. L KAREN 0.95. Similar to study of 10/15/14.  Cardiovascular renal angiography 10/27/2014    Bilateral patent renal arteries. Right CFA occluded but collateralized.  Cardiovascular renal duplex 10/13/2014    Aorta w/irregular walls. Severe >60% bilateral renal artery stenosis. Bilateral intrinsic/med renal disease.  Carotid duplex 10/17/2014    Mild <50% DAVID stenosis. Biphasic signals in both subclavians.  Carotid duplex 12/05/2016    Mild < 50% DAVID stenosis.       Cervical disc disease 09/2015    MRI of C-spine at [facility] GAGAN Dizziness     Ear ache     Essential hypertension     Fainting spell     Frequent headaches     Frequent nosebleeds     Hemorrhoid     Hyperlipidemia     Hypertension     Ill-defined condition     Migraine     Sinus problem     Sinusitis, chronic     Spontaneous pneumothorax 1999    2 episodes on the right within several months of each other    Stomach pain     Weakness of right leg        Past Surgical History:   Procedure Laterality Date    COLONOSCOPY N/A 11/17/2017    COLONOSCOPY, SCREENING with hot bx polypectomy performed by Ro Bernal MD at [address] [place] [address] HX CHOLECYSTECTOMY  11/8/14    HX COLPOSCOPY      HX HEART CATHETERIZATION Bilateral 10/27/14    bilateral lower extremity angiography  HX OTHER SURGICAL Left     shave biopsy ( thigh area)       Social History     Social History    Marital status: SINGLE     Spouse name: N/A    Number of children: N/A    Years of education: N/A     Occupational History    Not on file. Social History Main Topics    Smoking status: Former Smoker     Packs/day: 0.25     Years: 20.00     Quit date: 12/31/2017    Smokeless tobacco: Never Used      Comment: now down to 3 cigarettes per day    Alcohol use No    Drug use: No    Sexual activity: No     Other Topics Concern    Not on file     Social History Narrative       Patient does not have an advanced directive on file    Visit Vitals    /60 (BP 1 Location: Left arm, BP Patient Position: Sitting)    Pulse 68    Temp 98 °F (36.7 °C) (Tympanic)    Ht 4' 11\" (1.499 m)    SpO2 96%       Physical Exam   HENT:   Ears:  Normal   Neck: Carotid bruit is not present. Cardiovascular: Normal rate and regular rhythm. Exam reveals no gallop. No murmur heard. Pulmonary/Chest: She has no wheezes. She has no rales. Abdominal: Soft. Bowel sounds are normal. She exhibits no distension and no mass. There is no tenderness. Musculoskeletal: She exhibits no edema. BMI:  OK        . No results found for any visits on 10/12/18. Assessment / Plan      ICD-10-CM ICD-9-CM    1. Essential hypertension I10 401.9    2. Chronic maxillary sinusitis J32.0 473.0    3. PAD (peripheral artery disease) ([facility]) I73.9 443.9        Clindamycin for 10 days  she was advised to continue her maintenance medications  she is to call if symptoms persist over 7 days        Follow-up Disposition:  Return in about 2 months (around 12/12/2018). I asked Stefan Madrigal if she has any questions and I answered the questions. Gladys Madrigal states that she understands the treatment plan and agrees with the treatment plan

## 2018-10-22 ENCOUNTER — OFFICE VISIT (OUTPATIENT)
Dept: INTERNAL MEDICINE CLINIC | Age: 63
End: 2018-10-22

## 2018-10-22 VITALS
OXYGEN SATURATION: 98 % | WEIGHT: 115 LBS | HEIGHT: 59 IN | BODY MASS INDEX: 23.18 KG/M2 | SYSTOLIC BLOOD PRESSURE: 120 MMHG | DIASTOLIC BLOOD PRESSURE: 60 MMHG | RESPIRATION RATE: 16 BRPM | HEART RATE: 65 BPM | TEMPERATURE: 97.5 F

## 2018-10-22 DIAGNOSIS — G47.30 SLEEP APNEA, UNSPECIFIED TYPE: Primary | ICD-10-CM

## 2018-10-22 DIAGNOSIS — J32.9 CHRONIC SINUSITIS, UNSPECIFIED LOCATION: ICD-10-CM

## 2018-10-22 DIAGNOSIS — J45.42 MODERATE PERSISTENT ASTHMA WITH STATUS ASTHMATICUS: ICD-10-CM

## 2018-10-22 NOTE — PROGRESS NOTES
Anny Sethi is a 59 y.o. female presenting today for Asthma  . HPI:  Anny Sethi presents to the office today for asthma and sleep apnea follow-up. Patient reports that she was at work yesterday and started having an asthma attack. She reports she became nervous and anxious and had to take albuterol inhaler. She notes that it took about 15 minutes of albuterol to take effect and she felt like she had anxiety. Patient notes she originally came to the appointment today to ask for a work note for 1 week until she can get adjusted to her meds but later has changed her mind and will go to work tomorrow. Patient is also complaining of sleep apnea. She reports when her sister was in town last week she told her that she stops breathing while she sleeps. Patient has a history of chronic sinusitis, and was recently treated with clindamycin and reports she is feeling much better. Review of Systems   HENT: Positive for sinus pain (improved). Respiratory: Positive for shortness of breath and wheezing. Negative for cough. Cardiovascular: Negative for chest pain and palpitations. Allergies   Allergen Reactions    Latex Rash    Keflex [Cephalexin] Rash    Epinephrine Other (comments)     tachycardia    Tetracyclines Nausea Only       Current Outpatient Medications   Medication Sig Dispense Refill    budesonide/formoterol fumarate (SYMBICORT IN) Take  by inhalation.  ipratropium (ATROVENT) 42 mcg (0.06 %) nasal spray 2 sprays up each nostril twice per day 15 mL 2    montelukast (SINGULAIR) 10 mg tablet Take 1 Tab by mouth daily. 90 Tab 1    clindamycin (CLEOCIN) 300 mg capsule Take 1 Cap by mouth three (3) times daily for 10 days.  30 Cap 0    PROAIR HFA 90 mcg/actuation inhaler       atorvastatin (LIPITOR) 40 mg tablet TAKE ONE TABLET BY MOUTH DAILY 90 Tab 3    metoprolol succinate (TOPROL-XL) 25 mg XL tablet TAKE ONE TABLET BY MOUTH DAILY 90 Tab 3    meclizine (ANTIVERT) 25 mg tablet 1 tablet three times per day 60 Tab 1    aspirin delayed-release 81 mg tablet Take  by mouth nightly.  fluticasone (FLONASE) 50 mcg/actuation nasal spray 2 Sprays by Both Nostrils route daily.  cetirizine (ZYRTEC) 10 mg tablet Take 10 mg by mouth nightly.  labetalol (NORMODYNE) 100 mg tablet Take 100 mg by mouth two (2) times a day.  nitroglycerin (NITROSTAT) 0.4 mg SL tablet 1 Tab by SubLINGual route every five (5) minutes as needed for Chest Pain. 25 Tab 3       Past Medical History:   Diagnosis Date    Allergic rhinitis     Blurred vision     Cardiac echocardiogram 06/11/2014    EF 60%. No RWMA. RVSP 30 mmHg. Mild AI.  Cardiac Holter monitor, normal 11/05/2014    Benign 48-hr Holter study.  Cardiac nuclear imaging test, mod risk 08/14/2015    Intermediate risk. High anterior artifact vs diagonal artery ischemia. Following Lexiscan, 0.5-mm downsloping inferolateral ST depression, resolving 10 min 30 sec of recovery. Arm heaviness noted.  Cardiovascular lower extremity arterial duplex 07/15/2016    Right leg: Mod peripheral arterial disease in femoral segment at rest.  Left leg:  Mild arterial disease at rest.  R KAREN 0.58. L KAREN 0.95. Similar to study of 10/15/14.  Cardiovascular renal angiography 10/27/2014    Bilateral patent renal arteries. Right CFA occluded but collateralized.  Cardiovascular renal duplex 10/13/2014    Aorta w/irregular walls. Severe >60% bilateral renal artery stenosis. Bilateral intrinsic/med renal disease.  Carotid duplex 10/17/2014    Mild <50% DAVID stenosis. Biphasic signals in both subclavians.  Carotid duplex 12/05/2016    Mild < 50% DAVID stenosis.       Cervical disc disease 09/2015    MRI [facility] C-spine [facility] JO GAGAN Dizziness     Ear ache     Essential hypertension     Fainting spell     Frequent headaches     Frequent nosebleeds     Hemorrhoid     Hyperlipidemia     Hypertension     Ill-defined condition     Migraine     Sinus problem     Sinusitis, chronic     Spontaneous pneumothorax     2 episodes on the right within several months of each other    Stomach pain     Weakness of right leg        Past Surgical History:   Procedure Laterality Date    HX BREAST REDUCTION      HX CHOLECYSTECTOMY  14    HX COLPOSCOPY      HX HEART CATHETERIZATION Bilateral 10/27/14    bilateral lower extremity angiography     HX OTHER SURGICAL Left     shave biopsy ( thigh area)       Social History     Socioeconomic History    Marital status: SINGLE     Spouse name: Not on file    Number of children: Not on file    Years of education: Not on file    Highest education level: Not on file   Social Needs    Financial resource strain: Not on file    Food insecurity - worry: Not on file    Food insecurity - inability: Not on file   [employer] needs - medical: Not on file   [employer] needs - non-medical: Not on file   Occupational History    Not on file   Tobacco Use    Smoking status: Former Smoker     Packs/day: 0.25     Years: 20.00     Pack years: 5.00     Last attempt to quit: 2017     Years since quittin.8    Smokeless tobacco: Never Used    Tobacco comment: now down to 3 cigarettes per day   Substance and Sexual Activity    Alcohol use: No    Drug use: No    Sexual activity: No   Other Topics Concern    Not on file   Social History Narrative    Not on file       Patient does not have an advanced directive on file    Vitals:    10/22/18 1535   BP: 120/60   Pulse: 65   Resp: 16   Temp: 97.5 °F (36.4 °C)   TempSrc: Tympanic   SpO2: 98%   Weight: 115 lb (52.2 kg)   Height: 4' 11\" (1.499 m)   PainSc:   0 - No pain       Physical Exam   Constitutional: No distress. Cardiovascular: Normal rate, regular rhythm and normal heart sounds. Pulmonary/Chest: Effort normal. No respiratory distress. She has no wheezes. Nursing note and vitals reviewed.       Hospital Outpatient Visit on 10/06/2018   Component Date Value Ref Range Status    D. pteronyssinus 10/06/2018 <0.10  Class 0 kU/L Final    D. farinae Mite 10/06/2018 <0.10  Class 0 kU/L Final    Cat Hair/Dander 10/06/2018 <0.10  Class 0 kU/L Final    Dog Hair/Dander 10/06/2018 <0.10  Class 0 kU/L Final    B[place] Grass 10/06/2018 <0.10  Class 0 kU/L Final    Boom grass 10/06/2018 <0.10  Class 0 kU/L Final    Rajinder Grass 10/06/2018 <0.10  Class 0 kU/L Final    Bahia Grass 10/06/2018 <0.10  Class 0 kU/L Final    Cockroach,American 10/06/2018 <0.10  Class 0 kU/L Final    Comment: (NOTE)  This test was developed and its performance characteristics  determined by [employer].  It has not been cleared or approved by the  U.S. Food and Drug Administration. The FDA has determined that such clearance or approval is not  necessary. This test is used for clinical purposes. It should not  be regarded as investigational or for research.  Penicillium notatum 10/06/2018 <0.10  Class 0 kU/L Final    Cladosporium herbarum 10/06/2018 <0.10  Class 0 kU/L Final    Aspergillus fumigatus 10/06/2018 <0.10  Class 0 kU/L Final    Mucor racemosus 10/06/2018 <0.10  Class 0 kU/L Final    Alternaria tenuis 10/06/2018 <0.10  Class 0 kU/L Final    Stemphylium botryosum 10/06/2018 <0.10  Class 0 kU/L Final    White [place]ch 10/06/2018 <0.10  Class 0 kU/L Final    [place] 10/06/2018 <0.10  Class 0 kU/L Final    American [place] 10/06/2018 <0.10  Class 0 kU/L Final    Maple/[place] 10/06/2018 <0.10  Class 0 kU/L Final    White [place] 10/06/2018 <0.10  Class 0 kU/L Final    Comment: (NOTE)  This test was developed and its performance characteristics  determined by [employer].  It has not been cleared or approved by the  U.S. Food and Drug Administration. The FDA has determined that such clearance or approval is not  necessary. This test is used for clinical purposes. It should not  be regarded as investigational or for research.       American Colliers 10/06/2018 <0.10  Class 0 kU/L Final    White [place] 10/06/2018 <0.10  Class 0 kU/L Final    Sweet Gum 10/06/2018 <0.10  Class 0 kU/L Final    Comment: (NOTE)  This test was developed and its performance characteristics  determined by [facility].  It has not been cleared or approved by the  U.S. Food and Drug Administration. The FDA has determined that such clearance or approval is not  necessary. This test is used for clinical purposes. It should not  be regarded as investigational or for research.      [facility] 10/06/2018 <0.10  Class 0 kU/L Final    Ragweed, Short/Common 10/06/2018 <0.10  Class 0 kU/L Final    Mugwort 10/06/2018 <0.10  Class 0 kU/L Final    Plantain, English 10/06/2018 <0.10  Class 0 kU/L Final    Pigweed, Rough 10/06/2018 <0.10  Class 0 kU/L Final    Sheep [facility]) 10/06/2018 <0.10  Class 0 kU/L Final    Nettle 10/06/2018 <0.10  Class 0 kU/L Final    Comment: (NOTE)  Performed At: [address] [id number]  Peng Sandoval MD DY:[id number]      Class description 10/06/2018 Comment    Final    Comment: (NOTE)     Levels of Specific IgE       Class  Description of Class     ---------------------------  -----  --------------------                    < 0.10         0         Negative            0.10 -    0.31         0/I       Equivocal/Low            0.32 -    0.55         I         Low            0.56 -    1.40         II        Moderate            1.41 -    3.90         III       High            3.91 -   19.00         IV        Very High           19.01 -  100.00         V         Very High                   >100.00         VI        Very High      WBC 10/06/2018 7.8  4.6 - 13.2 K/uL Final    RBC 10/06/2018 4.74  4.20 - 5.30 M/uL Final    HGB 10/06/2018 11.0* 12.0 - 16.0 g/dL Final    HCT 10/06/2018 33.6* 35.0 - 45.0 % Final    MCV 10/06/2018 70.9* 74.0 - 97.0 FL Final    MCH 10/06/2018 23.2* 24.0 - 34.0 PG Final    MCHC 10/06/2018 32.7  31.0 - 37.0 g/dL Final    RDW 10/06/2018 14.8* 11.6 - 14.5 % Final    PLATELET 28/91/3403 240  135 - 420 K/uL Final    MPV 10/06/2018 12.1* 9.2 - 11.8 FL Final    NEUTROPHILS 10/06/2018 61  40 - 73 % Final    LYMPHOCYTES 10/06/2018 30  21 - 52 % Final    MONOCYTES 10/06/2018 6  3 - 10 % Final    EOSINOPHILS 10/06/2018 2  0 - 5 % Final    BASOPHILS 10/06/2018 1  0 - 2 % Final    ABS. NEUTROPHILS 10/06/2018 4.7  1.8 - 8.0 K/UL Final    ABS. LYMPHOCYTES 10/06/2018 2.3  0.9 - 3.6 K/UL Final    ABS. MONOCYTES 10/06/2018 0.5  0.05 - 1.2 K/UL Final    ABS. EOSINOPHILS 10/06/2018 0.2  0.0 - 0.4 K/UL Final    ABS.  BASOPHILS 10/06/2018 0.1  0.0 - 0.1 K/UL Final    DF 10/06/2018 AUTOMATED    Final    SMEAR SCANNED    RBC COMMENTS 10/06/2018     Final                    Value:ANISOCYTOSIS  1+      RBC COMMENTS 10/06/2018     Final                    Value:HYPOCHROMIA  1+      RBC COMMENTS 10/06/2018     Final                    Value:MICROCYTOSIS  1+      Immunoglobulin E 10/06/2018 66  0 - 100 IU/mL Final    Comment: (NOTE)  Performed At: [address] [id number]  Stacy Hagen MD VL:[id number]     Admission on 09/17/2018, Discharged on 09/18/2018   Component Date Value Ref Range Status    Ventricular Rate 09/17/2018 97  BPM Final    Atrial Rate 09/17/2018 97  BPM Final    P-R Interval 09/17/2018 168  ms Final    QRS Duration 09/17/2018 84  ms Final    Q-T Interval 09/17/2018 344  ms Final    QTC Calculation (Bezet) 09/17/2018 436  ms Final    Calculated P Axis 09/17/2018 66  degrees Final    Calculated R Axis 09/17/2018 9  degrees Final    Calculated T Axis 09/17/2018 52  degrees Final    Diagnosis 09/17/2018    Final                    Value:Normal sinus rhythm  Nonspecific ST abnormality  Abnormal ECG  When compared with ECG of 18-NOV-2017 00:58,  premature atrial complexes are no longer present  Confirmed by Antonio Barton ([phone]) on 9/18/2018 12:34:47 PM      WBC 09/17/2018 9.4  4.6 - 13.2 K/uL Final    RBC 09/17/2018 5.08  4.20 - 5.30 M/uL Final    HGB 09/17/2018 12.0  12.0 - 16.0 g/dL Final    HCT 09/17/2018 35.5  35.0 - 45.0 % Final    MCV 09/17/2018 69.9* 74.0 - 97.0 FL Final    MCH 09/17/2018 23.6* 24.0 - 34.0 PG Final    MCHC 09/17/2018 33.8  31.0 - 37.0 g/dL Final    RDW 09/17/2018 14.9* 11.6 - 14.5 % Final    PLATELET 05/34/2699 161  135 - 420 K/uL Final    NEUTROPHILS 09/17/2018 85* 40 - 73 % Final    LYMPHOCYTES 09/17/2018 10* 21 - 52 % Final    MONOCYTES 09/17/2018 5  3 - 10 % Final    EOSINOPHILS 09/17/2018 0  0 - 5 % Final    BASOPHILS 09/17/2018 0  0 - 2 % Final    ABS. NEUTROPHILS 09/17/2018 8.0  1.8 - 8.0 K/UL Final    ABS. LYMPHOCYTES 09/17/2018 1.0  0.9 - 3.6 K/UL Final    ABS. MONOCYTES 09/17/2018 0.5  0.05 - 1.2 K/UL Final    ABS. EOSINOPHILS 09/17/2018 0.0  0.0 - 0.4 K/UL Final    ABS. BASOPHILS 09/17/2018 0.0  0.0 - 0.1 K/UL Final    DF 09/17/2018 AUTOMATED    Final    Sodium 09/17/2018 146* 136 - 145 mmol/L Final    Potassium 09/17/2018 4.1  3.5 - 5.5 mmol/L Final    Chloride 09/17/2018 109* 100 - 108 mmol/L Final    CO2 09/17/2018 27  21 - 32 mmol/L Final    Anion gap 09/17/2018 10  3.0 - 18 mmol/L Final    Glucose 09/17/2018 173* 74 - 99 mg/dL Final    BUN 09/17/2018 9  7.0 - 18 MG/DL Final    Creatinine 09/17/2018 0.68  0.6 - 1.3 MG/DL Final    BUN/Creatinine ratio 09/17/2018 13  12 - 20   Final    GFR est AA 09/17/2018 >60  >60 ml/min/1.73m2 Final    GFR est non-AA 09/17/2018 >60  >60 ml/min/1.73m2 Final    Comment: (NOTE)  Estimated GFR is calculated using the Modification of Diet in Renal   Disease (MDRD) Study equation, reported for both  Americans   (GFRAA) and non- Americans (GFRNA), and normalized to 1.73m2   body surface area. The physician must decide which value applies to   the patient. The MDRD study equation should only be used in   individuals age 25 or older. It has not been validated for the   following: pregnant women, patients with serious comorbid conditions,   or on certain medications, or persons with extremes of body size,   muscle mass, or nutritional status.  Calcium 09/17/2018 10.4* 8.5 - 10.1 MG/DL Final    Bilirubin, total 09/17/2018 0.4  0.2 - 1.0 MG/DL Final    ALT (SGPT) 09/17/2018 41  13 - 56 U/L Final    AST (SGOT) 09/17/2018 16  15 - 37 U/L Final    Alk. phosphatase 09/17/2018 95  45 - 117 U/L Final    Protein, total 09/17/2018 9.0* 6.4 - 8.2 g/dL Final    Albumin 09/17/2018 4.6  3.4 - 5.0 g/dL Final    Globulin 09/17/2018 4.4* 2.0 - 4.0 g/dL Final    A-G Ratio 09/17/2018 1.0  0.8 - 1.7   Final    Magnesium 09/17/2018 2.3  1.6 - 2.6 mg/dL Final    NT pro-BNP 09/17/2018 98  0 - 900 PG/ML Final    Comment:           For patients with dyspnea, NT proBNP is highly sensitive for detecting acute congestive heart failure. Also, an NT proBNP <300 pg/mL effectively rules out acute congestive heart failure, with 99% negative predictive value. Our reference ranges are for acute dyspnea. Age              Range (pg/ml)                            0-49                0-450        50-75               0-900        >75                 0-1800       For ambulatory office patients, the ranges below apply: For patients with dyspnea, NT proBNP is highly sensitive for detecting acute congestive heart failure. Also, an NT proBNP <300 pg/mL effectively rules out acute congestive heart failure, with 99% negative predictive value. Our reference ranges are for acute dyspnea.       Color 09/17/2018 YELLOW    Final    Appearance 09/17/2018 CLEAR    Final    Specific gravity 09/17/2018 1.005  1.005 - 1.030   Final    pH (UA) 09/17/2018 6.5  5.0 - 8.0   Final    Protein 09/17/2018 NEGATIVE   NEG mg/dL Final    Glucose 09/17/2018 NEGATIVE   NEG mg/dL Final    Ketone 09/17/2018 NEGATIVE   NEG mg/dL Final    Bilirubin 09/17/2018 NEGATIVE   NEG   Final  Blood 09/17/2018 NEGATIVE   NEG   Final    Urobilinogen 09/17/2018 0.2  0.2 - 1.0 EU/dL Final    Nitrites 09/17/2018 NEGATIVE   NEG   Final    Leukocyte Esterase 09/17/2018 NEGATIVE   NEG   Final    TSH 09/17/2018 0.33* 0.36 - 3.74 uIU/mL Final    CK 09/17/2018 162  26 - 192 U/L Final    CK - MB 09/17/2018 1.6  <3.6 ng/ml Final    CK-MB Index 09/17/2018 1.0  0.0 - 4.0 % Final    Troponin-I, Qt. 09/17/2018 <0.02  0.0 - 0.045 NG/ML Final    Comment: The presence of detectable troponin above the reference range indicates myocardial injury which may be due to ischemia, myocarditis, trauma, etc.  Clinical correlation is necessary to establish the significance of this finding. Sequential testing is recommended to determine if the typical rise and fall of cTnI is demonstrated. Note:  Cardiac troponin I has a relatively long half life and may be present well after the CK MB has returned to baseline. The reference range is based on the 99th percentile of the referent population. Hospital Outpatient Visit on 09/08/2018   Component Date Value Ref Range Status    WBC 09/08/2018 7.7  4.6 - 13.2 K/uL Final    RBC 09/08/2018 4.78  4.20 - 5.30 M/uL Final    HGB 09/08/2018 11.0* 12.0 - 16.0 g/dL Final    HCT 09/08/2018 33.6* 35.0 - 45.0 % Final    MCV 09/08/2018 70.3* 74.0 - 97.0 FL Final    MCH 09/08/2018 23.0* 24.0 - 34.0 PG Final    MCHC 09/08/2018 32.7  31.0 - 37.0 g/dL Final    RDW 09/08/2018 14.8* 11.6 - 14.5 % Final    PLATELET 79/86/8761 870  135 - 420 K/uL Final    MPV 09/08/2018 10.9  9.2 - 11.8 FL Final    NEUTROPHILS 09/08/2018 52  40 - 73 % Final    LYMPHOCYTES 09/08/2018 40  21 - 52 % Final    MONOCYTES 09/08/2018 5  3 - 10 % Final    EOSINOPHILS 09/08/2018 2  0 - 5 % Final    BASOPHILS 09/08/2018 1  0 - 2 % Final    ABS. NEUTROPHILS 09/08/2018 3.9  1.8 - 8.0 K/UL Final    ABS. LYMPHOCYTES 09/08/2018 3.1  0.9 - 3.6 K/UL Final    ABS.  MONOCYTES 09/08/2018 0.4  0.05 - 1.2 K/UL Final  ABS. EOSINOPHILS 09/08/2018 0.2  0.0 - 0.4 K/UL Final    ABS. BASOPHILS 09/08/2018 0.1  0.0 - 0.1 K/UL Final    DF 09/08/2018 AUTOMATED    Final    SMEAR SCANNED    PLATELET COMMENTS 60/79/4001 ADEQUATE PLATELETS    Final    RBC COMMENTS 09/08/2018     Final                    Value:ANISOCYTOSIS  1+      RBC COMMENTS 09/08/2018     Final                    Value:MICROCYTOSIS  2+      RBC COMMENTS 09/08/2018     Final                    Value:OVALOCYTES  2+      RBC COMMENTS 09/08/2018     Final                    Value:TARGET CELLS  1+      LIPID PROFILE 09/08/2018        Final    Cholesterol, total 09/08/2018 106  <200 MG/DL Final    Triglyceride 09/08/2018 84  <150 MG/DL Final    Comment: The drugs N-acetylcysteine (NAC) and  Metamiszole have been found to cause falsely  low results in this chemical assay. Please  be sure to submit blood samples obtained  BEFORE administration of either of these  drugs to assure correct results.       HDL Cholesterol 09/08/2018 37* 40 - 60 MG/DL Final    LDL, calculated 09/08/2018 52.2  0 - 100 MG/DL Final    VLDL, calculated 09/08/2018 16.8  MG/DL Final    CHOL/HDL Ratio 09/08/2018 2.9  0 - 5.0   Final    Sodium 09/08/2018 144  136 - 145 mmol/L Final    Potassium 09/08/2018 4.9  3.5 - 5.5 mmol/L Final    Chloride 09/08/2018 109* 100 - 108 mmol/L Final    CO2 09/08/2018 29  21 - 32 mmol/L Final    Anion gap 09/08/2018 6  3.0 - 18 mmol/L Final    Glucose 09/08/2018 92  74 - 99 mg/dL Final    BUN 09/08/2018 13  7.0 - 18 MG/DL Final    Creatinine 09/08/2018 0.60  0.6 - 1.3 MG/DL Final    BUN/Creatinine ratio 09/08/2018 22* 12 - 20   Final    GFR est AA 09/08/2018 >60  >60 ml/min/1.73m2 Final    GFR est non-AA 09/08/2018 >60  >60 ml/min/1.73m2 Final    Comment: (NOTE)  Estimated GFR is calculated using the Modification of Diet in Renal   Disease (MDRD) Study equation, reported for both  Americans   (GFRAA) and non- Americans (GFRNA), and normalized to 1.73m2   body surface area. The physician must decide which value applies to   the patient. The MDRD study equation should only be used in   individuals age 25 or older. It has not been validated for the   following: pregnant women, patients with serious comorbid conditions,   or on certain medications, or persons with extremes of body size,   muscle mass, or nutritional status.  Calcium 09/08/2018 9.3  8.5 - 10.1 MG/DL Final    Bilirubin, total 09/08/2018 0.6  0.2 - 1.0 MG/DL Final    ALT (SGPT) 09/08/2018 46  13 - 56 U/L Final    AST (SGOT) 09/08/2018 17  15 - 37 U/L Final    Alk. phosphatase 09/08/2018 83  45 - 117 U/L Final    Protein, total 09/08/2018 7.0  6.4 - 8.2 g/dL Final    Albumin 09/08/2018 4.0  3.4 - 5.0 g/dL Final    Globulin 09/08/2018 3.0  2.0 - 4.0 g/dL Final    A-G Ratio 09/08/2018 1.3  0.8 - 1.7   Final       .No results found for any visits on 10/22/18. Assessment / Plan:      ICD-10-CM ICD-9-CM    1. Sleep apnea, unspecified type G47.30 780.57 REFERRAL TO SLEEP STUDIES   2. Chronic sinusitis, unspecified location J32.9 473.9    3. Moderate persistent asthma with status asthmaticus J45.42 493.91      Patient reports he has sleep apnea symptoms-referral to sleep studies  Patient has chronic sinusitis-she is taking clindamycin and was given this by her PCP  History of asthma-patient has been given an asthma plan by her pulmonologist and is taking Symbicort and albuterol around-the-clock  Follow-up as needed    Follow-up Disposition:  Return if symptoms worsen or fail to improve. I asked the patient if she  had any questions and answered her  questions.   The patient stated that she understands the treatment plan and agrees with the treatment plan

## 2018-10-22 NOTE — PROGRESS NOTES
1. Have you been to the ER, urgent care clinic since your last visit? Hospitalized since your last visit? Yes When: oct 15 Where: [facility] Reason for visit: asthma    2. Have you seen or consulted any other health care providers outside of the [address] [place] [address] since your last visit? Include any pap smears or colon screening.  No

## 2018-11-10 ENCOUNTER — HOSPITAL ENCOUNTER (EMERGENCY)
Age: 63
Discharge: HOME | End: 2018-11-10
Attending: EMERGENCY MEDICINE
Payer: COMMERCIAL

## 2018-11-10 VITALS
HEIGHT: 59 IN | TEMPERATURE: 98.7 F | WEIGHT: 114 LBS | RESPIRATION RATE: 16 BRPM | SYSTOLIC BLOOD PRESSURE: 179 MMHG | HEART RATE: 70 BPM | OXYGEN SATURATION: 99 % | BODY MASS INDEX: 22.98 KG/M2 | DIASTOLIC BLOOD PRESSURE: 50 MMHG

## 2018-11-10 DIAGNOSIS — I10 ESSENTIAL HYPERTENSION: Primary | ICD-10-CM

## 2018-11-10 PROCEDURE — 99282 EMERGENCY DEPT VISIT SF MDM: CPT

## 2018-11-10 RX ORDER — NYSTATIN 100000 [USP'U]/ML
SUSPENSION ORAL 4 TIMES DAILY
COMMUNITY
End: 2019-05-03 | Stop reason: ALTCHOICE

## 2018-11-11 NOTE — DISCHARGE INSTRUCTIONS
Home Blood Pressure Test: About This Test  What is it? A home blood pressure test allows you to keep track of your blood pressure at home. Blood pressure is a measure of the force of blood against the walls of your arteries. Blood pressure readings include two numbers, such as 130/80 (say \"130 over 80\"). The first number is the systolic pressure. The second number is the diastolic pressure. Why is this test done? You may do this test at home to:  · Find out if you have high blood pressure. · Track your blood pressure if you have high blood pressure. · Track how well medicine is working to reduce high blood pressure. · Check how lifestyle changes, such as weight loss and exercise, are affecting blood pressure. How can you prepare for the test?  · Do not use caffeine, tobacco, or medicines known to raise blood pressure (such as nasal decongestant sprays) for at least 30 minutes before taking your blood pressure. · Do not exercise for at least 30 minutes before taking your blood pressure. What happens before the test?  Take your blood pressure while you feel comfortable and relaxed. Sit quietly with both feet on the floor for at least 5 minutes before the test.  What happens during the test?  · Sit with your arm slightly bent and resting on a table so that your upper arm is at the same level as your heart. · Roll up your sleeve or take off your shirt to expose your upper arm. · Wrap the blood pressure cuff around your upper arm so that the lower edge of the cuff is about 1 inch above the bend of your elbow. Proceed with the following steps depending on if you are using an automatic or manual pressure monitor. Automatic blood pressure monitors  · Press the on/off button on the automatic monitor and wait until the ready-to-measure \"heart\" symbol appears next to zero in the display window. · Press the start button. The cuff will inflate and deflate by itself.   · Your blood pressure numbers will appear on the screen. · Write your numbers in your log book, along with the date and time. Manual blood pressure monitors  · Place the earpieces of a stethoscope in your ears, and place the bell of the stethoscope over the artery, just below the cuff. · Close the valve on the rubber inflating bulb. · Squeeze the bulb rapidly with your opposite hand to inflate the cuff until the dial or column of mercury reads about 30 mm Hg higher than your usual systolic pressure. If you do not know your usual pressure, inflate the cuff to 210 mm Hg or until the pulse at your wrist disappears. · Open the pressure valve just slightly by twisting or pressing the valve on the bulb. · As you watch the pressure slowly fall, note the level on the dial at which you first start to hear a pulsing or tapping sound through the stethoscope. This is your systolic blood pressure. · Continue letting the air out slowly. The sounds will become muffled and will finally disappear. Note the pressure when the sounds completely disappear. This is your diastolic blood pressure. Let out all the remaining air. · Write your numbers in your log book, along with the date and time. What else should you know about the test?  Here are the categories of blood pressure for adults:  Ideal blood pressure. Systolic is less than 511, and diastolic is less than 80. Elevated blood pressure. Systolic is 543 to 760, and diastolic is less than 80. High blood pressure (hypertension). Systolic is 072 or above. Diastolic is 80 or above. One or both numbers may be high. It is more accurate to take the average of several readings made throughout the day than to rely on a single reading. Follow-up care is a key part of your treatment and safety. Be sure to make and go to all appointments, and call your doctor if you are having problems. It's also a good idea to keep a list of the medicines you take. Where can you learn more?   Go to http://dirk-amaris.info/. Enter C427 in the search box to learn more about \"Home Blood Pressure Test: About This Test.\"  Current as of: December 6, 2017  Content Version: 11.8  © 2006-2018 [employer]. Care instructions adapted under license by [employer] (which disclaims liability or warranty for this information). If you have questions about a medical condition or this instruction, always ask your healthcare professional. [address] any warranty or liability for your use of this information. Home Blood Pressure Test: About This Test  What is it? A home blood pressure test allows you to keep track of your blood pressure at home. Blood pressure is a measure of the force of blood against the walls of your arteries. Blood pressure readings include two numbers, such as 130/80 (say \"130 over 80\"). The first number is the systolic pressure. The second number is the diastolic pressure. Why is this test done? You may do this test at home to:  · Find out if you have high blood pressure. · Track your blood pressure if you have high blood pressure. · Track how well medicine is working to reduce high blood pressure. · Check how lifestyle changes, such as weight loss and exercise, are affecting blood pressure. How can you prepare for the test?  · Do not use caffeine, tobacco, or medicines known to raise blood pressure (such as nasal decongestant sprays) for at least 30 minutes before taking your blood pressure. · Do not exercise for at least 30 minutes before taking your blood pressure. What happens before the test?  Take your blood pressure while you feel comfortable and relaxed. Sit quietly with both feet on the floor for at least 5 minutes before the test.  What happens during the test?  · Sit with your arm slightly bent and resting on a table so that your upper arm is at the same level as your heart.   · Roll up your sleeve or take off your shirt to expose your upper arm. · Wrap the blood pressure cuff around your upper arm so that the lower edge of the cuff is about 1 inch above the bend of your elbow. Proceed with the following steps depending on if you are using an automatic or manual pressure monitor. Automatic blood pressure monitors  · Press the on/off button on the automatic monitor and wait until the ready-to-measure \"heart\" symbol appears next to zero in the display window. · Press the start button. The cuff will inflate and deflate by itself. · Your blood pressure numbers will appear on the screen. · Write your numbers in your log book, along with the date and time. Manual blood pressure monitors  · Place the earpieces of a stethoscope in your ears, and place the bell of the stethoscope over the artery, just below the cuff. · Close the valve on the rubber inflating bulb. · Squeeze the bulb rapidly with your opposite hand to inflate the cuff until the dial or column of mercury reads about 30 mm Hg higher than your usual systolic pressure. If you do not know your usual pressure, inflate the cuff to 210 mm Hg or until the pulse at your wrist disappears. · Open the pressure valve just slightly by twisting or pressing the valve on the bulb. · As you watch the pressure slowly fall, note the level on the dial at which you first start to hear a pulsing or tapping sound through the stethoscope. This is your systolic blood pressure. · Continue letting the air out slowly. The sounds will become muffled and will finally disappear. Note the pressure when the sounds completely disappear. This is your diastolic blood pressure. Let out all the remaining air. · Write your numbers in your log book, along with the date and time. What else should you know about the test?  Here are the categories of blood pressure for adults:  Ideal blood pressure. Systolic is less than 027, and diastolic is less than 80. Elevated blood pressure.    Systolic is 120 to 865, and diastolic is less than 80. High blood pressure (hypertension). Systolic is 620 or above. Diastolic is 80 or above. One or both numbers may be high. It is more accurate to take the average of several readings made throughout the day than to rely on a single reading. Follow-up care is a key part of your treatment and safety. Be sure to make and go to all appointments, and call your doctor if you are having problems. It's also a good idea to keep a list of the medicines you take. Where can you learn more? Go to http://dirk-amaris.info/. Enter C427 in the search box to learn more about \"Home Blood Pressure Test: About This Test.\"  Current as of: December 6, 2017  Content Version: 11.8  © 5189-8873 [employer]. Care instructions adapted under license by [employer] (which disclaims liability or warranty for this information). If you have questions about a medical condition or this instruction, always ask your healthcare professional. [address] any warranty or liability for your use of this information.

## 2018-11-11 NOTE — ED TRIAGE NOTES
Patient states using Symbicort prior to taking BP at home. States SBP greater than 200 on home machine.

## 2018-11-11 NOTE — ED PROVIDER NOTES
EMERGENCY DEPARTMENT HISTORY AND PHYSICAL EXAM 
 
10:48 PM 
 
 
Date: 11/10/2018 Patient Name: Marisel Beatty History of Presenting Illness Chief Complaint Patient presents with  Hypertension History Provided By: Patient Chief Complaint: HTN Duration:  Few Minutes PTA Timing:  Acute Location: n/a Quality: n/a Severity: n/a Modifying Factors: No modifying or aggravating factors were reported. Associated Symptoms: No symptoms reported. Additional History (Context): 10:49 PM Marisel Beatty is a 59 y.o. female with h/o HTN who presents to ED complaining of acute HTN with onset a few minutes PTA. Pt took her nebulizer today, and her Symbicort about 20 PTA then noticed her high blood pressure. Did take her BP meds and aspirin today. She was just on a cardiac monitor for 30 days which ended yesterday, followed by Dr. Alan Trinidad. Presents to the ED for peace of mind about her high blood pressure reading. Denies any new CP or SOB. No modifying or aggravating factors were reported. No other concerns or symptoms at this time. PCP: Alvaro Chavira MD 
 
Current Outpatient Medications Medication Sig Dispense Refill  nystatin (MYCOSTATIN) 100,000 unit/mL suspension Take  by mouth four (4) times daily. swish and spit  budesonide/formoterol fumarate (SYMBICORT IN) Take  by inhalation.  ipratropium (ATROVENT) 42 mcg (0.06 %) nasal spray 2 sprays up each nostril twice per day 15 mL 2  
 montelukast (SINGULAIR) 10 mg tablet Take 1 Tab by mouth daily. 90 Tab 1  
 PROAIR HFA 90 mcg/actuation inhaler  atorvastatin (LIPITOR) 40 mg tablet TAKE ONE TABLET BY MOUTH DAILY 90 Tab 3  
 metoprolol succinate (TOPROL-XL) 25 mg XL tablet TAKE ONE TABLET BY MOUTH DAILY 90 Tab 3  
 meclizine (ANTIVERT) 25 mg tablet 1 tablet three times per day 60 Tab 1  
 aspirin delayed-release 81 mg tablet Take  by mouth nightly.     
 labetalol (NORMODYNE) 100 mg tablet Take 100 mg by mouth two (2) times a day.    
 fluticasone (FLONASE) 50 mcg/actuation nasal spray 2 Sprays by Both Nostrils route daily.  cetirizine (ZYRTEC) 10 mg tablet Take 10 mg by mouth nightly. Past History Past Medical History: 
Past Medical History:  
Diagnosis Date  Allergic rhinitis  Blurred vision  Cardiac echocardiogram 06/11/2014 EF 60%. No RWMA. RVSP 30 mmHg. Mild AI.  Cardiac Holter monitor, normal 11/05/2014 Benign 48-hr Holter study.  Cardiac nuclear imaging test, mod risk 08/14/2015 Intermediate risk. High anterior artifact vs diagonal artery ischemia. Following Lexiscan, 0.5-mm downsloping inferolateral ST depression, resolving 10 min 30 sec of recovery. Arm heaviness noted.  Cardiovascular lower extremity arterial duplex 07/15/2016 Right leg: Mod peripheral arterial disease in femoral segment at rest.  Left leg:  Mild arterial disease at rest.  R KAREN 0.58. L KAREN 0.95. Similar to study of 10/15/14.  Cardiovascular renal angiography 10/27/2014 Bilateral patent renal arteries. Right CFA occluded but collateralized.  Cardiovascular renal duplex 10/13/2014 Aorta w/irregular walls. Severe >60% bilateral renal artery stenosis. Bilateral intrinsic/med renal disease.  Carotid duplex 10/17/2014 Mild <50% DAVID stenosis. Biphasic signals in both subclavians.  Carotid duplex 12/05/2016 Mild < 50% DAVID stenosis.  Cervical disc disease 09/2015 MRI of C-spine at [facility]  Dizziness  Ear ache  Essential hypertension  Fainting spell  Frequent headaches  Frequent nosebleeds  Hemorrhoid  Hyperlipidemia  Hypertension  Ill-defined condition  Migraine  Sinus problem  Sinusitis, chronic  Spontaneous pneumothorax 1999  
 2 episodes on the right within several months of each other  Stomach pain  Weakness of right leg Past Surgical History: 
Past Surgical History: Procedure Laterality Date  HX BREAST REDUCTION    
 HX CHOLECYSTECTOMY  14  HX COLPOSCOPY    
 HX HEART CATHETERIZATION Bilateral 10/27/14  
 bilateral lower extremity angiography  HX OTHER SURGICAL Left   
 shave biopsy ( thigh area) Family History: 
Family History Problem Relation Age of Onset  Deep Vein Thrombosis Father  Coronary Artery Disease Father  Pacemaker Father  Diabetes Father  High Cholesterol Father  Hypertension Father  High Cholesterol Mother  Hypertension Mother  Heart Attack Brother 40  Cancer Brother  Heart Attack Maternal Grandmother 100  
 Heart Attack Other 50  Hypertension Other  Hypertension Maternal Aunt Social History: 
Social History Tobacco Use  Smoking status: Former Smoker Packs/day: 0.25 Years: 20.00 Pack years: 5.00 Last attempt to quit: 2017 Years since quittin.8  Smokeless tobacco: Never Used  Tobacco comment: now down to 3 cigarettes per day Substance Use Topics  Alcohol use: No  
 Drug use: No  
 
 
Allergies: Allergies Allergen Reactions  Latex Rash  Keflex [Cephalexin] Rash  Epinephrine Other (comments)  
  tachycardia  Tetracyclines Nausea Only Review of Systems Review of Systems Constitutional: Negative for chills and fever. Respiratory: Negative for shortness of breath. Cardiovascular: Negative for chest pain. Gastrointestinal: Negative for nausea and vomiting. All other systems reviewed and are negative. Physical Exam  
 
Visit Vitals /50 Pulse 70 Temp 98.7 °F (37.1 °C) Resp 16 Ht 4' 11\" (1.499 m) Wt 51.7 kg (114 lb) SpO2 99% BMI 23.03 kg/m² Physical Exam  
Constitutional: She is oriented to person, place, and time. She appears well-developed and well-nourished. No distress. HENT:  
Head: Normocephalic and atraumatic. Eyes: Conjunctivae and EOM are normal. Right eye exhibits no discharge. Left eye exhibits no discharge. No scleral icterus. Neck: Normal range of motion. Neck supple. No tracheal deviation present. Cardiovascular: Normal rate, regular rhythm and normal heart sounds. No murmur heard. Pulmonary/Chest: Effort normal and breath sounds normal. No respiratory distress. She has no wheezes. She has no rales. Abdominal: Soft. She exhibits no distension. There is no tenderness. There is no rebound and no guarding. Musculoskeletal: Normal range of motion. She exhibits no edema or deformity. Neurological: She is alert and oriented to person, place, and time. No cranial nerve deficit. Skin: Skin is warm and dry. She is not diaphoretic. Psychiatric: She has a normal mood and affect. Her behavior is normal. Judgment and thought content normal.  
 
Medical Decision Making I am the first provider for this patient. I reviewed the vital signs, available nursing notes, past medical history, past surgical history, family history and social history. Vital Signs-Reviewed the patient's vital signs. Pulse Oximetry Analysis -  99% on room air Records Reviewed: Nursing Notes and Old Medical Records (Time of Review: 10:48 PM) Provider Notes (Medical Decision Making): Patient with elevated blood pressure reading at home after neb and inhaled steroid. Blood pressure improved here, otherwise asymptomatic. Will discharge home with routine follow up with her primary care doctor. Diagnosis Clinical Impression: 1. Essential hypertension Disposition: discharged Follow-up Information Follow up With Specialties Details Why Contact Info Yue Young MD Internal Medicine Schedule an appointment as soon as possible for a visit  [place] [address] [address] [facility] [address] 
[phone] [address] [facility] EMERGENCY DEPT Emergency Medicine  If symptoms worsen [address] [id number] [phone] Medication List  
  
ASK your doctor about these medications   
aspirin delayed-release 81 mg tablet 
  
atorvastatin 40 mg tablet Commonly known as:  LIPITOR 
TAKE ONE TABLET BY MOUTH DAILY 
  
FLONASE 50 mcg/actuation nasal spray Generic drug:  fluticasone 
  
ipratropium 42 mcg (0.06 %) nasal spray Commonly known as:  ATROVENT 
2 sprays up each nostril twice per day 
  
labetalol 100 mg tablet Commonly known as:  NORMODYNE 
  
meclizine 25 mg tablet Commonly known as:  ANTIVERT 
1 tablet three times per day 
  
metoprolol succinate 25 mg XL tablet Commonly known as:  TOPROL-XL 
TAKE ONE TABLET BY MOUTH DAILY 
  
montelukast 10 mg tablet Commonly known as:  SINGULAIR Take 1 Tab by mouth daily. nystatin 100,000 unit/mL suspension Commonly known as:  MYCOSTATIN 
  
PROAIR HFA 90 mcg/actuation inhaler Generic drug:  albuterol SYMBICORT IN 
  
ZyrTEC 10 mg tablet Generic drug:  cetirizine 
  
  
 
_______________________________ Scribe Attestation Tejas Vidal acting as a scribe for and in the presence of No att. providers found November 11, 2018 at 12:44 AM 
    
Provider Attestation:     
I personally performed the services described in the documentation, reviewed the documentation, as recorded by the scribe in my presence, and it accurately and completely records my words and actions. November 11, 2018 at 12:44 AM - No att. providers found   
 
 
_______________________________

## 2018-11-19 ENCOUNTER — OFFICE VISIT (OUTPATIENT)
Dept: INTERNAL MEDICINE CLINIC | Age: 63
End: 2018-11-19

## 2018-11-19 VITALS
DIASTOLIC BLOOD PRESSURE: 60 MMHG | OXYGEN SATURATION: 98 % | SYSTOLIC BLOOD PRESSURE: 110 MMHG | BODY MASS INDEX: 22.18 KG/M2 | HEART RATE: 78 BPM | TEMPERATURE: 96.7 F | HEIGHT: 59 IN | WEIGHT: 110 LBS

## 2018-11-19 DIAGNOSIS — J45.40 MODERATE PERSISTENT ASTHMA, UNSPECIFIED WHETHER COMPLICATED: Primary | ICD-10-CM

## 2018-11-19 DIAGNOSIS — G47.30 SLEEP APNEA, UNSPECIFIED TYPE: ICD-10-CM

## 2018-11-19 DIAGNOSIS — I10 ESSENTIAL HYPERTENSION: ICD-10-CM

## 2018-11-19 NOTE — PROGRESS NOTES
El Asa presents today for Chief Complaint Patient presents with  Well Woman El Asa preferred language for health care discussion is english. Is someone accompanying this pt? no 
 
Is the patient using any DME equipment during OV? no 
 
Depression Screening: PHQ over the last two weeks 11/19/2018 Little interest or pleasure in doing things Not at all Feeling down, depressed, irritable, or hopeless Not at all Total Score PHQ 2 0 Learning Assessment: 
Learning Assessment 4/20/2016 PRIMARY LEARNER Patient HIGHEST LEVEL OF EDUCATION - PRIMARY LEARNER  -  
BARRIERS PRIMARY LEARNER -  
CO-LEARNER CAREGIVER -  
PRIMARY LANGUAGE ENGLISH  
LEARNER PREFERENCE PRIMARY READING  
  DEMONSTRATION  
  VIDEOS  
ANSWERED BY patient RELATIONSHIP SELF Abuse Screening: 
Abuse Screening Questionnaire 8/30/2017 Do you ever feel afraid of your partner? Lolis Diaz Are you in a relationship with someone who physically or mentally threatens you? Lolis Diaz Is it safe for you to go home? Beti Bartholomew Health Maintenance reviewed and discussed and ordered per Provider. Health Maintenance Due Topic Date Due  
 Hepatitis C Screening  1955  Pneumococcal 19-64 Medium Risk (1 of 1 - PPSV23) 10/02/1975  
 DTaP/Tdap/Td series (1 - Tdap) 10/02/1977  Shingrix Vaccine Age 50> (1 of 2) 10/02/2006  Influenza Age 5 to Adult  08/01/2018 Chiara Lim Asa is updated on all  Pt currently taking Antiplatelet therapy? no 
 
Coordination of Care: 1. Have you been to the ER, urgent care clinic since your last visit? Yes [facility] for BP  Hospitalized since your last visit? no 
 
2. Have you seen or consulted any other health care providers outside of the [address] [place] [address] since your last visit? no Include any pap smears or colon screening.  no

## 2018-11-20 NOTE — PROGRESS NOTES
The patient presents to the office today with the chief complaint of asthma HPI The patient remains on inhalers for asthma. She is doing ok but she persists with an element of bronchospasm. The patient remains on therapy for sleep apnea. She is doing ok on the therapy but at times she is concerned that the therapy may flare the asthma. The patient remains on medications for hypertension. she is tolerating the medications well. The patient remains off cigarettes Review of Systems Respiratory: Negative for shortness of breath. Cardiovascular: Negative for chest pain and leg swelling. Allergies Allergen Reactions  Latex Rash  Keflex [Cephalexin] Rash  Epinephrine Other (comments)  
  tachycardia  Tetracyclines Nausea Only Current Outpatient Medications Medication Sig Dispense Refill  nystatin (MYCOSTATIN) 100,000 unit/mL suspension Take  by mouth four (4) times daily. swish and spit  budesonide/formoterol fumarate (SYMBICORT IN) Take  by inhalation.  ipratropium (ATROVENT) 42 mcg (0.06 %) nasal spray 2 sprays up each nostril twice per day 15 mL 2  
 montelukast (SINGULAIR) 10 mg tablet Take 1 Tab by mouth daily. 90 Tab 1  
 PROAIR HFA 90 mcg/actuation inhaler  atorvastatin (LIPITOR) 40 mg tablet TAKE ONE TABLET BY MOUTH DAILY 90 Tab 3  
 metoprolol succinate (TOPROL-XL) 25 mg XL tablet TAKE ONE TABLET BY MOUTH DAILY 90 Tab 3  
 meclizine (ANTIVERT) 25 mg tablet 1 tablet three times per day 60 Tab 1  
 aspirin delayed-release 81 mg tablet Take  by mouth nightly.  fluticasone (FLONASE) 50 mcg/actuation nasal spray 2 Sprays by Both Nostrils route daily.  cetirizine (ZYRTEC) 10 mg tablet Take 10 mg by mouth nightly.  labetalol (NORMODYNE) 100 mg tablet Take 100 mg by mouth two (2) times a day. Past Medical History:  
Diagnosis Date  Allergic rhinitis  Blurred vision  Cardiac echocardiogram 06/11/2014 EF 60%. No RWMA. RVSP 30 mmHg. Mild AI.  Cardiac Holter monitor, normal 11/05/2014 Benign 48-hr Holter study.  Cardiac nuclear imaging test, mod risk 08/14/2015 Intermediate risk. High anterior artifact vs diagonal artery ischemia. Following Lexiscan, 0.5-mm downsloping inferolateral ST depression, resolving 10 min 30 sec of recovery. Arm heaviness noted.  Cardiovascular lower extremity arterial duplex 07/15/2016 Right leg: Mod peripheral arterial disease in femoral segment at rest.  Left leg:  Mild arterial disease at rest.  R KAREN 0.58. L KAREN 0.95. Similar to study of 10/15/14.  Cardiovascular renal angiography 10/27/2014 Bilateral patent renal arteries. Right CFA occluded but collateralized.  Cardiovascular renal duplex 10/13/2014 Aorta w/irregular walls. Severe >60% bilateral renal artery stenosis. Bilateral intrinsic/med renal disease.  Carotid duplex 10/17/2014 Mild <50% DAVID stenosis. Biphasic signals in both subclavians.  Carotid duplex 12/05/2016 Mild < 50% DAVID stenosis.  Cervical disc disease 09/2015 MRI of C-spine at [facility] [place]  Dizziness  Ear ache  Essential hypertension  Fainting spell  Frequent headaches  Frequent nosebleeds  Hemorrhoid  Hyperlipidemia  Hypertension  Ill-defined condition  Migraine  Sinus problem  Sinusitis, chronic  Spontaneous pneumothorax 1999  
 2 episodes on the right within several months of each other  Stomach pain  Weakness of right leg Past Surgical History:  
Procedure Laterality Date  HX BREAST REDUCTION    
 HX CHOLECYSTECTOMY  11/8/14  HX COLPOSCOPY    
 HX HEART CATHETERIZATION Bilateral 10/27/14  
 bilateral lower extremity angiography  HX OTHER SURGICAL Left   
 shave biopsy ( thigh area) Social History Socioeconomic History  Marital status: SINGLE Spouse name: Not on file  Number of children: Not on file  Years of education: Not on file  Highest education level: Not on file Social Needs  Financial resource strain: Not on file  Food insecurity - worry: Not on file  Food insecurity - inability: Not on file  Transportation needs - medical: Not on file  Transportation needs - non-medical: Not on file Occupational History  Not on file Tobacco Use  Smoking status: Former Smoker Packs/day: 0.25 Years: 20.00 Pack years: 5.00 Last attempt to quit: 2017 Years since quittin.8  Smokeless tobacco: Never Used  Tobacco comment: now down to 3 cigarettes per day Substance and Sexual Activity  Alcohol use: No  
 Drug use: No  
 Sexual activity: No  
Other Topics Concern  Not on file Social History Narrative  Not on file Patient does not have an advanced directive on file Visit Vitals /60 (BP 1 Location: Left arm, BP Patient Position: Sitting) Pulse 78 Temp 96.7 °F (35.9 °C) (Tympanic) Ht 4' 11\" (1.499 m) Wt 110 lb (49.9 kg) SpO2 98% BMI 22.22 kg/m² Physical Exam 
 
BMI:  OK Assessment / Plan ICD-10-CM ICD-9-CM 1. Moderate persistent asthma, unspecified whether complicated M70.98 481.59   
2. Essential hypertension I10 401.9 3. Sleep apnea, unspecified type G47.30 780.57   
 
 
she was advised to continue her maintenance medications Patient will follow up with pulmonary Follow-up Disposition: 
Return in about 5 months (around 2019). I asked Jaleel Guerin if she has any questions and I answered the questions. Gladys Guerin states that she understands the treatment plan and agrees with the treatment plan

## 2018-12-06 ENCOUNTER — TELEPHONE (OUTPATIENT)
Dept: CARDIOLOGY CLINIC | Age: 63
End: 2018-12-06

## 2018-12-06 NOTE — TELEPHONE ENCOUNTER
Leonore Boast, DO [place], [place], RN             This lady's event monitor looks fine.  I do not see any significant arrhythmias.  Please let her know.  ES      Patient aware of result s

## 2019-01-07 ENCOUNTER — OFFICE VISIT (OUTPATIENT)
Dept: CARDIOLOGY CLINIC | Age: 64
End: 2019-01-07

## 2019-01-07 VITALS
BODY MASS INDEX: 22.78 KG/M2 | SYSTOLIC BLOOD PRESSURE: 128 MMHG | HEIGHT: 59 IN | WEIGHT: 113 LBS | HEART RATE: 65 BPM | OXYGEN SATURATION: 97 % | DIASTOLIC BLOOD PRESSURE: 52 MMHG

## 2019-01-07 DIAGNOSIS — E78.00 PURE HYPERCHOLESTEROLEMIA: ICD-10-CM

## 2019-01-07 DIAGNOSIS — R06.02 SOB (SHORTNESS OF BREATH): Primary | ICD-10-CM

## 2019-01-07 DIAGNOSIS — R07.9 CHEST PAIN, UNSPECIFIED TYPE: ICD-10-CM

## 2019-01-07 DIAGNOSIS — I73.9 PAD (PERIPHERAL ARTERY DISEASE) (HCC): ICD-10-CM

## 2019-01-07 DIAGNOSIS — I10 ESSENTIAL HYPERTENSION: ICD-10-CM

## 2019-01-07 DIAGNOSIS — J45.20 MILD INTERMITTENT ASTHMA WITHOUT COMPLICATION: ICD-10-CM

## 2019-01-07 DIAGNOSIS — R00.2 PALPITATIONS: ICD-10-CM

## 2019-01-07 DIAGNOSIS — I35.1 NONRHEUMATIC AORTIC VALVE INSUFFICIENCY: ICD-10-CM

## 2019-01-07 DIAGNOSIS — R42 DIZZINESS: ICD-10-CM

## 2019-01-07 NOTE — PROGRESS NOTES
HPI:  I saw [place] Rosalind Primrose in my office today in cardiovascular evaluation regarding her past history of anginal type chest discomfort in the setting of peripheral vascular disease and hypercholesterolemia. Mr. Rosalind Primrose is a pleasant, small, 57 year old  female with history of hypertension, hyperlipidemia and peripheral vascular disease with a femoral bruit and known totally occluded right common femoral artery documented in a catheterization to rule out renal artery stenosis, which was completed on 10/27/14 and demonstrated no evidence of renal artery stenosis with both renal arteries widely patent.  
   
She does have history of some bilateral arm heaviness when I saw her in July of 2015, which sounded atypical for angina, but we did do a nuclear myocardial perfusion study on 8/14/15, which was mildly abnormal, showing a mild partially transient perfusion defect involving the high anterior wall with relative sparing of the apex and interventricular septum, most consistent with either soft tissue breast attenuation with breast shifting or possibly some mild ischemia in a diagonal coronary artery. The gated SPECT imaging portion of the study demonstrated normal left ventricular function with an ejection fraction of 71%, but although the stress portion of the test was negative for chest pain, she did develop some downsloping ST depression inferolaterally and complained of some arm heaviness for about five minutes into recovery with EKG changes lasting for 10 minutes and recovery suggesting this might be truly positive.  
  
We actually added Toprol XL 25 mg to her regimen for the possibility that this was anginal, and also for her palpitations, even though she was already on Normodyne, which is beta blocker and alpha blocker combination for her blood pressure.   We gave her some sublingual nitroglycerin for administration for recurrent arm or chest pain.  
   
 Interestingly, she told me at the time of her 10/8/15 visit that she went to the [facility] in [place] and had an MRI of her cervical spine, which demonstrated multilevel disc disease, most prominent in C6-C7, where there was a moderate spinal canal stenosis and severe bilateral neural foraminal narrowings, which would explain her arm pain issues. She comes into the office today and relates that she is having a lot of problems with shortness of breath and chest tightness which she thinks may be all from her asthma, but this has been getting worse and it does not appear to be being helped with her inhalers. She relates that she is noted that more more she is getting more short of breath with chest tightness with less exertion and course this could potentially be an anginal equivalent. She also complains of fatigue which she thinks is getting worse as well and the some palpitations which are poorly described she does have some coughing without sputum production and some wheezing issues. Encounter Diagnoses Name Primary?  Chest pain and tightness  SOB (shortness of breath) on exertion which is worsening Yes  Mild intermittent asthma without complication  Palpitations  Dizziness  PAD (peripheral artery disease) ([address]  Essential hypertension  Pure hypercholesterolemia  Nonrheumatic aortic valve insufficiency Discussion: This lady is very hard to sort out and is complaining of worsening shortness of breath and chest tightness as well as fatigue issues all of which could be secondary to her asthma but which may also potentially be an anginal equivalent. I am going to order a pharmacologic nuclear myocardial perfusion study which she would like to get completed at the [place] to see if there is any signs of ischemia.    
 
She does have a soft systolic ejection murmur with some mild aortic insufficiency and she did have an echocardiogram in August of last year suggesting moderate aortic insufficiency in the setting of good left ventricular function. Will have to get another echo in August of this year to follow the course of her aortic insufficiency. She is on Lipitor 40 mg daily for treatment of her cholesterol and when he get a copy of her latest lipid profile from her family physician. Her EKG appears to be stable and her blood pressure well controlled so I am simply going to plan to see her again in several months unless her nuclear myocardial perfusion study is significantly abnormal would warrant more rapid follow-up and further cardiovascular workup. PCP: Sam Chandler MD 
 
 
Past Medical History:  
Diagnosis Date  Allergic rhinitis  Blurred vision  Cardiac echocardiogram 06/11/2014 EF 60%. No RWMA. RVSP 30 mmHg. Mild AI.  Cardiac Holter monitor, normal 11/05/2014 Benign 48-hr Holter study.  Cardiac nuclear imaging test, mod risk 08/14/2015 Intermediate risk. High anterior artifact vs diagonal artery ischemia. Following Lexiscan, 0.5-mm downsloping inferolateral ST depression, resolving 10 min 30 sec of recovery. Arm heaviness noted.  Cardiovascular lower extremity arterial duplex 07/15/2016 Right leg: Mod peripheral arterial disease in femoral segment at rest.  Left leg:  Mild arterial disease at rest.  R KAREN 0.58. L KAREN 0.95. Similar to study of 10/15/14.  Cardiovascular renal angiography 10/27/2014 Bilateral patent renal arteries. Right CFA occluded but collateralized.  Cardiovascular renal duplex 10/13/2014 Aorta w/irregular walls. Severe >60% bilateral renal artery stenosis. Bilateral intrinsic/med renal disease.  Carotid duplex 10/17/2014 Mild <50% DAVID stenosis. Biphasic signals in both subclavians.  Carotid duplex 12/05/2016 Mild < 50% DAVID stenosis.  Cervical disc disease 09/2015 MRI of C-spine at [facility] [place]  Dizziness  Ear ache  Essential hypertension  Fainting spell  Frequent headaches  Frequent nosebleeds  Hemorrhoid  Hyperlipidemia  Hypertension  Ill-defined condition  Migraine  Sinus problem  Sinusitis, chronic  Spontaneous pneumothorax 1999  
 2 episodes on the right within several months of each other  Stomach pain  Weakness of right leg Past Surgical History:  
Procedure Laterality Date  COLONOSCOPY N/A 11/17/2017 COLONOSCOPY, SCREENING with hot bx polypectomy performed by Sandra Anderson MD at [address] [place] [address] HX CHOLECYSTECTOMY  11/8/14  HX COLPOSCOPY    
 HX HEART CATHETERIZATION Bilateral 10/27/14  
 bilateral lower extremity angiography  HX OTHER SURGICAL Left   
 shave biopsy ( thigh area) Current Outpatient Medications Medication Sig  
 nystatin (MYCOSTATIN) 100,000 unit/mL suspension Take  by mouth four (4) times daily. swish and spit  budesonide/formoterol fumarate (SYMBICORT IN) Take  by inhalation.  ipratropium (ATROVENT) 42 mcg (0.06 %) nasal spray 2 sprays up each nostril twice per day  montelukast (SINGULAIR) 10 mg tablet Take 1 Tab by mouth daily.  PROAIR HFA 90 mcg/actuation inhaler  atorvastatin (LIPITOR) 40 mg tablet TAKE ONE TABLET BY MOUTH DAILY  metoprolol succinate (TOPROL-XL) 25 mg XL tablet TAKE ONE TABLET BY MOUTH DAILY  meclizine (ANTIVERT) 25 mg tablet 1 tablet three times per day  aspirin delayed-release 81 mg tablet Take  by mouth nightly.  fluticasone (FLONASE) 50 mcg/actuation nasal spray 2 Sprays by Both Nostrils route daily.  cetirizine (ZYRTEC) 10 mg tablet Take 10 mg by mouth nightly.  labetalol (NORMODYNE) 100 mg tablet Take 100 mg by mouth two (2) times a day. No current facility-administered medications for this visit. Allergies Allergen Reactions  Latex Rash  Keflex [Cephalexin] Rash  Epinephrine Other (comments)  
  tachycardia  Tetracyclines Nausea Only Social History : 
Social History Tobacco Use  Smoking status: Former Smoker Packs/day: 0.25 Years: 20.00 Pack years: 5.00 Last attempt to quit: 2017 Years since quittin.0  Smokeless tobacco: Never Used  Tobacco comment: now down to 3 cigarettes per day Substance Use Topics  Alcohol use: No  
  
 
Family History: family history includes Cancer in her brother; Coronary Artery Disease in her father; Deep Vein Thrombosis in her father; Diabetes in her father; Heart Attack (age of onset: 80) in her maternal grandmother; Heart Attack (age of onset: 40) in her brother; Heart Attack (age of onset: 48) in an other family member; High Cholesterol in her father and mother; Hypertension in her father, maternal aunt, mother, and another family member; Pacemaker in her father. Review of Systems: 
Constitutional: Positive for malaise/fatigue and weight loss. Negative for chills and fever. Respiratory: Positive for cough and shortness of breath. Negative for hemoptysis. Cardiovascular: Positive for chest pain, palpitations and orthopnea. Negative for leg swelling. Gastrointestinal: Negative. Musculoskeletal: Negative for falls, joint pain and myalgias. Neurological: Positive for dizziness. Physical Exam:   
The patient is a cooperative, alert, well developed, well nourished 59 y.o.  female who is in no acute distress at the time of the examination. Visit Vitals /52 Pulse 65 Ht 4' 11\" (1.499 m) Wt 51.3 kg (113 lb) SpO2 97% BMI 22.82 kg/m² HEENT: Conjuctiva white, mucosa moist, no pallor or cyanosis. NECK: Supple without masses, tenderness or thyromegaly. There was no jugular venous distention. Carotid are full bilaterally with bilateral  bruits vs. radiation of murmur from the heart. CHEST: Symmetrical with good excursion. LUNGS: Clear to auscultation in all fields. HEART: The apex is not displaced. There were no lifts, thrills or heaves. There is a normal S1 and S2. There is a grade 2/6 systolic ejection murmur along the left sternal border with radiation to the base with a grade 1/6 diastolic decrescendo murmur along the LSB without appreciable rubs, clicks, or gallops auscultated. ABDOMEN: Soft without masses, tenderness or organomegaly. There is a fairly loud bruit just above the umbilicus in the midline consistent with possible renal arteries stenosis as well as significant bifemoral bruits EXTREMITIES: Full peripheral pulses on the left with decrease PT and DP pulses on the right without peripheral edema. Review of Data: See PMH and Cardiology and Imaging sections for cardiac testing Results for orders placed or performed in visit on 01/07/19 AMB POC EKG ROUTINE W/ 12 LEADS, INTER & REP     Status: None Narrative Normal sinus rhythm with rate 65. There is an RSR prime in V1 and V2. This EKG is within normal limits and similar to the EKG of September 20, 2018 Vikki Cruz D.O., F.A.C.C. [facility] [place] [address] [place] [id number] [facility] [phone] PLEASE NOTE:  This document has been produced using voice recognition software. Unrecognized errors in transcription may be present.

## 2019-01-14 RX ORDER — METOPROLOL SUCCINATE 25 MG/1
TABLET, EXTENDED RELEASE ORAL
Qty: 90 TAB | Refills: 2 | Status: SHIPPED | OUTPATIENT
Start: 2019-01-14 | End: 2019-08-07 | Stop reason: ALTCHOICE

## 2019-01-14 RX ORDER — ATORVASTATIN CALCIUM 40 MG/1
TABLET, FILM COATED ORAL
Qty: 90 TAB | Refills: 2 | Status: SHIPPED | OUTPATIENT
Start: 2019-01-14

## 2019-02-21 ENCOUNTER — HOSPITAL ENCOUNTER (EMERGENCY)
Age: 64
Discharge: HOME | End: 2019-02-21
Attending: EMERGENCY MEDICINE
Payer: COMMERCIAL

## 2019-02-21 VITALS
OXYGEN SATURATION: 99 % | HEART RATE: 72 BPM | RESPIRATION RATE: 18 BRPM | BODY MASS INDEX: 23.03 KG/M2 | TEMPERATURE: 98.1 F | SYSTOLIC BLOOD PRESSURE: 147 MMHG | WEIGHT: 114 LBS | DIASTOLIC BLOOD PRESSURE: 64 MMHG

## 2019-02-21 DIAGNOSIS — R10.84 ABDOMINAL PAIN, GENERALIZED: Primary | ICD-10-CM

## 2019-02-21 LAB
ALBUMIN SERPL-MCNC: 4 G/DL (ref 3.4–5)
ALBUMIN/GLOB SERPL: 1.2 {RATIO} (ref 0.8–1.7)
ALP SERPL-CCNC: 89 U/L (ref 45–117)
ALT SERPL-CCNC: 31 U/L (ref 13–56)
ANION GAP SERPL CALC-SCNC: 5 MMOL/L (ref 3–18)
APPEARANCE UR: CLEAR
AST SERPL-CCNC: 17 U/L (ref 15–37)
ATRIAL RATE: 67 BPM
BASOPHILS # BLD: 0 K/UL (ref 0–0.06)
BASOPHILS NFR BLD: 0 % (ref 0–3)
BILIRUB SERPL-MCNC: 0.5 MG/DL (ref 0.2–1)
BILIRUB UR QL: NEGATIVE
BUN SERPL-MCNC: 13 MG/DL (ref 7–18)
BUN/CREAT SERPL: 19 (ref 12–20)
CALCIUM SERPL-MCNC: 8.9 MG/DL (ref 8.5–10.1)
CALCULATED P AXIS, ECG09: 63 DEGREES
CALCULATED R AXIS, ECG10: 22 DEGREES
CALCULATED T AXIS, ECG11: 49 DEGREES
CHLORIDE SERPL-SCNC: 105 MMOL/L (ref 100–108)
CK MB CFR SERPL CALC: 1.3 % (ref 0–4)
CK MB SERPL-MCNC: 1.7 NG/ML (ref 5–25)
CK SERPL-CCNC: 130 U/L (ref 26–192)
CO2 SERPL-SCNC: 30 MMOL/L (ref 21–32)
COLOR UR: YELLOW
CREAT SERPL-MCNC: 0.67 MG/DL (ref 0.6–1.3)
DIAGNOSIS, 93000: NORMAL
DIFFERENTIAL METHOD BLD: ABNORMAL
EOSINOPHIL # BLD: 0.2 K/UL (ref 0–0.4)
EOSINOPHIL NFR BLD: 2 % (ref 0–5)
ERYTHROCYTE [DISTWIDTH] IN BLOOD BY AUTOMATED COUNT: 14.7 % (ref 11.6–14.5)
GLOBULIN SER CALC-MCNC: 3.3 G/DL (ref 2–4)
GLUCOSE SERPL-MCNC: 101 MG/DL (ref 74–99)
GLUCOSE UR STRIP.AUTO-MCNC: NEGATIVE MG/DL
HCT VFR BLD AUTO: 33 % (ref 35–45)
HGB BLD-MCNC: 10.7 G/DL (ref 12–16)
HGB UR QL STRIP: NEGATIVE
KETONES UR QL STRIP.AUTO: NEGATIVE MG/DL
LEUKOCYTE ESTERASE UR QL STRIP.AUTO: NEGATIVE
LYMPHOCYTES # BLD: 3 K/UL (ref 0.8–3.5)
LYMPHOCYTES NFR BLD: 31 % (ref 20–51)
MCH RBC QN AUTO: 23.2 PG (ref 24–34)
MCHC RBC AUTO-ENTMCNC: 32.4 G/DL (ref 31–37)
MCV RBC AUTO: 71.6 FL (ref 74–97)
MONOCYTES # BLD: 0.6 K/UL (ref 0–1)
MONOCYTES NFR BLD: 6 % (ref 2–9)
NEUTS BAND NFR BLD MANUAL: 1 % (ref 0–5)
NEUTS SEG # BLD: 5.9 K/UL (ref 1.8–8)
NEUTS SEG NFR BLD: 60 % (ref 42–75)
NITRITE UR QL STRIP.AUTO: NEGATIVE
P-R INTERVAL, ECG05: 180 MS
PH UR STRIP: 6.5 [PH] (ref 5–8)
PLATELET # BLD AUTO: 226 K/UL (ref 135–420)
PLATELET COMMENTS,PCOM: ABNORMAL
PMV BLD AUTO: 11.6 FL (ref 9.2–11.8)
POTASSIUM SERPL-SCNC: 3.9 MMOL/L (ref 3.5–5.5)
PROT SERPL-MCNC: 7.3 G/DL (ref 6.4–8.2)
PROT UR STRIP-MCNC: NEGATIVE MG/DL
Q-T INTERVAL, ECG07: 376 MS
QRS DURATION, ECG06: 90 MS
QTC CALCULATION (BEZET), ECG08: 397 MS
RBC # BLD AUTO: 4.61 M/UL (ref 4.2–5.3)
RBC MORPH BLD: ABNORMAL
SODIUM SERPL-SCNC: 140 MMOL/L (ref 136–145)
SP GR UR REFRACTOMETRY: 1.02 (ref 1–1.03)
TROPONIN I SERPL-MCNC: <0.02 NG/ML (ref 0–0.04)
UROBILINOGEN UR QL STRIP.AUTO: 1 EU/DL (ref 0.2–1)
VENTRICULAR RATE, ECG03: 67 BPM
WBC # BLD AUTO: 9.7 K/UL (ref 4.6–13.2)

## 2019-02-21 PROCEDURE — 82550 ASSAY OF CK (CPK): CPT

## 2019-02-21 PROCEDURE — 80053 COMPREHEN METABOLIC PANEL: CPT

## 2019-02-21 PROCEDURE — 85025 COMPLETE CBC W/AUTO DIFF WBC: CPT

## 2019-02-21 PROCEDURE — 99283 EMERGENCY DEPT VISIT LOW MDM: CPT

## 2019-02-21 PROCEDURE — 81003 URINALYSIS AUTO W/O SCOPE: CPT

## 2019-02-21 PROCEDURE — 93005 ELECTROCARDIOGRAM TRACING: CPT

## 2019-02-21 NOTE — ED PROVIDER NOTES
EMERGENCY DEPARTMENT HISTORY AND PHYSICAL EXAM 
 
1:43 AM 
 
 
Date: 2/21/2019 Patient Name: Colette Hyde History of Presenting Illness Chief Complaint Patient presents with  Abdominal Pain  Flank Pain History Provided By: Patient Chief Complaint: Abdominal pain Duration:  Minutes PTA Timing:  Acute Location: lower abdomen Quality: N/A Severity: N/A Modifying Factors: aspirin with no relief Associated Symptoms: back pain Additional History (Context): Colette Hyde is a 59 y.o. female with hypertension, hyperlipidemia, and pneumothorax who presents with abdominal pain. Pt states she woke up minutes PTA to go to the bathroom when she felt lower abdominal pain. She reports associated back pain. She denies nausea, vomiting, diarrhea, and constipation. She denies similar symptoms in the past. She reports PSHx of cholecystectomy. She denies hx of kidney stones and UTI. She has taken aspirin for her pain with no relief. No other concerns or symptoms at this time. PCP: Viviana Whitman MD 
 
Current Outpatient Medications Medication Sig Dispense Refill  atorvastatin (LIPITOR) 40 mg tablet TAKE ONE TABLET BY MOUTH DAILY 90 Tab 2  
 metoprolol succinate (TOPROL-XL) 25 mg XL tablet TAKE ONE TABLET BY MOUTH DAILY 90 Tab 2  
 nystatin (MYCOSTATIN) 100,000 unit/mL suspension Take  by mouth four (4) times daily. swish and spit  budesonide/formoterol fumarate (SYMBICORT IN) Take  by inhalation.  ipratropium (ATROVENT) 42 mcg (0.06 %) nasal spray 2 sprays up each nostril twice per day 15 mL 2  
 montelukast (SINGULAIR) 10 mg tablet Take 1 Tab by mouth daily. 90 Tab 1  
 PROAIR HFA 90 mcg/actuation inhaler  meclizine (ANTIVERT) 25 mg tablet 1 tablet three times per day 60 Tab 1  
 aspirin delayed-release 81 mg tablet Take  by mouth nightly.  fluticasone (FLONASE) 50 mcg/actuation nasal spray 2 Sprays by Both Nostrils route daily.  cetirizine (ZYRTEC) 10 mg tablet Take 10 mg by mouth nightly.  labetalol (NORMODYNE) 100 mg tablet Take 100 mg by mouth two (2) times a day. Past History Past Medical History: 
Past Medical History:  
Diagnosis Date  Allergic rhinitis  Blurred vision  Cardiac echocardiogram 06/11/2014 EF 60%. No RWMA. RVSP 30 mmHg. Mild AI.  Cardiac Holter monitor, normal 11/05/2014 Benign 48-hr Holter study.  Cardiac nuclear imaging test, mod risk 08/14/2015 Intermediate risk. High anterior artifact vs diagonal artery ischemia. Following Lexiscan, 0.5-mm downsloping inferolateral ST depression, resolving 10 min 30 sec of recovery. Arm heaviness noted.  Cardiovascular lower extremity arterial duplex 07/15/2016 Right leg: Mod peripheral arterial disease in femoral segment at rest.  Left leg:  Mild arterial disease at rest.  R KAREN 0.58. L KAREN 0.95. Similar to study of 10/15/14.  Cardiovascular renal angiography 10/27/2014 Bilateral patent renal arteries. Right CFA occluded but collateralized.  Cardiovascular renal duplex 10/13/2014 Aorta w/irregular walls. Severe >60% bilateral renal artery stenosis. Bilateral intrinsic/med renal disease.  Carotid duplex 10/17/2014 Mild <50% DAVID stenosis. Biphasic signals in both subclavians.  Carotid duplex 12/05/2016 Mild < 50% DAVID stenosis.  Cervical disc disease 09/2015 MRI of C[facility]spine at [facility]  Dizziness  Ear ache  Essential hypertension  Fainting spell  Frequent headaches  Frequent nosebleeds  Hemorrhoid  Hyperlipidemia  Hypertension  Ill-defined condition  Migraine  Sinus problem  Sinusitis, chronic  Spontaneous pneumothorax 1999  
 2 episodes on the right within several months of each other  Stomach pain  Weakness of right leg Past Surgical History: 
Past Surgical History:  
Procedure Laterality Date  COLONOSCOPY N/A 2017 COLONOSCOPY, SCREENING with hot bx polypectomy performed [facility] Anderson [facility] [address]  [facility] HX CHOLECYSTECTOMY  14  HX COLPOSCOPY    
 HX HEART CATHETERIZATION Bilateral 10/27/14  
 bilateral lower extremity angiography  HX OTHER SURGICAL Left   
 shave biopsy ( thigh area) Family History: 
Family History Problem Relation Age of Onset  Deep Vein Thrombosis Father  Coronary Artery Disease Father  Pacemaker Father  Diabetes Father  High Cholesterol Father  Hypertension Father  High Cholesterol Mother  Hypertension Mother  Heart Attack Brother 40  Cancer Brother  Heart Attack Maternal Grandmother 100  
 Heart Attack Other 50  Hypertension Other  Hypertension Maternal Aunt Social History: 
Social History Tobacco Use  Smoking status: Former Smoker Packs/day: 0.25 Years: 20.00 Pack years: 5.00 Last attempt to quit: 2017 Years since quittin.1  Smokeless tobacco: Never Used  Tobacco comment: now down to 3 cigarettes per day Substance Use Topics  Alcohol use: No  
 Drug use: No  
 
 
Allergies: Allergies Allergen Reactions  Latex Rash  Keflex [Cephalexin] Rash  Epinephrine Other (comments)  
  tachycardia  Tetracyclines Nausea Only Review of Systems Review of Systems Constitutional: Negative for activity change and appetite change. HENT: Negative for congestion. Eyes: Negative for visual disturbance. Respiratory: Negative for cough and shortness of breath. Cardiovascular: Negative for chest pain. Gastrointestinal: Positive for abdominal pain. Negative for constipation, diarrhea, nausea and vomiting. Genitourinary: Negative for dysuria. Musculoskeletal: Positive for back pain. Negative for arthralgias and myalgias. Skin: Negative for rash. Neurological: Negative for weakness and numbness. All other systems reviewed and are negative. Physical Exam  
 
Visit Vitals /64 (BP Patient Position: At rest) Pulse 72 Temp 98.1 °F (36.7 °C) Resp 18 Wt 51.7 kg (114 lb) SpO2 99% BMI 23.03 kg/m² Physical Exam  
Constitutional: She is oriented to person, place, and time. She appears well-developed and well-nourished. HENT:  
Head: Normocephalic and atraumatic. Mouth/Throat: Oropharynx is clear and moist.  
Eyes: Conjunctivae are normal.  
Neck: Normal range of motion. Neck supple. No JVD present. Cardiovascular: Normal rate, regular rhythm, normal heart sounds and intact distal pulses. No murmur heard. Pulmonary/Chest: Effort normal and breath sounds normal.  
Abdominal: Soft. Bowel sounds are normal. She exhibits no distension. There is no tenderness. Musculoskeletal: Normal range of motion. She exhibits no deformity. Lymphadenopathy:  
  She has no cervical adenopathy. Neurological: She is alert and oriented to person, place, and time. Coordination normal.  
Skin: Skin is warm and dry. No rash noted. Psychiatric: She has a normal mood and affect. Nursing note and vitals reviewed. Diagnostic Study Results Labs - Recent Results (from the past 12 hour(s)) EKG, 12 LEAD, INITIAL Collection Time: 02/21/19 12:57 AM  
Result Value Ref Range Ventricular Rate 67 BPM  
 Atrial Rate 67 BPM  
 P-R Interval 180 ms QRS Duration 90 ms Q-T Interval 376 ms QTC Calculation (Bezet) 397 ms Calculated P Axis 63 degrees Calculated R Axis 22 degrees Calculated T Axis 49 degrees Diagnosis Normal sinus rhythm Possible Left atrial enlargement Borderline ECG When compared with ECG of 17-SEP-2018 22:01, No significant change was found URINALYSIS W/ RFLX MICROSCOPIC Collection Time: 02/21/19  1:12 AM  
Result Value Ref Range Color YELLOW  Appearance CLEAR    
 Specific gravity 1.020 1.005 - 1.030    
 pH (UA) 6.5 5.0 - 8.0 Protein NEGATIVE  NEG mg/dL Glucose NEGATIVE  NEG mg/dL Ketone NEGATIVE  NEG mg/dL Bilirubin NEGATIVE  NEG Blood NEGATIVE  NEG Urobilinogen 1.0 0.2 - 1.0 EU/dL Nitrites NEGATIVE  NEG Leukocyte Esterase NEGATIVE  NEG    
CBC WITH AUTOMATED DIFF Collection Time: 02/21/19  1:23 AM  
Result Value Ref Range WBC 9.7 4.6 - 13.2 K/uL  
 RBC 4.61 4.20 - 5.30 M/uL  
 HGB 10.7 (L) 12.0 - 16.0 g/dL HCT 33.0 (L) 35.0 - 45.0 % MCV 71.6 (L) 74.0 - 97.0 FL  
 MCH 23.2 (L) 24.0 - 34.0 PG  
 MCHC 32.4 31.0 - 37.0 g/dL  
 RDW 14.7 (H) 11.6 - 14.5 % PLATELET 084 921 - 432 K/uL MPV 11.6 9.2 - 11.8 FL  
 NEUTROPHILS 60 42 - 75 % BAND NEUTROPHILS 1 0 - 5 % LYMPHOCYTES 31 20 - 51 % MONOCYTES 6 2 - 9 % EOSINOPHILS 2 0 - 5 % BASOPHILS 0 0 - 3 %  
 ABS. NEUTROPHILS 5.9 1.8 - 8.0 K/UL  
 ABS. LYMPHOCYTES 3.0 0.8 - 3.5 K/UL  
 ABS. MONOCYTES 0.6 0 - 1.0 K/UL  
 ABS. EOSINOPHILS 0.2 0.0 - 0.4 K/UL  
 ABS. BASOPHILS 0.0 0.0 - 0.06 K/UL  
 DF MANUAL PLATELET COMMENTS ADEQUATE PLATELETS    
 RBC COMMENTS MICROCYTOSIS 1+ 
    
 RBC COMMENTS SCHISTOCYTES 1+ RBC COMMENTS Elliptocytes OCCASIONAL 
    
CARDIAC PANEL,(CK, CKMB & TROPONIN) Collection Time: 02/21/19  1:23 AM  
Result Value Ref Range  26 - 192 U/L  
 CK - MB 1.7 <3.6 ng/ml CK-MB Index 1.3 0.0 - 4.0 % Troponin-I, QT <0.02 0.0 - 9.867 NG/ML  
METABOLIC PANEL, COMPREHENSIVE Collection Time: 02/21/19  1:23 AM  
Result Value Ref Range Sodium 140 136 - 145 mmol/L Potassium 3.9 3.5 - 5.5 mmol/L Chloride 105 100 - 108 mmol/L  
 CO2 30 21 - 32 mmol/L Anion gap 5 3.0 - 18 mmol/L Glucose 101 (H) 74 - 99 mg/dL BUN 13 7.0 - 18 MG/DL Creatinine 0.67 0.6 - 1.3 MG/DL  
 BUN/Creatinine ratio 19 12 - 20 GFR est AA >60 >60 ml/min/1.73m2 GFR est non-AA >60 >60 ml/min/1.73m2  Calcium 8.9 8.5 - 10.1 MG/DL  
 Bilirubin, total 0.5 0.2 - 1.0 MG/DL  
 ALT (SGPT) 31 13 - 56 U/L  
 AST (SGOT) 17 15 - 37 U/L Alk. phosphatase 89 45 - 117 U/L Protein, total 7.3 6.4 - 8.2 g/dL Albumin 4.0 3.4 - 5.0 g/dL Globulin 3.3 2.0 - 4.0 g/dL A-G Ratio 1.2 0.8 - 1.7 Radiologic Studies - No orders to display Medical Decision Making I am the first provider for this patient. I reviewed the vital signs, available nursing notes, past medical history, past surgical history, family history and social history. Vital Signs-Reviewed the patient's vital signs. Pulse Oximetry Analysis -  100% on room air, stable Cardiac Monitor: 
Rate: 71 BPM 
Rhythm:  Normal Sinus Rhythm EKG: Interpreted by the EP. Time Interpreted: 00:57 Normal sinus rhythm, rate 67 BPM, , QTc 397. No acute ST or T wave changes, no STEMI. Records Reviewed: Nursing Notes and Old Medical Records (Time of Review: 1:43 AM) ED Course: Progress Notes, Reevaluation, and Consults: 
 
Provider Notes (Medical Decision Making):  
 59 y.o. female with hypertension, hyperlipidemia, and pneumothorax who presents with abdominal pain and back pain onset minutes PTA after pt woke up to use the bathroom. Pt has hx of cholecystectomy. Unremarkable exam. Pain now improved. Differential Diagnosis: Will consider gastroenteritis, pancreatitis, PUD/GERD, foodborne illness, constipation, hepatobiliary disease, urinary infection, inflammatory bowel disease Given history and exam, I have low suspicion for appendicitis, diverticulitis, colitis, malignancy, bowel obstruction, ureterolithiasis, SBP, anginal equivalent pain, AAA rupture, abdominal compartment syndrome, aortic dissection, mesenteric ischemia, toxicity. Testing: UA w/rflx microscopic, CBC with automated diff, Cardiac panel, CMP, EKG Treatments: refused analgesia. All studies unremarkable. Patient pain free, requesting discharge.  The patient will be discharged home. Findings were discussed at length and questions were answered. Information on all newly prescribed medications was given. the patient was instructed to follow-up with her PCP, or return to the Emergency Department with any worsened symptoms or concerns. Return precautions were given. Diagnosis Clinical Impression: 1. Abdominal pain, generalized Disposition: Discharge Follow-up Information Follow up With Specialties Details Why Contact Info Lilia Partida MD Internal Medicine Schedule an appointment as soon as possible for a visit As needed [facility] [address] [facility] [address] 
[phone] [facility] EMERGENCY DEPT Emergency Medicine  If symptoms worsen [address] [facility] [address] 
[phone] Medication List  
  
ASK your doctor about these medications   
aspirin delayed-release 81 mg tablet 
  
atorvastatin 40 mg tablet Commonly known as:  LIPITOR 
TAKE ONE TABLET BY MOUTH DAILY 
  
FLONASE 50 mcg/actuation nasal spray Generic drug:  fluticasone 
  
ipratropium 42 mcg (0.06 %) nasal spray Commonly known as:  ATROVENT 
2 sprays up each nostril twice per day 
  
labetalol 100 mg tablet Commonly known as:  NORMODYNE 
  
meclizine 25 mg tablet Commonly known as:  ANTIVERT 
1 tablet three times per day 
  
metoprolol succinate 25 mg XL tablet Commonly known as:  TOPROL-XL 
TAKE ONE TABLET BY MOUTH DAILY 
  
montelukast 10 mg tablet Commonly known as:  SINGULAIR Take 1 Tab by mouth daily. nystatin 100,000 unit/mL suspension Commonly known as:  MYCOSTATIN 
  
PROAIR HFA 90 mcg/actuation inhaler Generic drug:  albuterol SYMBICORT IN 
  
ZyrTEC 10 mg tablet Generic drug:  cetirizine 
  
  
 
_______________________________ Attestations: 
Scribe Attestation Tamy Casarez acting as a scribe for and in the presence of Jackie Kingsley MD    
February 21, 2019 at 4:24 AM 
    
Provider Attestation: I personally performed the services described in the documentation, reviewed the documentation, as recorded by the scribe in my presence, and it accurately and completely records my words and actions. February 21, 2019 at 4:24 AM - Hawa Grossman MD   
_______________________________

## 2019-02-21 NOTE — DISCHARGE INSTRUCTIONS

## 2019-02-26 ENCOUNTER — OFFICE VISIT (OUTPATIENT)
Dept: INTERNAL MEDICINE CLINIC | Age: 64
End: 2019-02-26

## 2019-02-26 VITALS
SYSTOLIC BLOOD PRESSURE: 128 MMHG | OXYGEN SATURATION: 96 % | HEIGHT: 59 IN | DIASTOLIC BLOOD PRESSURE: 68 MMHG | HEART RATE: 70 BPM | WEIGHT: 114 LBS | BODY MASS INDEX: 22.98 KG/M2 | TEMPERATURE: 98.5 F

## 2019-02-26 DIAGNOSIS — J32.0 CHRONIC MAXILLARY SINUSITIS: Primary | ICD-10-CM

## 2019-02-26 DIAGNOSIS — I10 ESSENTIAL HYPERTENSION: ICD-10-CM

## 2019-02-26 RX ORDER — AMOXICILLIN 500 MG/1
500 CAPSULE ORAL 3 TIMES DAILY
Qty: 30 CAP | Refills: 0 | Status: SHIPPED | OUTPATIENT
Start: 2019-02-26 | End: 2019-03-08

## 2019-02-26 NOTE — PROGRESS NOTES
Chief Complaint   Patient presents with    Well Woman       Depression Screening:  3 most recent PHQ Screens 1/7/2019   Little interest or pleasure in doing things Not at all   Feeling down, depressed, irritable, or hopeless Not at all   Total Score PHQ 2 0       Learning Assessment:  Learning Assessment 4/20/2016   PRIMARY LEARNER Patient   HIGHEST LEVEL OF EDUCATION - PRIMARY LEARNER  -   BARRIERS PRIMARY LEARNER -   CO-LEARNER CAREGIVER -   PRIMARY LANGUAGE ENGLISH   LEARNER PREFERENCE PRIMARY READING     DEMONSTRATION     VIDEOS   ANSWERED BY patient   RELATIONSHIP SELF       Abuse Screening:  Abuse Screening Questionnaire 8/30/2017   Do you ever feel afraid of your partner? N   Are you in a relationship with someone who physically or mentally threatens you? N   Is it safe for you to go home? Y             1. Have you been to the ER, urgent care clinic since your last visit? Hospitalized since your last visit? [facility]  2. Have you seen or consulted any other health care providers outside of the [address] [place] [address] since your last visit? Include any pap smears or colon screening.  [facility]

## 2019-03-02 NOTE — PROGRESS NOTES
The patient presents to the office today with the chief complaint of hypertension    HPI    The patient remains on Labetalol for hypertension. she is tolerating the medications well. The patient has complaints of sinus congestion and facial fullness. This has been a chronic problem but it recently has worsened. Review of Systems   HENT: Positive for congestion. Respiratory: Negative for shortness of breath. Cardiovascular: Negative for chest pain and leg swelling. Allergies   Allergen Reactions    Latex Rash    Keflex [Cephalexin] Rash    Epinephrine Other (comments)     tachycardia    Tetracyclines Nausea Only       Current Outpatient Medications   Medication Sig Dispense Refill    amoxicillin (AMOXIL) 500 mg capsule Take 1 Cap by mouth three (3) times daily for 10 days. 30 Cap 0    atorvastatin (LIPITOR) 40 mg tablet TAKE ONE TABLET BY MOUTH DAILY 90 Tab 2    metoprolol succinate (TOPROL-XL) 25 mg XL tablet TAKE ONE TABLET BY MOUTH DAILY 90 Tab 2    nystatin (MYCOSTATIN) 100,000 unit/mL suspension Take  by mouth four (4) times daily. swish and spit      budesonide/formoterol fumarate (SYMBICORT IN) Take  by inhalation.  ipratropium (ATROVENT) 42 mcg (0.06 %) nasal spray 2 sprays up each nostril twice per day 15 mL 2    montelukast (SINGULAIR) 10 mg tablet Take 1 Tab by mouth daily. 90 Tab 1    PROAIR HFA 90 mcg/actuation inhaler       meclizine (ANTIVERT) 25 mg tablet 1 tablet three times per day 60 Tab 1    aspirin delayed-release 81 mg tablet Take  by mouth nightly.  fluticasone (FLONASE) 50 mcg/actuation nasal spray 2 Sprays by Both Nostrils route daily.  cetirizine (ZYRTEC) 10 mg tablet Take 10 mg by mouth nightly.  labetalol (NORMODYNE) 100 mg tablet Take 100 mg by mouth two (2) times a day. Past Medical History:   Diagnosis Date    Allergic rhinitis     Blurred vision     Cardiac echocardiogram 06/11/2014    EF 60%. No RWMA. RVSP 30 mmHg. Mild AI.  Cardiac Holter monitor, normal 11/05/2014    Benign 48-hr Holter study.  Cardiac nuclear imaging test, mod risk 08/14/2015    Intermediate risk. High anterior artifact vs diagonal artery ischemia. Following Lexiscan, 0.5-mm downsloping inferolateral ST depression, resolving 10 min 30 sec of recovery. Arm heaviness noted.  Cardiovascular lower extremity arterial duplex 07/15/2016    Right leg: Mod peripheral arterial disease in femoral segment at rest.  Left leg:  Mild arterial disease at rest.  R KAREN 0.58. L KAREN 0.95. Similar to study of 10/15/14.  Cardiovascular renal angiography 10/27/2014    Bilateral patent renal arteries. Right CFA occluded but collateralized.  Cardiovascular renal duplex 10/13/2014    Aorta w/irregular walls. Severe >60% bilateral renal artery stenosis. Bilateral intrinsic/med renal disease.  Carotid duplex 10/17/2014    Mild <50% DAVID stenosis. Biphasic signals in both subclavians.  Carotid duplex 12/05/2016    Mild < 50% DAVID stenosis.       Cervical disc disease 09/2015    MRI of C-spine at [facility]AN Dizziness     Ear ache     Essential hypertension     Fainting spell     Frequent headaches     Frequent nosebleeds     Hemorrhoid     Hyperlipidemia     Hypertension     Ill-defined condition     Migraine     Sinus problem     Sinusitis, chronic     Spontaneous pneumothorax 1999    2 episodes on the right within several months of each other    Stomach pain     Weakness of right leg        Past Surgical History:   Procedure Laterality Date    COLONOSCOPY N/A 11/17/2017    COLONOSCOPY, SCREENING with hot bx polypectomy performed by John Randolph MD at [address] [place] [address] HX CHOLECYSTECTOMY  11/8/14    HX COLPOSCOPY      HX HEART CATHETERIZATION Bilateral 10/27/14    bilateral lower extremity angiography     HX OTHER SURGICAL Left     shave biopsy ( thigh area)       Social History     Socioeconomic History    Marital status: SINGLE     Spouse name: Not on file    Number of children: Not on file    Years of education: Not on file    Highest education level: Not on file   Social Needs    Financial resource strain: Not on file    Food insecurity - worry: Not on file    Food insecurity - inability: Not on file    Transportation needs - medical: Not on file   Konnects needs - non-medical: Not on file   Occupational History    Not on file   Tobacco Use    Smoking status: Former Smoker     Packs/day: 0.25     Years: 20.00     Pack years: 5.00     Last attempt to quit: 2017     Years since quittin.1    Smokeless tobacco: Never Used    Tobacco comment: now down to 3 cigarettes per day   Substance and Sexual Activity    Alcohol use: No    Drug use: No    Sexual activity: No   Other Topics Concern    Not on file   Social History Narrative    Not on file       Patient does not have an advanced directive on file    Visit Vitals  /68 (BP 1 Location: Left arm, BP Patient Position: Sitting)   Pulse 70   Temp 98.5 °F (36.9 °C) (Tympanic)   Ht 4' 11\" (1.499 m)   Wt 114 lb (51.7 kg)   SpO2 96%   BMI 23.03 kg/m²       Physical Exam   No Cervical Lymphadenopathy  No Supraclavicular Lymphadenopathy  Thyroid is Normal  Lungs are normal to percussion. Clear to auscultation   Heart:  S1 S2 are normal, No gallops, No murmurs  No Carotid Bruits  Abdomen:  Normal Bowel Sounds. No tenderness. No masses. No Hepatomegaly or Splenomegaly  LE:  Strong Pedal Pulses.   No Edema      BMI:  OK    Admission on 2019, Discharged on 2019   Component Date Value Ref Range Status    Ventricular Rate 2019 67  BPM Final    Atrial Rate 2019 67  BPM Final    P-R Interval 2019 180  ms Final    QRS Duration 2019 90  ms Final    Q-T Interval 2019 376  ms Final    QTC Calculation (Bezet) 2019 397  ms Final    Calculated P Axis 2019 63  degrees Final    Calculated R Axis 02/21/2019 22  degrees Final    Calculated T Axis 02/21/2019 49  degrees Final    Diagnosis 02/21/2019    Final                    Value:Normal sinus rhythm  Possible Left atrial enlargement  Borderline ECG  When compared with ECG of 17-SEP-2018 22:01,  No significant change was found  Confirmed by Paul Hameed ([id number]) on 2/21/2019 1:29:10 PM      WBC 02/21/2019 9.7  4.6 - 13.2 K/uL Final    RBC 02/21/2019 4.61  4.20 - 5.30 M/uL Final    HGB 02/21/2019 10.7* 12.0 - 16.0 g/dL Final    HCT 02/21/2019 33.0* 35.0 - 45.0 % Final    MCV 02/21/2019 71.6* 74.0 - 97.0 FL Final    MCH 02/21/2019 23.2* 24.0 - 34.0 PG Final    MCHC 02/21/2019 32.4  31.0 - 37.0 g/dL Final    RDW 02/21/2019 14.7* 11.6 - 14.5 % Final    PLATELET 12/49/1083 506  135 - 420 K/uL Final    MPV 02/21/2019 11.6  9.2 - 11.8 FL Final    NEUTROPHILS 02/21/2019 60  42 - 75 % Final    BAND NEUTROPHILS 02/21/2019 1  0 - 5 % Final    LYMPHOCYTES 02/21/2019 31  20 - 51 % Final    MONOCYTES 02/21/2019 6  2 - 9 % Final    EOSINOPHILS 02/21/2019 2  0 - 5 % Final    BASOPHILS 02/21/2019 0  0 - 3 % Final    ABS. NEUTROPHILS 02/21/2019 5.9  1.8 - 8.0 K/UL Final    ABS. LYMPHOCYTES 02/21/2019 3.0  0.8 - 3.5 K/UL Final    ABS. MONOCYTES 02/21/2019 0.6  0 - 1.0 K/UL Final    ABS. EOSINOPHILS 02/21/2019 0.2  0.0 - 0.4 K/UL Final    ABS.  BASOPHILS 02/21/2019 0.0  0.0 - 0.06 K/UL Final    DF 02/21/2019 MANUAL    Final    PLATELET COMMENTS 81/38/1755 ADEQUATE PLATELETS    Final    RBC COMMENTS 02/21/2019     Final                    Value:MICROCYTOSIS  1+      RBC COMMENTS 02/21/2019     Final                    Value:SCHISTOCYTES  1+      RBC COMMENTS 02/21/2019     Final                    Value:Elliptocytes  OCCASIONAL      CK 02/21/2019 130  26 - 192 U/L Final    CK - MB 02/21/2019 1.7  <3.6 ng/ml Final    CK-MB Index 02/21/2019 1.3  0.0 - 4.0 % Final    Troponin-I, QT 02/21/2019 <0.02  0.0 - 0.045 NG/ML Final Comment: The presence of detectable troponin above the reference range indicates myocardial injury which may be due to ischemia, myocarditis, trauma, etc.  Clinical correlation is necessary to establish the significance of this finding. Sequential testing is recommended to determine if the typical rise and fall of cTnI is demonstrated. Note:  Cardiac troponin I has a relatively long half life and may be present well after the CK MB has returned to baseline. The reference range is based on the 99th percentile of the referent population.  Sodium 02/21/2019 140  136 - 145 mmol/L Final    Potassium 02/21/2019 3.9  3.5 - 5.5 mmol/L Final    Chloride 02/21/2019 105  100 - 108 mmol/L Final    CO2 02/21/2019 30  21 - 32 mmol/L Final    Anion gap 02/21/2019 5  3.0 - 18 mmol/L Final    Glucose 02/21/2019 101* 74 - 99 mg/dL Final    BUN 02/21/2019 13  7.0 - 18 MG/DL Final    Creatinine 02/21/2019 0.67  0.6 - 1.3 MG/DL Final    BUN/Creatinine ratio 02/21/2019 19  12 - 20   Final    GFR est AA 02/21/2019 >60  >60 ml/min/1.73m2 Final    GFR est non-AA 02/21/2019 >60  >60 ml/min/1.73m2 Final    Comment: (NOTE)  Estimated GFR is calculated using the Modification of Diet in Renal   Disease (MDRD) Study equation, reported for both  Americans   (GFRAA) and non- Americans (GFRNA), and normalized to 1.73m2   body surface area. The physician must decide which value applies to   the patient. The MDRD study equation should only be used in   individuals age 25 or older. It has not been validated for the   following: pregnant women, patients with serious comorbid conditions,   or on certain medications, or persons with extremes of body size,   muscle mass, or nutritional status.  Calcium 02/21/2019 8.9  8.5 - 10.1 MG/DL Final    Bilirubin, total 02/21/2019 0.5  0.2 - 1.0 MG/DL Final    ALT (SGPT) 02/21/2019 31  13 - 56 U/L Final    AST (SGOT) 02/21/2019 17  15 - 37 U/L Final    Alk.  phosphatase 02/21/2019 89  45 - 117 U/L Final    Protein, total 02/21/2019 7.3  6.4 - 8.2 g/dL Final    Albumin 02/21/2019 4.0  3.4 - 5.0 g/dL Final    Globulin 02/21/2019 3.3  2.0 - 4.0 g/dL Final    A-G Ratio 02/21/2019 1.2  0.8 - 1.7   Final    Color 02/21/2019 YELLOW    Final    Appearance 02/21/2019 CLEAR    Final    Specific gravity 02/21/2019 1.020  1.005 - 1.030   Final    pH (UA) 02/21/2019 6.5  5.0 - 8.0   Final    Protein 02/21/2019 NEGATIVE   NEG mg/dL Final    Glucose 02/21/2019 NEGATIVE   NEG mg/dL Final    Ketone 02/21/2019 NEGATIVE   NEG mg/dL Final    Bilirubin 02/21/2019 NEGATIVE   NEG   Final    Blood 02/21/2019 NEGATIVE   NEG   Final    Urobilinogen 02/21/2019 1.0  0.2 - 1.0 EU/dL Final    Nitrites 02/21/2019 NEGATIVE   NEG   Final    Leukocyte Esterase 02/21/2019 NEGATIVE   NEG   Final       .No results found for any visits on 02/26/19. Assessment / Plan      ICD-10-CM ICD-9-CM    1. Chronic maxillary sinusitis J32.0 473.0    2. Essential hypertension I10 401.9 CBC WITH AUTOMATED DIFF      METABOLIC PANEL, COMPREHENSIVE       Labs ordered  Amoxicillin  she was advised to continue her maintenance medications      Follow-up Disposition:  Return in about 3 months (around 5/26/2019). I asked Jennet Chatters I. Dalphine Gowers if she has any questions and I answered the questions. Alba I. Dalphine Gowers states that she understands the treatment plan and agrees with the treatment plan

## 2019-03-16 ENCOUNTER — HOSPITAL ENCOUNTER (OUTPATIENT)
Dept: LAB | Age: 64
Discharge: HOME | End: 2019-03-16
Payer: COMMERCIAL

## 2019-03-16 DIAGNOSIS — I10 ESSENTIAL HYPERTENSION: ICD-10-CM

## 2019-03-16 LAB
ALBUMIN SERPL-MCNC: 4.1 G/DL (ref 3.4–5)
ALBUMIN/GLOB SERPL: 1.4 {RATIO} (ref 0.8–1.7)
ALP SERPL-CCNC: 87 U/L (ref 45–117)
ALT SERPL-CCNC: 28 U/L (ref 13–56)
ANION GAP SERPL CALC-SCNC: 6 MMOL/L (ref 3–18)
AST SERPL-CCNC: 14 U/L (ref 15–37)
BASOPHILS # BLD: 0.1 K/UL (ref 0–0.1)
BASOPHILS NFR BLD: 1 % (ref 0–2)
BILIRUB SERPL-MCNC: 0.4 MG/DL (ref 0.2–1)
BUN SERPL-MCNC: 11 MG/DL (ref 7–18)
BUN/CREAT SERPL: 17 (ref 12–20)
CALCIUM SERPL-MCNC: 9.4 MG/DL (ref 8.5–10.1)
CHLORIDE SERPL-SCNC: 109 MMOL/L (ref 100–108)
CO2 SERPL-SCNC: 30 MMOL/L (ref 21–32)
CREAT SERPL-MCNC: 0.66 MG/DL (ref 0.6–1.3)
DIFFERENTIAL METHOD BLD: ABNORMAL
EOSINOPHIL # BLD: 0.2 K/UL (ref 0–0.4)
EOSINOPHIL NFR BLD: 2 % (ref 0–5)
ERYTHROCYTE [DISTWIDTH] IN BLOOD BY AUTOMATED COUNT: 15.1 % (ref 11.6–14.5)
GLOBULIN SER CALC-MCNC: 3 G/DL (ref 2–4)
GLUCOSE SERPL-MCNC: 90 MG/DL (ref 74–99)
HCT VFR BLD AUTO: 34.4 % (ref 35–45)
HGB BLD-MCNC: 10.7 G/DL (ref 12–16)
LYMPHOCYTES # BLD: 3.2 K/UL (ref 0.9–3.6)
LYMPHOCYTES NFR BLD: 36 % (ref 21–52)
MCH RBC QN AUTO: 22.4 PG (ref 24–34)
MCHC RBC AUTO-ENTMCNC: 31.1 G/DL (ref 31–37)
MCV RBC AUTO: 72 FL (ref 74–97)
MONOCYTES # BLD: 0.4 K/UL (ref 0.05–1.2)
MONOCYTES NFR BLD: 5 % (ref 3–10)
NEUTS SEG # BLD: 5 K/UL (ref 1.8–8)
NEUTS SEG NFR BLD: 56 % (ref 40–73)
PLATELET # BLD AUTO: 232 K/UL (ref 135–420)
PLATELET COMMENTS,PCOM: ABNORMAL
POTASSIUM SERPL-SCNC: 4.9 MMOL/L (ref 3.5–5.5)
PROT SERPL-MCNC: 7.1 G/DL (ref 6.4–8.2)
RBC # BLD AUTO: 4.78 M/UL (ref 4.2–5.3)
RBC MORPH BLD: ABNORMAL
SODIUM SERPL-SCNC: 145 MMOL/L (ref 136–145)
WBC # BLD AUTO: 8.9 K/UL (ref 4.6–13.2)

## 2019-03-16 PROCEDURE — 85025 COMPLETE CBC W/AUTO DIFF WBC: CPT

## 2019-03-16 PROCEDURE — 36415 COLL VENOUS BLD VENIPUNCTURE: CPT

## 2019-03-16 PROCEDURE — 80053 COMPREHEN METABOLIC PANEL: CPT

## 2019-05-03 ENCOUNTER — OFFICE VISIT (OUTPATIENT)
Dept: FAMILY MEDICINE CLINIC | Facility: CLINIC | Age: 64
End: 2019-05-03

## 2019-05-03 VITALS
RESPIRATION RATE: 18 BRPM | DIASTOLIC BLOOD PRESSURE: 78 MMHG | WEIGHT: 112 LBS | BODY MASS INDEX: 22.58 KG/M2 | OXYGEN SATURATION: 97 % | SYSTOLIC BLOOD PRESSURE: 118 MMHG | TEMPERATURE: 98.6 F | HEART RATE: 65 BPM | HEIGHT: 59 IN

## 2019-05-03 DIAGNOSIS — I10 ESSENTIAL HYPERTENSION: ICD-10-CM

## 2019-05-03 DIAGNOSIS — R00.2 PALPITATIONS: Primary | ICD-10-CM

## 2019-05-03 RX ORDER — AMLODIPINE BESYLATE 5 MG/1
TABLET ORAL
Qty: 30 TAB | Refills: 3 | Status: SHIPPED | OUTPATIENT
Start: 2019-05-03 | End: 2019-06-07

## 2019-05-03 NOTE — PROGRESS NOTES
The patient presents to the office today with the chief complaint of palpitations HPI The patient remains on Labetalol and Metoprolol (low dose) for hypertension. The BP has been doing well. The patient has complains of intermittent heart racing. Previous EKGs have shown a notable sinus arrhythmia. Review of Systems Respiratory: Negative for shortness of breath. Cardiovascular: Positive for palpitations. Negative for chest pain and leg swelling. Allergies Allergen Reactions  Latex Rash  Keflex [Cephalexin] Rash  Epinephrine Other (comments)  
  tachycardia  Tetracyclines Nausea Only Current Outpatient Medications Medication Sig Dispense Refill  amLODIPine (NORVASC) 5 mg tablet 1 tab twice per day. (Stop Labetalol) 30 Tab 3  
 atorvastatin (LIPITOR) 40 mg tablet TAKE ONE TABLET BY MOUTH DAILY 90 Tab 2  
 metoprolol succinate (TOPROL-XL) 25 mg XL tablet TAKE ONE TABLET BY MOUTH DAILY 90 Tab 2  
 budesonide/formoterol fumarate (SYMBICORT IN) Take  by inhalation.  ipratropium (ATROVENT) 42 mcg (0.06 %) nasal spray 2 sprays up each nostril twice per day 15 mL 2  
 montelukast (SINGULAIR) 10 mg tablet Take 1 Tab by mouth daily. 90 Tab 1  
 PROAIR HFA 90 mcg/actuation inhaler  aspirin delayed-release 81 mg tablet Take  by mouth nightly.  fluticasone (FLONASE) 50 mcg/actuation nasal spray 2 Sprays by Both Nostrils route daily.  cetirizine (ZYRTEC) 10 mg tablet Take 10 mg by mouth nightly. Past Medical History:  
Diagnosis Date  Allergic rhinitis  Blurred vision  Cardiac echocardiogram 06/11/2014 EF 60%. No RWMA. RVSP 30 mmHg. Mild AI.  Cardiac Holter monitor, normal 11/05/2014 Benign 48-hr Holter study.  Cardiac nuclear imaging test, mod risk 08/14/2015 Intermediate risk. High anterior artifact vs diagonal artery ischemia.   Following Lexiscan, 0.5-mm downsloping inferolateral ST depression, resolving 10 min 30 sec of recovery. Arm heaviness noted.  Cardiovascular lower extremity arterial duplex 07/15/2016 Right leg: Mod peripheral arterial disease in femoral segment at rest.  Left leg:  Mild arterial disease at rest.  R KAREN 0.58. L KAREN 0.95. Similar to study of 10/15/14.  Cardiovascular renal angiography 10/27/2014 Bilateral patent renal arteries. Right CFA occluded but collateralized.  Cardiovascular renal duplex 10/13/2014 Aorta w/irregular walls. Severe >60% bilateral renal artery stenosis. Bilateral intrinsic/med renal disease.  Carotid duplex 10/17/2014 Mild <50% DAVID stenosis. Biphasic signals in both subclavians.  Carotid duplex 12/05/2016 Mild < 50% DAVID stenosis.  Cervical disc disease 09/2015 MRI of C-spine at [facility] AND LUNG [facility]  Dizziness  Ear ache  Essential hypertension  Fainting spell  Frequent headaches  Frequent nosebleeds  Hemorrhoid  Hyperlipidemia  Hypertension  Ill-defined condition  Migraine  Sinus problem  Sinusitis, chronic  Spontaneous pneumothorax 1999  
 2 episodes on the right within several months of each other  Stomach pain  Weakness of right leg Past Surgical History:  
Procedure Laterality Date  COLONOSCOPY N/A 11/17/2017 COLONOSCOPY, SCREENING with hot bx polypectomy performed by Krista Anderson MD at [address] [facility] HX CHOLECYSTECTOMY  11/8/14  HX COLPOSCOPY    
 HX HEART CATHETERIZATION Bilateral 10/27/14  
 bilateral lower extremity angiography  HX OTHER SURGICAL Left   
 shave biopsy ( thigh area) Social History Socioeconomic History  Marital status: SINGLE Spouse name: Not on file  Number of children: Not on file  Years of education: Not on file  Highest education level: Not on file Occupational History  Not on file Social Needs  Financial resource strain: Not on file  Food insecurity:  
  Worry: Not on file Inability: Not on file  Transportation needs:  
  Medical: Not on file Non-medical: Not on file Tobacco Use  Smoking status: Former Smoker Packs/day: 0.25 Years: 20.00 Pack years: 5.00 Last attempt to quit: 2017 Years since quittin.3  Smokeless tobacco: Never Used  Tobacco comment: now down to 3 cigarettes per day Substance and Sexual Activity  Alcohol use: No  
 Drug use: No  
 Sexual activity: Never Lifestyle  Physical activity:  
  Days per week: Not on file Minutes per session: Not on file  Stress: Not on file Relationships  Social connections:  
  Talks on phone: Not on file Gets together: Not on file Attends Baptism service: Not on file Active member of club or organization: Not on file Attends meetings of clubs or organizations: Not on file Relationship status: Not on file  Intimate partner violence:  
  Fear of current or ex partner: Not on file Emotionally abused: Not on file Physically abused: Not on file Forced sexual activity: Not on file Other Topics Concern  Not on file Social History Narrative  Not on file Patient does not have an advanced directive on file Visit Vitals /78 Pulse 65 Temp 98.6 °F (37 °C) (Tympanic) Resp 18 Ht 4' 11\" (1.499 m) Wt 112 lb (50.8 kg) SpO2 97% BMI 22.62 kg/m² Physical Exam  
Neck: Carotid bruit is not present. Cardiovascular: Normal rate and regular rhythm. Exam reveals no gallop. No murmur heard. Pulmonary/Chest: She has no wheezes. She has no rales. Abdominal: Soft. Bowel sounds are normal. She exhibits no distension and no mass. There is no tenderness. Musculoskeletal: She exhibits no edema. BMI:  [facility] 2019 Component Date Value Ref Range Status  WBC 2019 8.9  4.6 - 13.2 K/uL Final  
  RBC 03/16/2019 4.78  4.20 - 5.30 M/uL Final  
 HGB 03/16/2019 10.7* 12.0 - 16.0 g/dL Final  
 HCT 03/16/2019 34.4* 35.0 - 45.0 % Final  
 MCV 03/16/2019 72.0* 74.0 - 97.0 FL Final  
 MCH 03/16/2019 22.4* 24.0 - 34.0 PG Final  
 MCHC 03/16/2019 31.1  31.0 - 37.0 g/dL Final  
 RDW 03/16/2019 15.1* 11.6 - 14.5 % Final  
 PLATELET 49/84/3680 473  135 - 420 K/uL Final  
 NEUTROPHILS 03/16/2019 56  40 - 73 % Final  
 LYMPHOCYTES 03/16/2019 36  21 - 52 % Final  
 MONOCYTES 03/16/2019 5  3 - 10 % Final  
 EOSINOPHILS 03/16/2019 2  0 - 5 % Final  
 BASOPHILS 03/16/2019 1  0 - 2 % Final  
 ABS. NEUTROPHILS 03/16/2019 5.0  1.8 - 8.0 K/UL Final  
 ABS. LYMPHOCYTES 03/16/2019 3.2  0.9 - 3.6 K/UL Final  
 ABS. MONOCYTES 03/16/2019 0.4  0.05 - 1.2 K/UL Final  
 ABS. EOSINOPHILS 03/16/2019 0.2  0.0 - 0.4 K/UL Final  
 ABS. BASOPHILS 03/16/2019 0.1  0.0 - 0.1 K/UL Final  
 DF 03/16/2019 AUTOMATED    Final  
 PLATELET COMMENTS 89/77/8966 ADEQUATE PLATELETS    Final  
 LARGE PLATELETS  RBC COMMENTS 03/16/2019     Final  
                 Value:OVALOCYTES 1+  RBC COMMENTS 03/16/2019     Final  
                 Value:ANISOCYTOSIS 
1+  RBC COMMENTS 03/16/2019     Final  
                 Value:TARGET CELLS 1+  RBC COMMENTS 03/16/2019     Final  
                 Value:SCHISTOCYTES 
FEW  Sodium 03/16/2019 145  136 - 145 mmol/L Final  
 Potassium 03/16/2019 4.9  3.5 - 5.5 mmol/L Final  
 Chloride 03/16/2019 109* 100 - 108 mmol/L Final  
 CO2 03/16/2019 30  21 - 32 mmol/L Final  
 Anion gap 03/16/2019 6  3.0 - 18 mmol/L Final  
 Glucose 03/16/2019 90  74 - 99 mg/dL Final  
 BUN 03/16/2019 11  7.0 - 18 MG/DL Final  
 Creatinine 03/16/2019 0.66  0.6 - 1.3 MG/DL Final  
 BUN/Creatinine ratio 03/16/2019 17  12 - 20   Final  
 GFR est AA 03/16/2019 >60  >60 ml/min/1.73m2 Final  
 GFR est non-AA 03/16/2019 >60  >60 ml/min/1.73m2 Final  
 Comment: (NOTE) Estimated GFR is calculated using the Modification of Diet in Renal  
Disease (MDRD) Study equation, reported for both  Americans [facility]) and non- Americans (GFRNA), and normalized to 1.73m2  
body surface area. The physician must decide which value applies to  
the patient. The MDRD study equation should only be used in  
individuals age 25 or older. It has not been validated for the  
following: pregnant women, patients with serious comorbid conditions,  
or on certain medications, or persons with extremes of body size,  
muscle mass, or nutritional status.  Calcium 03/16/2019 9.4  8.5 - 10.1 MG/DL Final  
 Bilirubin, total 03/16/2019 0.4  0.2 - 1.0 MG/DL Final  
 ALT (SGPT) 03/16/2019 28  13 - 56 U/L Final  
 AST (SGOT) 03/16/2019 14* 15 - 37 U/L Final  
 Alk. phosphatase 03/16/2019 87  45 - 117 U/L Final  
 Protein, total 03/16/2019 7.1  6.4 - 8.2 g/dL Final  
 Albumin 03/16/2019 4.1  3.4 - 5.0 g/dL Final  
 Globulin 03/16/2019 3.0  2.0 - 4.0 g/dL Final  
 A-G Ratio 03/16/2019 1.4  0.8 - 1.7   Final  
Admission on 02/21/2019, Discharged on 02/21/2019 Component Date Value Ref Range Status  Ventricular Rate 02/21/2019 67  BPM Final  
 Atrial Rate 02/21/2019 67  BPM Final  
 P-R Interval 02/21/2019 180  ms Final  
 QRS Duration 02/21/2019 90  ms Final  
 Q-T Interval 02/21/2019 376  ms Final  
 QTC Calculation (Bezet) 02/21/2019 397  ms Final  
 Calculated P Axis 02/21/2019 63  degrees Final  
 Calculated R Axis 02/21/2019 22  degrees Final  
 Calculated T Axis 02/21/2019 49  degrees Final  
 Diagnosis 02/21/2019    Final  
                 Value:Normal sinus rhythm Possible Left atrial enlargement Borderline ECG When compared with ECG of 17-SEP-2018 22:01, No significant change was found Confirmed by Caitlin Smith ([id number]) on 2/21/2019 1:29:10 PM 
  
 WBC 02/21/2019 9.7  4.6 - 13.2 K/uL Final  
 RBC 02/21/2019 4.61  4.20 - 5.30 M/uL Final  
  HGB 02/21/2019 10.7* 12.0 - 16.0 g/dL Final  
 HCT 02/21/2019 33.0* 35.0 - 45.0 % Final  
 MCV 02/21/2019 71.6* 74.0 - 97.0 FL Final  
 MCH 02/21/2019 23.2* 24.0 - 34.0 PG Final  
 MCHC 02/21/2019 32.4  31.0 - 37.0 g/dL Final  
 RDW 02/21/2019 14.7* 11.6 - 14.5 % Final  
 PLATELET 20/63/2079 011  135 - 420 K/uL Final  
 MPV 02/21/2019 11.6  9.2 - 11.8 FL Final  
 NEUTROPHILS 02/21/2019 60  42 - 75 % Final  
 BAND NEUTROPHILS 02/21/2019 1  0 - 5 % Final  
 LYMPHOCYTES 02/21/2019 31  20 - 51 % Final  
 MONOCYTES 02/21/2019 6  2 - 9 % Final  
 EOSINOPHILS 02/21/2019 2  0 - 5 % Final  
 BASOPHILS 02/21/2019 0  0 - 3 % Final  
 ABS. NEUTROPHILS 02/21/2019 5.9  1.8 - 8.0 K/UL Final  
 ABS. LYMPHOCYTES 02/21/2019 3.0  0.8 - 3.5 K/UL Final  
 ABS. MONOCYTES 02/21/2019 0.6  0 - 1.0 K/UL Final  
 ABS. EOSINOPHILS 02/21/2019 0.2  0.0 - 0.4 K/UL Final  
 ABS. BASOPHILS 02/21/2019 0.0  0.0 - 0.06 K/UL Final  
 DF 02/21/2019 MANUAL    Final  
 PLATELET COMMENTS 98/45/1916 ADEQUATE PLATELETS    Final  
 RBC COMMENTS 02/21/2019     Final  
                 Value:MICROCYTOSIS 
1+  RBC COMMENTS 02/21/2019     Final  
                 Value:SCHISTOCYTES 1+  RBC COMMENTS 02/21/2019     Final  
                 Value:Elliptocytes OCCASIONAL 
  
 CK 02/21/2019 130  26 - 192 U/L Final  
 CK - MB 02/21/2019 1.7  <3.6 ng/ml Final  
 CK-MB Index 02/21/2019 1.3  0.0 - 4.0 % Final  
 Troponin-I, QT 02/21/2019 <0.02  0.0 - 0.045 NG/ML Final  
 Comment: The presence of detectable troponin above the reference range indicates myocardial injury which may be due to ischemia, myocarditis, trauma, etc. 
Clinical correlation is necessary to establish the significance of this finding. Sequential testing is recommended to determine if the typical rise and fall of cTnI is demonstrated. Note:  Cardiac troponin I has a relatively long half life and may be present well after the CK MB has returned to baseline. The reference range is based on the 99th percentile of the referent population.  Sodium 02/21/2019 140  136 - 145 mmol/L Final  
 Potassium 02/21/2019 3.9  3.5 - 5.5 mmol/L Final  
 Chloride 02/21/2019 105  100 - 108 mmol/L Final  
 CO2 02/21/2019 30  21 - 32 mmol/L Final  
 Anion gap 02/21/2019 5  3.0 - 18 mmol/L Final  
 Glucose 02/21/2019 101* 74 - 99 mg/dL Final  
 BUN 02/21/2019 13  7.0 - 18 MG/DL Final  
 Creatinine 02/21/2019 0.67  0.6 - 1.3 MG/DL Final  
 BUN/Creatinine ratio 02/21/2019 19  12 - 20   Final  
 GFR est AA 02/21/2019 >60  >60 ml/min/1.73m2 Final  
 GFR est non-AA 02/21/2019 >60  >60 ml/min/1.73m2 Final  
 Comment: (NOTE) Estimated GFR is calculated using the Modification of Diet in Renal  
Disease (MDRD) Study equation, reported for both  [facility]) and non- Americans (GFRNA), and normalized to 1.73m2  
body surface area. The physician must decide which value applies to  
the patient. The MDRD study equation should only be used in  
individuals age 25 or older. It has not been validated for the  
following: pregnant women, patients with serious comorbid conditions,  
or on certain medications, or persons with extremes of body size,  
muscle mass, or nutritional status.  Calcium 02/21/2019 8.9  8.5 - 10.1 MG/DL Final  
 Bilirubin, total 02/21/2019 0.5  0.2 - 1.0 MG/DL Final  
 ALT (SGPT) 02/21/2019 31  13 - 56 U/L Final  
 AST (SGOT) 02/21/2019 17  15 - 37 U/L Final  
 Alk.  phosphatase 02/21/2019 89  45 - 117 U/L Final  
 Protein, total 02/21/2019 7.3  6.4 - 8.2 g/dL Final  
 Albumin 02/21/2019 4.0  3.4 - 5.0 g/dL Final  
 Globulin 02/21/2019 3.3  2.0 - 4.0 g/dL Final  
 A-G Ratio 02/21/2019 1.2  0.8 - 1.7   Final  
 Color 02/21/2019 YELLOW    Final  
 Appearance 02/21/2019 CLEAR    Final  
 Specific gravity 02/21/2019 1.020  1.005 - 1.030   Final  
 pH (UA) 02/21/2019 6.5  5.0 - 8.0   Final  
  Protein 02/21/2019 NEGATIVE   NEG mg/dL Final  
 Glucose 02/21/2019 NEGATIVE   NEG mg/dL Final  
 Ketone 02/21/2019 NEGATIVE   NEG mg/dL Final  
 Bilirubin 02/21/2019 NEGATIVE   NEG   Final  
 Blood 02/21/2019 NEGATIVE   NEG   Final  
 Urobilinogen 02/21/2019 1.0  0.2 - 1.0 EU/dL Final  
 Nitrites 02/21/2019 NEGATIVE   NEG   Final  
 Leukocyte Esterase 02/21/2019 NEGATIVE   NEG   Final  
 
 
.No results found for any visits on 05/03/19. Assessment / Plan ICD-10-CM ICD-9-CM 1. Palpitations R00.2 785.1 2. Essential hypertension I10 401.9 Discontinue Labetalol Increase Toprol XL to 25 mg BID Add Norvasc 
she was advised to continue her maintenance medications Follow-up and Dispositions · Return in about 2 weeks (around 5/17/2019). I asked Shayyclarice Donnelly if she has any questions and I answered the questions. Gladys Donnelly states that she understands the treatment plan and agrees with the treatment plan THIS DOCUMENT WAS CREATED WITH A VOICE ACTIVATED DICTATION SYSTEM.   IT MAY CONTAIN TRANSCRIPTION ERRORS

## 2019-06-07 ENCOUNTER — HOSPITAL ENCOUNTER (EMERGENCY)
Age: 64
Discharge: HOME | End: 2019-06-07
Attending: EMERGENCY MEDICINE
Payer: COMMERCIAL

## 2019-06-07 ENCOUNTER — APPOINTMENT (OUTPATIENT)
Dept: GENERAL RADIOLOGY | Age: 64
End: 2019-06-07
Attending: EMERGENCY MEDICINE
Payer: COMMERCIAL

## 2019-06-07 VITALS
HEART RATE: 70 BPM | RESPIRATION RATE: 18 BRPM | WEIGHT: 113 LBS | SYSTOLIC BLOOD PRESSURE: 151 MMHG | DIASTOLIC BLOOD PRESSURE: 52 MMHG | OXYGEN SATURATION: 97 % | BODY MASS INDEX: 22.82 KG/M2 | TEMPERATURE: 98 F

## 2019-06-07 DIAGNOSIS — R07.89 ATYPICAL CHEST PAIN: Primary | ICD-10-CM

## 2019-06-07 DIAGNOSIS — R00.2 PALPITATIONS: ICD-10-CM

## 2019-06-07 LAB
ANION GAP SERPL CALC-SCNC: 3 MMOL/L (ref 3–18)
BASOPHILS # BLD: 0.1 K/UL (ref 0–0.1)
BASOPHILS NFR BLD: 1 % (ref 0–2)
BUN SERPL-MCNC: 13 MG/DL (ref 7–18)
BUN/CREAT SERPL: 23 (ref 12–20)
CALCIUM SERPL-MCNC: 9.3 MG/DL (ref 8.5–10.1)
CHLORIDE SERPL-SCNC: 109 MMOL/L (ref 100–108)
CK MB CFR SERPL CALC: 1.2 % (ref 0–4)
CK MB SERPL-MCNC: 1.8 NG/ML (ref 5–25)
CK SERPL-CCNC: 152 U/L (ref 26–192)
CO2 SERPL-SCNC: 32 MMOL/L (ref 21–32)
CREAT SERPL-MCNC: 0.56 MG/DL (ref 0.6–1.3)
DIFFERENTIAL METHOD BLD: ABNORMAL
EOSINOPHIL # BLD: 0.2 K/UL (ref 0–0.4)
EOSINOPHIL NFR BLD: 2 % (ref 0–5)
ERYTHROCYTE [DISTWIDTH] IN BLOOD BY AUTOMATED COUNT: 15.8 % (ref 11.6–14.5)
GLUCOSE SERPL-MCNC: 93 MG/DL (ref 74–99)
HCT VFR BLD AUTO: 35.6 % (ref 35–45)
HGB BLD-MCNC: 11.6 G/DL (ref 12–16)
LYMPHOCYTES # BLD: 3.2 K/UL (ref 0.9–3.6)
LYMPHOCYTES NFR BLD: 37 % (ref 21–52)
MCH RBC QN AUTO: 23.5 PG (ref 24–34)
MCHC RBC AUTO-ENTMCNC: 32.6 G/DL (ref 31–37)
MCV RBC AUTO: 72.2 FL (ref 74–97)
MONOCYTES # BLD: 0.8 K/UL (ref 0.05–1.2)
MONOCYTES NFR BLD: 9 % (ref 3–10)
NEUTS SEG # BLD: 4.5 K/UL (ref 1.8–8)
NEUTS SEG NFR BLD: 51 % (ref 40–73)
PLATELET # BLD AUTO: 232 K/UL (ref 135–420)
PMV BLD AUTO: 12.5 FL (ref 9.2–11.8)
POTASSIUM SERPL-SCNC: 3.4 MMOL/L (ref 3.5–5.5)
RBC # BLD AUTO: 4.93 M/UL (ref 4.2–5.3)
SODIUM SERPL-SCNC: 144 MMOL/L (ref 136–145)
TROPONIN I SERPL-MCNC: <0.02 NG/ML (ref 0–0.04)
WBC # BLD AUTO: 8.7 K/UL (ref 4.6–13.2)

## 2019-06-07 PROCEDURE — 99284 EMERGENCY DEPT VISIT MOD MDM: CPT

## 2019-06-07 PROCEDURE — 82550 ASSAY OF CK (CPK): CPT

## 2019-06-07 PROCEDURE — 71045 X-RAY EXAM CHEST 1 VIEW: CPT

## 2019-06-07 PROCEDURE — 80048 BASIC METABOLIC PNL TOTAL CA: CPT

## 2019-06-07 PROCEDURE — 85025 COMPLETE CBC W/AUTO DIFF WBC: CPT

## 2019-06-07 PROCEDURE — 93005 ELECTROCARDIOGRAM TRACING: CPT

## 2019-06-08 NOTE — ED PROVIDER NOTES
Kristine Sepulveda is a 59 y.o. Female with past medical history of hypertension, hyperlipidemia and chronic palpitations coming in with palpitations and atypical chest pain. Patient states that she has had palpitations since 1989. She sees Dr. Almaz Aguiar, cardiology for this, and states it bothers her worse when she is lying down or at night. She states that this morning around 4 AM when she woke up she had a few seconds of brief, achy, nonradiating chest pain in the left side of her chest.  She denies any chest pain since that time she denies any exertional chest pain. She denies any shortness of breath. She states that she gets palpitations daily and has had multiple evaluations for this. This includes an outpatient Holter monitor. She states that at home tonight she checked her heart rate and blood pressure on her home blood pressure monitor, however the readout did not appear normal to her and so she came in for evaluation. Denies any current symptoms, leg swelling, shortness of breath, fevers, orthopnea, or other symptoms. Past Medical History:   Diagnosis Date    Allergic rhinitis     Blurred vision     Cardiac echocardiogram 06/11/2014    EF 60%. No RWMA. RVSP 30 mmHg. Mild AI.  Cardiac Holter monitor, normal 11/05/2014    Benign 48-hr Holter study.  Cardiac nuclear imaging test, mod risk 08/14/2015    Intermediate risk. High anterior artifact vs diagonal artery ischemia. Following Lexiscan, 0.5-mm downsloping inferolateral ST depression, resolving 10 min 30 sec of recovery. Arm heaviness noted.  Cardiovascular lower extremity arterial duplex 07/15/2016    Right leg: Mod peripheral arterial disease in femoral segment at rest.  Left leg:  Mild arterial disease at rest.  R KAREN 0.58. L KAREN 0.95. Similar to study of 10/15/14.  Cardiovascular renal angiography 10/27/2014    Bilateral patent renal arteries. Right CFA occluded but collateralized.       Cardiovascular renal duplex 10/13/2014    Aorta w/irregular walls. Severe >60% bilateral renal artery stenosis. Bilateral intrinsic/med renal disease.  Carotid duplex 10/17/2014    Mild <50% DAVID stenosis. Biphasic signals in both subclavians.  Carotid duplex 12/05/2016    Mild < 50% DAVID stenosis.       Cervical disc disease 09/2015    MRI of C-spine at [facility] JO FARAH Dizziness     Ear ache     Essential hypertension     Fainting spell     Frequent headaches     Frequent nosebleeds     Hemorrhoid     Hyperlipidemia     Hypertension     Ill-defined condition     Migraine     Sinus problem     Sinusitis, chronic     Spontaneous pneumothorax 1999    2 episodes on the right within several months of each other    Stomach pain     Weakness of right leg        Past Surgical History:   Procedure Laterality Date    COLONOSCOPY N/A 11/17/2017    COLONOSCOPY, SCREENING with hot bx polypectomy performed by Beata Delong [facility] at [facility] ENDOSCOPY    HX BREAST REDUCTION      HX CHOLECYSTECTOMY  11/8/14    HX COLPOSCOPY      HX HEART CATHETERIZATION Bilateral 10/27/14    bilateral lower extremity angiography     HX OTHER SURGICAL Left     shave biopsy ( thigh area)         Family History:   Problem Relation Age of Onset    Deep Vein Thrombosis Father     Coronary Artery Disease Father     Pacemaker Father     Diabetes Father     High Cholesterol Father     Hypertension Father     High Cholesterol Mother     Hypertension Mother     Heart Attack Brother 40    Cancer Brother     Heart Attack Maternal Grandmother 100    Heart Attack Other 48    Hypertension Other     Hypertension Maternal Aunt        Social History     Socioeconomic History    Marital status: SINGLE     Spouse name: Not on file    Number of children: Not on file    Years of education: Not on file    Highest education level: Not on file   Occupational History    Not on file   Social Needs    Financial resource strain: Not on file   [place] insecurity:     Worry: Not on file     Inability: Not on file    Transportation needs:     Medical: Not on file     Non-medical: Not on file   Tobacco Use    Smoking status: Former Smoker     Packs/day: 0.25     Years: 20.00     Pack years: 5.00     Last attempt to quit: 2017     Years since quittin.4    Smokeless tobacco: Never Used    Tobacco comment: now down to 3 cigarettes per day   Substance and Sexual Activity    Alcohol use: No    Drug use: No    Sexual activity: Never   Lifestyle    Physical activity:     Days per week: Not on file     Minutes per session: Not on file    Stress: Not on file   Relationships    Social connections:     Talks on phone: Not on file     Gets together: Not on file     Attends Faith service: Not on file     Active member of club or organization: Not on file     Attends meetings of clubs or organizations: Not on file     Relationship status: Not on file    Intimate partner violence:     Fear of current or ex partner: Not on file     Emotionally abused: Not on file     Physically abused: Not on file     Forced sexual activity: Not on file   Other Topics Concern    Not on file   Social History Narrative    Not on file         ALLERGIES: Latex; Keflex [cephalexin]; Epinephrine; and Tetracyclines    Review of Systems   Constitutional: Negative. Negative for chills and fever. HENT: Negative. Eyes: Negative. Respiratory: Negative. Negative for cough and shortness of breath. Cardiovascular: Positive for chest pain (Resolved) and palpitations. Negative for leg swelling. Gastrointestinal: Negative. Negative for abdominal pain, nausea and vomiting. Genitourinary: Negative. Negative for dysuria. Musculoskeletal: Negative. Negative for back pain and myalgias. Skin: Negative. Negative for rash. Neurological: Negative. Negative for dizziness, weakness and light-headedness. Psychiatric/Behavioral: Negative.     All other systems reviewed and are negative. Vitals:    06/07/19 2057 06/07/19 2130   BP: 175/57 151/53   Pulse: 77 78   Resp: 16 25   Temp: 98 °F (36.7 °C)    SpO2: 98% 98%   Weight: 51.3 kg (113 lb)             Physical Exam   Constitutional: She is oriented to person, place, and time. She appears well-developed and well-nourished. No distress. HENT:   Head: Normocephalic and atraumatic. Mouth/Throat: Oropharynx is clear and moist.   Eyes: Pupils are equal, round, and reactive to light. Conjunctivae and EOM are normal.   Neck: Trachea normal and normal range of motion. Neck supple. No JVD present. Cardiovascular: Normal rate, regular rhythm, S1 normal and S2 normal. Exam reveals no gallop and no friction rub. No murmur heard. Pulmonary/Chest: Effort normal and breath sounds normal. No accessory muscle usage. No respiratory distress. Abdominal: Soft. Normal appearance. She exhibits no distension. There is no tenderness. There is no rigidity and no guarding. Musculoskeletal: Normal range of motion. She exhibits no edema or tenderness. Neurological: She is alert and oriented to person, place, and time. She has normal strength. No cranial nerve deficit. Coordination normal.   Skin: Skin is warm and intact. No rash noted. Psychiatric: She has a normal mood and affect. Her speech is normal and behavior is normal.   Vitals reviewed. MDM  Number of Diagnoses or Management Options  Atypical chest pain:   Palpitations:   Diagnosis management comments: Werner Muhammad is a 59 y.o. Female coming in with atypical chest pain the last for a few seconds around 4 AM as well as persistent palpitations. EKG is reassuring without any ischemic changes. Will get basic labs and single troponin. PERC negative, low risk heart score. Do not feel that she needs any further emergent work-up at this time.          Procedures      Vitals:  Patient Vitals for the past 12 hrs:   Temp Pulse Resp BP SpO2   06/07/19 2130  78 25 151/53 98 %   06/07/19 2057 98 °F (36.7 °C) 77 16 175/57 98 %       Medications ordered:   Medications - No data to display      Lab findings:  Recent Results (from the past 12 hour(s))   EKG, 12 LEAD, INITIAL    Collection Time: 06/07/19  8:52 PM   Result Value Ref Range    Ventricular Rate 74 BPM    Atrial Rate 74 BPM    P-R Interval 180 ms    QRS Duration 94 ms    Q-T Interval 382 ms    QTC Calculation (Bezet) 424 ms    Calculated P Axis 69 degrees    Calculated R Axis -8 degrees    Calculated T Axis 43 degrees    Diagnosis       Sinus rhythm with premature atrial complexes  Possible Left atrial enlargement  ST abnormality, possible digitalis effect  Abnormal ECG  When compared with ECG of 21-FEB-2019 00:57,  premature atrial complexes are now present     CBC WITH AUTOMATED DIFF    Collection Time: 06/07/19  8:56 PM   Result Value Ref Range    WBC 8.7 4.6 - 13.2 K/uL    RBC 4.93 4.20 - 5.30 M/uL    HGB 11.6 (L) 12.0 - 16.0 g/dL    HCT 35.6 35.0 - 45.0 %    MCV 72.2 (L) 74.0 - 97.0 FL    MCH 23.5 (L) 24.0 - 34.0 PG    MCHC 32.6 31.0 - 37.0 g/dL    RDW 15.8 (H) 11.6 - 14.5 %    PLATELET 336 856 - 146 K/uL    MPV 12.5 (H) 9.2 - 11.8 FL    NEUTROPHILS 51 40 - 73 %    LYMPHOCYTES 37 21 - 52 %    MONOCYTES 9 3 - 10 %    EOSINOPHILS 2 0 - 5 %    BASOPHILS 1 0 - 2 %    ABS. NEUTROPHILS 4.5 1.8 - 8.0 K/UL    ABS. LYMPHOCYTES 3.2 0.9 - 3.6 K/UL    ABS. MONOCYTES 0.8 0.05 - 1.2 K/UL    ABS. EOSINOPHILS 0.2 0.0 - 0.4 K/UL    ABS.  BASOPHILS 0.1 0.0 - 0.1 K/UL    DF AUTOMATED     METABOLIC PANEL, BASIC    Collection Time: 06/07/19  8:56 PM   Result Value Ref Range    Sodium 144 136 - 145 mmol/L    Potassium 3.4 (L) 3.5 - 5.5 mmol/L    Chloride 109 (H) 100 - 108 mmol/L    CO2 32 21 - 32 mmol/L    Anion gap 3 3.0 - 18 mmol/L    Glucose 93 74 - 99 mg/dL    BUN 13 7.0 - 18 MG/DL    Creatinine 0.56 (L) 0.6 - 1.3 MG/DL    BUN/Creatinine ratio 23 (H) 12 - 20      GFR est AA >60 >60 ml/min/1.73m2    GFR est non-AA >60 >60 ml/min/1.73m2    Calcium 9.3 8.5 - 10.1 MG/DL   CARDIAC PANEL,(CK, CKMB & TROPONIN)    Collection Time: 06/07/19  8:56 PM   Result Value Ref Range     26 - 192 U/L    CK - MB 1.8 <3.6 ng/ml    CK-MB Index 1.2 0.0 - 4.0 %    Troponin-I, QT <0.02 0.0 - 0.045 NG/ML       EKG interpretation by ED Physician: Sinus rhythm, rate of 74 bpm.  Sinus arrhythmia, no acute ischemic changes. X-Ray, CT or other radiology findings or impressions:  XR CHEST PORT    (Results Pending)       Progress notes, Consult notes or additional Procedure notes:     Reevaluation of patient:   I have reassessed the patient. Patient is resting comfortably in bed no distress. Discussed all of her results and need for outpatient follow-up with cardiology. Counseled her extensively on return precautions for acutely worsening symptoms and patient verbally states understanding and agrees with this plan. Disposition:  Diagnosis:   1. Atypical chest pain    2. Palpitations        Disposition: Discharge    Follow-up Information     Follow up With Specialties Details Why Contact Info    Rafael Elizondo, [address] [facility] Cardiology Call in 3 days for office follow up [address] [facility] [address] [facility]      [id number] [place] [address] EMERGENCY DEPT Emergency Medicine  As needed, If symptoms worsen [address] [facility]  [phone]           Patient's Medications   Start Taking    No medications on file   Continue Taking    ASPIRIN DELAYED-RELEASE 81 MG TABLET    Take  by mouth nightly. ATORVASTATIN (LIPITOR) 40 MG TABLET    TAKE ONE TABLET BY MOUTH DAILY    BUDESONIDE/FORMOTEROL FUMARATE (SYMBICORT IN)    Take  by inhalation. CETIRIZINE (ZYRTEC) 10 MG TABLET    Take 10 mg by mouth nightly. FLUTICASONE (FLONASE) 50 MCG/ACTUATION NASAL SPRAY    2 Sprays by Both Nostrils route daily.     IPRATROPIUM (ATROVENT) 42 MCG (0.06 %) NASAL SPRAY    2 sprays up each nostril twice per day    METOPROLOL SUCCINATE (TOPROL-XL) 25 MG XL TABLET    TAKE ONE TABLET BY MOUTH DAILY    MONTELUKAST (SINGULAIR) 10 MG TABLET    Take 1 Tab by mouth daily. PROAIR HFA 90 MCG/ACTUATION INHALER       These Medications have changed    No medications on file   Stop Taking    AMLODIPINE (NORVASC) 5 MG TABLET    1 tab twice per day.    (Stop Labetalol)

## 2019-06-08 NOTE — ED NOTES
Grover Santos is a [address] [place] [address] y.o. female that was discharged in stable. Pt was accompanied by self. Pt is driving. The patients diagnosis, condition and treatment were explained to  patient and aftercare instructions were given. The patient verbalized understanding. Patient armband removed and shredded.

## 2019-06-08 NOTE — DISCHARGE INSTRUCTIONS
Patient Education        Palpitations: Care Instructions  Your Care Instructions    Heart palpitations are the uncomfortable sensation that your heart is beating fast or irregularly. You might feel pounding or fluttering in your chest. It might feel like your heart is skipping a beat. Although palpitations may be caused by a heart problem, they also occur because of stress, fatigue, or use of alcohol, caffeine, or nicotine. Many medicines, including diet pills, antihistamines, decongestants, and some herbal products, can cause heart palpitations. Nearly everyone has palpitations from time to time. Depending on your symptoms, your doctor may need to do more tests to try to find the cause of your palpitations. Follow-up care is a key part of your treatment and safety. Be sure to make and go to all appointments, and call your doctor if you are having problems. It's also a good idea to know your test results and keep a list of the medicines you take. How can you care for yourself at home? · Avoid caffeine, nicotine, and excess alcohol. · Do not take illegal drugs, such as methamphetamines and cocaine. · Do not take weight loss or diet medicines unless you talk with your doctor first.  · Get plenty of sleep. · Do not overeat. · If you have palpitations again, take deep breaths and try to relax. This may slow a racing heart. · If you start to feel lightheaded, lie down to avoid injuries that might result if you pass out and fall down. · Keep a record of your palpitations and bring it to your next doctor's appointment. Write down:  ? The date and time. ? Your pulse. (If your heart is beating fast, it may be hard to count your pulse.)  ? What you were doing when the palpitations started. ? How long the palpitations lasted. ? Any other symptoms. · If an activity causes palpitations, slow down or stop. Talk to your doctor before you do that activity again. · Take your medicines exactly as prescribed.  Call your doctor if you think you are having a problem with your medicine. When should you call for help? Call 911 anytime you think you may need emergency care. For example, call if:    · You passed out (lost consciousness).     · You have symptoms of a heart attack. These may include:  ? Chest pain or pressure, or a strange feeling in the chest.  ? Sweating. ? Shortness of breath. ? Pain, pressure, or a strange feeling in the back, neck, jaw, or upper belly or in one or both shoulders or arms. ? Lightheadedness or sudden weakness. ? A fast or irregular heartbeat. After you call 911, the  may tell you to chew 1 adult-strength or 2 to 4 low-dose aspirin. Wait for an ambulance. Do not try to drive yourself.     · You have symptoms of a stroke. These may include:  ? Sudden numbness, tingling, weakness, or loss of movement in your face, arm, or leg, especially on only one side of your body. ? Sudden vision changes. ? Sudden trouble speaking. ? Sudden confusion or trouble understanding simple statements. ? Sudden problems with walking or balance. ? A sudden, severe headache that is different from past headaches.    Call your doctor now or seek immediate medical care if:    · You have heart palpitations and:  ? Are dizzy or lightheaded, or you feel like you may faint. ? Have new or increased shortness of breath.    Watch closely for changes in your health, and be sure to contact your doctor if:    · You continue to have heart palpitations. Where can you learn more? Go to http://dirk-amaris.info/. Enter R508 in the search box to learn more about \"Palpitations: Care Instructions. \"  Current as of: July 22, 2018  Content Version: 11.9  © 2095-0777 [employer]. Care instructions adapted under license by [employer] (which disclaims liability or warranty for this information).  If you have questions about a medical condition or this instruction, always ask your healthcare professional. [address] any warranty or liability for your use of this information. Patient Education        Chest Pain: Care Instructions  Your Care Instructions    There are many things that can cause chest pain. Some are not serious and will get better on their own in a few days. But some kinds of chest pain need more testing and treatment. Your doctor may have recommended a follow-up visit in the next 8 to 12 hours. If you are not getting better, you may need more tests or treatment. Even though your doctor has released you, you still need to watch for any problems. The doctor carefully checked you, but sometimes problems can develop later. If you have new symptoms or if your symptoms do not get better, get medical care right away. If you have worse or different chest pain or pressure that lasts more than 5 minutes or you passed out (lost consciousness), call 911 or seek other emergency help right away. A medical visit is only one step in your treatment. Even if you feel better, you still need to do what your doctor recommends, such as going to all suggested follow-up appointments and taking medicines exactly as directed. This will help you recover and help prevent future problems. How can you care for yourself at home? · Rest until you feel better. · Take your medicine exactly as prescribed. Call your doctor if you think you are having a problem with your medicine. · Do not drive after taking a prescription pain medicine. When should you call for help? Call 911 if:    · You passed out (lost consciousness).     · You have severe difficulty breathing.     · You have symptoms of a heart attack. These may include:  ? Chest pain or pressure, or a strange feeling in your chest.  ? Sweating. ? Shortness of breath. ? Nausea or vomiting.   ? Pain, pressure, or a strange feeling in your back, neck, jaw, or upper belly or in one or both shoulders or arms.  ? Lightheadedness or sudden weakness. ? A fast or irregular heartbeat. After you call 911, the  may tell you to chew 1 adult-strength or 2 to 4 low-dose aspirin. Wait for an ambulance. Do not try to drive yourself.    Call your doctor today if:    · You have any trouble breathing.     · Your chest pain gets worse.     · You are dizzy or lightheaded, or you feel like you may faint.     · You are not getting better as expected.     · You are having new or different chest pain. Where can you learn more? Go to http://dirk-amaris.info/. Enter A120 in the search box to learn more about \"Chest Pain: Care Instructions. \"  Current as of: September 23, 2018  Content Version: 11.9  © 4933-6830 [employer]. Care instructions adapted under license by [employer] (which disclaims liability or warranty for this information). If you have questions about a medical condition or this instruction, always ask your healthcare professional. [address] any warranty or liability for your use of this information.

## 2019-06-09 LAB
ATRIAL RATE: 74 BPM
CALCULATED P AXIS, ECG09: 69 DEGREES
CALCULATED R AXIS, ECG10: -8 DEGREES
CALCULATED T AXIS, ECG11: 43 DEGREES
DIAGNOSIS, 93000: NORMAL
P-R INTERVAL, ECG05: 180 MS
Q-T INTERVAL, ECG07: 382 MS
QRS DURATION, ECG06: 94 MS
QTC CALCULATION (BEZET), ECG08: 424 MS
VENTRICULAR RATE, ECG03: 74 BPM

## 2019-06-13 ENCOUNTER — OFFICE VISIT (OUTPATIENT)
Dept: FAMILY MEDICINE CLINIC | Facility: CLINIC | Age: 64
End: 2019-06-13

## 2019-06-13 ENCOUNTER — HOSPITAL ENCOUNTER (OUTPATIENT)
Dept: LAB | Age: 64
Discharge: HOME | End: 2019-06-13
Payer: COMMERCIAL

## 2019-06-13 DIAGNOSIS — Z79.899 MEDICATION MANAGEMENT: ICD-10-CM

## 2019-06-13 DIAGNOSIS — R00.2 PALPITATIONS: Primary | ICD-10-CM

## 2019-06-13 DIAGNOSIS — R00.2 PALPITATIONS: ICD-10-CM

## 2019-06-13 LAB
ALBUMIN SERPL-MCNC: 4.5 G/DL (ref 3.4–5)
ALBUMIN/GLOB SERPL: 1.4 {RATIO} (ref 0.8–1.7)
ALP SERPL-CCNC: 92 U/L (ref 45–117)
ALT SERPL-CCNC: 53 U/L (ref 13–56)
AST SERPL-CCNC: 20 U/L (ref 15–37)
BILIRUB DIRECT SERPL-MCNC: 0.1 MG/DL (ref 0–0.2)
BILIRUB SERPL-MCNC: 0.4 MG/DL (ref 0.2–1)
GLOBULIN SER CALC-MCNC: 3.2 G/DL (ref 2–4)
PROT SERPL-MCNC: 7.7 G/DL (ref 6.4–8.2)
TSH SERPL DL<=0.05 MIU/L-ACNC: 0.6 UIU/ML (ref 0.36–3.74)

## 2019-06-13 PROCEDURE — 84443 ASSAY THYROID STIM HORMONE: CPT

## 2019-06-13 PROCEDURE — 36415 COLL VENOUS BLD VENIPUNCTURE: CPT

## 2019-06-13 PROCEDURE — 80076 HEPATIC FUNCTION PANEL: CPT

## 2019-06-13 RX ORDER — LABETALOL 100 MG/1
TABLET, FILM COATED ORAL 2 TIMES DAILY
COMMUNITY
End: 2021-05-13

## 2019-06-13 NOTE — PROGRESS NOTES
[facility] presents today for   Chief Complaint   Patient presents with    Medication Evaluation     Pt states that when she took the norvasc her paplitations increased       Sissy Julio preferred language for health care discussion is english/other. Is someone accompanying this pt? no    Is the patient using any DME equipment during OV? no    Depression Screening:  3 most recent PHQ Screens 5/3/2019 1/7/2019 11/19/2018 9/20/2018 9/18/2018 8/30/2017 11/16/2016   Little interest or pleasure in doing things Not at all Not at all Not at all Not at all Not at all Not at all Not at all   Feeling down, depressed, irritable, or hopeless Not at all Not at all Not at all Not at all Not at all Not at all Not at all   Total Score PHQ 2 0 0 0 0 0 0 0       Learning Assessment:  Learning Assessment 4/20/2016 10/13/2014 6/3/2014   PRIMARY LEARNER Patient Patient Patient   HIGHEST LEVEL OF EDUCATION - PRIMARY LEARNER  - 4 YEARS OF COLLEGE > 4 [place] LEARNER - - [address] CAREGIVER - No No   PRIMARY LANGUAGE ENGLISH ENGLISH ENGLISH   LEARNER PREFERENCE PRIMARY READING OTHER (COMMENT) DEMONSTRATION     DEMONSTRATION - READING     VIDEOS - VIDEOS   ANSWERED BY patient patient patient   RELATIONSHIP SELF SELF SELF       Abuse Screening:  Abuse Screening Questionnaire 8/30/2017   Do you ever feel afraid of your partner? N   Are you in a relationship with someone who physically or mentally threatens you? N   Is it safe for you to go home? Y       Fall Risk  No flowsheet data found. Health Maintenance reviewed and discussed per provider. Yes    Sissy Julio is due for   Health Maintenance Due   Topic Date Due    Hepatitis C Screening  1955    Pneumococcal 0-64 years (1 of 1 - PPSV23) 10/02/1962    DTaP/Tdap/Td series (1 - Tdap) 10/02/1977    Shingrix Vaccine Age 50> (1 of 2) 10/02/2006         Please order/place referral if appropriate. Advance Directive:  1.  Do you have an advance directive in place? Patient Reply: no    2. If not, would you like material regarding how to put one in place? Patient Reply: no    Coordination of Care:  1. Have you been to the ER, urgent care clinic since your last visit? Hospitalized since your last visit? yes    2. Have you seen or consulted any other health care providers outside of the [address] [place] [address] since your last visit? Include any pap smears or colon screening.  no

## 2019-08-07 ENCOUNTER — OFFICE VISIT (OUTPATIENT)
Dept: FAMILY MEDICINE CLINIC | Facility: CLINIC | Age: 64
End: 2019-08-07

## 2019-08-07 ENCOUNTER — HOSPITAL ENCOUNTER (OUTPATIENT)
Dept: LAB | Age: 64
Discharge: HOME | End: 2019-08-07
Payer: COMMERCIAL

## 2019-08-07 VITALS
OXYGEN SATURATION: 97 % | HEART RATE: 67 BPM | HEIGHT: 59 IN | BODY MASS INDEX: 22.82 KG/M2 | WEIGHT: 113.2 LBS | DIASTOLIC BLOOD PRESSURE: 78 MMHG | SYSTOLIC BLOOD PRESSURE: 126 MMHG | TEMPERATURE: 95 F

## 2019-08-07 DIAGNOSIS — E78.00 PURE HYPERCHOLESTEROLEMIA: ICD-10-CM

## 2019-08-07 DIAGNOSIS — R42 DIZZINESS: Primary | ICD-10-CM

## 2019-08-07 DIAGNOSIS — J01.41 ACUTE RECURRENT PANSINUSITIS: ICD-10-CM

## 2019-08-07 DIAGNOSIS — I10 ESSENTIAL HYPERTENSION: ICD-10-CM

## 2019-08-07 LAB
CHOLEST SERPL-MCNC: 131 MG/DL
HDLC SERPL-MCNC: 45 MG/DL (ref 40–60)
HDLC SERPL: 2.9 {RATIO} (ref 0–5)
LDLC SERPL CALC-MCNC: 71.2 MG/DL (ref 0–100)
LIPID PROFILE,FLP: NORMAL
TRIGL SERPL-MCNC: 74 MG/DL (ref ?–150)
VLDLC SERPL CALC-MCNC: 14.8 MG/DL

## 2019-08-07 PROCEDURE — 80061 LIPID PANEL: CPT

## 2019-08-07 PROCEDURE — 36415 COLL VENOUS BLD VENIPUNCTURE: CPT

## 2019-08-07 RX ORDER — AMOXICILLIN AND CLAVULANATE POTASSIUM 875; 125 MG/1; MG/1
TABLET, FILM COATED ORAL EVERY 12 HOURS
COMMUNITY
End: 2019-10-04

## 2019-08-07 RX ORDER — PREDNISONE 20 MG/1
TABLET ORAL
Qty: 20 TAB | Refills: 0 | Status: SHIPPED | OUTPATIENT
Start: 2019-08-07 | End: 2019-10-04

## 2019-08-07 RX ORDER — AMOXICILLIN AND CLAVULANATE POTASSIUM 875; 125 MG/1; MG/1
TABLET, FILM COATED ORAL EVERY 12 HOURS
COMMUNITY
End: 2019-08-07 | Stop reason: ALTCHOICE

## 2019-08-07 NOTE — PROGRESS NOTES
The patient presents to the office today with the chief complaint of dizziness  HPI    The patient remains on [address] [facility] for hypertension. she is tolerating the medications well. The patient remains on Lipitor for hyperlipidemia. She is tolerating the statin well. She is due for lab for cholesterol. The patient has  complaints of recurrent dizziness - consistent with vertigo. This is some better with Meclizine but it still occurs. The patient recently was diagnosed with a sinus infection. She is now on Augmentin. She still notes a great deal of greenish discharge from the sinus. The patient has recurrent palpitations. The work up has shown frequent PACs with a baseline bradycardia. The patient is followed by cardiology. The patient still finds that her ambulation is limited by pain in her right hip. Review of Systems   Respiratory: Positive for cough. Negative for shortness of breath. Cardiovascular: Negative for chest pain and leg swelling. Neurological: Positive for dizziness. Allergies   Allergen Reactions    Latex Rash    Keflex [Cephalexin] Rash    Epinephrine Other (comments)     tachycardia    Tetracyclines Nausea Only       Current Outpatient Medications   Medication Sig Dispense Refill    amoxicillin-clavulanate (AUGMENTIN) 875-125 mg per tablet Take  by mouth every twelve (12) hours.  predniSONE (DELTASONE) 20 mg tablet 3 tabs daily x 3 days, 2 tabs daily x 3 days, 1 tab daily x 3 days, 1/2 tab daily x 3 days 20 Tab 0    labetalol (NORMODYNE) 100 mg tablet Take  by mouth two (2) times a day.  atorvastatin (LIPITOR) 40 mg tablet TAKE ONE TABLET BY MOUTH DAILY 90 Tab 2    budesonide/formoterol fumarate (SYMBICORT IN) Take  by inhalation.  ipratropium (ATROVENT) 42 mcg (0.06 %) nasal spray 2 sprays up each nostril twice per day 15 mL 2    montelukast (SINGULAIR) 10 mg tablet Take 1 Tab by mouth daily.  90 Tab 1    PROAIR HFA 90 mcg/actuation inhaler  aspirin delayed-release 81 mg tablet Take  by mouth nightly.  fluticasone (FLONASE) 50 mcg/actuation nasal spray 2 Sprays by Both Nostrils route daily.  cetirizine (ZYRTEC) 10 mg tablet Take 10 mg by mouth nightly. Past Medical History:   Diagnosis Date    Allergic rhinitis     Blurred vision     Cardiac echocardiogram 06/11/2014    EF 60%. No RWMA. RVSP 30 mmHg. Mild AI.  Cardiac Holter monitor, normal 11/05/2014    Benign 48-hr Holter study.  Cardiac nuclear imaging test, mod risk 08/14/2015    Intermediate risk. High anterior artifact vs diagonal artery ischemia. Following Lexiscan, 0.5-mm downsloping inferolateral ST depression, resolving 10 min 30 sec of recovery. Arm heaviness noted.  Cardiovascular lower extremity arterial duplex 07/15/2016    Right leg: Mod peripheral arterial disease in femoral segment at rest.  Left leg:  Mild arterial disease at rest.  R KAREN 0.58. L KAREN 0.95. Similar to study of 10/15/14.  Cardiovascular renal angiography 10/27/2014    Bilateral patent renal arteries. Right CFA occluded but collateralized.  Cardiovascular renal duplex 10/13/2014    Aorta w/irregular walls. Severe >60% bilateral renal artery stenosis. Bilateral intrinsic/med renal disease.  Carotid duplex 10/17/2014    Mild <50% DAVID stenosis. Biphasic signals in both subclavians.  Carotid duplex 12/05/2016    Mild < 50% DAVID stenosis.       Cervical disc disease 09/2015    MRI of C-spine at [facility] Dizziness     Ear ache     Essential hypertension     Fainting spell     Frequent headaches     Frequent nosebleeds     Hemorrhoid     Hyperlipidemia     Hypertension     Ill-defined condition     Migraine     Sinus problem     Sinusitis, chronic     Spontaneous pneumothorax 1999    2 episodes on the right within several months of each other    Stomach pain     Weakness of right leg        Past Surgical History:   Procedure Laterality Date  COLONOSCOPY N/A 2017    COLONOSCOPY, SCREENING with hot bx polypectomy performed by Rosana Agustin MD at [address] [place] [address] HX BREAST REDUCTION      HX CHOLECYSTECTOMY  14    HX COLPOSCOPY      HX HEART CATHETERIZATION Bilateral 10/27/14    bilateral lower extremity angiography     HX OTHER SURGICAL Left     shave biopsy ( thigh area)       Social History     Socioeconomic History    Marital status: SINGLE     Spouse name: Not on file    Number of children: Not on file    Years of education: Not on file    Highest education level: Not on file   Occupational History    Not on file   Social Needs    Financial resource strain: Not on file    Food insecurity:     Worry: Not on file     Inability: Not on file    Transportation needs:     Medical: Not on file     Non-medical: Not on file   Tobacco Use    Smoking status: Former Smoker     Packs/day: 0.25     Years: 20.00     Pack years: 5.00     Last attempt to quit: 2017     Years since quittin.6    Smokeless tobacco: Never Used    Tobacco comment: now down to 3 cigarettes per day   Substance and Sexual Activity    Alcohol use: No    Drug use: No    Sexual activity: Never   Lifestyle    Physical activity:     Days per week: Not on file     Minutes per session: Not on file    Stress: Not on file   Relationships    Social connections:     Talks on phone: Not on file     Gets together: Not on file     Attends [employer] service: Not on file     Active member of club or organization: Not on file     Attends meetings of clubs or organizations: Not on file     Relationship status: Not on file    Intimate partner violence:     Fear of current or ex partner: Not on file     Emotionally abused: Not on file     Physically abused: Not on file     Forced sexual activity: Not on file   Other Topics Concern    Not on file   Social History Narrative    Not on file       Patient does not have an advanced directive on file    Visit Vitals  /78 (BP 1 Location: Right arm, BP Patient Position: Sitting)   Pulse 67   Temp 95 °F (35 °C) (Tympanic)   Ht 4' 11\" (1.499 m)   Wt 113 lb 3.2 oz (51.3 kg)   SpO2 97%   BMI 22.86 kg/m²       Physical Exam    BMI:  [facility] [place] Outpatient Visit on 06/13/2019   Component Date Value Ref Range Status    Protein, total 06/13/2019 7.7  6.4 - 8.2 g/dL Final    Albumin 06/13/2019 4.5  3.4 - 5.0 g/dL Final    Globulin 06/13/2019 3.2  2.0 - 4.0 g/dL Final    A-G Ratio 06/13/2019 1.4  0.8 - 1.7   Final    Bilirubin, total 06/13/2019 0.4  0.2 - 1.0 MG/DL Final    Bilirubin, direct 06/13/2019 0.1  0.0 - 0.2 MG/DL Final    Alk.  phosphatase 06/13/2019 92  45 - 117 U/L Final    AST (SGOT) 06/13/2019 20  15 - 37 U/L Final    ALT (SGPT) 06/13/2019 53  13 - 56 U/L Final    TSH 06/13/2019 0.60  0.36 - 3.74 uIU/mL Final   Admission on 06/07/2019, Discharged on 06/07/2019   Component Date Value Ref Range Status    Ventricular Rate 06/07/2019 74  BPM Final    Atrial Rate 06/07/2019 74  BPM Final    P-R Interval 06/07/2019 180  ms Final    QRS Duration 06/07/2019 94  ms Final    Q-T Interval 06/07/2019 382  ms Final    QTC Calculation (Bezet) 06/07/2019 424  ms Final    Calculated P Axis 06/07/2019 69  degrees Final    Calculated R Axis 06/07/2019 -8  degrees Final    Calculated T Axis 06/07/2019 43  degrees Final    Diagnosis 06/07/2019    Final                    Value:Sinus rhythm with premature atrial complexes  Possible Left atrial enlargement  ST abnormality, possible digitalis effect  Abnormal ECG  When compared with ECG of 21-FEB-2019 00:57,  premature atrial complexes are now present  Confirmed by Cheyenne Villanueva ([id number]) on 6/9/2019 7:59:09 AM      WBC 06/07/2019 8.7  4.6 - 13.2 K/uL Final    RBC 06/07/2019 4.93  4.20 - 5.30 M/uL Final    HGB 06/07/2019 11.6* 12.0 - 16.0 g/dL Final    HCT 06/07/2019 35.6  35.0 - 45.0 % Final    MCV 06/07/2019 72.2* 74.0 - 97.0 FL Final    [facility] 06/07/2019 23.5* 24.0 - 34.0 PG Final    MCHC 06/07/2019 32.6  31.0 - 37.0 g/dL Final    RDW 06/07/2019 15.8* 11.6 - 14.5 % Final    PLATELET 66/00/1578 943  135 - 420 K/uL Final    MPV 06/07/2019 12.5* 9.2 - 11.8 FL Final    NEUTROPHILS 06/07/2019 51  40 - 73 % Final    LYMPHOCYTES 06/07/2019 37  21 - 52 % Final    MONOCYTES 06/07/2019 9  3 - 10 % Final    EOSINOPHILS 06/07/2019 2  0 - 5 % Final    BASOPHILS 06/07/2019 1  0 - 2 % Final    ABS. NEUTROPHILS 06/07/2019 4.5  1.8 - 8.0 K/UL Final    ABS. LYMPHOCYTES 06/07/2019 3.2  0.9 - 3.6 K/UL Final    ABS. MONOCYTES 06/07/2019 0.8  0.05 - 1.2 K/UL Final    ABS. EOSINOPHILS 06/07/2019 0.2  0.0 - 0.4 K/UL Final    ABS. BASOPHILS 06/07/2019 0.1  0.0 - 0.1 K/UL Final    DF 06/07/2019 AUTOMATED    Final    Sodium 06/07/2019 144  136 - 145 mmol/L Final    Potassium 06/07/2019 3.4* 3.5 - 5.5 mmol/L Final    Chloride 06/07/2019 109* 100 - 108 mmol/L Final    CO2 06/07/2019 32  21 - 32 mmol/L Final    Anion gap 06/07/2019 3  3.0 - 18 mmol/L Final    Glucose 06/07/2019 93  74 - 99 mg/dL Final    BUN 06/07/2019 13  7.0 - 18 MG/DL Final    Creatinine 06/07/2019 0.56* 0.6 - 1.3 MG/DL Final    BUN/Creatinine ratio 06/07/2019 23* 12 - 20   Final    GFR est AA 06/07/2019 >60  >60 ml/min/1.73m2 Final    GFR est non-AA 06/07/2019 >60  >60 ml/min/1.73m2 Final    Comment: (NOTE)  Estimated GFR is calculated using the Modification of Diet in Renal   Disease (MDRD) Study equation, reported for both  Americans   (GFRAA) and non- Americans (GFRNA), and normalized to 1.73m2   body surface area. The physician must decide which value applies to   the patient. The MDRD study equation should only be used in   individuals age 25 or older. It has not been validated for the   following: pregnant women, patients with serious comorbid conditions,   or on certain medications, or persons with extremes of body size,   muscle mass, or nutritional status.       Calcium 06/07/2019 9.3  8.5 - 10.1 MG/DL Final    CK 06/07/2019 152  26 - 192 U/L Final    CK - MB 06/07/2019 1.8  <3.6 ng/ml Final    CK-MB Index 06/07/2019 1.2  0.0 - 4.0 % Final    Troponin-I, QT 06/07/2019 <0.02  0.0 - 0.045 NG/ML Final    Comment: The presence of detectable troponin above the reference range indicates myocardial injury which may be due to ischemia, myocarditis, trauma, etc.  Clinical correlation is necessary to establish the significance of this finding. Sequential testing is recommended to determine if the typical rise and fall of cTnI is demonstrated. Note:  Cardiac troponin I has a relatively long half life and may be present well after the CK MB has returned to baseline. The reference range is based on the 99th percentile of the referent population. .No results found for any visits on 08/07/19. Assessment / Plan      ICD-10-CM ICD-9-CM    1. Dizziness R42 780.4    2. Essential hypertension I10 401.9    3. Pure hypercholesterolemia E78.00 272.0 LIPID PANEL   4. Acute recurrent pansinusitis J01.41 461.8      Check Lipids today  Add a course of Prednisone  she was advised to continue her maintenance medications      Follow-up and Dispositions    · Return in about 4 months (around 12/7/2019). I asked Filomena Jasmine if she has any questions and I answered the questions. Gladys Jasmine states that she understands the treatment plan and agrees with the treatment plan

## 2019-08-07 NOTE — PROGRESS NOTES
Miguel Marroquin presents today for   Chief Complaint   Patient presents with   Michelle Wang preferred language for health care discussion is Gideon Elias. Is someone accompanying this pt? no    Is the patient using any DME equipment during [address]? no    Depression Screening:  3 most recent PHQ Screens 8/7/2019   Little interest or pleasure in doing things Not at all   Feeling down, depressed, irritable, or hopeless Not at all   Total Score PHQ 2 0       Learning Assessment:  Learning Assessment 4/20/2016   PRIMARY LEARNER Patient   HIGHEST LEVEL OF EDUCATION - PRIMARY LEARNER  -   BARRIERS PRIMARY LEARNER -   CO-LEARNER CAREGIVER -   PRIMARY LANGUAGE ENGLISH   LEARNER PREFERENCE PRIMARY READING     DEMONSTRATION     VIDEOS   ANSWERED BY patient   RELATIONSHIP SELF       Abuse Screening:  Abuse Screening Questionnaire 8/30/2017   Do you ever feel afraid of your partner? N   Are you in a relationship with someone who physically or mentally threatens you? N   Is it safe for you to go home? Y       Health Maintenance reviewed and discussed and ordered per Provider. Health Maintenance Due   Topic Date Due    Hepatitis C Screening  1955    Pneumococcal 0-64 years (1 of 1 - PPSV23) 10/02/1962    DTaP/Tdap/Td series (1 - Tdap) 10/02/1977    Shingrix Vaccine Age 50> (1 of 2) 10/02/2006    Influenza Age 5 to Adult  08/01/2019   . Augustajustyna Lopez is updated on all     Pt currently taking Antiplatelet therapy? Yes aspirin    Coordination of Care:  1. Have you been to the ER, urgent care clinic since your last visit? no  Hospitalized since your last visit? no    2. Have you seen or consulted any other health care providers outside of the [address] [place] [address] since your last visit? no Include any pap smears or colon screening.  no

## 2019-08-19 NOTE — TELEPHONE ENCOUNTER
Requested Prescriptions     Pending Prescriptions Disp Refills    nystatin (MYCOSTATIN) 100,000 unit/mL suspension       Sig: Take  by mouth four (4) times daily.  swish and spit

## 2019-08-20 RX ORDER — NYSTATIN 100000 [USP'U]/ML
SUSPENSION ORAL
Qty: 473 ML | Refills: 1 | Status: SHIPPED | OUTPATIENT
Start: 2019-08-20 | End: 2019-10-04

## 2019-08-29 VITALS
BODY MASS INDEX: 22.86 KG/M2 | SYSTOLIC BLOOD PRESSURE: 132 MMHG | OXYGEN SATURATION: 98 % | WEIGHT: 113.4 LBS | TEMPERATURE: 93.7 F | HEART RATE: 66 BPM | HEIGHT: 59 IN | RESPIRATION RATE: 12 BRPM | DIASTOLIC BLOOD PRESSURE: 70 MMHG

## 2019-08-30 NOTE — PROGRESS NOTES
The patient presents to the office today with the chief complaint of palpitations    HPI    The patient remains on Labetalol for hypertension and intermittent palpitations. The symptoms are more quiet on the medication. The patient is due for a check of her LFT due to remaining on a statin. Is tolerating the medication well. Review of Systems   Respiratory: Negative for shortness of breath. Cardiovascular: Positive for palpitations. Negative for chest pain and leg swelling. Allergies   Allergen Reactions    Latex Rash    Keflex [Cephalexin] Rash    Epinephrine Other (comments)     tachycardia    Tetracyclines Nausea Only       Current Outpatient Medications   Medication Sig Dispense Refill    nystatin (MYCOSTATIN) 100,000 unit/mL suspension 2 teaspons swish and spit out 473 mL 1    labetalol (NORMODYNE) 100 mg tablet Take  by mouth two (2) times a day.  atorvastatin (LIPITOR) 40 mg tablet TAKE ONE TABLET BY MOUTH DAILY 90 Tab 2    budesonide/formoterol fumarate (SYMBICORT IN) Take  by inhalation.  ipratropium (ATROVENT) 42 mcg (0.06 %) nasal spray 2 sprays up each nostril twice per day 15 mL 2    montelukast (SINGULAIR) 10 mg tablet Take 1 Tab by mouth daily. 90 Tab 1    PROAIR HFA 90 mcg/actuation inhaler       aspirin delayed-release 81 mg tablet Take  by mouth nightly.  fluticasone (FLONASE) 50 mcg/actuation nasal spray 2 Sprays by Both Nostrils route daily.  cetirizine (ZYRTEC) 10 mg tablet Take 10 mg by mouth nightly.  amoxicillin-clavulanate (AUGMENTIN) 875-125 mg per tablet Take  by mouth every twelve (12) hours.  predniSONE (DELTASONE) 20 mg tablet 3 tabs daily x 3 days, 2 tabs daily x 3 days, 1 tab daily x 3 days, 1/2 tab daily x 3 days 20 Tab 0       Past Medical History:   Diagnosis Date    Allergic rhinitis     Blurred vision     Cardiac echocardiogram 06/11/2014    EF 60%. No RWMA. RVSP 30 mmHg. Mild AI.       Cardiac Holter monitor, normal 11/05/2014    Benign 48-hr Holter study.  Cardiac nuclear imaging test, mod risk 08/14/2015    Intermediate risk. High anterior artifact vs diagonal artery ischemia. Following Lexiscan, 0.5-mm downsloping inferolateral ST depression, resolving 10 min 30 sec of recovery. Arm heaviness noted.  Cardiovascular lower extremity arterial duplex 07/15/2016    Right leg: Mod peripheral arterial disease in femoral segment at rest.  Left leg:  Mild arterial disease at rest.  R KAREN 0.58. L KAREN 0.95. Similar to study of 10/15/14.  Cardiovascular renal angiography 10/27/2014    Bilateral patent renal arteries. Right CFA occluded but collateralized.  Cardiovascular renal duplex 10/13/2014    Aorta w/irregular walls. Severe >60% bilateral renal artery stenosis. Bilateral intrinsic/med renal disease.  Carotid duplex 10/17/2014    Mild <50% DAVID stenosis. Biphasic signals in both subclavians.  Carotid duplex 12/05/2016    Mild < 50% DAVID stenosis.       Cervical disc disease 09/2015    MRI of C[facility]spine at [facility] Dizziness     Ear ache     Essential hypertension     Fainting spell     Frequent headaches     Frequent nosebleeds     Hemorrhoid     Hyperlipidemia     Hypertension     Ill-defined condition     Migraine     Sinus problem     Sinusitis, chronic     Spontaneous pneumothorax 1999    2 episodes on the right within several months of each other    Stomach pain     Weakness of right leg        Past Surgical History:   Procedure Laterality Date    COLONOSCOPY N/A 11/17/2017    COLONOSCOPY, SCREENING with hot bx polypectomy performed by Yenni Carroll MD at [address] [place] [address] HX CHOLECYSTECTOMY  11/8/14    HX COLPOSCOPY      HX HEART CATHETERIZATION Bilateral 10/27/14    bilateral lower extremity angiography     HX OTHER SURGICAL Left     shave biopsy ( thigh area)       Social History     Socioeconomic History    Marital status: SINGLE Spouse name: Not on file    Number of children: Not on file    Years of education: Not on file    Highest education level: Not on file   Occupational History    Not on file   Social Needs    Financial resource strain: Not on file    Food insecurity:     Worry: Not on file     Inability: Not on file    Transportation needs:     Medical: Not on file     Non-medical: Not on file   Tobacco Use    Smoking status: Former Smoker     Packs/day: 0.25     Years: 20.00     Pack years: 5.00     Last attempt to quit: 2017     Years since quittin.6    Smokeless tobacco: Never Used    Tobacco comment: now down to 3 cigarettes per day   Substance and Sexual Activity    Alcohol use: No    Drug use: No    Sexual activity: Never   Lifestyle    Physical activity:     Days per week: Not on file     Minutes per session: Not on file    Stress: Not on file   Relationships    Social connections:     Talks on phone: Not on file     Gets together: Not on file     Attends [employer] service: Not on file     Active member of club or organization: Not on file     Attends meetings of clubs or organizations: Not on file     Relationship status: Not on file    Intimate partner violence:     Fear of current or ex partner: Not on file     Emotionally abused: Not on file     Physically abused: Not on file     Forced sexual activity: Not on file   Other Topics Concern    Not on file   Social History Narrative    Not on file       Patient does not have an advanced directive on file    Visit Vitals  /70   Pulse 66   Temp (!) 93.7 °F (34.3 °C) (Oral)   Resp 12   Ht 4' 11\" (1.499 m)   Wt 113 lb 6.4 oz (51.4 kg)   SpO2 98%   BMI 22.90 kg/m²       Physical Exam  No Cervical Lymphadenopathy  No Supraclavicular Lymphadenopathy  Thyroid is Normal  Lungs are normal to percussion. Clear to auscultation   Heart:  S1 S2 are normal, No gallops, No murmurs  No Carotid Bruits  Abdomen:  Normal Bowel Sounds. No tenderness. No masses. No Hepatomegaly or Splenomegaly  LE:  Strong Pedal Pulses. No Edema      BMI:  [facility] Outpatient Visit on 06/13/2019   Component Date Value Ref Range Status    Protein, total 06/13/2019 7.7  6.4 - 8.2 g/dL Final    Albumin 06/13/2019 4.5  3.4 - 5.0 g/dL Final    Globulin 06/13/2019 3.2  2.0 - 4.0 g/dL Final    A-G Ratio 06/13/2019 1.4  0.8 - 1.7   Final    Bilirubin, total 06/13/2019 0.4  0.2 - 1.0 MG/DL Final    Bilirubin, direct 06/13/2019 0.1  0.0 - 0.2 MG/DL Final    Alk.  phosphatase 06/13/2019 92  45 - 117 U/L Final    AST (SGOT) 06/13/2019 20  15 - 37 U/L Final    ALT (SGPT) 06/13/2019 53  13 - 56 U/L Final    TSH 06/13/2019 0.60  0.36 - 3.74 uIU/mL Final   Admission on 06/07/2019, Discharged on 06/07/2019   Component Date Value Ref Range Status    Ventricular Rate 06/07/2019 74  BPM Final    Atrial Rate 06/07/2019 74  BPM Final    P-R Interval 06/07/2019 180  ms Final    QRS Duration 06/07/2019 94  ms Final    Q-T Interval 06/07/2019 382  ms Final    QTC Calculation (Bezet) 06/07/2019 424  ms Final    Calculated P Axis 06/07/2019 69  degrees Final    Calculated R Axis 06/07/2019 -8  degrees Final    Calculated T Axis 06/07/2019 43  degrees Final    Diagnosis 06/07/2019    Final                    Value:Sinus rhythm with premature atrial complexes  Possible Left atrial enlargement  ST abnormality, possible digitalis effect  Abnormal ECG  When compared with ECG of 21-FEB-2019 00:57,  premature atrial complexes are now present  Confirmed by Karie Watts ([id number]) on 6/9/2019 7:59:09 AM      WBC 06/07/2019 8.7  4.6 - 13.2 K/uL Final    RBC 06/07/2019 4.93  4.20 - 5.30 M/uL Final    HGB 06/07/2019 11.6* 12.0 - 16.0 g/dL Final    HCT 06/07/2019 35.6  35.0 - 45.0 % Final    MCV 06/07/2019 72.2* 74.0 - 97.0 FL Final    MCH 06/07/2019 23.5* 24.0 - 34.0 PG Final    MCHC 06/07/2019 32.6  31.0 - 37.0 g/dL Final    RDW 06/07/2019 15.8* 11.6 - 14.5 % Final    PLATELET 17/78/0678 745 135 - 420 K/uL Final    MPV 06/07/2019 12.5* 9.2 - 11.8 FL Final    NEUTROPHILS 06/07/2019 51  40 - 73 % Final    LYMPHOCYTES 06/07/2019 37  21 - 52 % Final    MONOCYTES 06/07/2019 9  3 - 10 % Final    EOSINOPHILS 06/07/2019 2  0 - 5 % Final    BASOPHILS 06/07/2019 1  0 - 2 % Final    ABS. NEUTROPHILS 06/07/2019 4.5  1.8 - 8.0 K/UL Final    ABS. LYMPHOCYTES 06/07/2019 3.2  0.9 - 3.6 K/UL Final    ABS. MONOCYTES 06/07/2019 0.8  0.05 - 1.2 K/UL Final    ABS. EOSINOPHILS 06/07/2019 0.2  0.0 - 0.4 K/UL Final    ABS. BASOPHILS 06/07/2019 0.1  0.0 - 0.1 K/UL Final    DF 06/07/2019 AUTOMATED    Final    Sodium 06/07/2019 144  136 - 145 mmol/L Final    Potassium 06/07/2019 3.4* 3.5 - 5.5 mmol/L Final    Chloride 06/07/2019 109* 100 - 108 mmol/L Final    CO2 06/07/2019 32  21 - 32 mmol/L Final    Anion gap 06/07/2019 3  3.0 - 18 mmol/L Final    Glucose 06/07/2019 93  74 - 99 mg/dL Final    BUN 06/07/2019 13  7.0 - 18 MG/DL Final    Creatinine 06/07/2019 0.56* 0.6 - 1.3 MG/DL Final    BUN/Creatinine ratio 06/07/2019 23* 12 - 20   Final    GFR est AA 06/07/2019 >60  >60 ml/min/1.73m2 Final    GFR est non-AA 06/07/2019 >60  >60 ml/min/1.73m2 Final    Comment: (NOTE)  Estimated GFR is calculated using the Modification of Diet in Renal   Disease (MDRD) Study equation, reported for both  Americans   (GFRAA) and non- Americans (GFRNA), and normalized to 1.73m2   body surface area. The physician must decide which value applies to   the patient. The MDRD study equation should only be used in   individuals age 25 or older. It has not been validated for the   following: pregnant women, patients with serious comorbid conditions,   or on certain medications, or persons with extremes of body size,   muscle mass, or nutritional status.       Calcium 06/07/2019 9.3  8.5 - 10.1 MG/DL Final    CK 06/07/2019 152  26 - 192 U/L Final    CK - MB 06/07/2019 1.8  <3.6 ng/ml Final    CK-MB Index 06/07/2019 1.2 0.0 - 4.0 % Final    Troponin-I, QT 06/07/2019 <0.02  0.0 - 0.045 NG/ML Final    Comment: The presence of detectable troponin above the reference range indicates myocardial injury which may be due to ischemia, myocarditis, trauma, etc.  Clinical correlation is necessary to establish the significance of this finding. Sequential testing is recommended to determine if the typical rise and fall of cTnI is demonstrated. Note:  Cardiac troponin I has a relatively long half life and may be present well after the CK MB has returned to baseline. The reference range is based on the 99th percentile of the referent population. Hospital Outpatient Visit on 03/16/2019   Component Date Value Ref Range Status    WBC 03/16/2019 8.9  4.6 - 13.2 K/uL Final    RBC 03/16/2019 4.78  4.20 - 5.30 M/uL Final    HGB 03/16/2019 10.7* 12.0 - 16.0 g/dL Final    HCT 03/16/2019 34.4* 35.0 - 45.0 % Final    MCV 03/16/2019 72.0* 74.0 - 97.0 FL Final    MCH 03/16/2019 22.4* 24.0 - 34.0 PG Final    MCHC 03/16/2019 31.1  31.0 - 37.0 g/dL Final    RDW 03/16/2019 15.1* 11.6 - 14.5 % Final    PLATELET 63/27/5059 528  135 - 420 K/uL Final    NEUTROPHILS 03/16/2019 56  40 - 73 % Final    LYMPHOCYTES 03/16/2019 36  21 - 52 % Final    MONOCYTES 03/16/2019 5  3 - 10 % Final    EOSINOPHILS 03/16/2019 2  0 - 5 % Final    BASOPHILS 03/16/2019 1  0 - 2 % Final    ABS. NEUTROPHILS 03/16/2019 5.0  1.8 - 8.0 K/UL Final    ABS. LYMPHOCYTES 03/16/2019 3.2  0.9 - 3.6 K/UL Final    ABS. MONOCYTES 03/16/2019 0.4  0.05 - 1.2 K/UL Final    ABS. EOSINOPHILS 03/16/2019 0.2  0.0 - 0.4 K/UL Final    ABS.  BASOPHILS 03/16/2019 0.1  0.0 - 0.1 K/UL Final    DF 03/16/2019 AUTOMATED    Final    PLATELET COMMENTS 46/18/6402 ADEQUATE PLATELETS    Final    LARGE PLATELETS    RBC COMMENTS 03/16/2019     Final                    Value:OVALOCYTES  1+      RBC COMMENTS 03/16/2019     Final                    Value:ANISOCYTOSIS  1+      RBC COMMENTS 03/16/2019     Final                    Value:TARGET CELLS  1+      RBC COMMENTS 03/16/2019     Final                    Value:SCHISTOCYTES  FEW      Sodium 03/16/2019 145  136 - 145 mmol/L Final    Potassium 03/16/2019 4.9  3.5 - 5.5 mmol/L Final    Chloride 03/16/2019 109* 100 - 108 mmol/L Final    CO2 03/16/2019 30  21 - 32 mmol/L Final    Anion gap 03/16/2019 6  3.0 - 18 mmol/L Final    Glucose 03/16/2019 90  74 - 99 mg/dL Final    BUN 03/16/2019 11  7.0 - 18 MG/DL Final    Creatinine 03/16/2019 0.66  0.6 - 1.3 MG/DL Final    BUN/Creatinine ratio 03/16/2019 17  12 - 20   Final    GFR est AA 03/16/2019 >60  >60 ml/min/1.73m2 Final    GFR est non-AA 03/16/2019 >60  >60 ml/min/1.73m2 Final    Comment: (NOTE)  Estimated GFR is calculated using the Modification of Diet in Renal   Disease (MDRD) Study equation, reported for both  Americans   (GFRAA) and non- Americans (GFRNA), and normalized to 1.73m2   body surface area. The physician must decide which value applies to   the patient. The MDRD study equation should only be used in   individuals age 25 or older. It has not been validated for the   following: pregnant women, patients with serious comorbid conditions,   or on certain medications, or persons with extremes of body size,   muscle mass, or nutritional status.  Calcium 03/16/2019 9.4  8.5 - 10.1 MG/DL Final    Bilirubin, total 03/16/2019 0.4  0.2 - 1.0 MG/DL Final    ALT (SGPT) 03/16/2019 28  13 - 56 U/L Final    AST (SGOT) 03/16/2019 14* 15 - 37 U/L Final    Alk.  phosphatase 03/16/2019 87  45 - 117 U/L Final    Protein, total 03/16/2019 7.1  6.4 - 8.2 g/dL Final    Albumin 03/16/2019 4.1  3.4 - 5.0 g/dL Final    Globulin 03/16/2019 3.0  2.0 - 4.0 g/dL Final    A-G Ratio 03/16/2019 1.4  0.8 - 1.7   Final       .  Results for orders placed or performed during the hospital encounter of 06/13/19   HEPATIC FUNCTION PANEL   Result Value Ref Range    Protein, total 7.7 6.4 - 8.2 g/dL    Albumin 4.5 3.4 - 5.0 g/dL    Globulin 3.2 2.0 - 4.0 g/dL    A-G Ratio 1.4 0.8 - 1.7      Bilirubin, total 0.4 0.2 - 1.0 MG/DL    Bilirubin, direct 0.1 0.0 - 0.2 MG/DL    Alk. phosphatase 92 45 - 117 U/L    AST (SGOT) 20 15 - 37 U/L    ALT (SGPT) 53 13 - 56 U/L   TSH 3RD GENERATION   Result Value Ref Range    TSH 0.60 0.36 - 3.74 uIU/mL       Assessment / Plan      ICD-10-CM ICD-9-CM    1. Palpitations R00.2 785.1 REFERRAL TO CARDIOLOGY      TSH 3RD GENERATION   2. Medication management Z79.899 V58.69 HEPATIC FUNCTION PANEL       she was advised to continue her maintenance medications and folllow the symptoms  Labs ordered  To Cardiololgy      Follow-up and Dispositions    · Return in about 3 months (around 9/13/2019). I asked Tim Garcia if she has any questions and I answered the questions. Gladys Garcia states that she understands the treatment plan and agrees with the treatment plan          THIS DOCUMENT WAS CREATED WITH A VOICE ACTIVATED DICTATION SYSTEM.   IT MAY CONTAIN TRANSCRIPTION ERRORS

## 2019-09-25 ENCOUNTER — HOSPITAL ENCOUNTER (OUTPATIENT)
Dept: MAMMOGRAPHY | Age: 64
Discharge: HOME | End: 2019-09-25
Attending: NURSE PRACTITIONER
Payer: COMMERCIAL

## 2019-09-25 DIAGNOSIS — Z12.31 VISIT FOR SCREENING MAMMOGRAM: ICD-10-CM

## 2019-09-25 PROCEDURE — 77063 BREAST TOMOSYNTHESIS BI: CPT

## 2019-10-04 ENCOUNTER — HOSPITAL ENCOUNTER (EMERGENCY)
Age: 64
Discharge: HOME | End: 2019-10-04
Attending: EMERGENCY MEDICINE
Payer: COMMERCIAL

## 2019-10-04 VITALS
TEMPERATURE: 98.4 F | BODY MASS INDEX: 22.78 KG/M2 | RESPIRATION RATE: 18 BRPM | WEIGHT: 113 LBS | OXYGEN SATURATION: 95 % | HEIGHT: 59 IN | HEART RATE: 65 BPM | SYSTOLIC BLOOD PRESSURE: 134 MMHG | DIASTOLIC BLOOD PRESSURE: 44 MMHG

## 2019-10-04 DIAGNOSIS — G43.001 MIGRAINE WITHOUT AURA AND WITH STATUS MIGRAINOSUS, NOT INTRACTABLE: Primary | ICD-10-CM

## 2019-10-04 LAB
ALBUMIN SERPL-MCNC: 3.9 G/DL (ref 3.4–5)
ALBUMIN/GLOB SERPL: 1.1 {RATIO} (ref 0.8–1.7)
ALP SERPL-CCNC: 86 U/L (ref 45–117)
ALT SERPL-CCNC: 35 U/L (ref 13–56)
ANION GAP SERPL CALC-SCNC: 4 MMOL/L (ref 3–18)
APPEARANCE UR: CLEAR
AST SERPL-CCNC: 17 U/L (ref 10–38)
BASOPHILS # BLD: 0.1 K/UL (ref 0–0.1)
BASOPHILS NFR BLD: 1 % (ref 0–2)
BILIRUB SERPL-MCNC: 0.3 MG/DL (ref 0.2–1)
BILIRUB UR QL: NEGATIVE
BUN SERPL-MCNC: 11 MG/DL (ref 7–18)
BUN/CREAT SERPL: 19 (ref 12–20)
CALCIUM SERPL-MCNC: 9.5 MG/DL (ref 8.5–10.1)
CHLORIDE SERPL-SCNC: 109 MMOL/L (ref 100–111)
CK MB CFR SERPL CALC: 0.7 % (ref 0–4)
CK MB SERPL-MCNC: 1.2 NG/ML (ref 5–25)
CK SERPL-CCNC: 173 U/L (ref 26–192)
CO2 SERPL-SCNC: 31 MMOL/L (ref 21–32)
COLOR UR: YELLOW
CREAT SERPL-MCNC: 0.58 MG/DL (ref 0.6–1.3)
DIFFERENTIAL METHOD BLD: ABNORMAL
EOSINOPHIL # BLD: 0.2 K/UL (ref 0–0.4)
EOSINOPHIL NFR BLD: 2 % (ref 0–5)
ERYTHROCYTE [DISTWIDTH] IN BLOOD BY AUTOMATED COUNT: 15.4 % (ref 11.6–14.5)
GLOBULIN SER CALC-MCNC: 3.5 G/DL (ref 2–4)
GLUCOSE SERPL-MCNC: 94 MG/DL (ref 74–99)
GLUCOSE UR STRIP.AUTO-MCNC: NEGATIVE MG/DL
HCT VFR BLD AUTO: 35.5 % (ref 35–45)
HGB BLD-MCNC: 11.4 G/DL (ref 12–16)
HGB UR QL STRIP: NEGATIVE
KETONES UR QL STRIP.AUTO: NEGATIVE MG/DL
LEUKOCYTE ESTERASE UR QL STRIP.AUTO: NEGATIVE
LYMPHOCYTES # BLD: 3.1 K/UL (ref 0.9–3.6)
LYMPHOCYTES NFR BLD: 40 % (ref 21–52)
MCH RBC QN AUTO: 23.4 PG (ref 24–34)
MCHC RBC AUTO-ENTMCNC: 32.1 G/DL (ref 31–37)
MCV RBC AUTO: 72.7 FL (ref 74–97)
MONOCYTES # BLD: 0.7 K/UL (ref 0.05–1.2)
MONOCYTES NFR BLD: 9 % (ref 3–10)
NEUTS SEG # BLD: 3.8 K/UL (ref 1.8–8)
NEUTS SEG NFR BLD: 48 % (ref 40–73)
NITRITE UR QL STRIP.AUTO: NEGATIVE
PH UR STRIP: 7 [PH] (ref 5–8)
PLATELET # BLD AUTO: 244 K/UL (ref 135–420)
PMV BLD AUTO: 12 FL (ref 9.2–11.8)
POTASSIUM SERPL-SCNC: 3.7 MMOL/L (ref 3.5–5.5)
PROT SERPL-MCNC: 7.4 G/DL (ref 6.4–8.2)
PROT UR STRIP-MCNC: NEGATIVE MG/DL
RBC # BLD AUTO: 4.88 M/UL (ref 4.2–5.3)
SODIUM SERPL-SCNC: 144 MMOL/L (ref 136–145)
SP GR UR REFRACTOMETRY: <1.005 (ref 1–1.03)
TROPONIN I SERPL-MCNC: <0.02 NG/ML (ref 0–0.04)
UROBILINOGEN UR QL STRIP.AUTO: 0.2 EU/DL (ref 0.2–1)
WBC # BLD AUTO: 7.9 K/UL (ref 4.6–13.2)

## 2019-10-04 PROCEDURE — 80053 COMPREHEN METABOLIC PANEL: CPT

## 2019-10-04 PROCEDURE — 99284 EMERGENCY DEPT VISIT MOD MDM: CPT

## 2019-10-04 PROCEDURE — 85025 COMPLETE CBC W/AUTO DIFF WBC: CPT

## 2019-10-04 PROCEDURE — 93005 ELECTROCARDIOGRAM TRACING: CPT

## 2019-10-04 PROCEDURE — 82550 ASSAY OF CK (CPK): CPT

## 2019-10-04 PROCEDURE — 81003 URINALYSIS AUTO W/O SCOPE: CPT

## 2019-10-04 RX ORDER — DIVALPROEX SODIUM 500 MG/1
500 TABLET, DELAYED RELEASE ORAL 2 TIMES DAILY
COMMUNITY

## 2019-10-04 RX ORDER — SUMATRIPTAN 6 MG/.5ML
6 INJECTION, SOLUTION SUBCUTANEOUS ONCE
Status: DISCONTINUED | OUTPATIENT
Start: 2019-10-04 | End: 2019-10-05 | Stop reason: HOSPADM

## 2019-10-04 RX ORDER — METOPROLOL SUCCINATE 25 MG/1
25 TABLET, EXTENDED RELEASE ORAL 2 TIMES DAILY
COMMUNITY
Start: 2019-08-15

## 2019-10-05 LAB
ATRIAL RATE: 68 BPM
CALCULATED P AXIS, ECG09: 54 DEGREES
CALCULATED R AXIS, ECG10: -10 DEGREES
CALCULATED T AXIS, ECG11: 22 DEGREES
DIAGNOSIS, 93000: NORMAL
P-R INTERVAL, ECG05: 174 MS
Q-T INTERVAL, ECG07: 390 MS
QRS DURATION, ECG06: 92 MS
QTC CALCULATION (BEZET), ECG08: 414 MS
VENTRICULAR RATE, ECG03: 68 BPM

## 2019-10-05 NOTE — DISCHARGE INSTRUCTIONS
Patient Education        Migraine Headache: Care Instructions  Your Care Instructions  Migraines are painful, throbbing headaches that often start on one side of the head. They may cause nausea and vomiting and make you sensitive to light, sound, or smell. Without treatment, migraines can last from 4 hours to a few days. Medicines can help prevent migraines or stop them after they have started. Your doctor can help you find which ones work best for you. Follow-up care is a key part of your treatment and safety. Be sure to make and go to all appointments, and call your doctor if you are having problems. It's also a good idea to know your test results and keep a list of the medicines you take. How can you care for yourself at home? · Do not drive if you have taken a prescription pain medicine. · Rest in a quiet, dark room until your headache is gone. Close your eyes, and try to relax or go to sleep. Don't watch TV or read. · Put a cold, moist cloth or cold pack on the painful area for 10 to 20 minutes at a time. Put a thin cloth between the cold pack and your skin. · Use a warm, moist towel or a heating pad set on low to relax tight shoulder and neck muscles. · Have someone gently massage your neck and shoulders. · Take your medicines exactly as prescribed. Call your doctor if you think you are having a problem with your medicine. You will get more details on the specific medicines your doctor prescribes. · Be careful not to take pain medicine more often than the instructions allow. You could get worse or more frequent headaches when the medicine wears off. To prevent migraines  · Keep a headache diary so you can figure out what triggers your headaches. Avoiding triggers may help you prevent headaches. Record when each headache began, how long it lasted, and what the pain was like.  (Was it throbbing, aching, stabbing, or dull?) Write down any other symptoms you had with the headache, such as nausea, flashing lights or dark spots, or sensitivity to bright light or loud noise. Note if the headache occurred near your period. List anything that might have triggered the headache. Triggers may include certain foods (chocolate, cheese, wine) or odors, smoke, bright light, stress, or lack of sleep. · If your doctor has prescribed medicine for your migraines, take it as directed. You may have medicine that you take only when you get a migraine and medicine that you take all the time to help prevent migraines. ? If your doctor has prescribed medicine for when you get a headache, take it at the first sign of a migraine, unless your doctor has given you other instructions. ? If your doctor has prescribed medicine to prevent migraines, take it exactly as prescribed. Call your doctor if you think you are having a problem with your medicine. · Find healthy ways to deal with stress. Migraines are most common during or right after stressful times. Take time to relax before and after you do something that has caused a migraine in the past.  · Try to keep your muscles relaxed by keeping good posture. Check your jaw, face, neck, and shoulder muscles for tension. Try to relax them. When you sit at a desk, change positions often. And make sure to stretch for 30 seconds each hour. · Get plenty of sleep and exercise. · Eat meals on a regular schedule. Avoid foods and drinks that often trigger migraines. These include chocolate, alcohol (especially red wine and port), aspartame, monosodium glutamate (MSG), and some additives found in foods (such as hot dogs, collins, cold cuts, aged cheeses, and pickled foods). · Limit caffeine. Don't drink too much coffee, tea, or soda. But don't quit caffeine suddenly. That can also give you migraines. · Do not smoke or allow others to smoke around you. If you need help quitting, talk to your doctor about stop-smoking programs and medicines.  These can increase your chances of quitting for good.  · If you are taking birth control pills or hormone therapy, talk to your doctor about whether they are triggering your migraines. When should you call for help? Call 911 anytime you think you may need emergency care. For example, call if:    · You have signs of a stroke. These may include:  ? Sudden numbness, paralysis, or weakness in your face, arm, or leg, especially on only one side of your body. ? Sudden vision changes. ? Sudden trouble speaking. ? Sudden confusion or trouble understanding simple statements. ? Sudden problems with walking or balance. ? A sudden, severe headache that is different from past headaches.    Call your doctor now or seek immediate medical care if:    · You have new or worse nausea and vomiting.     · You have a new or higher fever.     · Your headache gets much worse.    Watch closely for changes in your health, and be sure to contact your doctor if:    · You are not getting better after 2 days (48 hours). Where can you learn more? Go to http://dirk-amaris.info/. Enter H194 in the search box to learn more about \"Migraine Headache: Care Instructions. \"  Current as of: March 28, 2019  Content Version: 12.2  © 1341-2087 [employer]. Care instructions adapted under license by [employer] (which disclaims liability or warranty for this information). If you have questions about a medical condition or this instruction, always ask your healthcare professional. [address] any warranty or liability for your use of this information.

## 2019-10-05 NOTE — ED TRIAGE NOTES
Patient states prior hx of migraine headaches and HTN. She states left side headache x 2 days. C/o elevated blood pressure. Advises that she took a dose of labetalol before coming to ER. She states labetalol use had previously been discontinued by Cardiology and Metoprolol was substituted in its place. States sensing palpitations. Advises of prior dx of arrhythmia.

## 2019-10-06 NOTE — ED PROVIDER NOTES
HPI Patient states she a hx of migraine headaches and HTN. She states she has had a  left side headache x 2 days secondary to an elevated blood pressure. She states her cardiologist change her BP from labetalol to metoprolol,  However, her BP wasn't wasn't controlling her headache. She took a dose of labetalol before coming to ER. Her headache had improved upon arrival to the ER. Past Medical History:   Diagnosis Date    Allergic rhinitis     Blurred vision     Cardiac echocardiogram 06/11/2014    EF 60%. No RWMA. RVSP 30 mmHg. Mild AI.  Cardiac Holter monitor, normal 11/05/2014    Benign 48-hr Holter study.  Cardiac nuclear imaging test, mod risk 08/14/2015    Intermediate risk. High anterior artifact vs diagonal artery ischemia. Following Lexiscan, 0.5-mm downsloping inferolateral ST depression, resolving 10 min 30 sec of recovery. Arm heaviness noted.  Cardiovascular lower extremity arterial duplex 07/15/2016    Right leg: Mod peripheral arterial disease in femoral segment at rest.  Left leg:  Mild arterial disease at rest.  R KAREN 0.58. L KAREN 0.95. Similar to study of 10/15/14.  Cardiovascular renal angiography 10/27/2014    Bilateral patent renal arteries. Right CFA occluded but collateralized.  Cardiovascular renal duplex 10/13/2014    Aorta w/irregular walls. Severe >60% bilateral renal artery stenosis. Bilateral intrinsic/med renal disease.  Carotid duplex 10/17/2014    Mild <50% DAVID stenosis. Biphasic signals in both subclavians.  Carotid duplex 12/05/2016    Mild < 50% DAVID stenosis.       Cervical disc disease 09/2015    MRI of C-spine at [facility] JO FARAH Dizziness     Ear ache     Essential hypertension     Fainting spell     Frequent headaches     Frequent nosebleeds     Hemorrhoid     Hyperlipidemia     Hypertension     Ill-defined condition     Migraine     Sinus problem     Sinusitis, chronic     Spontaneous pneumothorax 1999    2 episodes on the right within several months of each other    Stomach pain     Weakness of right leg        Past Surgical History:   Procedure Laterality Date    COLONOSCOPY N/A 2017    COLONOSCOPY, SCREENING with hot bx polypectomy performed by Frandy Metz MD at [facility] ENDOSCOPY    HX BREAST REDUCTION      HX CHOLECYSTECTOMY  14    HX COLPOSCOPY      HX HEART CATHETERIZATION Bilateral 10/27/14    bilateral lower extremity angiography     HX OTHER SURGICAL Left     shave biopsy ( thigh area)         Family History:   Problem Relation Age of Onset    Deep Vein Thrombosis Father     Coronary Artery Disease Father     Pacemaker Father     Diabetes Father     High Cholesterol Father     Hypertension Father     High Cholesterol Mother     Hypertension Mother     Heart Attack Brother 40    Cancer Brother     Heart Attack Maternal Grandmother 100    Heart Attack Other 48    Hypertension Other     Hypertension Maternal Aunt        Social History     Socioeconomic History    Marital status: SINGLE     Spouse name: Not on file    Number of children: Not on file    Years of education: Not on file    Highest education level: Not on file   Occupational History    Not on file   Social Needs    Financial resource strain: Not on file    Food insecurity:     Worry: Not on file     Inability: Not on file    Transportation needs:     Medical: Not on file     Non-medical: Not on file   Tobacco Use    Smoking status: Former Smoker     Packs/day: 0.25     Years: 20.00     Pack years: 5.00     Last attempt to quit: 2017     Years since quittin.7    Smokeless tobacco: Never Used    Tobacco comment: now down to 3 cigarettes per day   Substance and Sexual Activity    Alcohol use: No    Drug use: No    Sexual activity: Never   Lifestyle    Physical activity:     Days per week: Not on file     Minutes per session: Not on file    Stress: Not on file   Relationships    Social connections:     Talks on phone: Not on file     Gets together: Not on file     Attends Zoroastrianism service: Not on file     Active member of club or organization: Not on file     Attends meetings of clubs or organizations: Not on file     Relationship status: Not on file    Intimate partner violence:     Fear of current or ex partner: Not on file     Emotionally abused: Not on file     Physically abused: Not on file     Forced sexual activity: Not on file   Other Topics Concern    Not on file   Social History Narrative    Not on file         ALLERGIES: Latex; Keflex [cephalexin]; Epinephrine; and Tetracyclines    Review of Systems   Constitutional: Negative. HENT: Negative. Eyes: Negative. Respiratory: Negative. Cardiovascular: Negative. Gastrointestinal: Negative. Endocrine: Negative. Genitourinary: Negative. Musculoskeletal: Negative. Skin: Negative. Allergic/Immunologic: Negative. Neurological: Positive for headaches. Hematological: Negative. Psychiatric/Behavioral: Negative. All other systems reviewed and are negative. Vitals:    10/04/19 2108 10/04/19 2200 10/04/19 2300   BP: 180/59 147/55 134/44   Pulse: 68 67 65   Resp: 18 8 18   Temp: 98.6 °F (37 °C)  98.4 °F (36.9 °C)   SpO2: 99% 95% 95%   Weight: 51.3 kg (113 lb)     Height: 4' 11\" (1.499 m)              Physical Exam   Constitutional: She is oriented to person, place, and time. She appears well-developed and well-nourished. No distress. HENT:   Head: Normocephalic. Right Ear: External ear normal.   Left Ear: External ear normal.   Mouth/Throat: No oropharyngeal exudate. Eyes: Pupils are equal, round, and reactive to light. Conjunctivae and EOM are normal. Right eye exhibits no discharge. Left eye exhibits no discharge. No scleral icterus. Neck: Normal range of motion. Neck supple. No JVD present. No tracheal deviation present. No thyromegaly present.    Cardiovascular: Normal rate, regular rhythm, normal heart sounds and intact distal pulses. Exam reveals no gallop and no friction rub. No murmur heard. Pulmonary/Chest: Effort normal and breath sounds normal. No stridor. No respiratory distress. She has no wheezes. She has no rales. She exhibits no tenderness. Abdominal: Soft. Bowel sounds are normal. She exhibits no distension and no mass. There is no tenderness. There is no rebound and no guarding. Musculoskeletal: Normal range of motion. She exhibits no edema or tenderness. Lymphadenopathy:     She has no cervical adenopathy. Neurological: She is alert and oriented to person, place, and time. She displays normal reflexes. No cranial nerve deficit. She exhibits normal muscle tone. Coordination normal.   Skin: Skin is warm and dry. No rash noted. She is not diaphoretic. No erythema. No pallor. Nursing note and vitals reviewed. MDM differential diagnosis: Migraine, tension headache, anxiety, uncontrolled hypertension       Procedures    Hospital course: Patient was doing well after taking her labetalol. Patient refused another medication for headache. Patient headache resolved on his own. Diagnosis: Migraine headache secondary to hypertension. Disposition: Patient to follow-up with her cardiologist within the next 2 to 3 days. She is continue to use the labetalol until seen by her cardiologist.  Patient can return to ER as needed. Dictation disclaimer:  Please note that this dictation was completed with "[employer] the [employer] voice recognition software. Quite often unanticipated grammatical, syntax, homophones, and other interpretive errors are inadvertently transcribed by the computer software. Please disregard these errors. Please excuse any errors that have escaped final proofreading.

## 2019-10-24 ENCOUNTER — OFFICE VISIT (OUTPATIENT)
Dept: ORTHOPEDIC SURGERY | Age: 64
End: 2019-10-24

## 2019-10-24 VITALS
HEIGHT: 59 IN | OXYGEN SATURATION: 99 % | BODY MASS INDEX: 23.02 KG/M2 | DIASTOLIC BLOOD PRESSURE: 52 MMHG | TEMPERATURE: 97.2 F | WEIGHT: 114.2 LBS | RESPIRATION RATE: 12 BRPM | HEART RATE: 66 BPM | SYSTOLIC BLOOD PRESSURE: 168 MMHG

## 2019-10-24 DIAGNOSIS — M79.642 LEFT HAND PAIN: ICD-10-CM

## 2019-10-24 DIAGNOSIS — M67.442 GANGLION CYST OF FLEXOR TENDON SHEATH OF FINGER OF LEFT HAND: Primary | ICD-10-CM

## 2019-10-24 RX ORDER — IBUPROFEN 800 MG/1
TABLET ORAL
COMMUNITY
End: 2021-05-13

## 2019-10-24 NOTE — PROGRESS NOTES
Shari Huynh is a 62 y.o. female right handed . Worker's Compensation and legal considerations: not known. Vitals:    10/24/19 0920   BP: 168/52   Pulse: 66   Resp: 12   Temp: 97.2 °F (36.2 °C)   TempSrc: Oral   SpO2: 99%   Weight: 114 lb 3.2 oz (51.8 kg)   Height: 4' 11\" (1.499 m)   PainSc:   5   PainLoc: Finger           Chief Complaint   Patient presents with    Finger Pain     LEFT INDEX FINGER         HPI: Patient comes in today with complaints of left index finger pain and swelling. She reports the pain to be at the base of the finger on the volar side. Date of onset:  Indeterminate    Injury: No    Prior Treatment:  No    Numbness/ Tingling: No    ROS: Review of Systems - General ROS: negative  Respiratory ROS: no cough, shortness of breath, or wheezing  Cardiovascular ROS: no chest pain or dyspnea on exertion  Musculoskeletal ROS: positive for - pain in finger - left  Neurological ROS: negative  Dermatological ROS: negative    Past Medical History:   Diagnosis Date    Allergic rhinitis     Blurred vision     Cardiac echocardiogram 06/11/2014    EF 60%. No RWMA. RVSP 30 mmHg. Mild AI.  Cardiac Holter monitor, normal 11/05/2014    Benign 48-hr Holter study.  Cardiac nuclear imaging test, mod risk 08/14/2015    Intermediate risk. High anterior artifact vs diagonal artery ischemia. Following Lexiscan, 0.5-mm downsloping inferolateral ST depression, resolving 10 min 30 sec of recovery. Arm heaviness noted.  Cardiovascular lower extremity arterial duplex 07/15/2016    Right leg: Mod peripheral arterial disease in femoral segment at rest.  Left leg:  Mild arterial disease at rest.  R KAREN 0.58. L KAREN 0.95. Similar to study of 10/15/14.  Cardiovascular renal angiography 10/27/2014    Bilateral patent renal arteries. Right CFA occluded but collateralized.  Cardiovascular renal duplex 10/13/2014    Aorta w/irregular walls.   Severe >60% bilateral renal artery stenosis. Bilateral intrinsic/med renal disease.  Carotid duplex 10/17/2014    Mild <50% DAVID stenosis. Biphasic signals in both subclavians.  Carotid duplex 12/05/2016    Mild < 50% DAVID stenosis.  Cervical disc disease 09/2015    MRI of C-spine [facility] JO FARAH Dizziness     Ear ache     Essential hypertension     Fainting spell     Frequent headaches     Frequent nosebleeds     Hemorrhoid     Hyperlipidemia     Hypertension     Ill-defined condition     Migraine     Sinus problem     Sinusitis, chronic     Spontaneous pneumothorax 1999    2 episodes on the right within several months of each other    Stomach pain     Weakness of right leg        Past Surgical History:   Procedure Laterality Date    COLONOSCOPY N/A 11/17/2017    COLONOSCOPY, SCREENING with hot bx polypectomy performed by Joel Hernandez MD at [address] [place] [address] HX CHOLECYSTECTOMY  11/8/14    HX COLPOSCOPY      HX HEART CATHETERIZATION Bilateral 10/27/14    bilateral lower extremity angiography     HX OTHER SURGICAL Left     shave biopsy ( thigh area)       Current Outpatient Medications   Medication Sig Dispense Refill    ibuprofen (MOTRIN) 800 mg tablet Take  by mouth.  triamcinolone acetonide (KENALOG) 10 mg/mL injection 1 mL by IntraMUSCular route once for 1 dose. 1 Vial 0    divalproex DR (DEPAKOTE) 500 mg tablet Take 500 mg by mouth two (2) times a day.  metoprolol succinate (TOPROL-XL) 25 mg XL tablet Take 25 mg by mouth.  labetalol (NORMODYNE) 100 mg tablet Take  by mouth two (2) times a day.  atorvastatin (LIPITOR) 40 mg tablet TAKE ONE TABLET BY MOUTH DAILY 90 Tab 2    budesonide/formoterol fumarate (SYMBICORT IN) Take  by inhalation.  ipratropium (ATROVENT) 42 mcg (0.06 %) nasal spray 2 sprays up each nostril twice per day 15 mL 2    montelukast (SINGULAIR) 10 mg tablet Take 1 Tab by mouth daily.  90 Tab 1    PROAIR HFA 90 mcg/actuation inhaler       aspirin delayed-release 81 mg tablet Take  by mouth nightly.  fluticasone (FLONASE) 50 mcg/actuation nasal spray 2 Sprays by Both Nostrils route daily.  cetirizine (ZYRTEC) 10 mg tablet Take 10 mg by mouth nightly. Allergies   Allergen Reactions    Latex Rash    Keflex [Cephalexin] Rash    Epinephrine Other (comments)     tachycardia    Tetracyclines Nausea Only         PE:     Left Hand: There is tenderness to palpation over the volar aspect of the base of the index finger. The area also has a 1 cm semi-compressible semi-firm mass. This mass is tender to palpation. Range of motion is near full with no catching or locking and triggering. Cap refill is brisk and sensation is intact distally. Imaging:     Plain films of the left hand does not show any fracture dislocation or other osseous abnormality. More specifically about the index finger there erosive process noted. ICD-10-CM ICD-9-CM    1. Ganglion cyst of flexor tendon sheath of finger of left hand M67.442 727.42 triamcinolone acetonide (KENALOG) 10 mg/mL injection      TRIAMCINOLONE ACETONIDE INJ      [place] INJECT TENDON SHEATH/LIGAMENT   2.  Left hand pain M79.642 729.5 AMB POC XRAY, HAND; 3+ VIEWS       Plan:     Left index finger A1 pulley and ganglion cyst injection    Follow-up PRN    Plan was reviewed with patient, who verbalized agreement and understanding of the plan    [facility] NOTE        Chart reviewed for the following:   Lupillo LOPEZ DO, have reviewed the History, Physical and updated the Allergic reactions for [address] [place] [address] performed immediately prior to start of procedure:   Lupillo LOPEZ DO, have performed the following reviews on [address] [place] [address] prior to the start of the procedure:            * Patient was identified by name and date of birth   * Agreement on procedure being performed was verified  * Risks and Benefits explained to the patient  * Procedure site verified and marked as necessary  * Patient was positioned for comfort  * Consent was signed and verified     Time: 10:15 AM      Date of procedure: 10/24/2019    Procedure performed by: Rhett Salazar DO    Provider assisted by: Travon CLAUDIO    Patient assisted by: self    How tolerated by patient: tolerated the procedure well with no complications    Post Procedural Pain Scale: 0 - No Hurt    Comments: none    Procedure:  After consent was obtained, using sterile technique the ganglion was prepped. Local anesthetic used: 1% lidocaine. Kenalog 5 mg and was then injected and the needle withdrawn. The procedure was well tolerated. The patient is asked to continue to rest the area for a few more days before resuming regular activities. It may be more painful for the first 1-2 days. Watch for fever, or increased swelling or persistent pain in the joint. Call or return to clinic prn if such symptoms occur or there is failure to improve as anticipated.

## 2019-10-24 NOTE — LETTER
NOTIFICATION RETURN TO WORK 
 
10/24/2019 10:13 AM 
 
Ms. Renuka Adler [address] [facility] [id number] To Whom It May Concern: 
 
Renuka Adler is currently under the care of [address] [place] [address] Alfredo Maya. Patient was seen in our office today, please excuse her from work today 10/24/19. If there are questions or concerns please have the patient contact our office. Sincerely, Jeanne Kern, DO

## 2019-10-24 NOTE — PROGRESS NOTES
Verbal order read back given by Dr. Hussein Carey for injection draw up of .5 mL of Kenalog (10 mg) and 1.5 ml of Lidocaine 1%

## 2019-10-24 NOTE — PROGRESS NOTES
1. Have you been to the ER, urgent care clinic since your last visit? Hospitalized since your last visit? No    2. Have you seen or consulted any other health care providers outside of the [address] [place] [address] since your last visit? Include any pap smears or colon screening.  No

## 2019-11-16 ENCOUNTER — HOSPITAL ENCOUNTER (OUTPATIENT)
Dept: LAB | Age: 64
Discharge: HOME | End: 2019-11-16
Payer: COMMERCIAL

## 2019-11-16 LAB
25(OH)D3 SERPL-MCNC: 15 NG/ML (ref 30–100)
CHOLEST SERPL-MCNC: 129 MG/DL
HDLC SERPL-MCNC: 43 MG/DL (ref 40–60)
HDLC SERPL: 3 {RATIO} (ref 0–5)
LDLC SERPL CALC-MCNC: 69.4 MG/DL (ref 0–100)
LIPID PROFILE,FLP: NORMAL
TRIGL SERPL-MCNC: 83 MG/DL (ref ?–150)
TSH SERPL DL<=0.05 MIU/L-ACNC: 1.23 UIU/ML (ref 0.36–3.74)
VLDLC SERPL CALC-MCNC: 16.6 MG/DL

## 2019-11-16 PROCEDURE — 36415 COLL VENOUS BLD VENIPUNCTURE: CPT

## 2019-11-16 PROCEDURE — 80061 LIPID PANEL: CPT

## 2019-11-16 PROCEDURE — 84443 ASSAY THYROID STIM HORMONE: CPT

## 2019-11-16 PROCEDURE — 82306 VITAMIN D 25 HYDROXY: CPT

## 2019-11-19 ENCOUNTER — TELEPHONE (OUTPATIENT)
Dept: FAMILY MEDICINE CLINIC | Facility: CLINIC | Age: 64
End: 2019-11-19

## 2019-12-02 ENCOUNTER — OFFICE VISIT (OUTPATIENT)
Dept: ORTHOPEDIC SURGERY | Age: 64
End: 2019-12-02

## 2019-12-02 VITALS
BODY MASS INDEX: 22.98 KG/M2 | HEIGHT: 59 IN | SYSTOLIC BLOOD PRESSURE: 133 MMHG | TEMPERATURE: 98.1 F | DIASTOLIC BLOOD PRESSURE: 53 MMHG | RESPIRATION RATE: 16 BRPM | HEART RATE: 64 BPM | WEIGHT: 114 LBS | OXYGEN SATURATION: 100 %

## 2019-12-02 DIAGNOSIS — M54.59 MECHANICAL LOW BACK PAIN: ICD-10-CM

## 2019-12-02 DIAGNOSIS — M48.02 CERVICAL STENOSIS OF SPINE: Primary | ICD-10-CM

## 2019-12-02 NOTE — PATIENT INSTRUCTIONS
Neck Pain: Care Instructions  Your Care Instructions    You can have neck pain anywhere from the bottom of your head to the top of your shoulders. It can spread to the upper back or arms. Injuries, painting a ceiling, sleeping with your neck twisted, staying in one position for too long, and many other activities can cause neck pain. Most neck pain gets better with home care. Your doctor may recommend medicine to relieve pain or relax your muscles. He or she may suggest exercise and physical therapy to increase flexibility and relieve stress. You may need to wear a special (cervical) collar to support your neck for a day or two. Follow-up care is a key part of your treatment and safety. Be sure to make and go to all appointments, and call your doctor if you are having problems. It's also a good idea to know your test results and keep a list of the medicines you take. How can you care for yourself at home? · Try using a heating pad on a low or medium setting for 15 to 20 minutes every 2 or 3 hours. Try a warm shower in place of one session with the heating pad. · You can also try an ice pack for 10 to 15 minutes every 2 to 3 hours. Put a thin cloth between the ice and your skin. · Take pain medicines exactly as directed. ? If the doctor gave you a prescription medicine for pain, take it as prescribed. ? If you are not taking a prescription pain medicine, ask your doctor if you can take an over-the-counter medicine. · If your doctor recommends a cervical collar, wear it exactly as directed. When should you call for help? Call your doctor now or seek immediate medical care if:    · You have new or worsening numbness in your arms, buttocks or legs.     · You have new or worsening weakness in your arms or legs.  (This could make it hard to stand up.)     · You lose control of your bladder or bowels.    Watch closely for changes in your health, and be sure to contact your doctor if:    · Your neck pain is getting worse.     · You are not getting better after 1 week.     · You do not get better as expected. Where can you learn more? Go to http://dirk-amaris.info/. Enter [id number] in the search box to learn more about \"Neck Pain: Care Instructions. \"  Current as of: June 26, 2019  Content Version: 12.2  © 2903-3993 "[employer] Care instructions adapted under license by [employer] (which disclaims liability or warranty for this information). If you have questions about a medical condition or this instruction, always ask your healthcare professional. [place] [address] any warranty or liability for your use of this information. MRI of the Cervical Spine: About This Test  What is it? MRI (magnetic resonance imaging) is a test that uses a magnetic field and pulses of radio wave energy to make pictures of the organs and structures inside the body. An MRI can give your doctor information about the spine in your neck (the cervical spine). This can include the spine, the space around the spinal cord, and vertebrae in your neck. When you have an MRI, you lie on a table and the table moves into the MRI machine. Why is this test done? An MRI of the cervical spine can help find problems such as infection and tumors. It also can help diagnose narrowing of the spinal canal (spinal stenosis) and a herniated disc in the cervical spine. How can you prepare for the test?  Talk to your doctor about all your health conditions before the test. For example, tell your doctor if:  · You are allergic to any medicines. · You are or might be pregnant. · You have a pacemaker, an artificial limb, any metal pins or metal parts in your body, metal heart valves, metal clips in your brain, metal implants in your ears, or any other implanted or prosthetic medical device. · You have an intrauterine device (IUD) in place. · You get nervous in confined spaces.  You may need medicine to help you relax. · You wear a patch that contains medicine. · You have kidney disease. What happens before the test?  · You will remove all metal objects, such as hearing aids, dentures, jewelry, watches, and hairpins. · You will take off all or most of your clothes and change into a gown. If you do leave some clothes on, make sure you take everything out of your pockets. · You may have contrast material (dye) put into your arm through a tube called an IV. Contrast material helps doctors see specific organs, blood vessels, and most tumors. What happens during the test?  · You will lie on your back on a table that is part of the MRI scanner. Your head, chest, and arms may be held with straps to help you remain still. · The table will slide into the space that contains the magnet. A device called a coil may be placed over or wrapped around your neck. · Inside the scanner you will hear a fan and feel air moving. You may hear tapping, thumping, or snapping noises. You may be given earplugs or headphones to reduce the noise. · You will be asked to hold still during the scan. You may be asked to hold your breath for short periods. · You may be alone in the scanning room, but a technologist will be watching you through a window and talking with you during the test.  What else should you know about the test?  · An MRI does not hurt. · You may feel warmth in the area being examined. This is normal.  · A dye (contrast material) that contains gadolinium may be used in this test. Be sure to tell your doctor if:  ? You are pregnant or think you may be pregnant. ? You have kidney problems. ? You've had more than one test that used gadolinium. · The U.S. Food and Drug Administration (FDA) has safety warnings about gadolinium. But for most people, the benefit of its use in this test outweighs the risk. · If a dye is used, you may feel a quick sting or pinch and some coolness when the IV is started.  The dye may give you a metallic taste in your mouth. Some people feel sick to their stomach or get a headache. · If you breastfeed and are concerned about whether the dye used in this test is safe, talk to your doctor. Most experts believe that very little dye passes into breast milk and even less is passed on to the baby. But if you prefer, you can store some of your breast milk ahead of time and use it for a day or two after the test.  How long does the test take? · The test usually takes 30 to 60 minutes but can take as long as 2 hours. What happens after the test?  · You will probably be able to go home right away, depending on the reason for the test.  · You can go back to your usual activities right away. Follow-up care is a key part of your treatment and safety. Be sure to make and go to all appointments, and call your doctor if you are having problems. It's also a good idea to keep a list of the medicines you take. Ask your doctor when you can expect to have your test results. Where can you learn more? Go to http://dirk-amaris.info/. Enter F223 in the search box to learn more about \"MRI of the Cervical Spine: About This Test.\"  Current as of: March 28, 2019  Content Version: 12.2  © 1059-9774 [employer]. Care instructions adapted under license by [employer] (which disclaims liability or warranty for this information). If you have questions about a medical condition or this instruction, always ask your healthcare professional. [place] [address] any warranty or liability for your use of this information. Neck Spasm: Exercises  Introduction  Here are some examples of exercises for you to try. The exercises may be suggested for a condition or for rehabilitation. Start each exercise slowly. Ease off the exercises if you start to have pain. You will be told when to start these exercises and which ones will work best for you.   How to do the exercises  Levator scapula stretch    1. Sit in a firm chair, or stand up straight. 2. Gently tilt your head toward your left shoulder. 3. Turn your head to look down into your armpit, bending your head slightly forward. Let the weight of your head stretch your neck muscles. 4. Hold for 15 to 30 seconds. 5. Return to your starting position. 6. Follow the same instructions above, but tilt your head toward your right shoulder. 7. Repeat 2 to 4 times toward each shoulder. Upper trapezius stretch    1. Sit in a firm chair, or stand up straight. 2. This stretch works best if you keep your shoulder down as you lean away from it. To help you remember to do this, start by relaxing your shoulders and lightly holding on to your thighs or your chair. 3. Tilt your head toward your shoulder and hold for 15 to 30 seconds. Let the weight of your head stretch your muscles. 4. If you would like a little added stretch, place your arm behind your back. Use the arm opposite of the direction you are tilting your head. For example, if you are tilting your head to the left, place your right arm behind your back. 5. Repeat 2 to 4 times toward each shoulder. Neck rotation    1. Sit in a firm chair, or stand up straight. 2. Keeping your chin level, turn your head to the right, and hold for 15 to 30 seconds. 3. Turn your head to the left, and hold for 15 to 30 seconds. 4. Repeat 2 to 4 times to each side. Chin tuck    1. Lie on the floor with a rolled-up towel under your neck. Your head should be touching the floor. 2. Slowly bring your chin toward the front of your neck. 3. Hold for a count of 6, and then relax for up to 10 seconds. 4. Repeat 8 to 12 times. Forward neck flexion    1. Sit in a firm chair, or stand up straight. 2. Bend your head forward. 3. Hold for 15 to 30 seconds, then return to your starting position. 4. Repeat 2 to 4 times. Follow-up care is a key part of your treatment and safety.  Be sure to make and go to all appointments, and call your doctor if you are having problems. It's also a good idea to know your test results and keep a list of the medicines you take. Where can you learn more? Go to http://dirk-amaris.info/. Enter P962 in the search box to learn more about \"Neck Spasm: Exercises. \"  Current as of: June 26, 2019  Content Version: 12.2  © 6665-7396 [employer]. Care instructions adapted under license by [employer] (which disclaims liability or warranty for this information). If you have questions about a medical condition or this instruction, always ask your healthcare professional. [address] any warranty or liability for your use of this information.
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
## 2019-12-06 ENCOUNTER — HOSPITAL ENCOUNTER (OUTPATIENT)
Age: 64
Discharge: HOME | End: 2019-12-06
Attending: PHYSICAL MEDICINE & REHABILITATION
Payer: COMMERCIAL

## 2019-12-06 DIAGNOSIS — M48.02 CERVICAL STENOSIS OF SPINE: ICD-10-CM

## 2019-12-06 PROCEDURE — 72141 MRI NECK SPINE W/O DYE: CPT

## 2020-01-22 ENCOUNTER — OFFICE VISIT (OUTPATIENT)
Dept: ORTHOPEDIC SURGERY | Age: 65
End: 2020-01-22

## 2020-01-22 VITALS
HEART RATE: 67 BPM | RESPIRATION RATE: 16 BRPM | BODY MASS INDEX: 22.98 KG/M2 | TEMPERATURE: 98.2 F | WEIGHT: 114 LBS | OXYGEN SATURATION: 98 % | HEIGHT: 59 IN | SYSTOLIC BLOOD PRESSURE: 138 MMHG | DIASTOLIC BLOOD PRESSURE: 63 MMHG

## 2020-01-22 DIAGNOSIS — M48.02 CERVICAL STENOSIS OF SPINE: Primary | ICD-10-CM

## 2020-01-22 DIAGNOSIS — M47.812 CERVICAL ARTHRITIS: ICD-10-CM

## 2020-01-22 RX ORDER — MECLIZINE HYDROCHLORIDE 25 MG/1
TABLET ORAL
COMMUNITY
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
## 2020-01-22 NOTE — PATIENT INSTRUCTIONS
Cervical Spinal Stenosis: Care Instructions  Your Care Instructions    Spinal stenosis is a narrowing of the canal that surrounds the spinal cord and nerve roots. Sometimes bone and other tissue grow into this canal and press on the nerves that branch out from the spinal cord. This can happen as a part of aging. When the narrowing happens in your neck, it's called cervical spinal stenosis. It often causes stiffness, pain, numbness, and weakness in the neck, shoulders, arms, hands, or legs. It can even cause problems with your balance, coordination, and bowel or bladder control. But some people have no symptoms. You may be able to get relief from the symptoms of spinal stenosis by taking pain medicine. Your doctor may suggest physical therapy and exercises to keep your spine strong and flexible. Some people try steroid shots to reduce swelling. If pain and numbness in your neck, arms, or legs are still so bad that you cannot do your normal activities, you may need surgery. Follow-up care is a key part of your treatment and safety. Be sure to make and go to all appointments, and call your doctor if you are having problems. It's also a good idea to know your test results and keep a list of the medicines you take. How can you care for yourself at home? · Ask your doctor if you can take an over-the-counter pain medicine, such as acetaminophen (Tylenol), ibuprofen (Advil, Motrin), or naproxen (Aleve). Be safe with medicines. Read and follow all instructions on the label. · Do not take two or more pain medicines at the same time unless the doctor told you to. Many pain medicines have acetaminophen, which is Tylenol. Too much acetaminophen (Tylenol) can be harmful. · Change positions often when you are standing or sitting. This may reduce pressure on the spinal cord and its nerves. · When you rest, use pillows or towel rolls to support your neck and head in a comfortable position.   · Follow your doctor's instructions about activity. He or she may tell you not to do sports or activities that could injure your neck. · Stretch your neck and shoulders as your doctor or physical therapist recommends. If your doctor says it is okay to do them, these exercises may help:  ? Neck stretches to the side. Keep your shoulders relaxed and slowly tilt your head straight over toward one shoulder. Hold for 15 seconds. Let the weight of your head stretch your muscles. Then do the same toward the other shoulder. ? Neck rotations. Keep your chin level and slowly turn your head to one side. Hold for 15 seconds. Then do the same to the other side. ? Shoulder rolls. Roll your shoulders up, then back, and then down in a smooth, circular motion. Repeat several times. When should you call for help? Call 911 anytime you think you may need emergency care. For example, call if:    · You are unable to move an arm or a leg at all.   [facility] your doctor now or seek immediate medical care if:    · You have new or worse symptoms in your arms, legs, belly, or buttocks. Symptoms may include:  ? Numbness or tingling. ? Weakness. ? Pain.     · You lose bladder or bowel control.    Watch closely for changes in your health, and be sure to contact your doctor if:    · You do not get better as expected. Where can you learn more? Go to http://dirk-amaris.info/. Enter  in the search box to learn more about \"Cervical Spinal Stenosis: Care Instructions. \"  Current as of: June 26, 2019  Content Version: 12.2  © 7982-2803 [employer]. Care instructions adapted under license by [employer] (which disclaims liability or warranty for this information). If you have questions about a medical condition or this instruction, always ask your healthcare professional. [address] any warranty or liability for your use of this information.

## 2020-01-22 NOTE — LETTER
1/22/20 Patient: Sabine Duval YOB: 1955 Date of Visit: 1/22/2020 Siva Gonzalez MD 
[address] [place] [address] [address] [place] [address] VIA Facsimile: [phone] Dear Siva Gonzalez MD, Thank you for referring Ms. Gin Head to [address] [place] [address] for evaluation. My notes for this consultation are attached. If you have questions, please do not hesitate to call me. I look forward to following your patient along with you. Sincerely, Bradyon Rae MD

## 2020-01-28 ENCOUNTER — HOSPITAL ENCOUNTER (OUTPATIENT)
Dept: PHYSICAL THERAPY | Age: 65
Discharge: HOME | End: 2020-01-28
Payer: COMMERCIAL

## 2020-01-28 PROCEDURE — 97162 PT EVAL MOD COMPLEX 30 MIN: CPT | Performed by: PHYSICAL THERAPIST

## 2020-01-28 PROCEDURE — 97530 THERAPEUTIC ACTIVITIES: CPT | Performed by: PHYSICAL THERAPIST

## 2020-01-28 PROCEDURE — 97110 THERAPEUTIC EXERCISES: CPT | Performed by: PHYSICAL THERAPIST
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
## 2020-01-29 ENCOUNTER — HOSPITAL ENCOUNTER (OUTPATIENT)
Dept: PHYSICAL THERAPY | Age: 65
Discharge: HOME | End: 2020-01-29
Payer: COMMERCIAL

## 2020-01-29 PROCEDURE — 97140 MANUAL THERAPY 1/> REGIONS: CPT

## 2020-01-29 PROCEDURE — 97110 THERAPEUTIC EXERCISES: CPT

## 2020-01-29 NOTE — PROGRESS NOTES
PT DAILY TREATMENT NOTE    Patient Name: Ayla Torres  Date:2020  : 1955  [x]  Patient  Verified  Payor: Alena Posadas / Plan:  [facility] [place] / Product Type: PPO /    In time:4:30  Out time:5:24  Total Treatment Time (min): 54  Total Timed Codes (min): 54  1:1 Treatment Time ( only): 39   Visit #: 2 of 12    Treatment Area: Spinal stenosis, cervical region [M48.02]  Spondylosis without myelopathy or radiculopathy, cervical region [M47.812]    SUBJECTIVE  Pain Level (0-10 scale): 5  Any medication changes, allergies to medications, adverse drug reactions, diagnosis change, or new procedure performed?: [x] No    [] Yes (see summary sheet for update)  Subjective functional status/changes:   [] No changes reported  \"Left arm feels heavy like. \"    OBJECTIVE      46 min Therapeutic Exercise:  [x] See flow sheet :   Rationale: increase ROM and increase strength to improve the patients ability to perform daily activities with decreased pain and symptom levels    8 min Manual Therapy:  Rib mobs with breaths, apical expansion, VC for hand placement   Rationale: decrease pain, increase ROM and increase tissue extensibility to improve the patients ability to perform daily activities with decreased pain and symptom levels    With   [] TE   [] TA   [] neuro   [] other: Patient Education: [x] Review HEP    [] Progressed/Changed HEP based on:   [] positioning   [] body mechanics   [] transfers   [] heat/ice application    [] other:      Other Objective/Functional Measures:   Decreased thoracic mobility in QP  Thoracic extension and UT compensation with reaching overhead      Pain Level (0-10 scale) post treatment: 4    ASSESSMENT/Changes in Function: good tolerance to exercises with decreased pain at end of session. Very challenged with PPT and decresing rib flare. Updated HEP today to include new exercises.      Patient will continue to benefit from skilled PT services to modify and progress therapeutic interventions, address functional mobility deficits, address ROM deficits, address strength deficits, analyze and address soft tissue restrictions, analyze and cue movement patterns, analyze and modify body mechanics/ergonomics, assess and modify postural abnormalities and instruct in home and community integration to attain remaining goals. []  See Plan of Care  []  See progress note/recertification  []  See Discharge Summary         Progress towards goals / Updated goals:  Short Term Goals: STG- To be accomplished in 3 week(s):  1.  Pt will be compliant with HEP for max progression toward all goals and return to PLOF. Eval:pt started on HEP (sitting upright with shoulder squeeze , cervical retraction , sitting shoulder flexion UE focus on easier right side to free up left , sitting trunk rotation general mobility )  Current: compliance, updated today      2. Pt pain will improve >= 40% to allow pt improved sleep and tolerance to daily activity. Eval:pain stays 5/10 per pt , with mopping can increase to mid back stopping ability to perform task.      3. Pt FOTO score will increase by >=3 points to show improvement in functional mobility. Eval:59/100  *will reasses every 5th visit      Long Term Goals: LTG- To be accomplished in 6 week(s):     1.  Pt will be independant with HEP for continued and ongoing progression following discharge toward full functional mobility. Eval:started on HEP      2. Pt pain will improve >= 80% to allow pt return to PLOF. Eval: pain stays mostly 5/10      3. Pt FOTO score will increase by >=6 points to show improvement in functional mobility. Eval:59/100   will reassess every 5th visit      4.  Pt strength will improve to 5/5 bilateral shoulder with upright posture to allow pt to return to PLOF.   Eval:    Strength: Right  Strength :   Left    Forward flexion 4+  4- pain    abd 4+ 3+pain    Ext  5 4+ pain    ER 4+ 4   IR  5 4+   LEft bicep 4+/5 , tricep 4/5 , deltoid 5/5 Definitely less  on left 3-/5 approx   Flexion /Extension wrist 5/5 bilaterally      5. Pt left shoulder AROM will improve flexion to >=170 deg to allow pt freedom for reaching overhead.   Eval:  ROM Right  left   Forward flexion 163/170 147/161         6.  Pt will demonstrate increase cervical ROM , flexion chin to chest , extension improve by >=10 deg , rotation left to = right to improve functional mobility and increase ease with driving and clearing traffic to rear .   Eval:  ROM AROM   Forward flexion 75%   Extension 30 deg    SB right 27 deg   SB left  32 deg   Rotation right 50 deg    Rotation left 35 deg     Short Term Goals: STG- To be accomplished in 3 week(s):  1.  Pt will be compliant with HEP for max progression toward all goals and return to PLOF. Eval:pt started on HEP (sitting upright with shoulder squeeze , cervical retraction , sitting shoulder flexion UE focus on easier right side to free up left , sitting trunk rotation general mobility )     2. Pt pain will improve >= 40% to allow pt improved sleep and tolerance to daily activity. Eval:pain stays 5/10 per pt , with mopping can increase to mid back stopping ability to perform task.      3. Pt FOTO score will increase by >=3 points to show improvement in functional mobility. Eval:59/100  *will reasses every 5th visit      Long Term Goals: LTG- To be accomplished in 6 week(s):     1.  Pt will be independant with HEP for continued and ongoing progression following discharge toward full functional mobility. Eval:started on HEP      2. Pt pain will improve >= 80% to allow pt return to PLOF. Eval: pain stays mostly 5/10      3. Pt FOTO score will increase by >=6 points to show improvement in functional mobility. Eval:59/100   will reassess every 5th visit      4.  Pt strength will improve to 5/5 bilateral shoulder with upright posture to allow pt to return to PLOF.   Eval:    Strength: Right  Strength :   Left    Forward flexion 4+  4- pain    abd 4+ 3+pain    Ext  5 4+ pain    ER 4+ 4   IR  5 4+   LEft bicep 4+/5 , tricep 4/5 , deltoid 5/5   Definitely less  on left 3-/5 approx   Flexion /Extension wrist 5/5 bilaterally      5.  Pt left shoulder AROM will improve flexion to >=170 deg to allow pt freedom for reaching overhead.   Eval:  ROM Right  left   Forward flexion 163/170 147/161         6.  Pt will demonstrate increase cervical ROM , flexion chin to chest , extension improve by >=10 deg , rotation left to = right to improve functional mobility and increase ease with driving and clearing traffic to rear .   Eval:  ROM AROM   Forward flexion 75%   Extension 30 deg    SB right 27 deg   SB left  32 deg   Rotation right 50 deg    Rotation left 35 deg         PLAN  [x]  Upgrade activities as tolerated     [x]  Continue plan of care  []  Update interventions per flow sheet       []  Discharge due to:_  []  Other:_      Gil Baca Remesi 1/29/2020  4:41 PM    Future Appointments   Date Time Provider Jason Erazoi   2/3/2020  6:15 PM Reji Griffin [facility]PTBW [facility]   2/5/2020  5:15 PM Reji Griffin [facility]PTBW [facility]   2/10/2020  6:15 PM Zelda Mccoy PTA [facility]PT[facility]   2/13/2020  5:45 PM Nyasia Castano, GENEVIEVE MIHPTBW [facility]   2/24/2020  3:30 PM Claudia Singh  [address]

## 2020-02-03 ENCOUNTER — HOSPITAL ENCOUNTER (OUTPATIENT)
Dept: PHYSICAL THERAPY | Age: 65
Discharge: HOME | End: 2020-02-03
Payer: COMMERCIAL

## 2020-02-03 PROCEDURE — 97110 THERAPEUTIC EXERCISES: CPT

## 2020-02-03 NOTE — PROGRESS NOTES
PT DAILY TREATMENT NOTE    Patient Name: Yamile Peralta  Date:2/3/2020  : 1955  [x]  Patient  Verified  Payor: BLUE CROSS / Plan:  [facility] [place] / Product Type: PPO /    In time:6:00  Out time:6:45  Total Treatment Time (min): 45  Total Timed Codes (min): 45  1:1 Treatment Time ( only): 39   Visit #: 3 of 12    Treatment Area: Spinal stenosis, cervical region [M48.02]  Spondylosis without myelopathy or radiculopathy, cervical region [M47.812]    SUBJECTIVE  Pain Level (0-10 scale): 4/10  Any medication changes, allergies to medications, adverse drug reactions, diagnosis change, or new procedure performed?: [x] No    [] Yes (see summary sheet for update)  Subjective functional status/changes:   [] No changes reported  \"I have pain in my neck and my middle back.'    OBJECTIVE      45 min Therapeutic Exercise:  [x] See flow sheet :   Rationale: increase ROM, increase strength and improve coordination to improve the patients ability to return to prior level of physical activity. With   [] TE   [] TA   [] neuro   [] other: Patient Education: [x] Review HEP    [] Progressed/Changed HEP based on:   [] positioning   [] body mechanics   [] transfers   [] heat/ice application    [] other:      Other Objective/Functional Measures:      Pain Level (0-10 scale) post treatment: 0/10    ASSESSMENT/Changes in Function: Pt tolerated session well. Pt reported pain in neck and middle back initially. Pt also reported pain in Left UT when performing open book to the left. Pt tolerated strengthening exercises well. Pt did demonstrated very evident signs of weakness in Left UE when performing D2 diagonal T-band exercise. Pt denied modalities post tx but reported no pain.      Patient will continue to benefit from skilled PT services to modify and progress therapeutic interventions, address functional mobility deficits, address ROM deficits, address strength deficits, analyze and address soft tissue restrictions, analyze and cue movement patterns, analyze and modify body mechanics/ergonomics and assess and modify postural abnormalities to attain remaining goals. []  See Plan of Care  []  See progress note/recertification  []  See Discharge Summary         Progress towards goals / Updated goals:  Short Term Goals: STG- To be accomplished in 3 week(s):  1.  Pt will be compliant with HEP for max progression toward all goals and return to PLOF. Eval:pt started on HEP (sitting upright with shoulder squeeze , cervical retraction , sitting shoulder flexion UE focus on easier right side to free up left , sitting trunk rotation general mobility )  Current: compliance, updated today      2. Pt pain will improve >= 40% to allow pt improved sleep and tolerance to daily activity. Eval:pain stays 5/10 per pt , with mopping can increase to mid back stopping ability to perform task.      3. Pt FOTO score will increase by >=3 points to show improvement in functional mobility. Eval:59/100  *will reasses every 5th visit      Long Term Goals: LTG- To be accomplished in 6 week(s):     1.  Pt will be independant with HEP for continued and ongoing progression following discharge toward full functional mobility. Eval:started on HEP      2. Pt pain will improve >= 80% to allow pt return to PLOF. Eval: pain stays mostly 5/10      3. Pt FOTO score will increase by >=6 points to show improvement in functional mobility. Eval:59/100   will reassess every 5th visit      4. Pt strength will improve to 5/5 bilateral shoulder with upright posture to allow pt to return to PLOF.   Eval:    Strength: Right  Strength :   Left    Forward flexion 4+  4- pain    abd 4+ 3+pain    Ext  5 4+ pain    ER 4+ 4   IR  5 4+   LEft bicep 4+/5 , tricep 4/5 , deltoid 5/5   Definitely less  on left 3-/5 approx   Flexion /Extension wrist 5/5 bilaterally      5.  Pt left shoulder AROM will improve flexion to >=170 deg to allow pt freedom for reaching overhead.   Eval:  ROM Right  left   Forward flexion 163/170 147/161         6.  Pt will demonstrate increase cervical ROM , flexion chin to chest , extension improve by >=10 deg , rotation left to = right to improve functional mobility and increase ease with driving and clearing traffic to rear .   Eval:  ROM AROM   Forward flexion 75%   Extension 30 deg    SB right 27 deg   SB left  32 deg   Rotation right 50 deg    Rotation left 35 deg         PLAN  [x]  Upgrade activities as tolerated     [x]  Continue plan of care  []  Update interventions per flow sheet       []  Discharge due to:_  []  Other:_      Susie Lopez, VELIA 2/3/2020  6:29 PM    Future Appointments   Date Time Provider Jason Alvarez   2/5/2020  5:15 PM Royal Lujan [facility]   2/10/2020  6:15 PM Royal Lujan [facility]ONZALEZ [facility]   2/13/2020  5:45 PM Agnes Solis PT [facility]PTBW [facility]   2/17/2020  1:30 PM [facility] PULM PFT ROOM CRMCRESP [facility]   2/24/2020  3:30 PM Luzmaria Alcantar  [address]

## 2020-02-05 ENCOUNTER — HOSPITAL ENCOUNTER (OUTPATIENT)
Dept: PHYSICAL THERAPY | Age: 65
Discharge: HOME | End: 2020-02-05
Payer: COMMERCIAL

## 2020-02-05 PROCEDURE — 97110 THERAPEUTIC EXERCISES: CPT

## 2020-02-05 NOTE — PROGRESS NOTES
PT DAILY TREATMENT NOTE    Patient Name: Deborah Orta  UKR  : 1955  [x]  Patient  Verified  Payor: BLUE CROSS / Plan:  [facility] / Product Type: PPO /    In time:4:53  Out time:5:38  Total Treatment Time (min): 45  Total Timed Codes (min): 45  1:1 Treatment Time ( only): NA   Visit #: 4 of 12    Treatment Area: Spinal stenosis, cervical region [M48.02]  Spondylosis without myelopathy or radiculopathy, cervical region [M47.812]    SUBJECTIVE  Pain Level (0-10 scale): 4/10  Any medication changes, allergies to medications, adverse drug reactions, diagnosis change, or new procedure performed?: [x] No    [] Yes (see summary sheet for update)  Subjective functional status/changes:   [] No changes reported  \"I have some pain today. I've been working hard. \"    OBJECTIVE      45 min Therapeutic Exercise:  [x] See flow sheet :   Rationale: increase ROM, increase strength and improve coordination to improve the patients ability to associated with       With   [] TE   [] TA   [] neuro   [] other: Patient Education: [x] Review HEP    [] Progressed/Changed HEP based on:   [] positioning   [] body mechanics   [] transfers   [] heat/ice application    [] other:      Other Objective/Functional Measures:      Pain Level (0-10 scale) post treatment: 0/10    ASSESSMENT/Changes in Function: Pt tolerated session well. Pt reported initial pain but decreased post ex. Pt continues to demonstrate greatly increased fatigue with D2 T-band ex but no pain. Patient will continue to benefit from skilled PT services to modify and progress therapeutic interventions, address functional mobility deficits, address ROM deficits, address strength deficits, analyze and address soft tissue restrictions, analyze and cue movement patterns, analyze and modify body mechanics/ergonomics and assess and modify postural abnormalities to attain remaining goals.      []  See Plan of Care  []  See progress note/recertification  []  See Discharge Summary         Progress towards goals / Updated goals:  Short Term Goals: STG- To be accomplished in 3 week(s):  1.  Pt will be compliant with HEP for max progression toward all goals and return to PLOF. Eval:pt started on HEP (sitting upright with shoulder squeeze , cervical retraction , sitting shoulder flexion UE focus on easier right side to free up left , sitting trunk rotation general mobility )  Current: compliance, updated today      2. Pt pain will improve >= 40% to allow pt improved sleep and tolerance to daily activity. Eval:pain stays 5/10 per pt , with mopping can increase to mid back stopping ability to perform task. Current: Pt reports continued difficulty with sleeping through night 2/5/20     3. Pt FOTO score will increase by >=3 points to show improvement in functional mobility. Eval:59/100  *will reasses every 5th visit      Long Term Goals: LTG- To be accomplished in 6 week(s):     1.  Pt will be independant with HEP for continued and ongoing progression following discharge toward full functional mobility. Eval:started on HEP      2. Pt pain will improve >= 80% to allow pt return to PLOF. Eval: pain stays mostly 5/10      3. Pt FOTO score will increase by >=6 points to show improvement in functional mobility. Eval:59/100   will reassess every 5th visit      4. Pt strength will improve to 5/5 bilateral shoulder with upright posture to allow pt to return to PLOF.   Eval:    Strength: Right  Strength :   Left    Forward flexion 4+  4- pain    abd 4+ 3+pain    Ext  5 4+ pain    ER 4+ 4   IR  5 4+   LEft bicep 4+/5 , tricep 4/5 , deltoid 5/5   Definitely less  on left 3-/5 approx   Flexion /Extension wrist 5/5 bilaterally      5.  Pt left shoulder AROM will improve flexion to >=170 deg to allow pt freedom for reaching overhead.   Eval:  ROM Right  left   Forward flexion 163/170 147/161         6.  Pt will demonstrate increase cervical ROM , flexion chin to chest , extension improve by >=10 deg , rotation left to = right to improve functional mobility and increase ease with driving and clearing traffic to rear .   Eval:  ROM AROM   Forward flexion 75%   Extension 30 deg    SB right 27 deg   SB left  32 deg   Rotation right 50 deg    Rotation left 35 deg          PLAN  [x]  Upgrade activities as tolerated     [x]  Continue plan of care  []  Update interventions per flow sheet       []  Discharge due to:_  []  Other:_      Luca Guaman, VELIA 2/5/2020  5:41 PM    Future Appointments   Date Time Provider Jason Alvarez   2/10/2020  6:15 PM Crescencio Parker [facility]PTBW [facility]   2/13/2020  5:45 PM Maria Del Carmen Retana PT [facility]PTBW [facility]   2/17/2020  1:30 PM [facility] PULM PFT ROOM CRMCRESP [facility]   2/24/2020  3:30 PM Candida Bailey  [address]

## 2020-02-10 ENCOUNTER — APPOINTMENT (OUTPATIENT)
Dept: PHYSICAL THERAPY | Age: 65
End: 2020-02-10
Payer: COMMERCIAL

## 2020-02-13 ENCOUNTER — HOSPITAL ENCOUNTER (OUTPATIENT)
Dept: PHYSICAL THERAPY | Age: 65
Discharge: HOME | End: 2020-02-13
Payer: COMMERCIAL

## 2020-02-13 PROCEDURE — 97110 THERAPEUTIC EXERCISES: CPT

## 2020-02-13 PROCEDURE — 97140 MANUAL THERAPY 1/> REGIONS: CPT

## 2020-02-13 NOTE — PROGRESS NOTES
PT DAILY TREATMENT NOTE    Patient Name: Oscar Chow  Date:2020  : 1955  [x]  Patient  Verified  Payor: Deandre Aguilera / Plan:  [facility] [place] / Product Type: PPO /    In time:5:40  Out time:6:40  Total Treatment Time (min): 60  Total Timed Codes (min): 60  1:1 Treatment Time (MC only): NA   Visit #: 5 of 12    Treatment Area: Spinal stenosis, cervical region [M48.02]  Spondylosis without myelopathy or radiculopathy, cervical region [M47.812]    SUBJECTIVE  Pain Level (0-10 scale): 0/10  Any medication changes, allergies to medications, adverse drug reactions, diagnosis change, or new procedure performed?: [x] No    [] Yes (see summary sheet for update)  Subjective functional status/changes:   [] No changes reported  \"no pain. \"    OBJECTIVE      45 min Therapeutic Exercise:  [x] See flow sheet :   Rationale: increase ROM, increase strength and improve coordination to improve the patients ability to perform ADLs    15 min Manual Therapy: cupping paraspinals, rib mobs   Rationale: decrease pain, increase ROM and increase tissue extensibility to perform ADLs        With   [] TE   [] TA   [] neuro   [] other: Patient Education: [x] Review HEP    [] Progressed/Changed HEP based on:   [] positioning   [] body mechanics   [] transfers   [] heat/ice application    [] other:      Other Objective/Functional Measures:deficits in left strength with visible quivering with TE with proper rib positioning      Pain Level (0-10 scale) post treatment: 0/10    ASSESSMENT/Changes in Function: Pt has visible fatigue and max difficulty with scapular retractions, increased UT compensation noted.    Patient will continue to benefit from skilled PT services to modify and progress therapeutic interventions, address functional mobility deficits, address ROM deficits, address strength deficits, analyze and address soft tissue restrictions, analyze and cue movement patterns, analyze and modify body mechanics/ergonomics and assess and modify postural abnormalities to attain remaining goals. []  See Plan of Care  []  See progress note/recertification  []  See Discharge Summary         Progress towards goals / Updated goals:  Short Term Goals: STG- To be accomplished in 3 week(s):  1.  Pt will be compliant with HEP for max progression toward all goals and return to PLOF. Eval:pt started on HEP (sitting upright with shoulder squeeze , cervical retraction , sitting shoulder flexion UE focus on easier right side to free up left , sitting trunk rotation general mobility )  Current: compliance, updated today      2. Pt pain will improve >= 40% to allow pt improved sleep and tolerance to daily activity. Eval:pain stays 5/10 per pt , with mopping can increase to mid back stopping ability to perform task. Current: Pt reports continued difficulty with sleeping through night 2/5/20     3. Pt FOTO score will increase by >=3 points to show improvement in functional mobility. Eval:59/100  Current:      Long Term Goals: LTG- To be accomplished in 6 week(s):     1.  Pt will be independant with HEP for continued and ongoing progression following discharge toward full functional mobility. Eval:started on HEP      2. Pt pain will improve >= 80% to allow pt return to PLOF. Eval: pain stays mostly 5/10   Current;      3. Pt FOTO score will increase by >=6 points to show improvement in functional mobility. Eval:59/100   will reassess every 5th visit      4.  Pt strength will improve to 5/5 bilateral shoulder with upright posture to allow pt to return to PLOF.   Eval:    Strength: Right  Strength :   Left    Forward flexion 4+  4- pain    abd 4+ 3+pain    Ext  5 4+ pain    ER 4+ 4   IR  5 4+   LEft bicep 4+/5 , tricep 4/5 , deltoid 5/5   Definitely less  on left 3-/5 approx   Flexion /Extension wrist 5/5 bilaterally     Current: pt demonstrates deficits in left strength with visible quivering with TE with proper rib positioning, 2/13/20 progressing.      5.  Pt left shoulder AROM will improve flexion to >=170 deg to allow pt freedom for reaching overhead.   Eval:  ROM Right  left   Forward flexion 163/170 147/161         6.  Pt will demonstrate increase cervical ROM , flexion chin to chest , extension improve by >=10 deg , rotation left to = right to improve functional mobility and increase ease with driving and clearing traffic to rear .   Eval:  ROM AROM   Forward flexion 75%   Extension 30 deg    SB right 27 deg   SB left  32 deg   Rotation right 50 deg    Rotation left 35 deg          PLAN  [x]  Upgrade activities as tolerated     [x]  Continue plan of care  []  Update interventions per flow sheet       []  Discharge due to:_  []  Other:_      Liz Roberts, PT 2/13/2020  5:41 PM    Future Appointments   Date Time Provider Jason Alvarez   2/13/2020  5:45 PM Maria Del Carmen Retana, PT MIHPTBW THE [facility]   2/17/2020  1:30 PM [facility] PFT ROOM CRMCRESP [facility]   2/24/2020  3:30 PM Candida Bailey  [address]

## 2020-02-20 ENCOUNTER — APPOINTMENT (OUTPATIENT)
Dept: PHYSICAL THERAPY | Age: 65
End: 2020-02-20
Payer: COMMERCIAL

## 2020-02-24 ENCOUNTER — OFFICE VISIT (OUTPATIENT)
Dept: ORTHOPEDIC SURGERY | Age: 65
End: 2020-02-24

## 2020-02-24 VITALS
WEIGHT: 112.4 LBS | RESPIRATION RATE: 16 BRPM | HEIGHT: 59 IN | HEART RATE: 77 BPM | BODY MASS INDEX: 22.66 KG/M2 | SYSTOLIC BLOOD PRESSURE: 152 MMHG | DIASTOLIC BLOOD PRESSURE: 57 MMHG

## 2020-02-24 DIAGNOSIS — M48.02 CERVICAL STENOSIS OF SPINE: Primary | ICD-10-CM

## 2020-02-24 DIAGNOSIS — M47.812 CERVICAL ARTHRITIS: ICD-10-CM

## 2020-02-24 PROCEDURE — 99283 EMERGENCY DEPT VISIT LOW MDM: CPT

## 2020-02-24 NOTE — PATIENT INSTRUCTIONS
Back Care and Preventing Injuries: Care Instructions  Your Care Instructions    You can hurt your back doing many everyday activities: lifting a heavy box, bending down to garden, exercising at the gym, and even getting out of bed. But you can keep your back strong and healthy by doing some exercises. You also can follow a few tips for sitting, sleeping, and lifting to avoid hurting your back again. Talk to your doctor before you start an exercise program. Ask for help if you want to learn more about keeping your back healthy. Follow-up care is a key part of your treatment and safety. Be sure to make and go to all appointments, and call your doctor if you are having problems. It's also a good idea to know your test results and keep a list of the medicines you take. How can you care for yourself at home? · Stay at a healthy weight to avoid strain on your lower back. · Do not smoke. Smoking increases the risk of osteoporosis, which weakens the spine. If you need help quitting, talk to your doctor about stop-smoking programs and medicines. These can increase your chances of quitting for good. · Make sure you sleep in a position that maintains your back's normal curves and on a mattress that feels comfortable. Sleep on your side with a pillow between your knees, or sleep on your back with a pillow under your knees. These positions can reduce strain on your back. · When you get out of bed, lie on your side and bend both knees. Drop your feet over the edge of the bed as you push up with both arms. Scoot to the edge of the bed. Make sure your feet are in line with your rear end (buttocks), and then stand up. · If you must stand for a long time, put one foot on a stool, ledge, or box. Exercise to strengthen your back and other muscles  · Get at least 30 minutes of exercise on most days of the week. Walking is a good choice.  You also may want to do other activities, such as running, swimming, cycling, or playing tennis or team sports. · Stretch your back muscles. Here are few exercises to try:  ? Lie on your back with your knees bent and your feet flat on the floor. Gently pull one bent knee to your chest. Put that foot back on the floor, and then pull the other knee to your chest. Hold for 15 to 30 seconds. Repeat 2 to 4 times. ? Do pelvic tilts. Lie on your back with your knees bent. Tighten your stomach muscles. Pull your belly button (navel) in and up toward your ribs. You should feel like your back is pressing to the floor and your hips and pelvis are slightly lifting off the floor. Hold for 6 seconds while breathing smoothly. · Keep your core muscles strong. The muscles of your back, belly (abdomen), and buttocks support your spine. ? Pull in your belly, and imagine pulling your navel toward your spine. Hold this for 6 seconds, then relax. Remember to keep breathing normally as you tense your muscles. ? Do curl-ups. Always do them with your knees bent. Keep your low back on the floor, and curl your shoulders toward your knees using a smooth, slow motion. Keep your arms folded across your chest. If this bothers your neck, try putting your hands behind your neck (not your head), with your elbows spread apart. ? Lie on your back with your knees bent and your feet flat on the floor. Tighten your belly muscles, and then push with your feet and raise your buttocks up a few inches. Hold this position 6 seconds as you continue to breathe normally, then lower yourself slowly to the floor. Repeat 8 to 12 times. ? If you like group exercise, try Pilates or yoga. These classes have poses that strengthen the core muscles. Protect your back when you sit  · Place a small pillow, a rolled-up towel, or a lumbar roll in the curve of your back if you need extra support. · Sit in a chair that is low enough to let you place both feet flat on the floor with both knees nearly level with your hips.  If your chair or desk is too high, use a foot rest to raise your knees. · When driving, keep your knees nearly level with your hips. Sit straight, and drive with both hands on the steering wheel. Your arms should be in a slightly bent position. · Try a kneeling chair, which helps tilt your hips forward. This takes pressure off your lower back. · Try sitting on an exercise ball. It can rock from side to side, which helps keep your back loose. Lift properly  · Squat down, bending at the hips and knees only. If you need to, put one knee to the floor and extend your other knee in front of you, bent at a right angle (half kneeling). · Press your chest straight forward. This helps keep your upper back straight while keeping a slight arch in your low back. · Hold the load as close to your body as possible, at the level of your navel. · Use your feet to change direction, taking small steps. · Lead with your hips as you change direction. Keep your shoulders in line with your hips as you move. Do not twist your body. · Set down your load carefully, squatting with your knees and hips only. When should you call for help? Watch closely for changes in your health, and be sure to contact your doctor if you have any problems. Where can you learn more? Go to http://dirk-amaris.info/. Enter S810 in the search box to learn more about \"Back Care and Preventing Injuries: Care Instructions. \"  Current as of: June 26, 2019  Content Version: 12.2  © 9597-4873 [employer]. Care instructions adapted under license by [employer] (which disclaims liability or warranty for this information). If you have questions about a medical condition or this instruction, always ask your healthcare professional. [place] [address] any warranty or liability for your use of this information.
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
## 2020-02-25 ENCOUNTER — HOSPITAL ENCOUNTER (EMERGENCY)
Age: 65
Discharge: HOME | End: 2020-02-25
Attending: EMERGENCY MEDICINE
Payer: COMMERCIAL

## 2020-02-25 ENCOUNTER — APPOINTMENT (OUTPATIENT)
Dept: CT IMAGING | Age: 65
End: 2020-02-25
Attending: EMERGENCY MEDICINE
Payer: COMMERCIAL

## 2020-02-25 VITALS
DIASTOLIC BLOOD PRESSURE: 48 MMHG | SYSTOLIC BLOOD PRESSURE: 151 MMHG | BODY MASS INDEX: 22.58 KG/M2 | RESPIRATION RATE: 16 BRPM | WEIGHT: 112 LBS | OXYGEN SATURATION: 96 % | HEART RATE: 76 BPM | HEIGHT: 59 IN | TEMPERATURE: 98.4 F

## 2020-02-25 DIAGNOSIS — R51.9 ACUTE NONINTRACTABLE HEADACHE, UNSPECIFIED HEADACHE TYPE: Primary | ICD-10-CM

## 2020-02-25 PROCEDURE — 70450 CT HEAD/BRAIN W/O DYE: CPT

## 2020-02-25 NOTE — ED TRIAGE NOTES
Pt. Complains of a headache since 9pm tonight, pt states she has a hx of migraines and wanted to get it checked out.
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
## 2020-02-25 NOTE — DISCHARGE INSTRUCTIONS

## 2020-03-05 ENCOUNTER — HOSPITAL ENCOUNTER (EMERGENCY)
Age: 65
Discharge: HOME | End: 2020-03-05
Attending: EMERGENCY MEDICINE
Payer: COMMERCIAL

## 2020-03-05 VITALS
BODY MASS INDEX: 22.58 KG/M2 | HEART RATE: 68 BPM | SYSTOLIC BLOOD PRESSURE: 145 MMHG | RESPIRATION RATE: 17 BRPM | TEMPERATURE: 97.7 F | DIASTOLIC BLOOD PRESSURE: 51 MMHG | HEIGHT: 59 IN | WEIGHT: 112 LBS | OXYGEN SATURATION: 99 %

## 2020-03-05 DIAGNOSIS — J01.10 ACUTE FRONTAL SINUSITIS, RECURRENCE NOT SPECIFIED: Primary | ICD-10-CM

## 2020-03-05 DIAGNOSIS — J45.20 MILD INTERMITTENT ASTHMA, UNSPECIFIED WHETHER COMPLICATED: ICD-10-CM

## 2020-03-05 PROCEDURE — 99282 EMERGENCY DEPT VISIT SF MDM: CPT

## 2020-03-05 RX ORDER — IPRATROPIUM BROMIDE AND ALBUTEROL SULFATE 2.5; .5 MG/3ML; MG/3ML
3 SOLUTION RESPIRATORY (INHALATION)
Status: DISCONTINUED | OUTPATIENT
Start: 2020-03-05 | End: 2020-03-05 | Stop reason: HOSPADM

## 2020-03-05 RX ORDER — PREDNISONE 50 MG/1
50 TABLET ORAL DAILY
Qty: 5 TAB | Refills: 0 | Status: SHIPPED | OUTPATIENT
Start: 2020-03-05 | End: 2020-03-10

## 2020-03-05 RX ORDER — AZITHROMYCIN 250 MG/1
TABLET, FILM COATED ORAL
Qty: 6 TAB | Refills: 0 | Status: SHIPPED | OUTPATIENT
Start: 2020-03-05 | End: 2020-03-10

## 2020-03-05 NOTE — ED NOTES
Bedside and Verbal shift change report given to [place] (oncoming nurse) by Casandra Denny RN (offgoing nurse). Report included the following information SBAR, Kardex, ED Summary, Procedure Summary, Intake/Output, MAR, Accordion and Recent Results.

## 2020-03-05 NOTE — DISCHARGE INSTRUCTIONS
Patient Education        Asthma Attack: Care Instructions  Your Care Instructions    During an asthma attack, the airways swell and narrow. This makes it hard to breathe. Severe asthma attacks can be life-threatening, but you can help prevent them by keeping your asthma under control and treating symptoms before they get bad. Symptoms include being short of breath, having chest tightness, coughing, and wheezing. Noting and treating these symptoms can also help you avoid future trips to the emergency room. The doctor has checked you carefully, but problems can develop later. If you notice any problems or new symptoms, get medical treatment right away. Follow-up care is a key part of your treatment and safety. Be sure to make and go to all appointments, and call your doctor if you are having problems. It's also a good idea to know your test results and keep a list of the medicines you take. How can you care for yourself at home? · Follow your asthma action plan to prevent and treat attacks. If you don't have an asthma action plan, work with your doctor to create one. · Take your asthma medicines exactly as prescribed. Talk to your doctor right away if you have any questions about how to take them. ? Use your quick-relief medicine when you have symptoms of an attack. Quick-relief medicine is usually an albuterol inhaler. Some people need to use quick-relief medicine before they exercise. ? Take your controller medicine every day, not just when you have symptoms. Controller medicine is usually an inhaled corticosteroid. The goal is to prevent problems before they occur. Don't use your controller medicine to treat an attack that has already started. It doesn't work fast enough to help. ? If your doctor prescribed corticosteroid pills to use during an attack, take them exactly as prescribed. It may take hours for the pills to work, but they may make the episode shorter and help you breathe better. ?  Keep your quick-relief medicine with you at all times. · Talk to your doctor before using other medicines. Some medicines, such as aspirin, can cause asthma attacks in some people. · If you have a peak flow meter, use it to check how well you are breathing. This can help you predict when an asthma attack is going to occur. Then you can take medicine to prevent the asthma attack or make it less severe. · Do not smoke or allow others to smoke around you. Avoid smoky places. Smoking makes asthma worse. If you need help quitting, talk to your doctor about stop-smoking programs and medicines. These can increase your chances of quitting for good. · Learn what triggers an asthma attack for you, and avoid the triggers when you can. Common triggers include colds, smoke, air pollution, dust, pollen, mold, pets, cockroaches, stress, and cold air. · Avoid colds and the flu. Get a pneumococcal vaccine shot. If you have had one before, ask your doctor if you need a second dose. Get a flu vaccine every fall. If you must be around people with colds or the flu, wash your hands often. When should you call for help? Call 911 anytime you think you may need emergency care. For example, call if:    · You have severe trouble breathing.    Call your doctor now or seek immediate medical care if:    · Your symptoms do not get better after you have followed your asthma action plan.     · You have new or worse trouble breathing.     · Your coughing and wheezing get worse.     · You cough up dark brown or bloody mucus (sputum).     · You have a new or higher fever.    Watch closely for changes in your health, and be sure to contact your doctor if:    · You need to use quick-relief medicine on more than 2 days a week (unless it is just for exercise).     · You cough more deeply or more often, especially if you notice more mucus or a change in the color of your mucus.     · You are not getting better as expected. Where can you learn more?   Go to http://dirk-amaris.info/. Enter Z763 in the search box to learn more about \"Asthma Attack: Care Instructions. \"  Current as of: June 9, 2019  Content Version: 12.2  © 6257-0335 [employer]. Care instructions adapted under license by [employer] (which disclaims liability or warranty for this information). If you have questions about a medical condition or this instruction, always ask your healthcare professional. [place] [address] any warranty or liability for your use of this information. Patient Education        Sinusitis: Care Instructions  Your Care Instructions    Sinusitis is an infection of the lining of the sinus cavities in your head. Sinusitis often follows a cold. It causes pain and pressure in your head and face. In most cases, sinusitis gets better on its own in 1 to 2 weeks. But some mild symptoms may last for several weeks. Sometimes antibiotics are needed. Follow-up care is a key part of your treatment and safety. Be sure to make and go to all appointments, and call your doctor if you are having problems. It's also a good idea to know your test results and keep a list of the medicines you take. How can you care for yourself at home? · Take an over-the-counter pain medicine, such as acetaminophen (Tylenol), ibuprofen (Advil, Motrin), or naproxen (Aleve). Read and follow all instructions on the label. · If the doctor prescribed antibiotics, take them as directed. Do not stop taking them just because you feel better. You need to take the full course of antibiotics. · Be careful when taking over-the-counter cold or flu medicines and Tylenol at the same time. Many of these medicines have acetaminophen, which is Tylenol. Read the labels to make sure that you are not taking more than the recommended dose. Too much acetaminophen (Tylenol) can be harmful.   · Breathe warm, moist air from a steamy shower, a hot bath, or a sink filled with hot water. Avoid cold, dry air. Using a humidifier in your home may help. Follow the directions for cleaning the machine. · Use saline (saltwater) nasal washes to help keep your nasal passages open and wash out mucus and bacteria. You can buy saline nose drops at a grocery store or drugstore. Or you can make your own at home by adding 1 teaspoon of salt and 1 teaspoon of baking soda to 2 cups of distilled water. If you make your own, fill a bulb syringe with the solution, insert the tip into your nostril, and squeeze gently. [place] your nose. · Put a hot, wet towel or a warm gel pack on your face 3 or 4 times a day for 5 to 10 minutes each time. · Try a decongestant nasal spray like oxymetazoline (Afrin). Do not use it for more than 3 days in a row. Using it for more than 3 days can make your congestion worse. When should you call for help? Call your doctor now or seek immediate medical care if:    · You have new or worse swelling or redness in your face or around your eyes.     · You have a new or higher fever.    Watch closely for changes in your health, and be sure to contact your doctor if:    · You have new or worse facial pain.     · The mucus from your nose becomes thicker (like pus) or has new blood in it.     · You are not getting better as expected. Where can you learn more? Go to http://dirk-amaris.info/. Enter F954 in the search box to learn more about \"Sinusitis: Care Instructions. \"  Current as of: October 21, 2018  Content Version: 12.2  © 7576-1462 [email or web address]. Care instructions adapted under license by [employer] (which disclaims liability or warranty for this information). If you have questions about a medical condition or this instruction, always ask your healthcare professional. [address] any warranty or liability for your use of this information.

## 2020-03-05 NOTE — ED PROVIDER NOTES
EMERGENCY DEPARTMENT HISTORY AND PHYSICAL EXAM    7:18 AM      Date: 3/5/2020  Patient Name: Oscar Chow    History of Presenting Illness     Chief Complaint   Patient presents with    Nasal Congestion    Cough         History Provided By: Patient    Additional History (Context): Oscar Chow is a 62 y.o. female with hypertension and asthma who presents with nasal congestion for 5 days. Patient states she is also had some cough with some white sputum. Patient denies chest pain, fever, nausea, vomiting or diarrhea. Patient has been using her albuterol inhaler with mild improvement. Patient denies smoking, alcohol or recreational drug use. PCP: Nimo Kaufman MD      Current Facility-Administered Medications   Medication Dose Route Frequency Provider Last Rate Last Dose    albuterol-ipratropium (DUO-NEB) 2.5 MG-0.5 MG/3 ML  3 mL Nebulization NOW Chidi Crandall DO        methylPREDNISolone (PF) (Solu-MEDROL) injection 125 mg  125 mg IntraVENous NOW Chidi Crandall DO         Current Outpatient Medications   Medication Sig Dispense Refill    predniSONE (DELTASONE) 50 mg tablet Take 1 Tab by mouth daily for 5 days. 5 Tab 0    azithromycin (ZITHROMAX Z-PIERCE) 250 mg tablet Take two tablets on day one then one tablet daily for the next 4 days. 6 Tab 0    meclizine (ANTIVERT) 25 mg tablet Take  by mouth three (3) times daily as needed for Dizziness.  ibuprofen (MOTRIN) 800 mg tablet Take  by mouth.  divalproex DR (DEPAKOTE) 500 mg tablet Take 500 mg by mouth two (2) times a day.  metoprolol succinate (TOPROL-XL) 25 mg XL tablet Take 25 mg by mouth.  labetalol (NORMODYNE) 100 mg tablet Take  by mouth two (2) times a day.  atorvastatin (LIPITOR) 40 mg tablet TAKE ONE TABLET BY MOUTH DAILY 90 Tab 2    budesonide/formoterol fumarate (SYMBICORT IN) Take  by inhalation.       ipratropium (ATROVENT) 42 mcg (0.06 %) nasal spray 2 sprays up each nostril twice per day 15 mL 2    montelukast (SINGULAIR) 10 mg tablet Take 1 Tab by mouth daily. 90 Tab 1    PROAIR HFA 90 mcg/actuation inhaler       aspirin delayed-release 81 mg tablet Take  by mouth nightly.  fluticasone (FLONASE) 50 mcg/actuation nasal spray 2 Sprays by Both Nostrils route daily.  cetirizine (ZYRTEC) 10 mg tablet Take 10 mg by mouth nightly. Past History     Past Medical History:  Past Medical History:   Diagnosis Date    Allergic rhinitis     Blurred vision     Cardiac echocardiogram 06/11/2014    EF 60%. No RWMA. RVSP 30 mmHg. Mild AI.  Cardiac Holter monitor, normal 11/05/2014    Benign 48-hr Holter study.  Cardiac nuclear imaging test, mod risk 08/14/2015    Intermediate risk. High anterior artifact vs diagonal artery ischemia. Following Lexiscan, 0.5-mm downsloping inferolateral ST depression, resolving 10 min 30 sec of recovery. Arm heaviness noted.  Cardiovascular lower extremity arterial duplex 07/15/2016    Right leg: Mod peripheral arterial disease in femoral segment at rest.  Left leg:  Mild arterial disease at rest.  R KAREN 0.58. L KAREN 0.95. Similar to study of 10/15/14.  Cardiovascular renal angiography 10/27/2014    Bilateral patent renal arteries. Right CFA occluded but collateralized.  Cardiovascular renal duplex 10/13/2014    Aorta w/irregular walls. Severe >60% bilateral renal artery stenosis. Bilateral intrinsic/med renal disease.  Carotid duplex 10/17/2014    Mild <50% DAVID stenosis. Biphasic signals in both subclavians.  Carotid duplex 12/05/2016    Mild < 50% DAVID stenosis.       Cervical disc disease 09/2015    MRI of C-spine at [facility] JO FARAH Dizziness     Ear ache     Essential hypertension     Fainting spell     Frequent headaches     Frequent nosebleeds     Hemorrhoid     Hyperlipidemia     Hypertension     Ill-defined condition     Migraine     FLORENCIO (obstructive sleep apnea)     Sinus problem     Sinusitis, chronic     Spontaneous pneumothorax 1999    2 episodes on the right within several months of each other    Stomach pain     Weakness of right leg        Past Surgical History:  Past Surgical History:   Procedure Laterality Date    COLONOSCOPY N/A 11/17/2017    COLONOSCOPY, SCREENING with hot bx [facility]    HX BREAST REDUCTION      HX CHOLECYSTECTOMY  11/8/14    HX COLPOSCOPY      HX HEART CATHETERIZATION Bilateral 10/27/14    bilateral lower extremity angiography     HX OTHER SURGICAL Left     shave biopsy ( thigh area)       Family History:  Family History   Problem Relation Age of Onset    Deep Vein Thrombosis Father     Coronary Artery Disease Father     Pacemaker Father     Diabetes Father     High Cholesterol Father     Hypertension Father     High Cholesterol Mother     Hypertension Mother     Heart Attack Brother 40    Cancer Brother     Heart Attack Maternal Grandmother 100    Heart Attack Other 48    Hypertension Other     Hypertension Maternal Aunt        Social History:  Social History     Tobacco Use    Smoking status: Light Tobacco Smoker     Packs/day: 0.25     Years: 20.00     Pack years: 5.00     Types: Cigarettes    Smokeless tobacco: Never Used    Tobacco comment: now down to 3 cigarettes per day   Substance Use Topics    Alcohol use: No    Drug use: No       Allergies: Allergies   Allergen Reactions    Latex Rash    Keflex [Cephalexin] Rash    Epinephrine Other (comments)     tachycardia    Tetracyclines Nausea Only         Review of Systems       Review of Systems   Constitutional: Negative. Negative for chills, diaphoresis and fever. HENT: Positive for postnasal drip, rhinorrhea and sinus pain. Negative for congestion and sore throat. Eyes: Negative. Negative for pain, discharge and redness. Respiratory: Positive for cough and wheezing. Negative for chest tightness and shortness of breath.     Cardiovascular: Negative. Negative for chest pain. Gastrointestinal: Negative. Negative for abdominal pain, constipation, diarrhea, nausea and vomiting. Genitourinary: Negative. Negative for dysuria, flank pain, frequency, hematuria and urgency. Musculoskeletal: Negative. Negative for back pain and neck pain. Skin: Negative. Negative for rash. Neurological: Negative. Negative for syncope, weakness, numbness and headaches. Psychiatric/Behavioral: Negative. All other systems reviewed and are negative. Physical Exam     Visit Vitals  /51 (BP 1 Location: Left arm)   Pulse 68   Temp 97.7 °F (36.5 °C)   Resp 17   Ht 4' 11\" (1.499 m)   Wt 50.8 kg (112 lb)   SpO2 99%   BMI 22.62 kg/m²         Physical Exam  Vitals signs and nursing note reviewed. Constitutional:       General: She is not in acute distress. Appearance: Normal appearance. She is well-developed. She is not ill-appearing, toxic-appearing or diaphoretic. HENT:      Head: Normocephalic and atraumatic. Mouth/Throat:      Pharynx: No oropharyngeal exudate. Eyes:      General: No scleral icterus. Conjunctiva/sclera: Conjunctivae normal.      Pupils: Pupils are equal, round, and reactive to light. Neck:      Musculoskeletal: Normal range of motion and neck supple. Thyroid: No thyromegaly. Vascular: No hepatojugular reflux or JVD. Trachea: No tracheal deviation. Cardiovascular:      Rate and Rhythm: Normal rate and regular rhythm. Pulses: Normal pulses. Radial pulses are 2+ on the right side and 2+ on the left side. Dorsalis pedis pulses are 2+ on the right side and 2+ on the left side. Heart sounds: Normal heart sounds, S1 normal and S2 normal. No murmur. No gallop. No S3 or S4 sounds. Pulmonary:      Effort: Pulmonary effort is normal. No respiratory distress. Breath sounds: Normal breath sounds. No decreased breath sounds, wheezing, rhonchi or rales.    Abdominal: General: Bowel sounds are normal. There is no distension. Palpations: Abdomen is soft. Abdomen is not rigid. There is no mass. Tenderness: There is no abdominal tenderness. There is no guarding or rebound. Negative signs include Zayas's sign and McBurney's sign. Musculoskeletal: Normal range of motion. Comments: Strength 4 5 throughout. Lymphadenopathy:      Head:      Right side of head: No submental, submandibular, preauricular or occipital adenopathy. Left side of head: No submental, submandibular, preauricular or occipital adenopathy. Cervical: No cervical adenopathy. Upper Body:      Right upper body: No supraclavicular adenopathy. Left upper body: No supraclavicular adenopathy. Skin:     General: Skin is warm and dry. Findings: No rash. Neurological:      Mental Status: She is alert. She is not disoriented. GCS: GCS eye subscore is 4. GCS verbal subscore is 5. GCS motor subscore is 6. Cranial Nerves: No cranial nerve deficit. Sensory: No sensory deficit. Coordination: Coordination normal.      Gait: Gait normal.      Deep Tendon Reflexes: Reflexes are normal and symmetric. Comments: Grossly intact. Psychiatric:         Speech: Speech normal.         Behavior: Behavior normal.         Thought Content: Thought content normal.         Judgment: Judgment normal.           Diagnostic Study Results     Labs -  No results found for this or any previous visit (from the past 12 hour(s)). Radiologic Studies -   No orders to display         Medical Decision Making   Provider Notes (Medical Decision Making):  MDM  Number of Diagnoses or Management Options  Diagnosis management comments: Nasal congestion  Asthma exacerbation      I am the first provider for this patient. I reviewed the vital signs, available nursing notes, past medical history, past surgical history, family history and social history.     Vital Signs-Reviewed the patient's vital signs. Records Reviewed: Nursing Notes (Time of Review: 7:18 AM)    ED Course: Progress Notes, Reevaluation, and Consults:    Patient refused inhaler and steroid dose. She states she is feeling much better. 7:28 AM 3/5/2020        Diagnosis       I have reassessed the patient. Patient is feeling well. Patient will be prescribed Azithromycin and Prednisone. Patient was discharged in stable condition. Patient is to return to emergency department if any new or worsening condition. Clinical Impression:   1. Acute frontal sinusitis, recurrence not specified    2. Mild intermittent asthma, unspecified whether complicated        Disposition: Discharged home     Follow-up Information     Follow up With Specialties Details Why Contact Info    Shraddha Palacio MD Family Practice In 2 days  [address]  [phone]               Attestation        Provider Attestation:     I personally performed the services described in the documentation, reviewed the documentation and it accurately and completely records my words and actions utilizing the [address] March 05, 2020 at 7:38 AM - Grey Crandall DO    Disclaimer. It is dictated using utilizing voice recognition software. Unfortunately this leads to occasional typographical errors. I apologize in advance if the situation occurs. If questions arise please do not hesitate to contact me or call our department.

## 2020-03-06 ENCOUNTER — OFFICE VISIT (OUTPATIENT)
Dept: ORTHOPEDIC SURGERY | Age: 65
End: 2020-03-06

## 2020-03-06 VITALS
SYSTOLIC BLOOD PRESSURE: 145 MMHG | RESPIRATION RATE: 16 BRPM | BODY MASS INDEX: 23.18 KG/M2 | HEART RATE: 70 BPM | TEMPERATURE: 98.3 F | WEIGHT: 115 LBS | OXYGEN SATURATION: 99 % | DIASTOLIC BLOOD PRESSURE: 50 MMHG | HEIGHT: 59 IN

## 2020-03-06 DIAGNOSIS — R29.898 WEAKNESS OF LEFT ARM: Primary | ICD-10-CM

## 2020-03-06 RX ORDER — ERGOCALCIFEROL 1.25 MG/1
50000 CAPSULE ORAL
COMMUNITY
Start: 2019-12-09
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
## 2020-03-09 ENCOUNTER — APPOINTMENT (OUTPATIENT)
Dept: GENERAL RADIOLOGY | Age: 65
End: 2020-03-09
Attending: EMERGENCY MEDICINE
Payer: COMMERCIAL

## 2020-03-09 ENCOUNTER — HOSPITAL ENCOUNTER (EMERGENCY)
Age: 65
Discharge: HOME | End: 2020-03-09
Attending: EMERGENCY MEDICINE
Payer: COMMERCIAL

## 2020-03-09 VITALS
RESPIRATION RATE: 24 BRPM | DIASTOLIC BLOOD PRESSURE: 49 MMHG | SYSTOLIC BLOOD PRESSURE: 134 MMHG | HEART RATE: 73 BPM | WEIGHT: 116 LBS | BODY MASS INDEX: 23.43 KG/M2 | OXYGEN SATURATION: 99 % | TEMPERATURE: 97.7 F

## 2020-03-09 DIAGNOSIS — R06.2 WHEEZING: ICD-10-CM

## 2020-03-09 DIAGNOSIS — J01.01 ACUTE RECURRENT MAXILLARY SINUSITIS: Primary | ICD-10-CM

## 2020-03-09 DIAGNOSIS — R09.82 POSTNASAL DRIP: ICD-10-CM

## 2020-03-09 DIAGNOSIS — R51.9 MILD HEADACHE: ICD-10-CM

## 2020-03-09 PROCEDURE — 94640 AIRWAY INHALATION TREATMENT: CPT

## 2020-03-09 PROCEDURE — 99284 EMERGENCY DEPT VISIT MOD MDM: CPT

## 2020-03-09 PROCEDURE — 74011000250 HC RX REV CODE- 250: Performed by: EMERGENCY MEDICINE

## 2020-03-09 RX ORDER — CETIRIZINE HCL 10 MG
10 TABLET ORAL
Qty: 30 TAB | Refills: 0 | Status: SHIPPED | OUTPATIENT
Start: 2020-03-09

## 2020-03-09 RX ORDER — ALBUTEROL SULFATE 0.83 MG/ML
2.5 SOLUTION RESPIRATORY (INHALATION)
Status: DISCONTINUED | OUTPATIENT
Start: 2020-03-09 | End: 2020-03-09 | Stop reason: HOSPADM

## 2020-03-09 RX ORDER — AMOXICILLIN 500 MG/1
500 TABLET, FILM COATED ORAL 3 TIMES DAILY
Qty: 21 TAB | Refills: 0 | Status: SHIPPED | OUTPATIENT
Start: 2020-03-09 | End: 2020-03-16

## 2020-03-09 RX ORDER — IPRATROPIUM BROMIDE AND ALBUTEROL SULFATE 2.5; .5 MG/3ML; MG/3ML
3 SOLUTION RESPIRATORY (INHALATION)
Status: COMPLETED | OUTPATIENT
Start: 2020-03-09 | End: 2020-03-09

## 2020-03-09 RX ADMIN — IPRATROPIUM BROMIDE AND ALBUTEROL SULFATE 3 ML: .5; 3 SOLUTION RESPIRATORY (INHALATION) at 15:50

## 2020-03-09 NOTE — ED PROVIDER NOTES
EMERGENCY DEPARTMENT HISTORY AND PHYSICAL EXAM    4:12 PM      Date: 3/9/2020  Patient Name: Werner Muhammad    History of Presenting Illness     Chief Complaint   Patient presents with    Wheezing    Shortness of Breath         History Provided By: Patient    Additional History (Context): Werner Muhammad is a 62 y.o. female with Past medical history of allergic rhinitis, sinusitis, hypertension who presents with chief complaint of wheezing for the past few days. She states that for the past 2 weeks she has had sinus congestion and postnasal drip, thick mucus from the nares, and mild headache this worse only when she lays down. She states that her ENT specialist discussed doing surgery on her sinuses thus far she has refused to do that. She has been on Zyrtec in the past which has helped but she does not have anymore. She also reports that she is on the last day of Z-Emmanuel but still has the congestion. Patient states that amoxicillin has worked better for her in the past for her sinusitis. She reports that when she gets this postnasal drip usually causes her to cough and sometimes wheeze. She denies any sick contacts, no exposure to anyone from [place], no fever, shortness of breath, nausea, vomiting, abdominal pain, and no other complaint. PCP: Dorothy Olguin MD        Past History     Past Medical History:  Past Medical History:   Diagnosis Date    Allergic rhinitis     Blurred vision     Cardiac echocardiogram 06/11/2014    EF 60%. No RWMA. RVSP 30 mmHg. Mild AI.  Cardiac Holter monitor, normal 11/05/2014    Benign 48-hr Holter study.  Cardiac nuclear imaging test, mod risk 08/14/2015    Intermediate risk. High anterior artifact vs diagonal artery ischemia. Following Lexiscan, 0.5-mm downsloping inferolateral ST depression, resolving 10 min 30 sec of recovery. Arm heaviness noted.  Cardiovascular lower extremity arterial duplex 07/15/2016    Right leg:   Mod peripheral arterial disease in femoral segment at rest.  Left leg:  Mild arterial disease at rest.  R KAREN 0.58. L KAREN 0.95. Similar to study of 10/15/14.  Cardiovascular renal angiography 10/27/2014    Bilateral patent renal arteries. Right CFA occluded but collateralized.  Cardiovascular renal duplex 10/13/2014    Aorta w/irregular walls. Severe >60% bilateral renal artery stenosis. Bilateral intrinsic/med renal disease.  Carotid duplex 10/17/2014    Mild <50% DAVID stenosis. Biphasic signals in both subclavians.  Carotid duplex 12/05/2016    Mild < 50% DAVID stenosis.       Cervical disc disease 09/2015    MRI of C-spine at [facility] JO FARAH Dizziness     Ear ache     Essential hypertension     Fainting spell     Frequent headaches     Frequent nosebleeds     Hemorrhoid     Hyperlipidemia     Hypertension     Ill-defined condition     Migraine     FLORENCIO (obstructive sleep apnea)     Sinus problem     Sinusitis, chronic     Spontaneous pneumothorax 1999    2 episodes on the right within several months of each other    Stomach pain     Weakness of right leg        Past Surgical History:  Past Surgical History:   Procedure Laterality Date    COLONOSCOPY N/A 11/17/2017    COLONOSCOPY, SCREENING with hot bx polypectomy performed by Jasper Knutson MD at [facility] ENDOSCOPY    HX BREAST REDUCTION      HX CHOLECYSTECTOMY  11/8/14    HX COLPOSCOPY      HX HEART CATHETERIZATION Bilateral 10/27/14    bilateral lower extremity angiography     HX OTHER SURGICAL Left     shave biopsy ( thigh area)       Family History:  Family History   Problem Relation Age of Onset    Deep Vein Thrombosis Father     Coronary Artery Disease Father     Pacemaker Father     Diabetes Father     High Cholesterol Father     Hypertension Father     High Cholesterol Mother     Hypertension Mother     Heart Attack Brother 40    Cancer Brother     Heart Attack Maternal Grandmother 100    Heart Attack Other 48    Hypertension Other  Hypertension Maternal Aunt        Social History:  Social History     Tobacco Use    Smoking status: Light Tobacco Smoker     Packs/day: 0.25     Years: 20.00     Pack years: 5.00     Types: Cigarettes    Smokeless tobacco: Never Used    Tobacco comment: now down to 3 cigarettes per day   Substance Use Topics    Alcohol use: No    Drug use: No       Allergies: Allergies   Allergen Reactions    Latex Rash    Keflex [Cephalexin] Rash    Epinephrine Other (comments)     tachycardia    Tetracyclines Nausea Only         Review of Systems       Review of Systems   Constitutional: Negative for chills and fever. HENT: Positive for congestion, postnasal drip and sinus pressure. Negative for rhinorrhea, sore throat and trouble swallowing. Eyes: Negative for visual disturbance. Respiratory: Positive for cough and wheezing. Negative for shortness of breath and stridor. Cardiovascular: Negative for chest pain, palpitations and leg swelling. Gastrointestinal: Negative for abdominal pain, nausea and vomiting. Endocrine: Negative for polyuria. Genitourinary: Negative for dysuria. Musculoskeletal: Negative for arthralgias and neck stiffness. Skin: Negative for pallor and rash. Neurological: Negative for dizziness, weakness, numbness and headaches. Hematological: Does not bruise/bleed easily. Psychiatric/Behavioral: Negative for confusion and dysphoric mood. All other systems reviewed and are negative. Physical Exam     Visit Vitals  /49   Pulse 73   Temp 97.7 °F (36.5 °C)   Resp 24   Wt 52.6 kg (116 lb)   SpO2 99%   BMI 23.43 kg/m²         Physical Exam  Vitals signs and nursing note reviewed. Constitutional:       General: She is not in acute distress. Appearance: She is well-developed. She is not ill-appearing, toxic-appearing or diaphoretic. HENT:      Head: Normocephalic and atraumatic. Nose: Congestion and rhinorrhea present.       Mouth/Throat:      Mouth: Mucous membranes are moist.      Pharynx: Oropharynx is clear. No pharyngeal swelling, oropharyngeal exudate or posterior oropharyngeal erythema. Eyes:      General: No scleral icterus. Extraocular Movements: Extraocular movements intact. Conjunctiva/sclera: Conjunctivae normal.      Pupils: Pupils are equal, round, and reactive to light. Neck:      Musculoskeletal: Normal range of motion and neck supple. No muscular tenderness. Cardiovascular:      Rate and Rhythm: Normal rate. Heart sounds: Normal heart sounds. No gallop. Comments: Capillary refill < 3 seconds  Pulmonary:      Effort: Pulmonary effort is normal. No respiratory distress. Breath sounds: Normal breath sounds. No stridor. No decreased breath sounds or rhonchi. Comments: Few scattered wheezes  Abdominal:      General: Bowel sounds are normal. There is no distension. Palpations: Abdomen is soft. Tenderness: There is no abdominal tenderness. Musculoskeletal: Normal range of motion. General: No tenderness. Right lower leg: No edema. Left lower leg: No edema. Lymphadenopathy:      Cervical: No cervical adenopathy. Skin:     General: Skin is warm and dry. Coloration: Skin is not jaundiced or pale. Neurological:      Mental Status: She is alert and oriented to person, place, and time. Cranial Nerves: No cranial nerve deficit. Motor: No weakness. Psychiatric:         Thought Content: Thought content normal.           Diagnostic Study Results     Labs -  No results found for this or any previous visit (from the past 12 hour(s)). Radiologic Studies -   No orders to display         Medical Decision Making   I am the first provider for this patient. I reviewed the vital signs, available nursing notes, past medical history, past surgical history, family history and social history. Vital Signs-Reviewed the patient's vital signs.         Records Reviewed: Nursing Notes and Old Medical Records (Time of Review: 4:12 PM)    Provider Notes (Medical Decision Making): DDX: Acute on chronic sinusitis, allergic rhinitis, postnasal drip, wheezing, URI    Patient refused chest x-ray and that this is her usual sinus infection with postnasal drip causing her wheezing. Gave DuoNeb treatment  MDM    Medications   albuterol (PROVENTIL VENTOLIN) nebulizer solution 2.5 mg (2.5 mg Nebulization Refused 3/9/20 1720)   albuterol-ipratropium (DUO-NEB) 2.5 MG-0.5 MG/3 ML (3 mL Nebulization Given 3/9/20 1550)         ED Course: Progress Notes, Reevaluation, and Consults:  Patient states she feels much better after breathing treatment and states that her airway is now open up and she is breathing much better. She reports that she does have saline nasal spray at home in addition to [facility] that she will use. She feels that amoxicillin will be better for her to get rid of the sinus infection and requests refill of Zyrtec which has helped her also in the past.      I have reassessed the patient. I have discussed the workup, results and plan with the patient and patient is in agreement. Patient is feeling better. Patient will be prescribed Zyrtec, amoxicillin. Patient was discharge in stable condition. Patient was given outpatient follow up. Patient is to return to emergency department if any new or worsening condition. Diagnosis     Clinical Impression:   1. Acute recurrent maxillary sinusitis    2. Mild headache    3. Postnasal drip    4.  Wheezing        Disposition: Discharged    Follow-up Information     Follow up With Specialties Details Why Contact Info    Mayra Boucher MD Family Practice Schedule an appointment as soon as possible for a visit in 3 days  [address] [facility] [address] [facility] [address] [facility]      [id number] [facility] EMERGENCY DEPT Emergency Medicine  As needed, If symptoms worsen [address]            Patient's Medications   Start Taking    AMOXICILLIN 500 MG TAB    Take 500 mg by mouth three (3) times daily for 7 days. Indications: acute bacterial infection of the sinuses   Continue Taking    ASPIRIN DELAYED-RELEASE 81 MG TABLET    Take  by mouth nightly. ATORVASTATIN (LIPITOR) 40 MG TABLET    TAKE ONE TABLET BY MOUTH DAILY    AZITHROMYCIN (ZITHROMAX Z-PIERCE) 250 MG TABLET    Take two tablets on day one then one tablet daily for the next 4 days. BUDESONIDE/FORMOTEROL FUMARATE (SYMBICORT IN)    Take  by inhalation. DIVALPROEX DR (DEPAKOTE) 500 MG TABLET    Take 500 mg by mouth two (2) times a day. ERGOCALCIFEROL (ERGOCALCIFEROL) 1,250 MCG (50,000 UNIT) CAPSULE        FLUTICASONE (FLONASE) 50 MCG/ACTUATION NASAL SPRAY    2 Sprays by Both Nostrils route daily. IBUPROFEN (MOTRIN) 800 MG TABLET    Take  by mouth. IPRATROPIUM (ATROVENT) 42 MCG (0.06 %) NASAL SPRAY    2 sprays up each nostril twice per day    LABETALOL (NORMODYNE) 100 MG TABLET    Take  by mouth two (2) times a day. MECLIZINE (ANTIVERT) 25 MG TABLET    Take  by mouth three (3) times daily as needed for Dizziness. METOPROLOL SUCCINATE (TOPROL-XL) 25 MG XL TABLET    Take 25 mg by mouth. MONTELUKAST (SINGULAIR) 10 MG TABLET    Take 1 Tab by mouth daily. PREDNISONE (DELTASONE) 50 MG TABLET    Take 1 Tab by mouth daily for 5 days. PROAIR HFA 90 MCG/ACTUATION INHALER       These Medications have changed    Modified Medication Previous Medication    CETIRIZINE (ZYRTEC) 10 MG TABLET cetirizine (ZYRTEC) 10 mg tablet       Take 1 Tab by mouth daily as needed (For allergic rhinitis). Take 10 mg by mouth nightly. Stop Taking    No medications on file         Ame Zhang DO    Dragon medical dictation software was used for portions of this report. Unintended transcription errors may occur.      My signature above authenticates this document and my orders, the final    diagnosis (es), discharge prescription (s), and instructions in the Epic    record.

## 2020-03-09 NOTE — LETTER
NOTIFICATION RETURN TO WORK / SCHOOL 
 
3/9/2020 6:52 PM 
 
Ms. Flores Taveras [address] [facility] [id number] To Whom It May Concern: 
 
Flores Taveras is currently under the care of [id number] [facility] EMERGENCY DEPT. She will return to work/school on: 3/12/2020 Flores Taveras may return to work/school with the following restrictions: none. If there are questions or concerns please have the patient contact our office.  
 
 
 
Sincerely, 
 
 
 
 
 
Marion Lemon RN

## 2020-03-09 NOTE — DISCHARGE INSTRUCTIONS
Patient Education      If you were prescribed any medication take as directed. Do not drive or use heavy equipment if prescribed narcotics. Follow up with your primary care physician or with specialist as directed. Return to the emergency room with any new or worsening conditions. Saline Nasal Washes: Care Instructions  Your Care Instructions  Saline nasal washes help keep the nasal passages open by washing out thick or dried mucus. This simple remedy can help relieve symptoms of allergies, sinusitis, and colds. It also can make the nose feel more comfortable by keeping the mucous membranes moist. You may notice a little burning sensation in your nose the first few times you use the solution, but this usually gets better in a few days. Follow-up care is a key part of your treatment and safety. Be sure to make and go to all appointments, and call your doctor if you are having problems. It's also a good idea to know your test results and keep a list of the medicines you take. How can you care for yourself at home? · You can buy premixed saline solution in a squeeze bottle or other sinus rinse products at a drugstore. Read and follow the instructions on the label. · You also can make your own saline solution by adding 1 teaspoon of salt and 1 teaspoon of baking soda to 2 cups of distilled water. · If you use a homemade solution, pour a small amount into a clean bowl. Using a rubber bulb syringe, squeeze the syringe and place the tip in the salt water. Pull a small amount of the salt water into the syringe by relaxing your hand. · Sit down with your head tilted slightly back. Do not lie down. Put the tip of the bulb syringe or the squeeze bottle a little way into one of your nostrils. Gently drip or squirt a few drops into the nostril. Repeat with the other nostril. Some sneezing and gagging are normal at first.  · Gently blow your nose. · Wipe the syringe or bottle tip clean after each use.   · Repeat this 2 or 3 times a day. · Use nasal washes gently if you have nosebleeds often. When should you call for help? Watch closely for changes in your health, and be sure to contact your doctor if:    · You often get nosebleeds.     · You have problems doing the nasal washes. Where can you learn more? Go to http://dirk-amaris.info/. Enter [id number] [id number] [id number] in the search box to learn more about \"Saline Nasal Washes: Care Instructions. \"  Current as of: October 21, 2018  Content Version: 12.2  © 1694-4594 [employer]. Care instructions adapted under license by [employer] (which disclaims liability or warranty for this information). If you have questions about a medical condition or this instruction, always ask your healthcare professional. [place] [address] any warranty or liability for your use of this information. Patient Education        Headache: Care Instructions  Your Care Instructions    Headaches have many possible causes. Most headaches aren't a sign of a more serious problem, and they will get better on their own. Home treatment may help you feel better faster. The doctor has checked you carefully, but problems can develop later. If you notice any problems or new symptoms, get medical treatment right away. Follow-up care is a key part of your treatment and safety. Be sure to make and go to all appointments, and call your doctor if you are having problems. It's also a good idea to know your test results and keep a list of the medicines you take. How can you care for yourself at home? · Do not drive if you have taken a prescription pain medicine. · Rest in a quiet, dark room until your headache is gone. Close your eyes and try to relax or go to sleep. Don't watch TV or read. · Put a cold, moist cloth or cold pack on the painful area for 10 to 20 minutes at a time. Put a thin cloth between the cold pack and your skin.   · Use a warm, moist towel or a heating pad set on low to relax tight shoulder and neck muscles. · Have someone gently massage your neck and shoulders. · Take pain medicines exactly as directed. ? If the doctor gave you a prescription medicine for pain, take it as prescribed. ? If you are not taking a prescription pain medicine, ask your doctor if you can take an over-the-counter medicine. · Be careful not to take pain medicine more often than the instructions allow, because you may get worse or more frequent headaches when the medicine wears off. · Do not ignore new symptoms that occur with a headache, such as a fever, weakness or numbness, vision changes, or confusion. These may be signs of a more serious problem. To prevent headaches  · Keep a headache diary so you can figure out what triggers your headaches. Avoiding triggers may help you prevent headaches. Record when each headache began, how long it lasted, and what the pain was like (throbbing, aching, stabbing, or dull). Write down any other symptoms you had with the headache, such as nausea, flashing lights or dark spots, or sensitivity to bright light or loud noise. Note if the headache occurred near your period. List anything that might have triggered the headache, such as certain foods (chocolate, cheese, wine) or odors, smoke, bright light, stress, or lack of sleep. · Find healthy ways to deal with stress. Headaches are most common during or right after stressful times. Take time to relax before and after you do something that has caused a headache in the past.  · Try to keep your muscles relaxed by keeping good posture. Check your jaw, face, neck, and shoulder muscles for tension, and try relaxing them. When sitting at a desk, change positions often, and stretch for 30 seconds each hour. · Get plenty of sleep and exercise. · Eat regularly and well. Long periods without food can trigger a headache. · Treat yourself to a massage.  Some people find that regular massages are very helpful in relieving tension. · Limit caffeine by not drinking too much coffee, tea, or soda. But don't quit caffeine suddenly, because that can also give you headaches. · Reduce eyestrain from computers by blinking frequently and looking away from the computer screen every so often. Make sure you have proper eyewear and that your monitor is set up properly, about an arm's length away. · Seek help if you have depression or anxiety. Your headaches may be linked to these conditions. Treatment can both prevent headaches and help with symptoms of anxiety or depression. When should you call for help? Call 911 anytime you think you may need emergency care. For example, call if:    · You have signs of a stroke. These may include:  ? Sudden numbness, paralysis, or weakness in your face, arm, or leg, especially on only one side of your body. ? Sudden vision changes. ? Sudden trouble speaking. ? Sudden confusion or trouble understanding simple statements. ? Sudden problems with walking or balance. ? A sudden, severe headache that is different from past headaches.    Call your doctor now or seek immediate medical care if:    · You have a new or worse headache.     · Your headache gets much worse. Where can you learn more? Go to http://dirk-amaris.info/. Enter M271 in the search box to learn more about \"Headache: Care Instructions. \"  Current as of: March 28, 2019  Content Version: 12.2  © 4581-5642 [employer]. Care instructions adapted under license by [employer] (which disclaims liability or warranty for this information). If you have questions about a medical condition or this instruction, always ask your healthcare professional. [place] [address] any warranty or liability for your use of this information.          Patient Education        Sinusitis: Care Instructions  Your Care Instructions    Sinusitis is an infection of the lining of the sinus cavities in your head. Sinusitis often follows a cold. It causes pain and pressure in your head and face. In most cases, sinusitis gets better on its own in 1 to 2 weeks. But some mild symptoms may last for several weeks. Sometimes antibiotics are needed. Follow-up care is a key part of your treatment and safety. Be sure to make and go to all appointments, and call your doctor if you are having problems. It's also a good idea to know your test results and keep a list of the medicines you take. How can you care for yourself at home? · Take an over-the-counter pain medicine, such as acetaminophen (Tylenol), ibuprofen (Advil, Motrin), or naproxen (Aleve). Read and follow all instructions on the label. · If the doctor prescribed antibiotics, take them as directed. Do not stop taking them just because you feel better. You need to take the full course of antibiotics. · Be careful when taking over-the-counter cold or flu medicines and Tylenol at the same time. Many of these medicines have acetaminophen, which is Tylenol. Read the labels to make sure that you are not taking more than the recommended dose. Too much acetaminophen (Tylenol) can be harmful. · Breathe warm, moist air from a steamy shower, a hot bath, or a sink filled with hot water. Avoid cold, dry air. Using a humidifier in your home may help. Follow the directions for cleaning the machine. · Use saline (saltwater) nasal washes to help keep your nasal passages open and wash out mucus and bacteria. You can buy saline nose drops at a grocery store or drugstore. Or you can make your own at home by adding 1 teaspoon of salt and 1 teaspoon of baking soda to 2 cups of distilled water. If you make your own, fill a bulb syringe with the solution, insert the tip into your nostril, and squeeze gently. [place] your nose. · Put a hot, wet towel or a warm gel pack on your face 3 or 4 times a day for 5 to 10 minutes each time.   · Try a decongestant nasal spray like oxymetazoline (Afrin). Do not use it for more than 3 days in a row. Using it for more than 3 days can make your congestion worse. When should you call for help? Call your doctor now or seek immediate medical care if:    · You have new or worse swelling or redness in your face or around your eyes.     · You have a new or higher fever.    Watch closely for changes in your health, and be sure to contact your doctor if:    · You have new or worse facial pain.     · The mucus from your nose becomes thicker (like pus) or has new blood in it.     · You are not getting better as expected. Where can you learn more? Go to http://dirk-amaris.info/. Enter F667 in the search box to learn more about \"Sinusitis: Care Instructions. \"  Current as of: October 21, 2018  Content Version: 12.2  © 5582-5069 [employer]. Care instructions adapted under license by [employer] (which disclaims liability or warranty for this information). If you have questions about a medical condition or this instruction, always ask your healthcare professional. [place] [address] any warranty or liability for your use of this information. Patient Education        Wheezing or Bronchoconstriction: Care Instructions  Your Care Instructions  Wheezing is a whistling noise made during breathing. It occurs when the small airways, or bronchial tubes, that lead to your lungs swell or contract (spasm) and become narrow. This narrowing is called bronchoconstriction. When your airways constrict, it is hard for air to pass through and this makes it hard for you to breathe. Wheezing and bronchoconstriction can be caused by many problems, including:  · An infection such as the flu or a cold. · Allergies such as hay fever. · Diseases such as asthma or chronic obstructive pulmonary disease. · Smoking. Treatment for your wheezing depends on what is causing the problem.  Your wheezing may get better without treatment. But you may need to pay attention to things that cause your wheezing and avoid them. Or you may need medicine to help treat the wheezing and to reduce the swelling or to relieve spasms in your lungs. Follow-up care is a key part of your treatment and safety. Be sure to make and go to all appointments, and call your doctor if you are having problems. It is also a good idea to know your test results and keep a list of the medicines you take. How can you care for yourself at home? · Take your medicine exactly as prescribed. Call your doctor if you think you are having a problem with your medicine. You will get more details on the specific medicine your doctor prescribes. · If your doctor prescribed antibiotics, take them as directed. Do not stop taking them just because you feel better. You need to take the full course of antibiotics. · Breathe moist air from a humidifier, hot shower, or sink filled with hot water. This may help ease your symptoms and make it easier for you to breathe. · If you have congestion in your nose and throat, drinking plenty of fluids, especially hot fluids, may help relieve your symptoms. If you have kidney, heart, or liver disease and have to limit fluids, talk with your doctor before you increase the amount of fluids you drink. · If you have mucus in your airways, it may help to breathe deeply and cough. · Do not smoke or allow others to smoke around you. Smoking can make your wheezing worse. If you need help quitting, talk to your doctor about stop-smoking programs and medicines. These can increase your chances of quitting for good. · Avoid things that may cause your wheezing. These may include colds, smoke, air pollution, dust, pollen, pets, cockroaches, stress, and cold air. When should you call for help? Call 911 anytime you think you may need emergency care.  For example, call if:    · You have severe trouble breathing.     · You passed out (lost consciousness).    Call your doctor now or seek immediate medical care if:    · You cough up yellow, dark brown, or bloody mucus (sputum).     · You have new or worse shortness of breath.     · Your wheezing is not getting better or it gets worse after you start taking your medicine.    Watch closely for changes in your health, and be sure to contact your doctor if:    · You do not get better as expected. Where can you learn more? Go to http://dirk-amaris.info/. Enter 454 [id number] in the search box to learn more about \"Wheezing or Bronchoconstriction: Care Instructions. \"  Current as of: June 9, 2019  Content Version: 12.2  © 6113-8291 [employer]. Care instructions adapted under license by [employer] (which disclaims liability or warranty for this information). If you have questions about a medical condition or this instruction, always ask your healthcare professional. [address] any warranty or liability for your use of this information.

## 2020-03-09 NOTE — ED NOTES
Patient refused Albuterol treatment. States, \" Trust me, I know. I don't need it\". Dr. Sarah Rodriguez and Abril Cadena Rn made aware. Pt in NAD.

## 2020-05-04 ENCOUNTER — APPOINTMENT (OUTPATIENT)
Dept: GENERAL RADIOLOGY | Age: 65
End: 2020-05-04
Attending: EMERGENCY MEDICINE
Payer: COMMERCIAL

## 2020-05-04 ENCOUNTER — HOSPITAL ENCOUNTER (EMERGENCY)
Age: 65
Discharge: HOME | End: 2020-05-04
Attending: EMERGENCY MEDICINE
Payer: COMMERCIAL

## 2020-05-04 VITALS
BODY MASS INDEX: 22.58 KG/M2 | RESPIRATION RATE: 18 BRPM | SYSTOLIC BLOOD PRESSURE: 183 MMHG | TEMPERATURE: 98.6 F | HEIGHT: 59 IN | HEART RATE: 77 BPM | OXYGEN SATURATION: 99 % | WEIGHT: 112 LBS | DIASTOLIC BLOOD PRESSURE: 42 MMHG

## 2020-05-04 DIAGNOSIS — M72.2 PLANTAR FASCIITIS OF RIGHT FOOT: Primary | ICD-10-CM

## 2020-05-04 DIAGNOSIS — I10 ELEVATED BLOOD PRESSURE READING WITH DIAGNOSIS OF HYPERTENSION: ICD-10-CM

## 2020-05-04 PROCEDURE — 73620 X-RAY EXAM OF FOOT: CPT

## 2020-05-04 PROCEDURE — 99283 EMERGENCY DEPT VISIT LOW MDM: CPT

## 2020-05-04 NOTE — DISCHARGE INSTRUCTIONS
High Blood Pressure: Care Instructions  Overview    It's normal for blood pressure to go up and down throughout the day. But if it stays up, you have high blood pressure. Another name for high blood pressure is hypertension. Despite what a lot of people think, high blood pressure usually doesn't cause headaches or make you feel dizzy or lightheaded. It usually has no symptoms. But it does increase your risk of stroke, heart attack, and other problems. You and your doctor will talk about your risks of these problems based on your blood pressure. Your doctor will give you a goal for your blood pressure. Your goal will be based on your health and your age. Lifestyle changes, such as eating healthy and being active, are always important to help lower blood pressure. You might also take medicine to reach your blood pressure goal.  Follow-up care is a key part of your treatment and safety. Be sure to make and go to all appointments, and call your doctor if you are having problems. It's also a good idea to know your test results and keep a list of the medicines you take. How can you care for yourself at home? Medical treatment  · If you stop taking your medicine, your blood pressure will go back up. You may take one or more types of medicine to lower your blood pressure. Be safe with medicines. Take your medicine exactly as prescribed. Call your doctor if you think you are having a problem with your medicine. · Talk to your doctor before you start taking aspirin every day. Aspirin can help certain people lower their risk of a heart attack or stroke. But taking aspirin isn't right for everyone, because it can cause serious bleeding. · See your doctor regularly. You may need to see the doctor more often at first or until your blood pressure comes down. · If you are taking blood pressure medicine, talk to your doctor before you take decongestants or anti-inflammatory medicine, such as ibuprofen.  Some of these medicines can raise blood pressure. · Learn how to check your blood pressure at home. Lifestyle changes  · Stay at a healthy weight. This is especially important if you put on weight around the waist. Losing even 10 pounds can help you lower your blood pressure. · If your doctor recommends it, get more exercise. Walking is a good choice. Bit by bit, increase the amount you walk every day. Try for at least 30 minutes on most days of the week. You also may want to swim, bike, or do other activities. · Avoid or limit alcohol. Talk to your doctor about whether you can drink any alcohol. · Try to limit how much sodium you eat to less than 2,300 milligrams (mg) a day. Your doctor may ask you to try to eat less than 1,500 mg a day. · Eat plenty of fruits (such as bananas and oranges), vegetables, legumes, whole grains, and low-fat dairy products. · Lower the amount of saturated fat in your diet. Saturated fat is found in animal products such as milk, cheese, and meat. Limiting these foods may help you lose weight and also lower your risk for heart disease. · Do not smoke. Smoking increases your risk for heart attack and stroke. If you need help quitting, talk to your doctor about stop-smoking programs and medicines. These can increase your chances of quitting for good. When should you call for help? Call  911 anytime you think you may need emergency care. This may mean having symptoms that suggest that your blood pressure is causing a serious heart or blood vessel problem. Your blood pressure may be over 180/120.   For example, call  911 if:    · You have symptoms of a heart attack. These may include:  ? Chest pain or pressure, or a strange feeling in the chest.  ? Sweating. ? Shortness of breath. ? Nausea or vomiting. ? Pain, pressure, or a strange feeling in the back, neck, jaw, or upper belly or in one or both shoulders or arms. ? Lightheadedness or sudden weakness.   ? A fast or irregular heartbeat.     · You have symptoms of a stroke. These may include:  ? Sudden numbness, tingling, weakness, or loss of movement in your face, arm, or leg, especially on only one side of your body. ? Sudden vision changes. ? Sudden trouble speaking. ? Sudden confusion or trouble understanding simple statements. ? Sudden problems with walking or balance. ? A sudden, severe headache that is different from past headaches.     · You have severe back or belly pain.    Do not wait until your blood pressure comes down on its own. Get help right away.   Call your doctor now or seek immediate care if:    · Your blood pressure is much higher than normal (such as 180/120 or higher), but you don't have symptoms.     · You think high blood pressure is causing symptoms, such as:  ? Severe headache.  ? Blurry vision.    Watch closely for changes in your health, and be sure to contact your doctor if:    · Your blood pressure measures higher than your doctor recommends at least 2 times. That means the top number is higher or the bottom number is higher, or both.     · You think you may be having side effects from your blood pressure medicine. Where can you learn more? Go to http://dirkLocationamaris.info/  Enter [id number] in the search box to learn more about \"High Blood Pressure: Care Instructions. \"  Current as of: December 15, 2019Content Version: 12.4  © 4186-4632 [employer]wise, [employer]orporated. Care instructions adapted under license by [employer] (which disclaims liability or warranty for this information). If you have questions about a medical condition or this instruction, always ask your healthcare professional. [place] [address] any warranty or liability for your use of this information.     Patient Education        Plantar Fasciitis: Care Instructions  Your Care Instructions    Plantar fasciitis is pain and inflammation of the plantar fascia, the tissue at the bottom of your foot that connects the heel bone to the toes. The plantar fascia also supports the arch. If you strain the plantar fascia, it can develop small tears and cause heel pain when you stand or walk. Plantar fasciitis can be caused by running or other sports. It also may occur in people who are overweight or who have high arches or flat feet. You may get plantar fasciitis if you walk or stand for long periods, or have a tight Achilles tendon or calf muscles. You can improve your foot pain with rest and other care at home. It might take a few weeks to a few months for your foot to heal completely. Follow-up care is a key part of your treatment and safety. Be sure to make and go to all appointments, and call your doctor if you are having problems. It's also a good idea to know your test results and keep a list of the medicines you take. How can you care for yourself at home? · Rest your feet often. Reduce your activity to a level that lets you avoid pain. If possible, do not run or walk on hard surfaces. · Take pain medicines exactly as directed. ? If the doctor gave you a prescription medicine for pain, take it as prescribed. ? If you are not taking a prescription pain medicine, take an over-the-counter anti-inflammatory medicine for pain and swelling, such as ibuprofen (Advil, Motrin) or naproxen (Aleve). Read and follow all instructions on the label. · Use ice massage to help with pain and swelling. You can use an ice cube or an ice cup several times a day. To make an ice cup, fill a paper cup with water and freeze it. Cut off the top of the cup until a half-inch of ice shows. Hold onto the remaining paper to use the cup. Rub the ice in small circles over the area for 5 to 7 minutes. · Contrast baths, which alternate hot and cold water, can also help reduce swelling. But because heat alone may make pain and swelling worse, end a contrast bath with a soak in cold water. · Wear a night splint if your doctor suggests it.  A night splint holds your foot with the toes pointed up and the foot and ankle at a 90-degree angle. This position gives the bottom of your foot a constant, gentle stretch. · Do simple exercises such as calf stretches and towel stretches 2 to 3 times each day, especially when you first get up in the morning. These can help the plantar fascia become more flexible. They also make the muscles that support your arch stronger. Hold these stretches for 15 to 30 seconds per stretch. Repeat 2 to 4 times. ? Stand about 1 foot from a wall. Place the palms of both hands against the wall at chest level. Lean forward against the wall, keeping one leg with the knee straight and heel on the ground while bending the knee of the other leg.  ? Sit down on the floor or a mat with your feet stretched in front of you. Roll up a towel lengthwise, and loop it over the ball of your foot. Holding the towel at both ends, gently pull the towel toward you to stretch your foot. · Wear shoes with good arch support. Athletic shoes or shoes with a well-cushioned sole are good choices. · Try heel cups or shoe inserts (orthotics) to help cushion your heel. You can buy these at many shoe stores. · Put on your shoes as soon as you get out of bed. Going barefoot or wearing slippers may make your pain worse. · Reach and stay at a good weight for your height. This puts less strain on your feet. When should you call for help? Call your doctor now or seek immediate medical care if:    · You have heel pain with fever, redness, or warmth in your heel.     · You cannot put weight on the sore foot.    Watch closely for changes in your health, and be sure to contact your doctor if:    · You have numbness or tingling in your heel.     · Your heel pain lasts more than 2 weeks. Where can you learn more? Go to http://dirk-amaris.info/  Enter X351 in the search box to learn more about \"Plantar Fasciitis: Care Instructions. \"  Current as of: June 26, 2019Content Version: 12.4  © 5762-7671 Healthwise, Incorporated. Care instructions adapted under license by [employer] (which disclaims liability or warranty for this information). If you have questions about a medical condition or this instruction, always ask your healthcare professional. [address] any warranty or liability for your use of this information. Patient Education        Arch Pain: Exercises  Introduction  Here are some examples of exercises for you to try. The exercises may be suggested for a condition or for rehabilitation. Start each exercise slowly. Ease off the exercises if you start to have pain. You will be told when to start these exercises and which ones will work best for you. How to do the exercises  Plantar fascia stretch   1. Sit in a chair and put your affected foot on your other knee. 2. Hold the heel of your foot in one hand, and grasp your toes with the other hand. 3. Pull on your heel (toward your body), and at the same time pull your toes back with your other hand. 4. You should feel a stretch along the bottom of your foot. 5. Hold 15 to 30 seconds. 6. Repeat 2 to 4 times. Plantar fascia stretch (kneeling)   1. Get on your hands and knees on the floor. Keep your heels pointing up and the balls of your feet and your toes on the floor. 2. Slowly sit back toward your ankles. 3. If this is too hard, you can try doing it one leg at a time. Stand up, and then kneel on one knee and keep the other leg forward. Place the foot of your forward leg flat on the ground and bend that knee. The heel on the leg still behind you should point up. The ball and toes of that foot should be on the floor. Sit back toward that ankle. 4. Hold 15 to 30 seconds. 5. Repeat 2 to 4 times. Switch legs if you are doing this one leg at a time. Plantar fascia self-massage   1. Sit in a chair.   2. Place your affected foot on a firm, tube-shaped object, such as a can or water bottle. 3. Roll your foot back and forth over the object to massage the bottom of your foot. 4. If you want to do ice massage, fill a water bottle about three-fourths of the way full and freeze before using. 5. Continue for 2 to 5 minutes. Bilateral calf stretch (knees straight)   1. Place a book on the floor a few inches from a wall or countertop, and put the balls of your feet on it. Your heels should be on the floor. The book needs to be thick enough so that you can feel a gentle stretch in each calf. If you are not steady on your feet, hold on to a chair, counter, or wall while you do this stretch. 2. Keep your knees straight, and lean forward until you feel a stretch in each calf. 3. To get more stretch, add another book or use a thicker book, such as a phone book, a dictionary, or an encyclopedia. 4. Hold the stretch for at least 15 to 30 seconds. 5. Repeat 2 to 4 times. Bilateral calf stretch (knees bent)   1. Place a book on the floor a few inches from a wall or countertop, and put the balls of your feet on it. Your heels should be on the floor. The book needs to be thick enough so that you can feel a gentle stretch in each calf. If you are not steady on your feet, hold on to a chair, counter, or wall while you do this stretch. 2. Bend your knees, and lean forward until you feel a stretch in each calf. 3. To get more stretch, add another book or use a thicker book, such as a phone book, a dictionary, or an encyclopedia. 4. Hold the stretch for at least 15 to 30 seconds. 5. Repeat 2 to 4 times. [place] pick-ups   1. Put some marbles on the floor next to a cup.  2. Sit down, and use the toes of your affected foot to lift up one marble from the floor at a time. Then try to put the marble in the cup.  3. Repeat 8 to 12 times. Towel scrunches   1. Sit down, and place your affected foot on a towel on the floor. You may also do this with both feet on the towel.   2. Scrunch the towel toward you with your toes. Then use your toes to push the towel back into place. 3. Repeat 8 to 12 times. Heel raises on a step   1. Stand on the bottom step of a staircase, facing up toward the stairs. Put the balls of your feet on the step. If you are not steady on your feet, hold on to the banister or wall. 2. Keeping both knees straight, slowly lift your heels above the step so that you are standing on your toes. Then slowly lower your heels below the step and toward the floor. 3. Return to the starting position, with your feet even with the step. 4. Repeat 8 to 12 times. Follow-up care is a key part of your treatment and safety. Be sure to make and go to all appointments, and call your doctor if you are having problems. It's also a good idea to know your test results and keep a list of the medicines you take. Where can you learn more? Go to http://dirk-amaris.info/  Enter H119 in the search box to learn more about \"Arch Pain: Exercises. \"  Current as of: June 26, 2019Content Version: 12.4  © 2157-2358 Healthwise, Incorporated. Care instructions adapted under license by [employer] (which disclaims liability or warranty for this information). If you have questions about a medical condition or this instruction, always ask your healthcare professional. [address] any warranty or liability for your use of this information.

## 2020-05-04 NOTE — ED PROVIDER NOTES
EMERGENCY DEPARTMENT HISTORY AND PHYSICAL EXAM    9:04 AM      Date: 5/4/2020  Patient Name: Bhavani Avila    History of Presenting Illness     Chief Complaint   Patient presents with    Foot Pain         History Provided By: Patient    Additional History (Context): Bhavani Avila is a 62 y.o. female with Past medical history of allergic rhinitis, blurred vision hypertension who presents with chief complaint of right foot pain intermittently for a few months. States that the pain is worse when she walks or stands on the foot. States that she does not like to take any medication for pain. He does report having a podiatrist that she can follow-up with. No trauma, numbness, swelling, calf pain, and no other complaint. PCP: Asael Gunn MD        Past History     Past Medical History:  Past Medical History:   Diagnosis Date    Allergic rhinitis     Blurred vision     Cardiac echocardiogram 06/11/2014    EF 60%. No RWMA. RVSP 30 mmHg. Mild AI.  Cardiac Holter monitor, normal 11/05/2014    Benign 48-hr Holter study.  Cardiac nuclear imaging test, mod risk 08/14/2015    Intermediate risk. High anterior artifact vs diagonal artery ischemia. Following Lexiscan, 0.5-mm downsloping inferolateral ST depression, resolving 10 min 30 sec of recovery. Arm heaviness noted.  Cardiovascular lower extremity arterial duplex 07/15/2016    Right leg: Mod peripheral arterial disease in femoral segment at rest.  Left leg:  Mild arterial disease at rest.  R KAREN 0.58. L KAREN 0.95. Similar to study of 10/15/14.  Cardiovascular renal angiography 10/27/2014    Bilateral patent renal arteries. Right CFA occluded but collateralized.  Cardiovascular renal duplex 10/13/2014    Aorta w/irregular walls. Severe >60% bilateral renal artery stenosis. Bilateral intrinsic/med renal disease.  Carotid duplex 10/17/2014    Mild <50% DAVID stenosis. Biphasic signals in both subclavians.     Carotid duplex 12/05/2016    Mild < 50% DAVID stenosis.  Cervical disc disease 09/2015    MRI of C-spine at [facility] JO FARAH Dizziness     Ear ache     Essential hypertension     Fainting spell     Frequent headaches     Frequent nosebleeds     Hemorrhoid     Hyperlipidemia     Hypertension     Ill-defined condition     Migraine     FLORENCIO (obstructive sleep apnea)     Sinus problem     Sinusitis, chronic     Spontaneous pneumothorax 1999    2 episodes on the right within several months of each other    Stomach pain     Weakness of right leg        Past Surgical History:  Past Surgical History:   Procedure Laterality Date    COLONOSCOPY N/A 11/17/2017    COLONOSCOPY, SCREENING with hot bx polypectomy performed by Hina Davis MD at [facility] ENDOSCOPY    HX BREAST REDUCTION      HX CHOLECYSTECTOMY  11/8/14    HX COLPOSCOPY      HX HEART CATHETERIZATION Bilateral 10/27/14    bilateral lower extremity angiography     HX OTHER SURGICAL Left     shave biopsy ( thigh area)       Family History:  Family History   Problem Relation Age of Onset    Deep Vein Thrombosis Father     Coronary Artery Disease Father     Pacemaker Father     Diabetes Father     High Cholesterol Father     Hypertension Father     High Cholesterol Mother     Hypertension Mother     Heart Attack Brother 40    Cancer Brother     Heart Attack Maternal Grandmother 100    Heart Attack Other 48    Hypertension Other     Hypertension Maternal Aunt        Social History:  Social History     Tobacco Use    Smoking status: Light Tobacco Smoker     Packs/day: 0.25     Years: 20.00     Pack years: 5.00     Types: Cigarettes    Smokeless tobacco: Never Used    Tobacco comment: now down to 3 cigarettes per day   Substance Use Topics    Alcohol use: No    Drug use: No       Allergies:   Allergies   Allergen Reactions    Latex Rash    Keflex [Cephalexin] Rash    Epinephrine Other (comments)     tachycardia    Tetracyclines Nausea Only Review of Systems       Review of Systems   Constitutional: Negative for chills and fever. HENT: Negative for rhinorrhea, sore throat and trouble swallowing. Respiratory: Negative for cough and shortness of breath. Cardiovascular: Negative for chest pain. Gastrointestinal: Negative for nausea and vomiting. Genitourinary: Negative for dysuria. Musculoskeletal: Positive for arthralgias. Negative for joint swelling, neck pain and neck stiffness. Right foot pain   Skin: Negative for pallor and rash. Neurological: Negative for dizziness, weakness, numbness and headaches. Hematological: Does not bruise/bleed easily. Psychiatric/Behavioral: Negative for confusion and dysphoric mood. All other systems reviewed and are negative. Physical Exam     Visit Vitals  /42 (BP 1 Location: Left arm, BP Patient Position: At rest) Comment: pt states she took BP meds PTA   Pulse 77   Temp 98.6 °F (37 °C)   Resp 18   Ht 4' 11\" (1.499 m)   Wt 50.8 kg (112 lb)   SpO2 99%   BMI 22.62 kg/m²         Physical Exam  Vitals signs and nursing note reviewed. Constitutional:       General: She is not in acute distress. Appearance: She is well-developed. She is not diaphoretic. HENT:      Head: Normocephalic and atraumatic. Eyes:      General: No scleral icterus. Conjunctiva/sclera: Conjunctivae normal.      Pupils: Pupils are equal, round, and reactive to light. Neck:      Musculoskeletal: Normal range of motion and neck supple. Cardiovascular:      Rate and Rhythm: Normal rate. Comments: Capillary refill < 3 seconds  Pulmonary:      Effort: Pulmonary effort is normal. No respiratory distress. Breath sounds: Normal breath sounds. Musculoskeletal: Normal range of motion. General: Tenderness present. No swelling, deformity or signs of injury. Right lower leg: No edema. Left lower leg: No edema.       Comments: Tenderness of the mid plantar surface of the right foot possibly plantar fasciitis    Symmetric dorsalis pedal pulses with normal capillary refill    Has full range of motion bilateral lower extremities   Skin:     General: Skin is warm and dry. Coloration: Skin is not pale. Findings: No erythema. Neurological:      Mental Status: She is alert and oriented to person, place, and time. Cranial Nerves: No cranial nerve deficit. Sensory: No sensory deficit. Motor: No weakness. Coordination: Coordination normal.      Gait: Gait normal.      Deep Tendon Reflexes: Reflexes normal.           Diagnostic Study Results     Labs -  No results found for this or any previous visit (from the past 12 hour(s)). Radiologic Studies -   XR FOOT RT AP/LAT   Final Result   IMPRESSION:      1. No acute fracture or subluxation. 2.  Hallux valgus. Per my preliminary read: No acute process  Citlali Keita DO      Medical Decision Making   I am the first provider for this patient. I reviewed the vital signs, available nursing notes, past medical history, past surgical history, family history and social history. Vital Signs-Reviewed the patient's vital signs. Records Reviewed: Nursing Notes and Old Medical Records (Time of Review: 9:04 AM)    Provider Notes (Medical Decision Making): DDX: Arthritis, plantar fasciitis, bone spur    Get x-ray  MDM    Medications - No data to display        ED Course: Progress Notes, Reevaluation, and Consults:  I discussed elevated blood pressure with patient, she is asymptomatic. We will have her follow-up PCP for repeat blood pressure check told her risk of elevated blood pressure and to take blood pressure medication as prescribed. She agrees. She reports that she will follow-up with her podiatrist also. Gave her plantar fasciitis exercise instructions  I have reassessed the patient.  I have discussed the workup, results and plan with the patient and patient is in agreement. .  Patient was discharge in stable condition. Patient was given outpatient follow up. Patient is to return to emergency department if any new or worsening condition. Diagnosis     Clinical Impression:   1. Plantar fasciitis of right foot    2. Elevated blood pressure reading with diagnosis of hypertension            Follow-up Information     Follow up With Specialties Details Why Contact Info    Your podiatrist  Schedule an appointment as soon as possible for a visit in 3 days      Ralf William MD [facility] Schedule an appointment as soon as possible for a visit in 3 days Repeat blood pressure check [address] [facility] [address]      [address] [place] [address] DEPT Emergency Medicine  As needed, If symptoms worsen [address] [facility]  [phone]           Discharge Medication List as of 5/4/2020 10:32 AM      CONTINUE these medications which have NOT CHANGED    Details   cetirizine (ZYRTEC) 10 mg tablet Take 1 Tab by mouth daily as needed (For allergic rhinitis). , Normal, Disp-30 Tab, R-0      ergocalciferol (ERGOCALCIFEROL) 1,250 mcg (50,000 unit) capsule Historical Med      meclizine (ANTIVERT) 25 mg tablet Take  by mouth three (3) times daily as needed for Dizziness. , Historical Med      ibuprofen (MOTRIN) 800 mg tablet Take  by mouth., Historical Med      divalproex DR (DEPAKOTE) 500 mg tablet Take 500 mg by mouth two (2) times a day., Historical Med      metoprolol succinate (TOPROL-XL) 25 mg XL tablet Take 25 mg by mouth two (2) times a day., Historical Med      labetalol (NORMODYNE) 100 mg tablet Take  by mouth two (2) times a day., Historical Med      atorvastatin (LIPITOR) 40 mg tablet TAKE ONE TABLET BY MOUTH DAILY, Normal, Disp-90 Tab, R-2      budesonide/formoterol fumarate (SYMBICORT IN) Take  by inhalation. , Historical Med      ipratropium (ATROVENT) 42 mcg (0.06 %) nasal spray 2 sprays up each nostril twice per day, Normal, Disp-15 mL, R-2      montelukast (SINGULAIR) 10 mg tablet Take 1 Tab by mouth daily. , Normal, Disp-90 Tab, R-1      PROAIR HFA 90 mcg/actuation inhaler Historical Med, JOSE      aspirin delayed-release 81 mg tablet Take  by mouth nightly., Historical Med      fluticasone (FLONASE) 50 mcg/actuation nasal spray 2 Sprays by Both Nostrils route daily. , Historical Med               Alexandra Gibbons DO    Dragon medical dictation software was used for portions of this report. Unintended transcription errors may occur. My signature above authenticates this document and my orders, the final    diagnosis (es), discharge prescription (s), and instructions in the Epic    record.

## 2020-05-04 NOTE — LETTER
NOTIFICATION RETURN TO WORK / SCHOOL 
 
5/4/2020 10:31 AM 
 
Ms. Carito Blankenship [address] [facility] [address] To Whom It May Concern: 
 
Carito Blankenship is currently under the care of [id number] [place] [address] EMERGENCY DEPT. She will return to work/school on: 5/5/2020 If there are questions or concerns please have the patient contact our office. Sincerely, 
 
 
Dr. Matt Price

## 2020-05-26 ENCOUNTER — HOSPITAL ENCOUNTER (EMERGENCY)
Age: 65
Discharge: HOME | End: 2020-05-26
Attending: EMERGENCY MEDICINE
Payer: COMMERCIAL

## 2020-05-26 VITALS
RESPIRATION RATE: 16 BRPM | HEART RATE: 69 BPM | OXYGEN SATURATION: 100 % | TEMPERATURE: 98.2 F | HEIGHT: 59 IN | DIASTOLIC BLOOD PRESSURE: 61 MMHG | BODY MASS INDEX: 22.58 KG/M2 | WEIGHT: 112 LBS | SYSTOLIC BLOOD PRESSURE: 154 MMHG

## 2020-05-26 DIAGNOSIS — R51.9 ACUTE NONINTRACTABLE HEADACHE, UNSPECIFIED HEADACHE TYPE: Primary | ICD-10-CM

## 2020-05-26 LAB
ALBUMIN SERPL-MCNC: 4.2 G/DL (ref 3.4–5)
ALBUMIN/GLOB SERPL: 1.2 {RATIO} (ref 0.8–1.7)
ALP SERPL-CCNC: 88 U/L (ref 45–117)
ALT SERPL-CCNC: 30 U/L (ref 13–56)
ANION GAP SERPL CALC-SCNC: 3 MMOL/L (ref 3–18)
APPEARANCE UR: CLEAR
AST SERPL-CCNC: 16 U/L (ref 10–38)
BASOPHILS # BLD: 0.1 K/UL (ref 0–0.1)
BASOPHILS NFR BLD: 1 % (ref 0–2)
BILIRUB SERPL-MCNC: 0.3 MG/DL (ref 0.2–1)
BILIRUB UR QL: NEGATIVE
BUN SERPL-MCNC: 9 MG/DL (ref 7–18)
BUN/CREAT SERPL: 15 (ref 12–20)
CALCIUM SERPL-MCNC: 9.4 MG/DL (ref 8.5–10.1)
CHLORIDE SERPL-SCNC: 104 MMOL/L (ref 100–111)
CO2 SERPL-SCNC: 30 MMOL/L (ref 21–32)
COLOR UR: YELLOW
CREAT SERPL-MCNC: 0.62 MG/DL (ref 0.6–1.3)
DIFFERENTIAL METHOD BLD: ABNORMAL
EOSINOPHIL # BLD: 0.1 K/UL (ref 0–0.4)
EOSINOPHIL NFR BLD: 1 % (ref 0–5)
ERYTHROCYTE [DISTWIDTH] IN BLOOD BY AUTOMATED COUNT: 15.3 % (ref 11.6–14.5)
GLOBULIN SER CALC-MCNC: 3.5 G/DL (ref 2–4)
GLUCOSE SERPL-MCNC: 112 MG/DL (ref 74–99)
GLUCOSE UR STRIP.AUTO-MCNC: NEGATIVE MG/DL
HCT VFR BLD AUTO: 37.6 % (ref 35–45)
HGB BLD-MCNC: 11.8 G/DL (ref 12–16)
HGB UR QL STRIP: NEGATIVE
KETONES UR QL STRIP.AUTO: NEGATIVE MG/DL
LEUKOCYTE ESTERASE UR QL STRIP.AUTO: NEGATIVE
LYMPHOCYTES # BLD: 2.7 K/UL (ref 0.9–3.6)
LYMPHOCYTES NFR BLD: 35 % (ref 21–52)
MCH RBC QN AUTO: 23 PG (ref 24–34)
MCHC RBC AUTO-ENTMCNC: 31.4 G/DL (ref 31–37)
MCV RBC AUTO: 73.3 FL (ref 74–97)
MONOCYTES # BLD: 0.7 K/UL (ref 0.05–1.2)
MONOCYTES NFR BLD: 9 % (ref 3–10)
NEUTS SEG # BLD: 4.2 K/UL (ref 1.8–8)
NEUTS SEG NFR BLD: 54 % (ref 40–73)
NITRITE UR QL STRIP.AUTO: NEGATIVE
PH UR STRIP: 7.5 [PH] (ref 5–8)
PLATELET # BLD AUTO: 230 K/UL (ref 135–420)
PMV BLD AUTO: 11.6 FL (ref 9.2–11.8)
POTASSIUM SERPL-SCNC: 4.4 MMOL/L (ref 3.5–5.5)
PROT SERPL-MCNC: 7.7 G/DL (ref 6.4–8.2)
PROT UR STRIP-MCNC: NEGATIVE MG/DL
RBC # BLD AUTO: 5.13 M/UL (ref 4.2–5.3)
SODIUM SERPL-SCNC: 137 MMOL/L (ref 136–145)
SP GR UR REFRACTOMETRY: <1.005 (ref 1–1.03)
TROPONIN I SERPL-MCNC: <0.02 NG/ML (ref 0–0.04)
UROBILINOGEN UR QL STRIP.AUTO: 0.2 EU/DL (ref 0.2–1)
WBC # BLD AUTO: 7.7 K/UL (ref 4.6–13.2)

## 2020-05-26 PROCEDURE — 93005 ELECTROCARDIOGRAM TRACING: CPT

## 2020-05-26 PROCEDURE — 80053 COMPREHEN METABOLIC PANEL: CPT

## 2020-05-26 PROCEDURE — 99284 EMERGENCY DEPT VISIT MOD MDM: CPT

## 2020-05-26 PROCEDURE — 85025 COMPLETE CBC W/AUTO DIFF WBC: CPT

## 2020-05-26 PROCEDURE — 74011250636 HC RX REV CODE- 250/636: Performed by: EMERGENCY MEDICINE

## 2020-05-26 PROCEDURE — 84484 ASSAY OF TROPONIN QUANT: CPT

## 2020-05-26 PROCEDURE — 81003 URINALYSIS AUTO W/O SCOPE: CPT

## 2020-05-26 RX ADMIN — SODIUM CHLORIDE 1000 ML: 900 INJECTION, SOLUTION INTRAVENOUS at 19:27

## 2020-05-26 NOTE — DISCHARGE INSTRUCTIONS

## 2020-05-26 NOTE — ED PROVIDER NOTES
61 yo HF with PMHx htn presents with 1-day h/o not feeling well. Pt states she was doing ok, was sitting down and watching news on TV. Pt states she got up and she started to get some headache and felt a rush across body followed by heaviness sensation to top half of body - chest and back. No fevers, no vomiting. Pt states she feels better now but wanted to get evaluated. Past Medical History:   Diagnosis Date    Allergic rhinitis     Blurred vision     Cardiac echocardiogram 06/11/2014    EF 60%. No RWMA. RVSP 30 mmHg. Mild AI.  Cardiac Holter monitor, normal 11/05/2014    Benign 48-hr Holter study.  Cardiac nuclear imaging test, mod risk 08/14/2015    Intermediate risk. High anterior artifact vs diagonal artery ischemia. Following Lexiscan, 0.5-mm downsloping inferolateral ST depression, resolving 10 min 30 sec of recovery. Arm heaviness noted.  Cardiovascular lower extremity arterial duplex 07/15/2016    Right leg: Mod peripheral arterial disease in femoral segment at rest.  Left leg:  Mild arterial disease at rest.  R KAREN 0.58. L KAREN 0.95. Similar to study of 10/15/14.  Cardiovascular renal angiography 10/27/2014    Bilateral patent renal arteries. Right CFA occluded but collateralized.  Cardiovascular renal duplex 10/13/2014    Aorta w/irregular walls. Severe >60% bilateral renal artery stenosis. Bilateral intrinsic/med renal disease.  Carotid duplex 10/17/2014    Mild <50% DAVID stenosis. Biphasic signals in both subclavians.  Carotid duplex 12/05/2016    Mild < 50% DAVID stenosis.       Cervical disc disease 09/2015    MRI of C-spine at [facility] JO FARAH Dizziness     Ear ache     Essential hypertension     Fainting spell     Frequent headaches     Frequent nosebleeds     Hemorrhoid     Hyperlipidemia     Hypertension     Ill-defined condition     Migraine     FLORENCIO (obstructive sleep apnea)     Sinus problem     Sinusitis, chronic     Spontaneous pneumothorax 1999    2 episodes on the right within several months of each other    Stomach pain     Weakness of right leg        Past Surgical History:   Procedure Laterality Date    COLONOSCOPY N/A 11/17/2017    COLONOSCOPY, SCREENING with hot bx polypectomy performed by Amandeep Little MD at [facility] ENDOSCOPY    HX BREAST REDUCTION      HX CHOLECYSTECTOMY  11/8/14    HX COLPOSCOPY      HX HEART CATHETERIZATION Bilateral 10/27/14    bilateral lower extremity angiography     HX OTHER SURGICAL Left     shave biopsy ( thigh area)         Family History:   Problem Relation Age of Onset    Deep Vein Thrombosis Father     Coronary Artery Disease Father     Pacemaker Father     Diabetes Father     High Cholesterol Father     Hypertension Father     High Cholesterol Mother     Hypertension Mother     Heart Attack Brother 40    Cancer Brother     Heart Attack Maternal Grandmother 100    Heart Attack Other 48    Hypertension Other     Hypertension Maternal Aunt        Social History     Socioeconomic History    Marital status: SINGLE     Spouse name: Not on file    Number of children: Not on file    Years of education: Not on file    Highest education level: Not on file   Occupational History    Not on file   Social Needs    Financial resource strain: Not on file    Food insecurity     Worry: Not on file     Inability: Not on file    Transportation needs     Medical: Not on file     Non-medical: Not on file   Tobacco Use    Smoking status: Light Tobacco Smoker     Packs/day: 0.25     Years: 20.00     Pack years: 5.00     Types: Cigarettes    Smokeless tobacco: Never Used    Tobacco comment: now down to 3 cigarettes per day   Substance and Sexual Activity    Alcohol use: No    Drug use: No    Sexual activity: Never   Lifestyle    Physical activity     Days per week: Not on file     Minutes per session: Not on file    Stress: Not on file   Relationships    Social connections Talks on phone: Not on file     Gets together: Not on file     Attends Rastafari service: Not on file     Active member of club or organization: Not on file     Attends meetings of clubs or organizations: Not on file     Relationship status: Not on file    Intimate partner violence     Fear of current or ex partner: Not on file     Emotionally abused: Not on file     Physically abused: Not on file     Forced sexual activity: Not on file   Other Topics Concern    Not on file   Social History Narrative    Not on file         ALLERGIES: Latex; Keflex [cephalexin]; Epinephrine; and Tetracyclines    Review of Systems   Constitutional: Negative for fever. HENT: Negative for trouble swallowing. Respiratory: Positive for shortness of breath. Cardiovascular: Positive for chest pain. Gastrointestinal: Negative for abdominal pain, diarrhea and vomiting. Genitourinary: Negative for difficulty urinating. Musculoskeletal: Positive for back pain. Skin: Negative for wound. Neurological: Positive for headaches. Negative for syncope. Psychiatric/Behavioral: Negative for behavioral problems. All other systems reviewed and are negative. Vitals:    05/26/20 1856 05/26/20 2000   BP: 169/57 154/61   Pulse: 73 69   Resp: 14 16   Temp: 98.4 °F (36.9 °C) 98.2 °F (36.8 °C)   SpO2: 100% 100%   Weight: 50.8 kg (112 lb)    Height: 4' 11\" (1.499 m)             Physical Exam  Vitals signs and nursing note reviewed. Constitutional:       General: She is not in acute distress. Appearance: She is well-developed. Comments: well-appearing, nad   HENT:      Head: Normocephalic and atraumatic. Eyes:      Extraocular Movements: Extraocular movements intact. Pupils: Pupils are equal, round, and reactive to light. Neck:      Musculoskeletal: Normal range of motion. Cardiovascular:      Rate and Rhythm: Normal rate. Pulses: Normal pulses.    Pulmonary:      Effort: Pulmonary effort is normal. No respiratory distress. Breath sounds: Normal breath sounds. Abdominal:      Palpations: Abdomen is soft. Tenderness: There is no abdominal tenderness. Musculoskeletal: Normal range of motion. Right lower leg: No edema. Left lower leg: No edema. Comments: Mechanically stable   Neurological:      General: No focal deficit present. Mental Status: She is alert and oriented to person, place, and time. Comments: No focal deficits noted, ambulates without difficulty   Psychiatric:         Behavior: Behavior normal.          MDM  Number of Diagnoses or Management Options  Acute nonintractable headache, unspecified headache type:   Diagnosis management comments: 61 yo HF with PMHx htn presents with 1-day h/o generalized heaviness sensation. No fevers, no vomiting, no focal deficits. Examination unremarkable with normal neuro exam, ambulates without difficulty, ctab and no increased wob. Will evaluate for acute process. 8:01 PM  Work-up unremarkable. Pt doing well, asymptomatic in ED. No evidence of emergent medical condition clinically. Discussed results and poc for dc home, symptom management, follow-up, return precautions.        Amount and/or Complexity of Data Reviewed  Clinical lab tests: ordered and reviewed  Review and summarize past medical records: yes  Independent visualization of images, tracings, or specimens: yes    Patient Progress  Patient progress: stable         EKG  Date/Time: 5/26/2020 7:33 PM  Performed by: Sari Bagley MD  Authorized by: Sari Bagley MD     ECG reviewed by ED Physician in the absence of a cardiologist: yes    Previous ECG:     Previous ECG:  Compared to current    Comparison ECG info:  10/4/19    Similarity:  No change  Interpretation:     Interpretation: normal    Rate:     ECG rate:  71    ECG rate assessment: normal    Rhythm:     Rhythm: sinus rhythm    Ectopy:     Ectopy: PAC    QRS:     QRS axis:  Normal  Conduction: Conduction: normal    ST segments:     ST segments:  Normal  T waves:     T waves: normal            PROGRESS NOTES    7:23 PM:   Lu Denise MD arrives to the bedside to evaluate the patient. Answered the patient's questions regarding the treatment plan. CONSULTATIONS  None      MEDICATIONS ORDERED  Medications   sodium chloride 0.9 % bolus infusion 1,000 mL (1,000 mL IntraVENous New Bag 5/26/20 1927)       RADIOLOGY INTERPRETATIONS  No orders to display       EKG READINGS/LABORATORY RESULTS  Recent Results (from the past 12 hour(s))   CBC WITH AUTOMATED DIFF    Collection Time: 05/26/20  7:15 PM   Result Value Ref Range    WBC 7.7 4.6 - 13.2 K/uL    RBC 5.13 4.20 - 5.30 M/uL    HGB 11.8 (L) 12.0 - 16.0 g/dL    HCT 37.6 35.0 - 45.0 %    MCV 73.3 (L) 74.0 - 97.0 FL    MCH 23.0 (L) 24.0 - 34.0 PG    MCHC 31.4 31.0 - 37.0 g/dL    RDW 15.3 (H) 11.6 - 14.5 %    PLATELET 539 787 - 958 K/uL    MPV 11.6 9.2 - 11.8 FL    NEUTROPHILS 54 40 - 73 %    LYMPHOCYTES 35 21 - 52 %    MONOCYTES 9 3 - 10 %    EOSINOPHILS 1 0 - 5 %    BASOPHILS 1 0 - 2 %    ABS. NEUTROPHILS 4.2 1.8 - 8.0 K/UL    ABS. LYMPHOCYTES 2.7 0.9 - 3.6 K/UL    ABS. MONOCYTES 0.7 0.05 - 1.2 K/UL    ABS. EOSINOPHILS 0.1 0.0 - 0.4 K/UL    ABS. BASOPHILS 0.1 0.0 - 0.1 K/UL    DF AUTOMATED     METABOLIC PANEL, COMPREHENSIVE    Collection Time: 05/26/20  7:15 PM   Result Value Ref Range    Sodium 137 136 - 145 mmol/L    Potassium 4.4 3.5 - 5.5 mmol/L    Chloride 104 100 - 111 mmol/L    CO2 30 21 - 32 mmol/L    Anion gap 3 3.0 - 18 mmol/L    Glucose 112 (H) 74 - 99 mg/dL    BUN 9 7.0 - 18 MG/DL    Creatinine 0.62 0.6 - 1.3 MG/DL    BUN/Creatinine ratio 15 12 - 20      GFR est AA >60 >60 ml/min/1.73m2    GFR est non-AA >60 >60 ml/min/1.73m2    Calcium 9.4 8.5 - 10.1 MG/DL    Bilirubin, total 0.3 0.2 - 1.0 MG/DL    ALT (SGPT) 30 13 - 56 U/L    AST (SGOT) 16 10 - 38 U/L    Alk.  phosphatase 88 45 - 117 U/L    Protein, total 7.7 6.4 - 8.2 g/dL    Albumin 4.2 3.4 - 5.0 g/dL    Globulin 3.5 2.0 - 4.0 g/dL    A-G Ratio 1.2 0.8 - 1.7     TROPONIN I    Collection Time: 05/26/20  7:15 PM   Result Value Ref Range    Troponin-I, QT <0.02 0.0 - 0.045 NG/ML   EKG, 12 LEAD, INITIAL    Collection Time: 05/26/20  7:28 PM   Result Value Ref Range    Ventricular Rate 71 BPM    Atrial Rate 71 BPM    P-R Interval 186 ms    QRS Duration 94 ms    Q-T Interval 378 ms    QTC Calculation (Bezet) 410 ms    Calculated P Axis 58 degrees    Calculated R Axis -9 degrees    Calculated T Axis 28 degrees    Diagnosis       Sinus rhythm with premature supraventricular complexes  Otherwise normal ECG  When compared with ECG of 04-OCT-2019 21:18,  premature supraventricular complexes are now present     URINALYSIS W/ RFLX MICROSCOPIC    Collection Time: 05/26/20  7:50 PM   Result Value Ref Range    Color YELLOW      Appearance CLEAR      Specific gravity <1.005 (L) 1.005 - 1.030    pH (UA) 7.5 5.0 - 8.0      Protein Negative NEG mg/dL    Glucose Negative NEG mg/dL    Ketone Negative NEG mg/dL    Bilirubin Negative NEG      Blood Negative NEG      Urobilinogen 0.2 0.2 - 1.0 EU/dL    Nitrites Negative NEG      Leukocyte Esterase Negative NEG         ED DIAGNOSIS & DISPOSITION INFORMATION  Diagnosis:   1. Acute nonintractable headache, unspecified headache type        Disposition: Discharged    Follow-up Information     Follow up With Specialties Details Why Contact Info    Mazin Wylie MD Family Practice Schedule an appointment as soon as possible for a visit  [address] [facility]  [address] [place] [address]      [id number] [place] [address] EMERGENCY DEPT Emergency Medicine  As needed [address] [facility]  [phone]          Patient's Medications   Start Taking    No medications on file   Continue Taking    ASPIRIN DELAYED-RELEASE 81 MG TABLET    Take  by mouth nightly.     ATORVASTATIN (LIPITOR) 40 MG TABLET    TAKE ONE TABLET BY MOUTH DAILY    BUDESONIDE/FORMOTEROL FUMARATE (SYMBICORT IN)    Take  by inhalation. CETIRIZINE (ZYRTEC) 10 MG TABLET    Take 1 Tab by mouth daily as needed (For allergic rhinitis). DIVALPROEX DR (DEPAKOTE) 500 MG TABLET    Take 500 mg by mouth two (2) times a day. ERGOCALCIFEROL (ERGOCALCIFEROL) 1,250 MCG (50,000 UNIT) CAPSULE        FLUTICASONE (FLONASE) 50 MCG/ACTUATION NASAL SPRAY    2 Sprays by Both Nostrils route daily. IBUPROFEN (MOTRIN) 800 MG TABLET    Take  by mouth. IPRATROPIUM (ATROVENT) 42 MCG (0.06 %) NASAL SPRAY    2 sprays up each nostril twice per day    LABETALOL (NORMODYNE) 100 MG TABLET    Take  by mouth two (2) times a day. MECLIZINE (ANTIVERT) 25 MG TABLET    Take  by mouth three (3) times daily as needed for Dizziness. METOPROLOL SUCCINATE (TOPROL-XL) 25 MG XL TABLET    Take 25 mg by mouth two (2) times a day. MONTELUKAST (SINGULAIR) 10 MG TABLET    Take 1 Tab by mouth daily.     PROAIR HFA 90 MCG/ACTUATION INHALER       These Medications have changed    No medications on file   Stop Taking    No medications on file           Ivan Huang MD.

## 2020-05-27 LAB
ATRIAL RATE: 71 BPM
CALCULATED P AXIS, ECG09: 58 DEGREES
CALCULATED R AXIS, ECG10: -9 DEGREES
CALCULATED T AXIS, ECG11: 28 DEGREES
DIAGNOSIS, 93000: NORMAL
P-R INTERVAL, ECG05: 186 MS
Q-T INTERVAL, ECG07: 378 MS
QRS DURATION, ECG06: 94 MS
QTC CALCULATION (BEZET), ECG08: 410 MS
VENTRICULAR RATE, ECG03: 71 BPM

## 2020-07-08 ENCOUNTER — HOSPITAL ENCOUNTER (EMERGENCY)
Age: 65
Discharge: HOME | End: 2020-07-08
Attending: EMERGENCY MEDICINE
Payer: COMMERCIAL

## 2020-07-08 VITALS
WEIGHT: 112 LBS | OXYGEN SATURATION: 97 % | TEMPERATURE: 98.1 F | BODY MASS INDEX: 22.62 KG/M2 | SYSTOLIC BLOOD PRESSURE: 155 MMHG | DIASTOLIC BLOOD PRESSURE: 65 MMHG | HEART RATE: 71 BPM | RESPIRATION RATE: 23 BRPM

## 2020-07-08 DIAGNOSIS — I10 HYPERTENSION, UNSPECIFIED TYPE: ICD-10-CM

## 2020-07-08 DIAGNOSIS — R00.2 PALPITATIONS: Primary | ICD-10-CM

## 2020-07-08 LAB
ANION GAP SERPL CALC-SCNC: 2 MMOL/L (ref 3–18)
APPEARANCE UR: CLEAR
BASOPHILS # BLD: 0.1 K/UL (ref 0–0.1)
BASOPHILS NFR BLD: 1 % (ref 0–2)
BILIRUB UR QL: NEGATIVE
BUN SERPL-MCNC: 8 MG/DL (ref 7–18)
BUN/CREAT SERPL: 11 (ref 12–20)
CALCIUM SERPL-MCNC: 9.1 MG/DL (ref 8.5–10.1)
CHLORIDE SERPL-SCNC: 109 MMOL/L (ref 100–111)
CK MB CFR SERPL CALC: NORMAL % (ref 0–4)
CK MB SERPL-MCNC: <1 NG/ML (ref 5–25)
CK SERPL-CCNC: 130 U/L (ref 26–192)
CO2 SERPL-SCNC: 31 MMOL/L (ref 21–32)
COLOR UR: YELLOW
CREAT SERPL-MCNC: 0.7 MG/DL (ref 0.6–1.3)
DIFFERENTIAL METHOD BLD: ABNORMAL
EOSINOPHIL # BLD: 0.1 K/UL (ref 0–0.4)
EOSINOPHIL NFR BLD: 1 % (ref 0–5)
ERYTHROCYTE [DISTWIDTH] IN BLOOD BY AUTOMATED COUNT: 15.4 % (ref 11.6–14.5)
GLUCOSE SERPL-MCNC: 103 MG/DL (ref 74–99)
GLUCOSE UR STRIP.AUTO-MCNC: NEGATIVE MG/DL
HCT VFR BLD AUTO: 37.8 % (ref 35–45)
HGB BLD-MCNC: 12.1 G/DL (ref 12–16)
HGB UR QL STRIP: NEGATIVE
KETONES UR QL STRIP.AUTO: NEGATIVE MG/DL
LEUKOCYTE ESTERASE UR QL STRIP.AUTO: NEGATIVE
LYMPHOCYTES # BLD: 3.1 K/UL (ref 0.9–3.6)
LYMPHOCYTES NFR BLD: 39 % (ref 21–52)
MCH RBC QN AUTO: 23.6 PG (ref 24–34)
MCHC RBC AUTO-ENTMCNC: 32 G/DL (ref 31–37)
MCV RBC AUTO: 73.8 FL (ref 74–97)
MONOCYTES # BLD: 0.7 K/UL (ref 0.05–1.2)
MONOCYTES NFR BLD: 8 % (ref 3–10)
NEUTS SEG # BLD: 4 K/UL (ref 1.8–8)
NEUTS SEG NFR BLD: 51 % (ref 40–73)
NITRITE UR QL STRIP.AUTO: NEGATIVE
PH UR STRIP: 7.5 [PH] (ref 5–8)
PLATELET # BLD AUTO: 231 K/UL (ref 135–420)
PMV BLD AUTO: 11.5 FL (ref 9.2–11.8)
POTASSIUM SERPL-SCNC: 3.9 MMOL/L (ref 3.5–5.5)
PROT UR STRIP-MCNC: NEGATIVE MG/DL
RBC # BLD AUTO: 5.12 M/UL (ref 4.2–5.3)
SODIUM SERPL-SCNC: 142 MMOL/L (ref 136–145)
SP GR UR REFRACTOMETRY: <1.005 (ref 1–1.03)
TROPONIN I SERPL-MCNC: <0.02 NG/ML (ref 0–0.04)
UROBILINOGEN UR QL STRIP.AUTO: 0.2 EU/DL (ref 0.2–1)
WBC # BLD AUTO: 8 K/UL (ref 4.6–13.2)

## 2020-07-08 PROCEDURE — 82550 ASSAY OF CK (CPK): CPT

## 2020-07-08 PROCEDURE — 99284 EMERGENCY DEPT VISIT MOD MDM: CPT

## 2020-07-08 PROCEDURE — 81003 URINALYSIS AUTO W/O SCOPE: CPT

## 2020-07-08 PROCEDURE — 85025 COMPLETE CBC W/AUTO DIFF WBC: CPT

## 2020-07-08 PROCEDURE — 80048 BASIC METABOLIC PNL TOTAL CA: CPT

## 2020-07-08 PROCEDURE — 93005 ELECTROCARDIOGRAM TRACING: CPT

## 2020-07-08 NOTE — LETTER
NOTIFICATION RETURN TO WORK / SCHOOL 
 
7/8/2020 10:32 PM 
 
Ms. Myah Chapin [address] [facility] [address] To Whom It May Concern: 
 
Myah Chapin is currently under the care of [address] [facility] EMERGENCY DEPT. She will return to work/school on: 7/12/20 If there are questions or concerns please have the patient contact our office.  
 
 
 
Sincerely, 
 
 
Issa Weller MD

## 2020-07-09 NOTE — DISCHARGE INSTRUCTIONS
Return for pain, fever not resolving with motrin or tylenol, shortness of breath, vomiting, decreased fluid intake, weakness, numbness, dizziness, or any change or concerns. Patient Education        High Blood Pressure: Care Instructions  Overview     It's normal for blood pressure to go up and down throughout the day. But if it stays up, you have high blood pressure. Another name for high blood pressure is hypertension. Despite what a lot of people think, high blood pressure usually doesn't cause headaches or make you feel dizzy or lightheaded. It usually has no symptoms. But it does increase your risk of stroke, heart attack, and other problems. You and your doctor will talk about your risks of these problems based on your blood pressure. Your doctor will give you a goal for your blood pressure. Your goal will be based on your health and your age. Lifestyle changes, such as eating healthy and being active, are always important to help lower blood pressure. You might also take medicine to reach your blood pressure goal.  Follow-up care is a key part of your treatment and safety. Be sure to make and go to all appointments, and call your doctor if you are having problems. It's also a good idea to know your test results and keep a list of the medicines you take. How can you care for yourself at home? Medical treatment  · If you stop taking your medicine, your blood pressure will go back up. You may take one or more types of medicine to lower your blood pressure. Be safe with medicines. Take your medicine exactly as prescribed. Call your doctor if you think you are having a problem with your medicine. · Talk to your doctor before you start taking aspirin every day. Aspirin can help certain people lower their risk of a heart attack or stroke. But taking aspirin isn't right for everyone, because it can cause serious bleeding. · See your doctor regularly.  You may need to see the doctor more often at first or until your blood pressure comes down. · If you are taking blood pressure medicine, talk to your doctor before you take decongestants or anti-inflammatory medicine, such as ibuprofen. Some of these medicines can raise blood pressure. · Learn how to check your blood pressure at home. Lifestyle changes  · Stay at a healthy weight. This is especially important if you put on weight around the waist. Losing even 10 pounds can help you lower your blood pressure. · If your doctor recommends it, get more exercise. Walking is a good choice. Bit by bit, increase the amount you walk every day. Try for at least 30 minutes on most days of the week. You also may want to swim, bike, or do other activities. · Avoid or limit alcohol. Talk to your doctor about whether you can drink any alcohol. · Try to limit how much sodium you eat to less than 2,300 milligrams (mg) a day. Your doctor may ask you to try to eat less than 1,500 mg a day. · Eat plenty of fruits (such as bananas and oranges), vegetables, legumes, whole grains, and low-fat dairy products. · Lower the amount of saturated fat in your diet. Saturated fat is found in animal products such as milk, cheese, and meat. Limiting these foods may help you lose weight and also lower your risk for heart disease. · Do not smoke. Smoking increases your risk for heart attack and stroke. If you need help quitting, talk to your doctor about stop-smoking programs and medicines. These can increase your chances of quitting for good. When should you call for help? Call  911 anytime you think you may need emergency care. This may mean having symptoms that suggest that your blood pressure is causing a serious heart or blood vessel problem. Your blood pressure may be over 180/120. For example, call 911 if:  · You have symptoms of a heart attack. These may include:  ? Chest pain or pressure, or a strange feeling in the chest.  ? Sweating. ? Shortness of breath.   ? Nausea or vomiting. ? Pain, pressure, or a strange feeling in the back, neck, jaw, or upper belly or in one or both shoulders or arms. ? Lightheadedness or sudden weakness. ? A fast or irregular heartbeat. · You have symptoms of a stroke. These may include:  ? Sudden numbness, tingling, weakness, or loss of movement in your face, arm, or leg, especially on only one side of your body. ? Sudden vision changes. ? Sudden trouble speaking. ? Sudden confusion or trouble understanding simple statements. ? Sudden problems with walking or balance. ? A sudden, severe headache that is different from past headaches. · You have severe back or belly pain. Do not wait until your blood pressure comes down on its own. Get help right away. Call your doctor now or seek immediate care if:  · Your blood pressure is much higher than normal (such as 180/120 or higher), but you don't have symptoms. · You think high blood pressure is causing symptoms, such as:  ? Severe headache.  ? Blurry vision. Watch closely for changes in your health, and be sure to contact your doctor if:  · Your blood pressure measures higher than your doctor recommends at least 2 times. That means the top number is higher or the bottom number is higher, or both. · You think you may be having side effects from your blood pressure medicine. Where can you learn more? Go to http://dirk-amaris.info/  Enter [id number] in the search box to learn more about \"High Blood Pressure: Care Instructions. \"  Current as of: December 16, 2019               Content Version: 12.5  © 4245-7614 Healthwise, Incorporated. Care instructions adapted under license by [employer] (which disclaims liability or warranty for this information). If you have questions about a medical condition or this instruction, always ask your healthcare professional. [place] [address] any warranty or liability for your use of this information.       Patient Education        Palpitations: Care Instructions  Your Care Instructions     Heart palpitations are the uncomfortable sensation that your heart is beating fast or irregularly. You might feel pounding or fluttering in your chest. It might feel like your heart is skipping a beat. Although palpitations may be caused by a heart problem, they also occur because of stress, fatigue, or use of alcohol, caffeine, or nicotine. Many medicines, including diet pills, antihistamines, decongestants, and some herbal products, can cause heart palpitations. Nearly everyone has palpitations from time to time. Depending on your symptoms, your doctor may need to do more tests to try to find the cause of your palpitations. Follow-up care is a key part of your treatment and safety. Be sure to make and go to all appointments, and call your doctor if you are having problems. It's also a good idea to know your test results and keep a list of the medicines you take. How can you care for yourself at home? · Avoid caffeine, nicotine, and excess alcohol. · Do not take illegal drugs, such as methamphetamines and cocaine. · Do not take weight loss or diet medicines unless you talk with your doctor first.  · Get plenty of sleep. · Do not overeat. · If you have palpitations again, take deep breaths and try to relax. This may slow a racing heart. · If you start to feel lightheaded, lie down to avoid injuries that might result if you pass out and fall down. · Keep a record of your palpitations and bring it to your next doctor's appointment. Write down:  ? The date and time. ? Your pulse. (If your heart is beating fast, it may be hard to count your pulse.)  ? What you were doing when the palpitations started. ? How long the palpitations lasted. ? Any other symptoms. · If an activity causes palpitations, slow down or stop. Talk to your doctor before you do that activity again. · Take your medicines exactly as prescribed.  Call your doctor if you think you are having a problem with your medicine. When should you call for help? [id number] anytime you think you may need emergency care. For example, call if:  · You passed out (lost consciousness). · You have symptoms of a heart attack. These may include:  ? Chest pain or pressure, or a strange feeling in the chest.  ? Sweating. ? Shortness of breath. ? Pain, pressure, or a strange feeling in the back, neck, jaw, or upper belly or in one or both shoulders or arms. ? Lightheadedness or sudden weakness. ? A fast or irregular heartbeat. After you call 911, the  may tell you to chew 1 adult-strength or 2 to 4 low-dose aspirin. Wait for an ambulance. Do not try to drive yourself. · You have symptoms of a stroke. These may include:  ? Sudden numbness, tingling, weakness, or loss of movement in your face, arm, or leg, especially on only one side of your body. ? Sudden vision changes. ? Sudden trouble speaking. ? Sudden confusion or trouble understanding simple statements. ? Sudden problems with walking or balance. ? A sudden, severe headache that is different from past headaches. Call your doctor now or seek immediate medical care if:  · You have heart palpitations and:  ? Are dizzy or lightheaded, or you feel like you may faint. ? Have new or increased shortness of breath. Watch closely for changes in your health, and be sure to contact your doctor if:  · You continue to have heart palpitations. Where can you learn more? Go to http://dirk-amaris.info/  Enter R508 in the search box to learn more about \"Palpitations: Care Instructions. \"  Current as of: December 16, 2019               Content Version: 12.5  © 0399-4958 Healthwise, Incorporated. Care instructions adapted under license by [employer] (which disclaims liability or warranty for this information).  If you have questions about a medical condition or this instruction, always ask your healthcare professional. [address] any warranty or liability for your use of this information.

## 2020-07-09 NOTE — ROUTINE PROCESS
Myah Chapin is a 62 y.o. female that was discharged in stable. Pt was accompanied by self. Pt is driving. The patients diagnosis, condition and treatment were explained to  patient and aftercare instructions were given. The patient verbalized understanding. Patient armband removed and shredded.

## 2020-07-09 NOTE — ED PROVIDER NOTES
Pt c/o elev bp at home, reading of hi, concerned pt, felt dizzy. No curr dizziness. Also notes palpitations at night x 30 yrs, no change, no curr palpitations. No cp, no sob. No leg pain or swelilng. No abd pain. No nausea. Says compliant w all bp meds. Past Medical History:   Diagnosis Date    Allergic rhinitis     Blurred vision     Cardiac echocardiogram 06/11/2014    EF 60%. No RWMA. RVSP 30 mmHg. Mild AI.  Cardiac Holter monitor, normal 11/05/2014    Benign 48-hr Holter study.  Cardiac nuclear imaging test, mod risk 08/14/2015    Intermediate risk. High anterior artifact vs diagonal artery ischemia. Following Lexiscan, 0.5-mm downsloping inferolateral ST depression, resolving 10 min 30 sec of recovery. Arm heaviness noted.  Cardiovascular lower extremity arterial duplex 07/15/2016    Right leg: Mod peripheral arterial disease in femoral segment at rest.  Left leg:  Mild arterial disease at rest.  R KAREN 0.58. L KAREN 0.95. Similar to study of 10/15/14.  Cardiovascular renal angiography 10/27/2014    Bilateral patent renal arteries. Right CFA occluded but collateralized.  Cardiovascular renal duplex 10/13/2014    Aorta w/irregular walls. Severe >60% bilateral renal artery stenosis. Bilateral intrinsic/med renal disease.  Carotid duplex 10/17/2014    Mild <50% DAVID stenosis. Biphasic signals in both subclavians.  Carotid duplex 12/05/2016    Mild < 50% DAVID stenosis.       Cervical disc disease 09/2015    [facility]spine at [facility] PUNTATYANA GAGAN Dizziness     Ear ache     Essential hypertension     Fainting spell     Frequent headaches     Frequent nosebleeds     Hemorrhoid     Hyperlipidemia     Hypertension     Ill-defined condition     Migraine     FLORENCIO (obstructive sleep apnea)     Sinus problem     Sinusitis, chronic     Spontaneous pneumothorax 1999    2 episodes on the right within several months of each other    Stomach pain     Weakness of right leg        Past Surgical History:   Procedure Laterality Date    COLONOSCOPY N/A 11/17/2017    COLONOSCOPY, SCREENING with hot bx polypectomy performed by Pravin Al [facility] at [facility] ENDOSCOPY    HX BREAST REDUCTION      HX CHOLECYSTECTOMY  11/8/14    HX COLPOSCOPY      HX HEART CATHETERIZATION Bilateral 10/27/14    bilateral lower extremity angiography     HX OTHER SURGICAL Left     shave biopsy ( thigh area)         Family History:   Problem Relation Age of Onset    Deep Vein Thrombosis Father     Coronary Artery Disease Father     Pacemaker Father     Diabetes Father     High Cholesterol Father     Hypertension Father     High Cholesterol Mother     Hypertension Mother     Heart Attack Brother 40    Cancer Brother     Heart Attack Maternal Grandmother 100    Heart Attack Other 48    Hypertension Other     Hypertension Maternal Aunt        Social History     Socioeconomic History    Marital status: SINGLE     Spouse name: Not on file    Number of children: Not on file    Years of education: Not on file    Highest education level: Not on file   Occupational History    Not on file   Social Needs    Financial resource strain: Not on file    Food insecurity     Worry: Not on file     Inability: Not on file    Transportation needs     Medical: Not on file     Non-medical: Not on file   Tobacco Use    Smoking status: Light Tobacco Smoker     Packs/day: 0.25     Years: 20.00     Pack years: 5.00     Types: Cigarettes    Smokeless tobacco: Never Used    Tobacco comment: now down to 3 cigarettes per day   Substance and Sexual Activity    Alcohol use: No    Drug use: No    Sexual activity: Never   Lifestyle    Physical activity     Days per week: Not on file     Minutes per session: Not on file    Stress: Not on file   Relationships    Social connections     Talks on phone: Not on file     Gets together: Not on file     Attends Holiness service: Not on file     Active member of club or organization: Not on file     Attends meetings of clubs or organizations: Not on file     Relationship status: Not on file    Intimate partner violence     Fear of current or ex partner: Not on file     Emotionally abused: Not on file     Physically abused: Not on file     Forced sexual activity: Not on file   Other Topics Concern    Not on file   Social History Narrative    Not on file         ALLERGIES: Latex; Keflex [cephalexin]; Epinephrine; and Tetracyclines    Review of Systems   Constitutional: Negative for fever. HENT: Negative for congestion. Respiratory: Negative for cough and shortness of breath. Cardiovascular: Positive for palpitations. Negative for chest pain. Gastrointestinal: Negative for abdominal pain and vomiting. Musculoskeletal: Negative for back pain. Skin: Negative for rash. Neurological: Negative for light-headedness. All other systems reviewed and are negative. Vitals:    07/08/20 2102 07/08/20 2215   BP: 188/49 154/62   Pulse: 68 63   Resp: 16 19   Temp: 98.1 °F (36.7 °C)    SpO2: 100% 98%   Weight: 50.8 kg (112 lb)             Physical Exam  Vitals signs and nursing note reviewed. Constitutional:       Appearance: She is well-developed. She is not diaphoretic. HENT:      Head: Normocephalic and atraumatic. Eyes:      Pupils: Pupils are equal, round, and reactive to light. Neck:      Musculoskeletal: Normal range of motion. Cardiovascular:      Rate and Rhythm: Normal rate and regular rhythm. Heart sounds: No murmur. Pulmonary:      Effort: Pulmonary effort is normal.      Breath sounds: No wheezing. Abdominal:      Palpations: Abdomen is soft. Tenderness: There is no abdominal tenderness. Musculoskeletal:         General: No tenderness. Skin:     General: Skin is dry. Capillary Refill: Capillary refill takes less than 2 seconds. Findings: No rash.    Neurological:      Mental Status: She is alert and oriented to person, place, and time. MDM       Procedures    Vitals:  Patient Vitals for the past 12 hrs:   Temp Pulse Resp BP SpO2   07/08/20 2215  63 19 154/62 98 %   07/08/20 2102 98.1 °F (36.7 °C) 68 16 188/49 100 %         Medications ordered:   Medications - No data to display      Lab findings:  Recent Results (from the past 12 hour(s))   CBC WITH AUTOMATED DIFF    Collection Time: 07/08/20  9:32 PM   Result Value Ref Range    WBC 8.0 4.6 - 13.2 K/uL    RBC 5.12 4.20 - 5.30 M/uL    HGB 12.1 12.0 - 16.0 g/dL    HCT 37.8 35.0 - 45.0 %    MCV 73.8 (L) 74.0 - 97.0 FL    MCH 23.6 (L) 24.0 - 34.0 PG    MCHC 32.0 31.0 - 37.0 g/dL    RDW 15.4 (H) 11.6 - 14.5 %    PLATELET 843 722 - 200 K/uL    MPV 11.5 9.2 - 11.8 FL    NEUTROPHILS 51 40 - 73 %    LYMPHOCYTES 39 21 - 52 %    MONOCYTES 8 3 - 10 %    EOSINOPHILS 1 0 - 5 %    BASOPHILS 1 0 - 2 %    ABS. NEUTROPHILS 4.0 1.8 - 8.0 K/UL    ABS. LYMPHOCYTES 3.1 0.9 - 3.6 K/UL    ABS. MONOCYTES 0.7 0.05 - 1.2 K/UL    ABS. EOSINOPHILS 0.1 0.0 - 0.4 K/UL    ABS.  BASOPHILS 0.1 0.0 - 0.1 K/UL    DF AUTOMATED     METABOLIC PANEL, BASIC    Collection Time: 07/08/20  9:32 PM   Result Value Ref Range    Sodium 142 136 - 145 mmol/L    Potassium 3.9 3.5 - 5.5 mmol/L    Chloride 109 100 - 111 mmol/L    CO2 31 21 - 32 mmol/L    Anion gap 2 (L) 3.0 - 18 mmol/L    Glucose 103 (H) 74 - 99 mg/dL    BUN 8 7.0 - 18 MG/DL    Creatinine 0.70 0.6 - 1.3 MG/DL    BUN/Creatinine ratio 11 (L) 12 - 20      GFR est AA >60 >60 ml/min/1.73m2    GFR est non-AA >60 >60 ml/min/1.73m2    Calcium 9.1 8.5 - 10.1 MG/DL   CARDIAC PANEL,(CK, CKMB & TROPONIN)    Collection Time: 07/08/20  9:32 PM   Result Value Ref Range    CK - MB <1.0 <3.6 ng/ml    CK-MB Index  0.0 - 4.0 %     CALCULATION NOT PERFORMED WHEN RESULT IS BELOW LINEAR LIMIT     26 - 192 U/L    Troponin-I, QT <0.02 0.0 - 0.045 NG/ML   URINALYSIS W/ RFLX MICROSCOPIC    Collection Time: 07/08/20 10:15 PM   Result Value Ref Range    Color YELLOW Appearance CLEAR      Specific gravity <1.005 (L) 1.005 - 1.030    pH (UA) 7.5 5.0 - 8.0      Protein Negative NEG mg/dL    Glucose Negative NEG mg/dL    Ketone Negative NEG mg/dL    Bilirubin Negative NEG      Blood Negative NEG      Urobilinogen 0.2 0.2 - 1.0 EU/dL    Nitrites Negative NEG      Leukocyte Esterase Negative NEG             X-Ray, CT or other radiology findings or impressions:  No orders to display       Progress notes, Consult notes or additional Procedure notes:   10:32 PM pt feels much better, denies any sx's, bp 154/60 on recheck, wants dc, declines further ed obs or tx. To dc per pt req, pt says wants work note otherwise no complaints. Given  No emc found. Not c/w cad/pe/sepsis/cva/vbi. Diagnosis:   1. Palpitations    2. Hypertension, unspecified type        Disposition: home    Follow-up Information     Follow up With Specialties Details Why Contact Info    Peterson Cox MD Family Practice Schedule an appointment as soon as possible for a visit in 1 day  [address] [facility] [address]      [id number] [place] [address] EMERGENCY DEPT Emergency Medicine Go to As needed, If symptoms worsen [address] [facility]  [phone]           Patient's Medications   Start Taking    No medications on file   Continue Taking    ASPIRIN DELAYED-RELEASE 81 MG TABLET    Take  by mouth nightly. ATORVASTATIN (LIPITOR) 40 MG TABLET    TAKE ONE TABLET BY MOUTH DAILY    BUDESONIDE/FORMOTEROL FUMARATE (SYMBICORT IN)    Take  by inhalation. CETIRIZINE (ZYRTEC) 10 MG TABLET    Take 1 Tab by mouth daily as needed (For allergic rhinitis). DIVALPROEX DR (DEPAKOTE) 500 MG TABLET    Take 500 mg by mouth two (2) times a day. ERGOCALCIFEROL (ERGOCALCIFEROL) 1,250 MCG (50,000 UNIT) CAPSULE        FLUTICASONE (FLONASE) 50 MCG/ACTUATION NASAL SPRAY    2 Sprays by Both Nostrils route daily. IBUPROFEN (MOTRIN) 800 MG TABLET    Take  by mouth.     IPRATROPIUM (ATROVENT) 42 MCG (0.06 %) NASAL SPRAY    2 sprays up each nostril twice per day    LABETALOL (NORMODYNE) 100 MG TABLET    Take  by mouth two (2) times a day. MECLIZINE (ANTIVERT) 25 MG TABLET    Take  by mouth three (3) times daily as needed for Dizziness. METOPROLOL SUCCINATE (TOPROL-XL) 25 MG XL TABLET    Take 25 mg by mouth two (2) times a day. MONTELUKAST (SINGULAIR) 10 MG TABLET    Take 1 Tab by mouth daily.     PROAIR HFA 90 MCG/ACTUATION INHALER       These Medications have changed    No medications on file   Stop Taking    No medications on file

## 2020-07-10 ENCOUNTER — HOSPITAL ENCOUNTER (OUTPATIENT)
Dept: LAB | Age: 65
Discharge: HOME | End: 2020-07-10
Payer: COMMERCIAL

## 2020-07-10 LAB
25(OH)D3 SERPL-MCNC: 22.4 NG/ML (ref 30–100)
ALBUMIN SERPL-MCNC: 3.9 G/DL (ref 3.4–5)
ALBUMIN/GLOB SERPL: 1.2 {RATIO} (ref 0.8–1.7)
ALP SERPL-CCNC: 75 U/L (ref 45–117)
ALT SERPL-CCNC: 27 U/L (ref 13–56)
AMYLASE SERPL-CCNC: 47 U/L (ref 25–115)
ANION GAP SERPL CALC-SCNC: 6 MMOL/L (ref 3–18)
AST SERPL-CCNC: 14 U/L (ref 10–38)
ATRIAL RATE: 58 BPM
BILIRUB DIRECT SERPL-MCNC: 0.1 MG/DL (ref 0–0.2)
BILIRUB SERPL-MCNC: 0.4 MG/DL (ref 0.2–1)
BUN SERPL-MCNC: 13 MG/DL (ref 7–18)
BUN/CREAT SERPL: 21 (ref 12–20)
CALCIUM SERPL-MCNC: 9 MG/DL (ref 8.5–10.1)
CALCULATED P AXIS, ECG09: 70 DEGREES
CALCULATED R AXIS, ECG10: 3 DEGREES
CALCULATED T AXIS, ECG11: 62 DEGREES
CHLORIDE SERPL-SCNC: 110 MMOL/L (ref 100–111)
CO2 SERPL-SCNC: 28 MMOL/L (ref 21–32)
CREAT SERPL-MCNC: 0.63 MG/DL (ref 0.6–1.3)
DIAGNOSIS, 93000: NORMAL
ERYTHROCYTE [DISTWIDTH] IN BLOOD BY AUTOMATED COUNT: 15.1 % (ref 11.6–14.5)
GLOBULIN SER CALC-MCNC: 3.2 G/DL (ref 2–4)
GLUCOSE SERPL-MCNC: 80 MG/DL (ref 74–99)
HCT VFR BLD AUTO: 36.1 % (ref 35–45)
HGB BLD-MCNC: 11.9 G/DL (ref 12–16)
LIPASE SERPL-CCNC: 309 U/L (ref 73–393)
MCH RBC QN AUTO: 23.8 PG (ref 24–34)
MCHC RBC AUTO-ENTMCNC: 33 G/DL (ref 31–37)
MCV RBC AUTO: 72.3 FL (ref 74–97)
P-R INTERVAL, ECG05: 180 MS
PLATELET # BLD AUTO: 232 K/UL (ref 135–420)
POTASSIUM SERPL-SCNC: 4.6 MMOL/L (ref 3.5–5.5)
PROT SERPL-MCNC: 7.1 G/DL (ref 6.4–8.2)
Q-T INTERVAL, ECG07: 386 MS
QRS DURATION, ECG06: 94 MS
QTC CALCULATION (BEZET), ECG08: 378 MS
RBC # BLD AUTO: 4.99 M/UL (ref 4.2–5.3)
SODIUM SERPL-SCNC: 144 MMOL/L (ref 136–145)
VENTRICULAR RATE, ECG03: 58 BPM
WBC # BLD AUTO: 10.7 K/UL (ref 4.6–13.2)

## 2020-07-10 PROCEDURE — 85027 COMPLETE CBC AUTOMATED: CPT

## 2020-07-10 PROCEDURE — 82248 BILIRUBIN DIRECT: CPT

## 2020-07-10 PROCEDURE — 36415 COLL VENOUS BLD VENIPUNCTURE: CPT

## 2020-07-10 PROCEDURE — 82306 VITAMIN D 25 HYDROXY: CPT

## 2020-07-10 PROCEDURE — 80053 COMPREHEN METABOLIC PANEL: CPT

## 2020-07-10 PROCEDURE — 82150 ASSAY OF AMYLASE: CPT

## 2020-07-10 PROCEDURE — 83690 ASSAY OF LIPASE: CPT

## 2020-07-10 PROCEDURE — 84443 ASSAY THYROID STIM HORMONE: CPT

## 2020-07-11 LAB — TSH SERPL-ACNC: 2.03 UIU/ML (ref 0.45–4.5)

## 2020-07-13 ENCOUNTER — TELEPHONE (OUTPATIENT)
Dept: CARDIOLOGY CLINIC | Age: 65
End: 2020-07-13

## 2020-07-13 ENCOUNTER — HOSPITAL ENCOUNTER (EMERGENCY)
Age: 65
Discharge: HOME | End: 2020-07-13
Attending: EMERGENCY MEDICINE | Admitting: EMERGENCY MEDICINE
Payer: COMMERCIAL

## 2020-07-13 VITALS
DIASTOLIC BLOOD PRESSURE: 66 MMHG | OXYGEN SATURATION: 97 % | RESPIRATION RATE: 18 BRPM | WEIGHT: 108 LBS | BODY MASS INDEX: 21.77 KG/M2 | HEIGHT: 59 IN | HEART RATE: 72 BPM | SYSTOLIC BLOOD PRESSURE: 164 MMHG | TEMPERATURE: 99.3 F

## 2020-07-13 DIAGNOSIS — Z53.20 PATIENT REFUSED EVALUATION OR TREATMENT: ICD-10-CM

## 2020-07-13 DIAGNOSIS — I10 HYPERTENSION, UNSPECIFIED TYPE: Primary | ICD-10-CM

## 2020-07-13 PROCEDURE — 99281 EMR DPT VST MAYX REQ PHY/QHP: CPT

## 2020-07-13 NOTE — TELEPHONE ENCOUNTER
Patient is being seen by Dr. Jannifer Habermann. Patient told to call Dr. Bing Brooks office to report ER visits and current symptoms. Patient thanked me and aware she called wrong office. No further questions or concerns.

## 2020-07-13 NOTE — ED PROVIDER NOTES
EMERGENCY DEPARTMENT HISTORY AND PHYSICAL EXAM    7:40 PM      Date: 7/13/2020  Patient Name: Myah Chapin    History of Presenting Illness     Chief Complaint   Patient presents with    Hypertension         History Provided By: Patient  Location/Duration/Severity/Modifying factors   HPI 62yo F pmhx of HTN, palpitations presents with concern for HTN. Patient provides hx of multiple visits to different local ERs over the past several days for her high blood pressure. She states that when she takes her blood pressure at home it is always in the 200s despite taking her medications. She states that when she gets evaluated in the ED her blood pressures usually goes down. She states she takes metoprolol and occasionally labetalol. I reviewed her recent visit to [facility] ER yesterday - she states although she was advised to take Norvasc she doesn't want to take that medication. She states she is here because she wants to know why her blood pressure is always up at home. Today she denies fever, chest pain, palpitations, dizziness, SOB, abdominal pain. PCP: Louvella Mortimer, MD    Current Outpatient Medications   Medication Sig Dispense Refill    cetirizine (ZYRTEC) 10 mg tablet Take 1 Tab by mouth daily as needed (For allergic rhinitis). 30 Tab 0    ergocalciferol (ERGOCALCIFEROL) 1,250 mcg (50,000 unit) capsule       meclizine (ANTIVERT) 25 mg tablet Take  by mouth three (3) times daily as needed for Dizziness.  ibuprofen (MOTRIN) 800 mg tablet Take  by mouth.  divalproex DR (DEPAKOTE) 500 mg tablet Take 500 mg by mouth two (2) times a day.  metoprolol succinate (TOPROL-XL) 25 mg XL tablet Take 25 mg by mouth two (2) times a day.  labetalol (NORMODYNE) 100 mg tablet Take  by mouth two (2) times a day.  atorvastatin (LIPITOR) 40 mg tablet TAKE ONE TABLET BY MOUTH DAILY 90 Tab 2    budesonide/formoterol fumarate (SYMBICORT IN) Take  by inhalation.       ipratropium (ATROVENT) 42 mcg (0.06 %) nasal spray 2 sprays up each nostril twice per day 15 mL 2    montelukast (SINGULAIR) 10 mg tablet Take 1 Tab by mouth daily. 90 Tab 1    PROAIR HFA 90 mcg/actuation inhaler       aspirin delayed-release 81 mg tablet Take  by mouth nightly.  fluticasone (FLONASE) 50 mcg/actuation nasal spray 2 Sprays by Both Nostrils route daily. Past History     Past Medical History:  Past Medical History:   Diagnosis Date    Allergic rhinitis     Blurred vision     Cardiac echocardiogram 06/11/2014    EF 60%. No RWMA. RVSP 30 mmHg. Mild AI.  Cardiac Holter monitor, normal 11/05/2014    Benign 48-hr Holter study.  Cardiac nuclear imaging test, mod risk 08/14/2015    Intermediate risk. High anterior artifact vs diagonal artery ischemia. Following Lexiscan, 0.5-mm downsloping inferolateral ST depression, resolving 10 min 30 sec of recovery. Arm heaviness noted.  Cardiovascular lower extremity arterial duplex 07/15/2016    Right leg: Mod peripheral arterial disease in femoral segment at rest.  Left leg:  Mild arterial disease at rest.  R KAREN 0.58. L KAREN 0.95. Similar to study of 10/15/14.  Cardiovascular renal angiography 10/27/2014    Bilateral patent renal arteries. Right CFA occluded but collateralized.  Cardiovascular renal duplex 10/13/2014    Aorta w/irregular walls. Severe >60% bilateral renal artery stenosis. Bilateral intrinsic/med renal disease.  Carotid duplex 10/17/2014    Mild <50% DAVID stenosis. Biphasic signals in both subclavians.  Carotid duplex 12/05/2016    Mild < 50% DAVID stenosis.       Cervical disc disease 09/2015    MRI [facility]spine at [facility] JO FARAH Dizziness     Ear ache     Essential hypertension     Fainting spell     Frequent headaches     Frequent nosebleeds     Hemorrhoid     Hyperlipidemia     Hypertension     Ill-defined condition     Migraine     FLORENCIO (obstructive sleep apnea)     Sinus problem     Sinusitis, chronic     Spontaneous pneumothorax 1999    2 episodes on the right within several months of each other    Stomach pain     Weakness of right leg        Past Surgical History:  Past Surgical History:   Procedure Laterality Date    COLONOSCOPY N/A 11/17/2017    COLONOSCOPY, SCREENING with hot bx polypectomy performed by Pravin Al [facility] at [facility] ENDOSCOPY    HX BREAST REDUCTION      HX CHOLECYSTECTOMY  11/8/14    HX COLPOSCOPY      HX HEART CATHETERIZATION Bilateral 10/27/14    bilateral lower extremity angiography     HX OTHER SURGICAL Left     shave biopsy ( thigh area)       Family History:  Family History   Problem Relation Age of Onset    Deep Vein Thrombosis Father     Coronary Artery Disease Father     Pacemaker Father     Diabetes Father     High Cholesterol Father     Hypertension Father     High Cholesterol Mother     Hypertension Mother     Heart Attack Brother 40    Cancer Brother     Heart Attack Maternal Grandmother 100    Heart Attack Other 48    Hypertension Other     Hypertension Maternal Aunt        Social History:  Social History     Tobacco Use    Smoking status: Light Tobacco Smoker     Packs/day: 0.25     Years: 20.00     Pack years: 5.00     Types: Cigarettes    Smokeless tobacco: Never Used    Tobacco comment: now down to 3 cigarettes per day   Substance Use Topics    Alcohol use: No    Drug use: No       Allergies: Allergies   Allergen Reactions    Latex Rash    Keflex [Cephalexin] Rash    Epinephrine Other (comments)     tachycardia    Tetracyclines Nausea Only         Review of Systems       Review of Systems   Constitutional: Negative for chills, diaphoresis, fatigue and fever. HENT: Negative for congestion. Eyes: Negative for visual disturbance. Respiratory: Negative for shortness of breath. Cardiovascular: Negative for chest pain. Gastrointestinal: Negative for abdominal pain, constipation, diarrhea, nausea and vomiting.    Genitourinary: Negative for difficulty urinating. Musculoskeletal: Negative for gait problem. Skin: Negative for rash. Neurological: Negative for dizziness, syncope, weakness, light-headedness, numbness and headaches. Psychiatric/Behavioral: Negative for suicidal ideas. Physical Exam     Visit Vitals  /66 (BP 1 Location: Left arm, BP Patient Position: Sitting)   Pulse 72   Temp 99.3 °F (37.4 °C)   Resp 18   Ht 4' 11\" (1.499 m)   Wt 49 kg (108 lb)   SpO2 97%   BMI 21.81 kg/m²       `  Physical Exam  Vitals signs and nursing note reviewed. Constitutional:       General: She is not in acute distress. Appearance: She is normal weight. She is not ill-appearing, toxic-appearing or diaphoretic. HENT:      Head: Normocephalic. Mouth/Throat:      Mouth: Mucous membranes are moist.   Eyes:      Extraocular Movements: Extraocular movements intact. Conjunctiva/sclera: Conjunctivae normal.   Neck:      Musculoskeletal: Normal range of motion. Cardiovascular:      Rate and Rhythm: Normal rate and regular rhythm. Pulmonary:      Effort: Pulmonary effort is normal. No respiratory distress. Abdominal:      Palpations: Abdomen is soft. Tenderness: There is no abdominal tenderness. Musculoskeletal: Normal range of motion. Right lower leg: No edema. Left lower leg: No edema. Skin:     General: Skin is warm. Capillary Refill: Capillary refill takes less than 2 seconds. Neurological:      General: No focal deficit present. Mental Status: She is alert and oriented to person, place, and time. Mental status is at baseline. Psychiatric:         Mood and Affect: Mood normal.           Diagnostic Study Results     Labs -  No results found for this or any previous visit (from the past 12 hour(s)). Radiologic Studies -   No orders to display         Medical Decision Making   I am the first provider for this patient.     I reviewed the vital signs, available nursing notes, past medical history, past surgical history, family history and social history. Vital Signs-Reviewed the patient's vital signs. EKG: n/a    Records Reviewed: Old Medical Records, Previous electrocardiograms, Previous Radiology Studies and Previous Laboratory Studies (Time of Review: 7:40 PM)    ED Course: Progress Notes, Reevaluation, and Consults:    ED Course as of Jul 13 1951   Mon Jul 13, 2020 1924 Temp: 99.3 °F (37.4 °C) [GW]   1924 Pulse (Heart Rate): 72 [GW]   1924 BP: 164/66 [GW]   1924 MAP (Calculated): 99 [GW]   1924 Resp Rate: 18 [GW]   1924 Vitals including HTN noted. O2 Sat (%): 97 % [GW]      ED Course User Index  [GW] Nimo George MD       Provider Notes (Medical Decision Making):   MDM  Number of Diagnoses or Management Options  Hypertension, unspecified type:   Diagnosis management comments: Extensive discussion with patient regarding her current medication regimen which is sporadic at best.   Also reviewed current blood pressure and exam here with her which was reassuring. I offered and discussed utility of screening labs, including EKG, and chest xray to rule out a more serious process. The patient reiterated that she wanted to know the reason why her blood pressure is elevated at home and that she doesn't currently have other symptoms. When I advised that I would not be able to tell her the exact reason why her blood pressure is elevated the patient stated 'thank you for your service, I am leaving.'      Procedures  Eric Arango MD    Critical Care Time:       Diagnosis     Clinical Impression:   1. Hypertension, unspecified type        Disposition: Eloped    Follow-up Information     Follow up With Specialties Details Why [address] [facility] EMERGENCY DEPT Emergency Medicine  If symptoms worsen [address] [facility]    Leydi Mike MD Family Practice  To discuss you blood pressure medications.  [address]  [address] [address]  [phone]             DISCHARGE NOTE:   Pt has been reexamined. Patient has no new complaints, changes, or physical findings. Care plan outlined and precautions discussed. Results were reviewed with the patient. All medications were reviewed with the patient; will d/c home with pain medication. All of pt's questions and concerns were addressed. Alarm symptoms and return precautions associated with chief complaint and evaluation were reviewed with the patient in detail. The patient demonstrated adequate understanding. Patient was instructed and agrees to follow up with PCP, as well as to return to the ED upon further deterioration. Patient is ready to go home. The patient is happy with this plan       Patient's Medications   Start Taking    No medications on file   Continue Taking    ASPIRIN DELAYED-RELEASE 81 MG TABLET    Take  by mouth nightly. ATORVASTATIN (LIPITOR) 40 MG TABLET    TAKE ONE TABLET BY MOUTH DAILY    BUDESONIDE/FORMOTEROL FUMARATE (SYMBICORT IN)    Take  by inhalation. CETIRIZINE (ZYRTEC) 10 MG TABLET    Take 1 Tab by mouth daily as needed (For allergic rhinitis). DIVALPROEX DR (DEPAKOTE) 500 MG TABLET    Take 500 mg by mouth two (2) times a day. ERGOCALCIFEROL (ERGOCALCIFEROL) 1,250 MCG (50,000 UNIT) CAPSULE        FLUTICASONE (FLONASE) 50 MCG/ACTUATION NASAL SPRAY    2 Sprays by Both Nostrils route daily. IBUPROFEN (MOTRIN) 800 MG TABLET    Take  by mouth. IPRATROPIUM (ATROVENT) 42 MCG (0.06 %) NASAL SPRAY    2 sprays up each nostril twice per day    LABETALOL (NORMODYNE) 100 MG TABLET    Take  by mouth two (2) times a day. MECLIZINE (ANTIVERT) 25 MG TABLET    Take  by mouth three (3) times daily as needed for Dizziness. METOPROLOL SUCCINATE (TOPROL-XL) 25 MG XL TABLET    Take 25 mg by mouth two (2) times a day. MONTELUKAST (SINGULAIR) 10 MG TABLET    Take 1 Tab by mouth daily.     PROAIR HFA 90 MCG/ACTUATION INHALER       These Medications have changed No medications on file   Stop Taking    No medications on file     Disclaimer: Sections of this note are dictated using utilizing voice recognition software. Minor typographical errors may be present. If questions arise, please do not hesitate to contact me or call our department. I have reviewed the chart and agree with the documentation recorded by the resident, including the assessment, treatment plan, and disposition. I performed a history and physical examination of the patient and discussed the management with the resident.        Karina Mota MD

## 2020-07-13 NOTE — TELEPHONE ENCOUNTER
Patient called requesting an appointment with Dr. Joy Klein . . she was seen in the ER on 7/10 with high blood pressure. They let her leave and when she took it over the weekend, the top # was over 200. I told her I would forward the request to Dr. Tre Vann nurse. Forwarded to Loi.

## 2020-07-16 ENCOUNTER — HOSPITAL ENCOUNTER (OUTPATIENT)
Dept: LAB | Age: 65
Discharge: HOME | End: 2020-07-16
Payer: COMMERCIAL

## 2020-07-16 LAB
CHOLEST SERPL-MCNC: 116 MG/DL
HDLC SERPL-MCNC: 40 MG/DL (ref 40–60)
HDLC SERPL: 2.9 {RATIO} (ref 0–5)
LDLC SERPL CALC-MCNC: 56.6 MG/DL (ref 0–100)
LIPID PROFILE,FLP: NORMAL
TRIGL SERPL-MCNC: 97 MG/DL (ref ?–150)
VLDLC SERPL CALC-MCNC: 19.4 MG/DL

## 2020-07-16 PROCEDURE — 80061 LIPID PANEL: CPT

## 2020-07-16 PROCEDURE — 36415 COLL VENOUS BLD VENIPUNCTURE: CPT

## 2020-07-17 ENCOUNTER — OFFICE VISIT (OUTPATIENT)
Dept: ORTHOPEDIC SURGERY | Age: 65
End: 2020-07-17

## 2020-07-17 VITALS
SYSTOLIC BLOOD PRESSURE: 159 MMHG | BODY MASS INDEX: 21.77 KG/M2 | TEMPERATURE: 98.9 F | RESPIRATION RATE: 19 BRPM | WEIGHT: 108 LBS | HEIGHT: 59 IN | DIASTOLIC BLOOD PRESSURE: 59 MMHG | OXYGEN SATURATION: 99 % | HEART RATE: 61 BPM

## 2020-07-17 DIAGNOSIS — G56.02 LEFT CARPAL TUNNEL SYNDROME: Primary | ICD-10-CM
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
## 2020-07-23 ENCOUNTER — TELEPHONE (OUTPATIENT)
Dept: ORTHOPEDIC SURGERY | Age: 65
End: 2020-07-23

## 2020-07-23 NOTE — TELEPHONE ENCOUNTER
Ms. Elio Zi has scheduled her EMG LUE w/Dr. Ainsley Muñoz. The study was ordered by Dr. Nikky Cisneros, however, she should probably follow-up with Dr. Ibeth Johnson based on the office notes from both providers. She was unable to schedule her foll. ow-up at the time of this call and will contact us when she is ready to do so. Thank you.

## 2020-07-24 ENCOUNTER — HOSPITAL ENCOUNTER (EMERGENCY)
Age: 65
Discharge: HOME | End: 2020-07-25
Attending: EMERGENCY MEDICINE
Payer: COMMERCIAL

## 2020-07-24 ENCOUNTER — APPOINTMENT (OUTPATIENT)
Dept: CT IMAGING | Age: 65
End: 2020-07-24
Attending: EMERGENCY MEDICINE
Payer: COMMERCIAL

## 2020-07-24 DIAGNOSIS — R10.30 LOWER ABDOMINAL PAIN: Primary | ICD-10-CM

## 2020-07-24 LAB
ALBUMIN SERPL-MCNC: 4.1 G/DL (ref 3.4–5)
ALBUMIN/GLOB SERPL: 1.1 {RATIO} (ref 0.8–1.7)
ALP SERPL-CCNC: 96 U/L (ref 45–117)
ALT SERPL-CCNC: 26 U/L (ref 13–56)
ANION GAP SERPL CALC-SCNC: 1 MMOL/L (ref 3–18)
APPEARANCE UR: CLEAR
AST SERPL-CCNC: 11 U/L (ref 10–38)
BASOPHILS # BLD: 0.1 K/UL (ref 0–0.1)
BASOPHILS NFR BLD: 0 % (ref 0–2)
BILIRUB SERPL-MCNC: 0.2 MG/DL (ref 0.2–1)
BILIRUB UR QL: NEGATIVE
BUN SERPL-MCNC: 5 MG/DL (ref 7–18)
BUN/CREAT SERPL: 8 (ref 12–20)
CALCIUM SERPL-MCNC: 9.5 MG/DL (ref 8.5–10.1)
CHLORIDE SERPL-SCNC: 107 MMOL/L (ref 100–111)
CO2 SERPL-SCNC: 32 MMOL/L (ref 21–32)
COLOR UR: YELLOW
CREAT SERPL-MCNC: 0.64 MG/DL (ref 0.6–1.3)
DIFFERENTIAL METHOD BLD: ABNORMAL
EOSINOPHIL # BLD: 0.1 K/UL (ref 0–0.4)
EOSINOPHIL NFR BLD: 1 % (ref 0–5)
ERYTHROCYTE [DISTWIDTH] IN BLOOD BY AUTOMATED COUNT: 15.5 % (ref 11.6–14.5)
GLOBULIN SER CALC-MCNC: 3.6 G/DL (ref 2–4)
GLUCOSE SERPL-MCNC: 117 MG/DL (ref 74–99)
GLUCOSE UR STRIP.AUTO-MCNC: NEGATIVE MG/DL
HCT VFR BLD AUTO: 37.3 % (ref 35–45)
HGB BLD-MCNC: 12 G/DL (ref 12–16)
HGB UR QL STRIP: NEGATIVE
KETONES UR QL STRIP.AUTO: NEGATIVE MG/DL
LEUKOCYTE ESTERASE UR QL STRIP.AUTO: NEGATIVE
LIPASE SERPL-CCNC: 225 U/L (ref 73–393)
LYMPHOCYTES # BLD: 2.8 K/UL (ref 0.9–3.6)
LYMPHOCYTES NFR BLD: 21 % (ref 21–52)
MCH RBC QN AUTO: 23.7 PG (ref 24–34)
MCHC RBC AUTO-ENTMCNC: 32.2 G/DL (ref 31–37)
MCV RBC AUTO: 73.6 FL (ref 74–97)
MONOCYTES # BLD: 1.1 K/UL (ref 0.05–1.2)
MONOCYTES NFR BLD: 8 % (ref 3–10)
NEUTS SEG # BLD: 9.6 K/UL (ref 1.8–8)
NEUTS SEG NFR BLD: 70 % (ref 40–73)
NITRITE UR QL STRIP.AUTO: NEGATIVE
PH UR STRIP: 6 [PH] (ref 5–8)
PLATELET # BLD AUTO: 232 K/UL (ref 135–420)
PMV BLD AUTO: 11.4 FL (ref 9.2–11.8)
POTASSIUM SERPL-SCNC: 4.1 MMOL/L (ref 3.5–5.5)
PROT SERPL-MCNC: 7.7 G/DL (ref 6.4–8.2)
PROT UR STRIP-MCNC: NEGATIVE MG/DL
RBC # BLD AUTO: 5.07 M/UL (ref 4.2–5.3)
SODIUM SERPL-SCNC: 140 MMOL/L (ref 136–145)
SP GR UR REFRACTOMETRY: <1.005 (ref 1–1.03)
UROBILINOGEN UR QL STRIP.AUTO: 1 EU/DL (ref 0.2–1)
WBC # BLD AUTO: 13.6 K/UL (ref 4.6–13.2)

## 2020-07-24 PROCEDURE — 85025 COMPLETE CBC W/AUTO DIFF WBC: CPT

## 2020-07-24 PROCEDURE — 80053 COMPREHEN METABOLIC PANEL: CPT

## 2020-07-24 PROCEDURE — 83690 ASSAY OF LIPASE: CPT

## 2020-07-24 PROCEDURE — 81003 URINALYSIS AUTO W/O SCOPE: CPT

## 2020-07-24 PROCEDURE — 74177 CT ABD & PELVIS W/CONTRAST: CPT

## 2020-07-24 PROCEDURE — 99283 EMERGENCY DEPT VISIT LOW MDM: CPT

## 2020-07-25 VITALS
HEART RATE: 68 BPM | RESPIRATION RATE: 18 BRPM | HEIGHT: 59 IN | OXYGEN SATURATION: 99 % | SYSTOLIC BLOOD PRESSURE: 165 MMHG | BODY MASS INDEX: 21.17 KG/M2 | WEIGHT: 105 LBS | DIASTOLIC BLOOD PRESSURE: 59 MMHG | TEMPERATURE: 98 F

## 2020-07-25 PROCEDURE — 74011636320 HC RX REV CODE- 636/320: Performed by: EMERGENCY MEDICINE

## 2020-07-25 RX ORDER — DICYCLOMINE HYDROCHLORIDE 10 MG/1
10 CAPSULE ORAL 4 TIMES DAILY
Qty: 20 CAP | Refills: 0 | Status: SHIPPED | OUTPATIENT
Start: 2020-07-25 | End: 2020-07-30

## 2020-07-25 RX ADMIN — IOPAMIDOL 100 ML: 612 INJECTION, SOLUTION INTRAVENOUS at 00:46

## 2020-07-25 NOTE — ED NOTES
I have reviewed discharge instructions with the patient. The patient verbalized understanding. Patient armband removed and given to patient to take home. Patient was informed of the privacy risks if armband lost or stolen  Current Discharge Medication List      START taking these medications    Details   dicyclomine (BentyL) 10 mg capsule Take 1 Cap by mouth four (4) times daily for 5 days.   Qty: 20 Cap, Refills: 0

## 2020-07-25 NOTE — ED TRIAGE NOTES
Pt c/o generalized abdominal pain and lower back pain x1 week. Denies any n/v. Reports last bowel movement yesterday was loose.

## 2020-07-25 NOTE — ED PROVIDER NOTES
Linda Beckman is a 62 y.o. female with a past medical history of hypertension, hyperlipidemia, and migraines coming in complaining of abdominal pain. Patient states that she is had intermittent lower abdominal pain for a month on and off. She states that she saw her PCP and was started on Colace and had an ultrasound which was reportedly normal.  She states she continues to have the pain and states that when she has a bowel movement it feels like \"something is moving. \"  She reports the pain is aching crampy. She states that she had all watery bowel movement yesterday. Denies any blood in her stool. Denies any nausea or vomiting. She denies any fevers or chills. Denies any urinary symptoms. Patient has no other complaints this time. Past Medical History:   Diagnosis Date    Allergic rhinitis     Blurred vision     Cardiac echocardiogram 06/11/2014    EF 60%. No RWMA. RVSP 30 mmHg. Mild AI.  Cardiac Holter monitor, normal 11/05/2014    Benign 48-hr Holter study.  Cardiac nuclear imaging test, mod risk 08/14/2015    Intermediate risk. High anterior artifact vs diagonal artery ischemia. Following Lexiscan, 0.5-mm downsloping inferolateral ST depression, resolving 10 min 30 sec of recovery. Arm heaviness noted.  Cardiovascular lower extremity arterial duplex 07/15/2016    Right leg: Mod peripheral arterial disease in femoral segment at rest.  Left leg:  Mild arterial disease at rest.  R KAREN 0.58. L KAREN 0.95. Similar to study of 10/15/14.  Cardiovascular renal angiography 10/27/2014    Bilateral patent renal arteries. Right CFA occluded but collateralized.  Cardiovascular renal duplex 10/13/2014    Aorta w/irregular walls. Severe >60% bilateral renal artery stenosis. Bilateral intrinsic/med renal disease.  Carotid duplex 10/17/2014    Mild <50% DAVID stenosis. Biphasic signals in both subclavians.  Carotid duplex 12/05/2016    Mild < 50% DAVID stenosis.       Cervical disc disease 09/2015    MRI of C-spine at [facility] Dizziness     Ear ache     Essential hypertension     Fainting spell     Frequent headaches     Frequent nosebleeds     Hemorrhoid     Hyperlipidemia     Hypertension     Ill-defined condition     Migraine     FLORENCIO (obstructive sleep apnea)     Sinus problem     Sinusitis, chronic     Spontaneous pneumothorax 1999    2 episodes on the right within several months of each other    Stomach pain     Weakness of right leg        Past Surgical History:   Procedure Laterality Date    COLONOSCOPY N/A 11/17/2017    COLONOSCOPY, SCREENING with hot bx polypectomy performed [facility] Elizabeth Gary [facility] ENDOSCOPY    HX BREAST REDUCTION      HX CHOLECYSTECTOMY  11/8/14    HX COLPOSCOPY      HX HEART CATHETERIZATION Bilateral 10/27/14    bilateral lower extremity angiography     HX OTHER SURGICAL Left     shave biopsy ( thigh area)         Family History:   Problem Relation Age of Onset    Deep Vein Thrombosis Father     Coronary Artery Disease Father     Pacemaker Father     Diabetes Father     High Cholesterol Father     Hypertension Father     High Cholesterol Mother     Hypertension Mother     Heart Attack Brother 40    Cancer Brother     Heart Attack Maternal Grandmother 100    Heart Attack Other 48    Hypertension Other     Hypertension Maternal Aunt        Social History     Socioeconomic History    Marital status: SINGLE     Spouse name: Not on file    Number of children: Not on file    Years of education: Not on file    Highest education level: Not on file   Occupational History    Not on file   Social Needs    Financial resource strain: Not on file    Food insecurity     Worry: Not on file     Inability: Not on file    Transportation needs     Medical: Not on file     Non-medical: Not on file   Tobacco Use    Smoking status: Light Tobacco Smoker     Packs/day: 0.25     Years: 20.00     Pack years: 5.00     Types: Cigarettes    Smokeless tobacco: Never Used    Tobacco comment: now down to 1 cigarettes per day   Substance and Sexual Activity    Alcohol use: No    Drug use: No    Sexual activity: Never   Lifestyle    Physical activity     Days per week: Not on file     Minutes per session: Not on file    Stress: Not on file   Relationships    Social connections     Talks on phone: Not on file     Gets together: Not on file     Attends Restorationism service: Not on file     Active member of club or organization: Not on file     Attends meetings of clubs or organizations: Not on file     Relationship status: Not on file    Intimate partner violence     Fear of current or ex partner: Not on file     Emotionally abused: Not on file     Physically abused: Not on file     Forced sexual activity: Not on file   Other Topics Concern    Not on file   Social History Narrative    Not on file         ALLERGIES: Latex; Keflex [cephalexin]; Epinephrine; and Tetracyclines    Review of Systems   Constitutional: Negative. Negative for chills and fever. Respiratory: Negative. Negative for shortness of breath. Cardiovascular: Negative. Negative for chest pain. Gastrointestinal: Positive for abdominal pain, constipation and diarrhea. Negative for nausea and vomiting. Musculoskeletal: Negative. Negative for myalgias. Skin: Negative. Negative for rash. Neurological: Negative. Negative for dizziness, weakness and light-headedness. All other systems reviewed and are negative. Vitals:    07/24/20 2243   BP: 172/43   Pulse: 72   Resp: 22   Temp: 98.4 °F (36.9 °C)   SpO2: 99%   Weight: 47.6 kg (105 lb)   Height: 4' 11\" (1.499 m)            Physical Exam  Vitals signs reviewed. Constitutional:       General: She is not in acute distress. Appearance: Normal appearance. She is well-developed. HENT:      Head: Normocephalic and atraumatic. Eyes:      Extraocular Movements: Extraocular movements intact. Conjunctiva/sclera: Conjunctivae normal.      Pupils: Pupils are equal, round, and reactive to light. Neck:      Musculoskeletal: Normal range of motion and neck supple. Cardiovascular:      Rate and Rhythm: Normal rate and regular rhythm. Heart sounds: S1 normal and S2 normal. No murmur. No friction rub. No gallop. Pulmonary:      Effort: Pulmonary effort is normal. No accessory muscle usage or respiratory distress. Breath sounds: Normal breath sounds. Abdominal:      General: There is no distension. Palpations: Abdomen is soft. Tenderness: There is no guarding or rebound. Comments: Mild diffuse lower abdominal tenderness palpation. No rebound or peritoneal signs. Musculoskeletal: Normal range of motion. General: No tenderness. Skin:     General: Skin is warm. Findings: No rash. Neurological:      General: No focal deficit present. Mental Status: She is alert and oriented to person, place, and time. Psychiatric:         Speech: Speech normal.          MDM  Number of Diagnoses or Management Options  Lower abdominal pain:   Diagnosis management comments: Hayde Haq is a 62 y.o. female coming in with lower abdominal pain. Also having some constipation and diarrhea. Patient is afebrile and well-appearing with normal vitals and temperature. Abdomen is soft, no evidence of peritonitis or surgical abdomen. Differential diagnosis includes constipation, gastroenteritis, colitis, appendicitis, UTI, mass, among others. Will get CT abdomen/pelvis. CT negative for any evidence of acute process. Patient has been ambulatory and well-appearing here in the department. Labs are reassuring. Patient is advised to follow-up with her regular doctor and was given prescription for Bentyl. Instructed return for worsening symptoms, persistent vomiting, fevers, or other concerns.          Procedures      Vitals:  Patient Vitals for the past 12 hrs:   Temp Pulse Resp BP SpO2   07/24/20 2243 98.4 °F (36.9 °C) 72 22 172/43 99 %       Medications ordered:   Medications   iopamidoL (ISOVUE 300) 61 % contrast injection 100 mL (100 mL IntraVENous Given 7/25/20 0046)         Lab findings:  Recent Results (from the past 12 hour(s))   CBC WITH AUTOMATED DIFF    Collection Time: 07/24/20 11:24 PM   Result Value Ref Range    WBC 13.6 (H) 4.6 - 13.2 K/uL    RBC 5.07 4.20 - 5.30 M/uL    HGB 12.0 12.0 - 16.0 g/dL    HCT 37.3 35.0 - 45.0 %    MCV 73.6 (L) 74.0 - 97.0 FL    MCH 23.7 (L) 24.0 - 34.0 PG    MCHC 32.2 31.0 - 37.0 g/dL    RDW 15.5 (H) 11.6 - 14.5 %    PLATELET 907 341 - 712 K/uL    MPV 11.4 9.2 - 11.8 FL    NEUTROPHILS 70 40 - 73 %    LYMPHOCYTES 21 21 - 52 %    MONOCYTES 8 3 - 10 %    EOSINOPHILS 1 0 - 5 %    BASOPHILS 0 0 - 2 %    ABS. NEUTROPHILS 9.6 (H) 1.8 - 8.0 K/UL    ABS. LYMPHOCYTES 2.8 0.9 - 3.6 K/UL    ABS. MONOCYTES 1.1 0.05 - 1.2 K/UL    ABS. EOSINOPHILS 0.1 0.0 - 0.4 K/UL    ABS. BASOPHILS 0.1 0.0 - 0.1 K/UL    DF AUTOMATED     METABOLIC PANEL, COMPREHENSIVE    Collection Time: 07/24/20 11:24 PM   Result Value Ref Range    Sodium 140 136 - 145 mmol/L    Potassium 4.1 3.5 - 5.5 mmol/L    Chloride 107 100 - 111 mmol/L    CO2 32 21 - 32 mmol/L    Anion gap 1 (L) 3.0 - 18 mmol/L    Glucose 117 (H) 74 - 99 mg/dL    BUN 5 (L) 7.0 - 18 MG/DL    Creatinine 0.64 0.6 - 1.3 MG/DL    BUN/Creatinine ratio 8 (L) 12 - 20      GFR est AA >60 >60 ml/min/1.73m2    GFR est non-AA >60 >60 ml/min/1.73m2    Calcium 9.5 8.5 - 10.1 MG/DL    Bilirubin, total 0.2 0.2 - 1.0 MG/DL    ALT (SGPT) 26 13 - 56 U/L    AST (SGOT) 11 10 - 38 U/L    Alk.  phosphatase 96 45 - 117 U/L    Protein, total 7.7 6.4 - 8.2 g/dL    Albumin 4.1 3.4 - 5.0 g/dL    Globulin 3.6 2.0 - 4.0 g/dL    A-G Ratio 1.1 0.8 - 1.7     LIPASE    Collection Time: 07/24/20 11:24 PM   Result Value Ref Range    Lipase 225 73 - 393 U/L   URINALYSIS W/ RFLX MICROSCOPIC    Collection Time: 07/24/20 11:24 PM   Result Value Ref Range    Color YELLOW      Appearance CLEAR      Specific gravity <1.005 (L) 1.005 - 1.030    pH (UA) 6.0 5.0 - 8.0      Protein Negative NEG mg/dL    Glucose Negative NEG mg/dL    Ketone Negative NEG mg/dL    Bilirubin Negative NEG      Blood Negative NEG      Urobilinogen 1.0 0.2 - 1.0 EU/dL    Nitrites Negative NEG      Leukocyte Esterase Negative NEG         X-Ray, CT or other radiology findings or impressions:  CT ABD PELV W CONT   Final Result   Impression:      No acute inflammation in abdomen or pelvis. Prior cholecystectomy with biliary prominence, likely due to reservoir effect. Nonobstructing left renal stone. No acute bowel abnormality. Additional incidentals as above. Disposition:  Diagnosis:   1. Lower abdominal pain        Disposition: Discharge    Follow-up Information     Follow up With Specialties Details Why Contact Info    Romona Snellen, MD Family Medicine Schedule an appointment as soon as possible for a visit for office follow [place] [id number] [facility] [address]      [id number] [facility] EMERGENCY DEPT Emergency Medicine  As needed, If symptoms worsen [address] [facility]  [phone]           Patient's Medications   Start Taking    DICYCLOMINE (BENTYL) 10 MG CAPSULE    Take 1 Cap by mouth four (4) times daily for 5 days. Continue Taking    ASPIRIN DELAYED-RELEASE 81 MG TABLET    Take  by mouth nightly. ATORVASTATIN (LIPITOR) 40 MG TABLET    TAKE ONE TABLET BY MOUTH DAILY    BUDESONIDE/FORMOTEROL FUMARATE (SYMBICORT IN)    Take  by inhalation. CETIRIZINE (ZYRTEC) 10 MG TABLET    Take 1 Tab by mouth daily as needed (For allergic rhinitis). DIVALPROEX DR (DEPAKOTE) 500 MG TABLET    Take 500 mg by mouth two (2) times a day. ERGOCALCIFEROL (ERGOCALCIFEROL) 1,250 MCG (50,000 UNIT) CAPSULE        FLUTICASONE (FLONASE) 50 MCG/ACTUATION NASAL SPRAY    2 Sprays by Both Nostrils route daily.     IBUPROFEN (MOTRIN) 800 MG TABLET    Take  by mouth. IPRATROPIUM (ATROVENT) 42 MCG (0.06 %) NASAL SPRAY    2 sprays up each nostril twice per day    LABETALOL (NORMODYNE) 100 MG TABLET    Take  by mouth two (2) times a day. MECLIZINE (ANTIVERT) 25 MG TABLET    Take  by mouth three (3) times daily as needed for Dizziness. METOPROLOL SUCCINATE (TOPROL-XL) 25 MG XL TABLET    Take 25 mg by mouth two (2) times a day. MONTELUKAST (SINGULAIR) 10 MG TABLET    Take 1 Tab by mouth daily.     PROAIR HFA 90 MCG/ACTUATION INHALER       These Medications have changed    No medications on file   Stop Taking    No medications on file

## 2020-07-25 NOTE — DISCHARGE INSTRUCTIONS

## 2020-10-12 ENCOUNTER — HOSPITAL ENCOUNTER (EMERGENCY)
Age: 65
Discharge: HOME | End: 2020-10-12
Attending: EMERGENCY MEDICINE
Payer: COMMERCIAL

## 2020-10-12 VITALS
TEMPERATURE: 98.6 F | HEART RATE: 77 BPM | HEIGHT: 59 IN | BODY MASS INDEX: 21.37 KG/M2 | SYSTOLIC BLOOD PRESSURE: 180 MMHG | DIASTOLIC BLOOD PRESSURE: 77 MMHG | RESPIRATION RATE: 16 BRPM | WEIGHT: 106 LBS | OXYGEN SATURATION: 100 %

## 2020-10-12 DIAGNOSIS — R10.84 ABDOMINAL PAIN, GENERALIZED: Primary | ICD-10-CM

## 2020-10-12 LAB
ANION GAP SERPL CALC-SCNC: 2 MMOL/L (ref 3–18)
APPEARANCE UR: CLEAR
BASOPHILS # BLD: 0.1 K/UL (ref 0–0.1)
BASOPHILS NFR BLD: 1 % (ref 0–2)
BILIRUB UR QL: NEGATIVE
BUN SERPL-MCNC: 6 MG/DL (ref 7–18)
BUN/CREAT SERPL: 10 (ref 12–20)
CALCIUM SERPL-MCNC: 9.4 MG/DL (ref 8.5–10.1)
CHLORIDE SERPL-SCNC: 109 MMOL/L (ref 100–111)
CO2 SERPL-SCNC: 32 MMOL/L (ref 21–32)
COLOR UR: YELLOW
CREAT SERPL-MCNC: 0.63 MG/DL (ref 0.6–1.3)
DIFFERENTIAL METHOD BLD: ABNORMAL
EOSINOPHIL # BLD: 0.1 K/UL (ref 0–0.4)
EOSINOPHIL NFR BLD: 1 % (ref 0–5)
ERYTHROCYTE [DISTWIDTH] IN BLOOD BY AUTOMATED COUNT: 15.3 % (ref 11.6–14.5)
GLUCOSE SERPL-MCNC: 107 MG/DL (ref 74–99)
GLUCOSE UR STRIP.AUTO-MCNC: NEGATIVE MG/DL
HCT VFR BLD AUTO: 36.2 % (ref 35–45)
HGB BLD-MCNC: 11.6 G/DL (ref 12–16)
HGB UR QL STRIP: NEGATIVE
KETONES UR QL STRIP.AUTO: NEGATIVE MG/DL
LEUKOCYTE ESTERASE UR QL STRIP.AUTO: NEGATIVE
LYMPHOCYTES # BLD: 2.4 K/UL (ref 0.9–3.6)
LYMPHOCYTES NFR BLD: 32 % (ref 21–52)
MCH RBC QN AUTO: 23.5 PG (ref 24–34)
MCHC RBC AUTO-ENTMCNC: 32 G/DL (ref 31–37)
MCV RBC AUTO: 73.4 FL (ref 74–97)
MONOCYTES # BLD: 0.7 K/UL (ref 0.05–1.2)
MONOCYTES NFR BLD: 9 % (ref 3–10)
NEUTS SEG # BLD: 4.3 K/UL (ref 1.8–8)
NEUTS SEG NFR BLD: 57 % (ref 40–73)
NITRITE UR QL STRIP.AUTO: NEGATIVE
PH UR STRIP: 8 [PH] (ref 5–8)
PLATELET # BLD AUTO: 251 K/UL (ref 135–420)
PMV BLD AUTO: 11.5 FL (ref 9.2–11.8)
POTASSIUM SERPL-SCNC: 3.9 MMOL/L (ref 3.5–5.5)
PROT UR STRIP-MCNC: NEGATIVE MG/DL
RBC # BLD AUTO: 4.93 M/UL (ref 4.2–5.3)
SODIUM SERPL-SCNC: 143 MMOL/L (ref 136–145)
SP GR UR REFRACTOMETRY: <1.005 (ref 1–1.03)
UROBILINOGEN UR QL STRIP.AUTO: 0.2 EU/DL (ref 0.2–1)
WBC # BLD AUTO: 7.5 K/UL (ref 4.6–13.2)

## 2020-10-12 PROCEDURE — 99283 EMERGENCY DEPT VISIT LOW MDM: CPT

## 2020-10-12 PROCEDURE — 81003 URINALYSIS AUTO W/O SCOPE: CPT

## 2020-10-12 PROCEDURE — 85025 COMPLETE CBC W/AUTO DIFF WBC: CPT

## 2020-10-12 PROCEDURE — 80048 BASIC METABOLIC PNL TOTAL CA: CPT

## 2020-10-12 NOTE — ED PROVIDER NOTES
HPI patient complains of intermittent abdominal pains off and on for several months. She describes as a dull and sometimes sharp pain that lasts a few seconds and then resolves. She has pains in the left lower quadrant of her abdomen and then occasionally in the right upper quadrant of the abdomen. She was seen here 3 months ago for the same symptoms. She says Didier Sanchez did a CT scan and did not find anything\". Since since that time she seen her primary care doctor at the [facility] and is scheduled for a GI consult next month. Patient says she had another episode of these pains this morning and \"I do not know what it is\". She decided come the emergency room for another evaluation. At the present time she denies any abdominal pain. Past Medical History:   Diagnosis Date    Allergic rhinitis     Blurred vision     Cardiac echocardiogram 06/11/2014    EF 60%. No RWMA. RVSP 30 mmHg. Mild AI.  Cardiac Holter monitor, normal 11/05/2014    Benign 48-hr Holter study.  Cardiac nuclear imaging test, mod risk 08/14/2015    Intermediate risk. High anterior artifact vs diagonal artery ischemia. Following Lexiscan, 0.5-mm downsloping inferolateral ST depression, resolving 10 min 30 sec of recovery. Arm heaviness noted.  Cardiovascular lower extremity arterial duplex 07/15/2016    Right leg: Mod peripheral arterial disease in femoral segment at rest.  Left leg:  Mild arterial disease at rest.  R KAREN 0.58. L KAREN 0.95. Similar to study of 10/15/14.  Cardiovascular renal angiography 10/27/2014    Bilateral patent renal arteries. Right CFA occluded but collateralized.  Cardiovascular renal duplex 10/13/2014    Aorta w/irregular walls. Severe >60% bilateral renal artery stenosis. Bilateral intrinsic/med renal disease.  Carotid duplex 10/17/2014    Mild <50% DAVID stenosis. Biphasic signals in both subclavians.  Carotid duplex 12/05/2016    Mild < 50% DAVID stenosis.       Cervical disc disease 09/2015    [facility] C[facility]spine at [facility] Dizziness     Ear ache     Essential hypertension     Fainting spell     Frequent headaches     Frequent nosebleeds     Hemorrhoid     Hyperlipidemia     Hypertension     Ill-defined condition     Migraine     FLORENCIO (obstructive sleep apnea)     Sinus problem     Sinusitis, chronic     Spontaneous pneumothorax 1999    2 episodes on the right within several months of each other    Stomach pain     Weakness of right leg        Past Surgical History:   Procedure Laterality Date    COLONOSCOPY N/A 11/17/2017    COLONOSCOPY, SCREENING with hot bx polypectomy performed by Lorena Christensen MD at [facility] ENDOSCOPY    HX BREAST REDUCTION      HX CHOLECYSTECTOMY  11/8/14    HX COLPOSCOPY      HX HEART CATHETERIZATION Bilateral 10/27/14    bilateral lower extremity angiography     HX OTHER SURGICAL Left     shave biopsy ( thigh area)         Family History:   Problem Relation Age of Onset    Deep Vein Thrombosis Father     Coronary Artery Disease Father     Pacemaker Father     Diabetes Father     High Cholesterol Father     Hypertension Father     High Cholesterol Mother     Hypertension Mother     Heart Attack Brother 40    Cancer Brother     Heart Attack Maternal Grandmother 100    Heart Attack Other 48    Hypertension Other     Hypertension Maternal Aunt        Social History     Socioeconomic History    Marital status: SINGLE     Spouse name: Not on file    Number of children: Not on file    Years of education: Not on file    Highest education level: Not on file   Occupational History    Not on file   Social Needs    Financial resource strain: Not on file    Food insecurity     Worry: Not on file     Inability: Not on file    Transportation needs     Medical: Not on file     Non-medical: Not on file   Tobacco Use    Smoking status: Light Tobacco Smoker     Packs/day: 0.25     Years: 20.00     Pack years: 5.00     Types: Cigarettes    Smokeless tobacco: Never Used    Tobacco comment: now down to 1 cigarettes per day   Substance and Sexual Activity    Alcohol use: No    Drug use: No    Sexual activity: Never   Lifestyle    Physical activity     Days per week: Not on file     Minutes per session: Not on file    Stress: Not on file   Relationships    Social connections     Talks on phone: Not on file     Gets together: Not on file     Attends Catholic service: Not on file     Active member of club or organization: Not on file     Attends meetings of clubs or organizations: Not on file     Relationship status: Not on file    Intimate partner violence     Fear of current or ex partner: Not on file     Emotionally abused: Not on file     Physically abused: Not on file     Forced sexual activity: Not on file   Other Topics Concern    Not on file   Social History Narrative    Not on file         ALLERGIES: Latex; Keflex [cephalexin]; Epinephrine; and Tetracyclines    Review of Systems   Constitutional: Negative. HENT: Negative. Eyes: Negative. Respiratory: Negative. Cardiovascular: Negative. Gastrointestinal: Positive for abdominal pain. Negative for constipation, nausea and vomiting. Genitourinary: Negative. Musculoskeletal: Negative. Neurological: Negative. Psychiatric/Behavioral: Negative. Vitals:    10/12/20 1409   BP: (!) 180/77   Pulse: 77   Resp: 16   Temp: 98.6 °F (37 °C)   SpO2: 100%   Weight: 48.1 kg (106 lb)   Height: 4' 11\" (1.499 m)            Physical Exam  Vitals signs and nursing note reviewed. Constitutional:       Appearance: She is well-developed. HENT:      Head: Normocephalic and atraumatic. Eyes:      Conjunctiva/sclera: Conjunctivae normal.      Pupils: Pupils are equal, round, and reactive to light. Neck:      Musculoskeletal: Normal range of motion and neck supple. Cardiovascular:      Rate and Rhythm: Normal rate and regular rhythm.    Pulmonary:      Effort: Pulmonary effort is normal.      Breath sounds: Normal breath sounds. Abdominal:      Palpations: Abdomen is soft. Musculoskeletal: Normal range of motion. Skin:     General: Skin is warm and dry. Neurological:      Mental Status: She is alert and oriented to person, place, and time. MDM  Number of Diagnoses or Management Options  Diagnosis management comments: I reviewed the results of the ER evaluation with the patient. She will follow up with gastroenterology as scheduled for continued evaluation.   Jw Umanzor MD           Procedures

## 2020-10-12 NOTE — DISCHARGE INSTRUCTIONS

## 2020-10-12 NOTE — ED NOTES
Kraig Seals is a 60 y.o. female that was discharged in good condition. The patients diagnosis, condition and treatment were explained to  patient and aftercare instructions were given. The patient verbalized understanding. Patient armband removed and shredded.

## 2020-11-06 ENCOUNTER — HOSPITAL ENCOUNTER (OUTPATIENT)
Dept: MAMMOGRAPHY | Age: 65
Discharge: HOME | End: 2020-11-06
Attending: NURSE PRACTITIONER
Payer: COMMERCIAL

## 2020-11-06 DIAGNOSIS — Z12.31 VISIT FOR SCREENING MAMMOGRAM: ICD-10-CM

## 2020-11-06 PROCEDURE — 77063 BREAST TOMOSYNTHESIS BI: CPT

## 2020-11-27 ENCOUNTER — HOSPITAL ENCOUNTER (EMERGENCY)
Age: 65
Discharge: LEFT BEFORE DISCHARGE | End: 2020-11-27
Payer: COMMERCIAL

## 2020-11-27 VITALS
WEIGHT: 104 LBS | HEART RATE: 73 BPM | OXYGEN SATURATION: 99 % | DIASTOLIC BLOOD PRESSURE: 60 MMHG | BODY MASS INDEX: 20.96 KG/M2 | HEIGHT: 59 IN | TEMPERATURE: 97.8 F | RESPIRATION RATE: 16 BRPM | SYSTOLIC BLOOD PRESSURE: 163 MMHG

## 2020-11-27 PROCEDURE — 75810000275 HC EMERGENCY DEPT VISIT NO LEVEL OF CARE

## 2020-11-27 RX ORDER — LISINOPRIL 10 MG/1
10 TABLET ORAL DAILY
COMMUNITY

## 2020-12-09 ENCOUNTER — HOSPITAL ENCOUNTER (OUTPATIENT)
Dept: LAB | Age: 65
Discharge: HOME | End: 2020-12-09
Payer: COMMERCIAL

## 2020-12-09 LAB
25(OH)D3 SERPL-MCNC: 35.2 NG/ML (ref 30–100)
ALBUMIN SERPL-MCNC: 3.9 G/DL (ref 3.4–5)
ALBUMIN/GLOB SERPL: 1.2 {RATIO} (ref 0.8–1.7)
ALP SERPL-CCNC: 77 U/L (ref 45–117)
ALT SERPL-CCNC: 32 U/L (ref 13–56)
ANION GAP SERPL CALC-SCNC: 1 MMOL/L (ref 3–18)
AST SERPL-CCNC: 18 U/L (ref 10–38)
BILIRUB DIRECT SERPL-MCNC: 0.2 MG/DL (ref 0–0.2)
BILIRUB SERPL-MCNC: 0.5 MG/DL (ref 0.2–1)
BUN SERPL-MCNC: 9 MG/DL (ref 7–18)
BUN/CREAT SERPL: 15 (ref 12–20)
CALCIUM SERPL-MCNC: 9.6 MG/DL (ref 8.5–10.1)
CHLORIDE SERPL-SCNC: 108 MMOL/L (ref 100–111)
CHOLEST SERPL-MCNC: 129 MG/DL
CO2 SERPL-SCNC: 33 MMOL/L (ref 21–32)
CREAT SERPL-MCNC: 0.6 MG/DL (ref 0.6–1.3)
EST. AVERAGE GLUCOSE BLD GHB EST-MCNC: 114 MG/DL
FOLATE SERPL-MCNC: >20 NG/ML (ref 3.1–17.5)
GLOBULIN SER CALC-MCNC: 3.2 G/DL (ref 2–4)
GLUCOSE SERPL-MCNC: 93 MG/DL (ref 74–99)
HBA1C MFR BLD: 5.6 % (ref 4.2–5.6)
HDLC SERPL-MCNC: 44 MG/DL (ref 40–60)
HDLC SERPL: 2.9 {RATIO} (ref 0–5)
LDLC SERPL CALC-MCNC: 68 MG/DL (ref 0–100)
LIPID PROFILE,FLP: NORMAL
MAGNESIUM SERPL-MCNC: 2.3 MG/DL (ref 1.6–2.6)
POTASSIUM SERPL-SCNC: 4.5 MMOL/L (ref 3.5–5.5)
PROT SERPL-MCNC: 7.1 G/DL (ref 6.4–8.2)
SODIUM SERPL-SCNC: 142 MMOL/L (ref 136–145)
TRIGL SERPL-MCNC: 85 MG/DL (ref ?–150)
TSH SERPL DL<=0.05 MIU/L-ACNC: 0.88 UIU/ML (ref 0.36–3.74)
VIT B12 SERPL-MCNC: 439 PG/ML (ref 211–911)
VLDLC SERPL CALC-MCNC: 17 MG/DL

## 2020-12-09 PROCEDURE — 83036 HEMOGLOBIN GLYCOSYLATED A1C: CPT

## 2020-12-09 PROCEDURE — 83735 ASSAY OF MAGNESIUM: CPT

## 2020-12-09 PROCEDURE — 80061 LIPID PANEL: CPT

## 2020-12-09 PROCEDURE — 82607 VITAMIN B-12: CPT

## 2020-12-09 PROCEDURE — 82306 VITAMIN D 25 HYDROXY: CPT

## 2020-12-09 PROCEDURE — 36415 COLL VENOUS BLD VENIPUNCTURE: CPT

## 2020-12-09 PROCEDURE — 82248 BILIRUBIN DIRECT: CPT

## 2020-12-09 PROCEDURE — 80053 COMPREHEN METABOLIC PANEL: CPT

## 2020-12-09 PROCEDURE — 84443 ASSAY THYROID STIM HORMONE: CPT

## 2021-04-05 ENCOUNTER — DOCUMENTATION ONLY (OUTPATIENT)
Dept: ORTHOPEDIC SURGERY | Age: 66
End: 2021-04-05

## 2021-04-05 NOTE — PROGRESS NOTES
3/24/21 received request for 15 minute phone conference with Dr. Lew Duran from [facility]/ [facility] Flanagan[facility]Nito or Miriam Donnelly [phone] . They are sending records also for Dr to review before phone conference. As of today we are still waiting on extra notes to come in before we can bill or schedule the phone conference. I will update as soon as things get set and will send documents to be scanned to chart.

## 2021-04-15 NOTE — ED NOTES
----- Message from Bernadette Reyes MD sent at 4/15/2021  9:15 AM CDT -----  Please call pt - kidney results are normal   Patient given copy of dc instructions and script(s). Patient verbalized understanding of instructions and script (s). Patient given a current medication reconciliation form and verbalized understanding of their medications. Patient verbalized understanding of the importance of discussing medications with  his or her physician or clinic they will be following up with. Patient alert and oriented and in no acute distress. Patient discharged home ambulatory with self.

## 2021-05-12 ENCOUNTER — DOCUMENTATION ONLY (OUTPATIENT)
Dept: ORTHOPEDIC SURGERY | Age: 66
End: 2021-05-12

## 2021-05-12 NOTE — PROGRESS NOTES
Phone conference with Nadine Araya is scheduled for May 19, 2021 at 5:00pm EST. Dr. Tova Sepulveda will call Mr. Almaraza Merry at [phone].

## 2021-05-13 ENCOUNTER — OFFICE VISIT (OUTPATIENT)
Dept: ORTHOPEDIC SURGERY | Age: 66
End: 2021-05-13
Payer: COMMERCIAL

## 2021-05-13 VITALS
WEIGHT: 100 LBS | TEMPERATURE: 97.7 F | HEART RATE: 62 BPM | BODY MASS INDEX: 20.16 KG/M2 | HEIGHT: 59 IN | OXYGEN SATURATION: 98 %

## 2021-05-13 DIAGNOSIS — M79.18 CERVICAL MYOFASCIAL PAIN SYNDROME: Primary | ICD-10-CM

## 2021-05-13 DIAGNOSIS — M48.02 CERVICAL STENOSIS OF SPINE: ICD-10-CM

## 2021-05-13 PROCEDURE — 99213 OFFICE O/P EST LOW 20 MIN: CPT | Performed by: PHYSICAL MEDICINE & REHABILITATION

## 2021-05-13 RX ORDER — BUTALBITAL, ACETAMINOPHEN AND CAFFEINE 50; 325; 40 MG/1; MG/1; MG/1
1-2 TABLET ORAL
COMMUNITY
Start: 2021-05-03

## 2021-05-13 RX ORDER — LOSARTAN POTASSIUM 25 MG/1
TABLET ORAL
COMMUNITY

## 2021-05-13 RX ORDER — TIOTROPIUM BROMIDE INHALATION SPRAY 3.12 UG/1
2 SPRAY, METERED RESPIRATORY (INHALATION) DAILY
COMMUNITY
Start: 2021-03-11

## 2021-05-13 RX ORDER — NYSTATIN 100000 [USP'U]/ML
SUSPENSION ORAL
COMMUNITY

## 2021-05-13 RX ORDER — NAPROXEN 375 MG/1
TABLET ORAL
COMMUNITY
End: 2021-05-13

## 2021-05-13 RX ORDER — CARBOXYMETHYLCELLULOSE SODIUM 5 MG/ML
SOLUTION/ DROPS OPHTHALMIC
COMMUNITY

## 2021-05-13 RX ORDER — FAMOTIDINE 20 MG/1
20 TABLET, FILM COATED ORAL
COMMUNITY

## 2021-05-13 RX ORDER — MOMETASONE FUROATE 50 MCG
AEROSOL, SPRAY WITH PUMP (GRAM) NASAL
COMMUNITY
Start: 2021-05-05

## 2021-05-13 RX ORDER — IPRATROPIUM BROMIDE AND ALBUTEROL SULFATE 2.5; .5 MG/3ML; MG/3ML
SOLUTION RESPIRATORY (INHALATION)
COMMUNITY

## 2021-05-13 RX ORDER — DULOXETIN HYDROCHLORIDE 20 MG/1
CAPSULE, DELAYED RELEASE ORAL
COMMUNITY
End: 2021-05-13

## 2021-05-13 RX ORDER — OMEPRAZOLE 20 MG/1
CAPSULE, DELAYED RELEASE ORAL
COMMUNITY

## 2021-05-13 RX ORDER — AMLODIPINE BESYLATE 2.5 MG/1
2.5 TABLET ORAL DAILY
COMMUNITY
Start: 2021-05-10

## 2021-05-13 RX ORDER — METOPROLOL TARTRATE 25 MG/1
25 TABLET, FILM COATED ORAL 2 TIMES DAILY
COMMUNITY
Start: 2021-04-14

## 2021-05-13 NOTE — PROGRESS NOTES
Emmanuel Hodge presents today for   Chief Complaint   Patient presents with    Back Pain     mid back pain       Is someone accompanying this pt? no    Is the patient using any DME equipment during OV? No      Depression Screening:  3 most recent PHQ Screens 7/17/2020   Little interest or pleasure in doing things Not at all   Feeling down, depressed, irritable, or hopeless Not at all   Total Score PHQ 2 0       Learning Assessment:  Learning Assessment 4/20/2016   PRIMARY LEARNER Patient   HIGHEST LEVEL OF EDUCATION - PRIMARY LEARNER  -   BARRIERS PRIMARY LEARNER -   CO-LEARNER CAREGIVER -   PRIMARY LANGUAGE ENGLISH   LEARNER PREFERENCE PRIMARY READING     DEMONSTRATION     VIDEOS   ANSWERED BY patient   RELATIONSHIP SELF       Abuse Screening:  Abuse Screening Questionnaire 8/30/2017   Do you ever feel afraid of your partner? N   Are you in a relationship with someone who physically or mentally threatens you? N   Is it safe for you to go home? Y       Coordination of Care:  1. Have you been to the ER, urgent care clinic since your last visit? Yes. Pt states that she has been to the ER several times for very high bp. Notes in care everywhere. Hospitalized since your last visit? No, pt had total hysterectomy done as out pt    2. Have you seen or consulted any other health care providers outside of the [address] [place] [address] since your last visit? Yes, OB/GYN Include any pap smears or colon screening.  no

## 2021-05-13 NOTE — PROGRESS NOTES
Verbal order entered per Dr. De Anda  as documented on blue sheet: physical therapy referral- Cervical myofascial pain- eval for dry needling
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
## 2021-05-20 ENCOUNTER — HOSPITAL ENCOUNTER (OUTPATIENT)
Dept: PHYSICAL THERAPY | Age: 66
Discharge: HOME | End: 2021-05-20
Payer: COMMERCIAL

## 2021-05-20 PROCEDURE — 97530 THERAPEUTIC ACTIVITIES: CPT

## 2021-05-20 PROCEDURE — 97162 PT EVAL MOD COMPLEX 30 MIN: CPT
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
## 2021-05-25 ENCOUNTER — DOCUMENTATION ONLY (OUTPATIENT)
Dept: ORTHOPEDIC SURGERY | Age: 66
End: 2021-05-25

## 2021-05-25 NOTE — PROGRESS NOTES
Dr. Brad Hudson Phone conference with Esa Moore was done and payments were deposited. Documents sent to be scanned to chart.

## 2021-06-08 ENCOUNTER — HOSPITAL ENCOUNTER (OUTPATIENT)
Dept: PHYSICAL THERAPY | Age: 66
Discharge: HOME | End: 2021-06-08
Payer: COMMERCIAL

## 2021-06-08 PROCEDURE — 97112 NEUROMUSCULAR REEDUCATION: CPT

## 2021-06-08 PROCEDURE — 97140 MANUAL THERAPY 1/> REGIONS: CPT

## 2021-06-08 PROCEDURE — 97110 THERAPEUTIC EXERCISES: CPT

## 2021-06-08 NOTE — PROGRESS NOTES
PT DAILY TREATMENT NOTE    Patient Name: Marie Talley  Date:2021  : 1955  [x]  Patient  Verified  Payor: Lucio Sorto / Plan:  [facility] / Product Type: PPO /    In time: 5:34  Out time:6:18  Total Treatment Time (min): 44  Total Timed Codes (min): 44  1:1 Treatment Time (MC only): 44   Visit #: 2 of 12    Treatment Area: Neck pain [M54.2]    SUBJECTIVE  Pain Level (0-10 scale): 2/10  Any medication changes, allergies to medications, adverse drug reactions, diagnosis change, or new procedure performed?: [x] No    [] Yes (see summary sheet for update)  Subjective functional status/changes:   [] No changes reported  Pt reports that her doctor told her that she can't get dry needling because she is taking a blood thinner. Reports that she is on three medicines for the blood pressure. Reports that she saw the cardiologist and she can tell when her blood pressure is high because she has a headache. Reports that the eval was a bad and wasn't given HEP. Reports that she has pain in the back with performing dishes. OBJECTIVE:  Pt enters gym in no apparent distress  Vitals: blood pressure: 132/48 mmHg        19 min Therapeutic Exercise:  [x] See flow sheet :   Rationale: increase ROM and increase strength to improve the patients ability to perform daily activities with decreased pain and symptom levels     15 min Neuromuscular Re-education:  [x]  See flow sheet :   Rationale: improve coordination, improve balance, and increase proprioception  to improve the patients ability to perform daily activities with decreased pain and symptom levels. 10 min Manual Therapy:  SOR, MFR intramuscular to getachew SCM, getachew scalenes, getachew upper trap, pt prone: MFR superficial and IASTM to bilat upper trap   Rationale: increase tissue extensibility, decrease trigger points, and increase postural awareness to improve the patients ability to perform daily activities with decreased pain and symptom levels.   The manual therapy interventions were performed at a separate and distinct time from the therapeutic activities interventions. With   [x] TE   [] TA   [] neuro   [] other: Patient Education: [x] Review HEP    [] Progressed/Changed HEP based on:   [] positioning   [] body mechanics   [] transfers   [] heat/ice application    [] other:      Other Objective/Functional Measures: please see below     Pain Level (0-10 scale) post treatment: 2/10    ASSESSMENT/Changes in Function:  Implemented plan of care per chart. Pt with better control of blood pressure, but diastolic is still low, but pt reporting that is her normal and MD is aware. Pt with noted muscle hypertonicity so performed manual techniques, will continue to address. Pt requires close supervision with proper technique with theraband exercises. Patient tolerated therapy session well as there were no adverse reactions today. Pt is progressing with therapy as indicated by pt tolerating increase in exercise repetitions and resistance. Although showing progress patient would benefit from continuation of skilled physical therapy to address the remaining limitations. Patient will continue to benefit from skilled PT services to modify and progress therapeutic interventions, address functional mobility deficits, address ROM deficits, address strength deficits, analyze and address soft tissue restrictions, analyze and cue movement patterns, analyze and modify body mechanics/ergonomics, assess and modify postural abnormalities and instruct in home and community integration to attain remaining goals. []  See Plan of Care  []  See progress note/recertification  []  See Discharge Summary         Progress towards goals / Updated goals:  Short Term Goals: STG- To be accomplished in 5 treatment(s):  1.  Pt will be independent with HEP to encourage prophylaxis. Eval:held due to time and pt not feeling well  Current:  Will give next visit     Long Term Goals: LTG- To be accomplished in 12 treatment(s):  1.  Pt will improve cervical ROM to [facility] to increase tolerance to daily activities. Eval:  AROM Right Left   Cervical Flex: 33, p!       Ext: 25       Rot 50 32   Lat flex 30 22      2.  Pt will improve scapular stability so can lower bilateral UE without dyskinesia and dumping to increase tolerance to reaching overhead and daily activities. Eval:Scapular Rhythm: dyskinesia, dumping and visible fatigue bilaterally with lowering      3.  Pt will be able to perform household activities such as sweeping floor, vacuuming without pain to increase tolerance to daily activites. Eval:pain immediately with sweeping, limites tolerance to washing dishes - pt lives alone      4.  Pt FOTO score will increase by 5 points to show improvement in function.   Eval:53  Current: will address at visit 5      PLAN  [x]  Upgrade activities as tolerated     [x]  Continue plan of care  [x]  Update interventions per flow sheet       []  Discharge due to:_  []  Other:_      Excell Karly, PT, DPT, CIMT 2021  10:52 AM    Future Appointments   Date Time Provider [place] Al[place]   2021  5:30 PM Maria Luisa Sloop, PT [facility]   6/10/2021  4:45 PM Arielle , PT, DPT [facility]   6/15/2021  4:45 PM Arielle Saint Xavier, PT, DPT [facility]   2021  4:00 PM Arielle , PT, DPT [facility]   2021  4:45 PM Maria Luisa Sloop, PT [facility]   2021  4:45 PM Arielle , PT, DPT [facility]   2021  4:45 PM Maria Luisa Sloop, PT [facility]   2021  4:45 PM Maria Luisa Sloop, PT [facility]

## 2021-06-10 ENCOUNTER — APPOINTMENT (OUTPATIENT)
Dept: PHYSICAL THERAPY | Age: 66
End: 2021-06-10
Payer: COMMERCIAL

## 2021-06-15 ENCOUNTER — APPOINTMENT (OUTPATIENT)
Dept: PHYSICAL THERAPY | Age: 66
End: 2021-06-15
Payer: COMMERCIAL

## 2021-06-18 ENCOUNTER — HOSPITAL ENCOUNTER (OUTPATIENT)
Dept: PHYSICAL THERAPY | Age: 66
Discharge: HOME | End: 2021-06-18
Payer: COMMERCIAL

## 2021-06-18 PROCEDURE — 97112 NEUROMUSCULAR REEDUCATION: CPT

## 2021-06-18 PROCEDURE — 97140 MANUAL THERAPY 1/> REGIONS: CPT

## 2021-06-18 PROCEDURE — 97110 THERAPEUTIC EXERCISES: CPT
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
## 2021-06-21 ENCOUNTER — HOSPITAL ENCOUNTER (OUTPATIENT)
Dept: PHYSICAL THERAPY | Age: 66
Discharge: HOME | End: 2021-06-21
Payer: COMMERCIAL

## 2021-06-21 PROCEDURE — 97140 MANUAL THERAPY 1/> REGIONS: CPT

## 2021-06-21 PROCEDURE — 97112 NEUROMUSCULAR REEDUCATION: CPT

## 2021-06-21 PROCEDURE — 97110 THERAPEUTIC EXERCISES: CPT

## 2021-06-21 NOTE — PROGRESS NOTES
PT DAILY TREATMENT NOTE    Patient Name: Myah Chapin  Date:2021  : 1955  [x]  Patient  Verified  Payor: Aminata Macdonald / Plan:  [facility] / Product Type: PPO /    In time: 4:48  Out time:5:35  Total Treatment Time (min): 47  Total Timed Codes (min): 47  1:1 Treatment Time ( only): 52   Visit #: 4 of 12    Treatment Area: Neck pain [M54.2]    SUBJECTIVE  Pain Level (0-10 scale): 0/10  Any medication changes, allergies to medications, adverse drug reactions, diagnosis change, or new procedure performed?: [x] No    [] Yes (see summary sheet for update)  Subjective functional status/changes:   [] No changes reported  Pt reports she felt good after last therapy session and even slept through the night. OBJECTIVE:  Pt enters gym in no apparent distress  Vitals: prior to therapy: 142/48 mmHg  End of therapy: 138/50 mmHG    22 min Therapeutic Exercise:  [x] See flow sheet :   Rationale: increase ROM and increase strength to improve the patients ability to perform daily activities with decreased pain and symptom levels     15 min Neuromuscular Re-education:  [x]  See flow sheet :   Rationale: improve coordination, improve balance, and increase proprioception  to improve the patients ability to perform daily activities with decreased pain and symptom levels. 10 min Manual Therapy:  Gentle SOR, manual cervical distraction, STM/MFR intramuscular to getachew UT and cervical paraspinals, right SCM, getachew levator scap, pt in right sidelying: MFR to levator scap, rhomboids  Scapular contract relax to inc scapular depression and retraction   Rationale: increase tissue extensibility, decrease trigger points, and increase postural awareness to improve the patients ability to perform daily activities with decreased pain and symptom levels. The manual therapy interventions were performed at a separate and distinct time from the therapeutic activities interventions.           With   [x] TE   [] TA   [] neuro   [] other: Patient Education: [x] Review HEP    [] Progressed/Changed HEP based on:   [] positioning   [] body mechanics   [] transfers   [] heat/ice application    [] other:      Other Objective/Functional Measures: please see below     Pain Level (0-10 scale) post treatment: 0/10    ASSESSMENT/Changes in Function: Patient tolerated therapy session well as there were no adverse reactions today. Pt with noted muscle hypertonicity so addressed with manual therapy. Pt did feel it in the left shoulder blade and down the triceps with tricep exericse Pt is progressing with therapy as indicated by pt tolerating increase in exercise repetitions and resistance. Although showing progress patient would benefit from continuation of skilled physical therapy to address the remaining limitations. Patient will continue to benefit from skilled PT services to modify and progress therapeutic interventions, address functional mobility deficits, address ROM deficits, address strength deficits, analyze and address soft tissue restrictions, analyze and cue movement patterns, analyze and modify body mechanics/ergonomics, assess and modify postural abnormalities and address imbalance/dizziness to attain remaining goals. []  See Plan of Care  []  See progress note/recertification  []  See Discharge Summary         Progress towards goals / Updated goals:  Short Term Goals: STG- To be accomplished in 5 treatment(s):  1.  Pt will be independent with HEP to encourage prophylaxis. Eval:held due to time and pt not feeling well  Current: Progressing 6/21/21 reports compliance     Long Term Goals: LTG- To be accomplished in 12 treatment(s):  1.  Pt will improve cervical ROM to [facility] to increase tolerance to daily activities.   Eval:  AROM Right Left   Cervical Flex: 33, p!       Ext: 25       Rot 50 32   Lat flex 30 22   Current: No change 6/18/21                           Cervical Rot AROM:   Ashley Gip 48           Left: 30     2.  Pt will improve scapular stability so can lower bilateral UE without dyskinesia and dumping to increase tolerance to reaching overhead and daily activities. Eval:Scapular Rhythm: dyskinesia, dumping and visible fatigue bilaterally with lowering   Current: 6/21/21 - pt with should hiking and required verbal and tactile cuing for lower trap facilitation.      3.  Pt will be able to perform household activities such as sweeping floor, vacuuming without pain to increase tolerance to daily activites. Eval:pain immediately with sweeping, limites tolerance to washing dishes - pt lives alone   Current: no change 6/18/21     4.  Pt FOTO score will increase by 5 points to show improvement in function.   Eval:53  Current: will address at visit 5      PLAN  [x]  Upgrade activities as tolerated     [x]  Continue plan of care  [x]  Update interventions per flow sheet       []  Discharge due to:_  []  Other:_      Enma Jolley, PT, DPT, CIMT 6/21/2021  11:41 AM    Future Appointments   Date Time Provider [place] Alvarez   6/21/2021  4:45 PM Sarai Zelaya PT [facility]   6/22/2021  4:45 PM Arlin Lemus, PT, DPT [facility]   6/28/2021  4:45 PM Sarai Zelaya PT [facility]   6/29/2021  4:45 PM Sarai Zelaya PT [facility]

## 2021-06-22 ENCOUNTER — HOSPITAL ENCOUNTER (OUTPATIENT)
Dept: PHYSICAL THERAPY | Age: 66
Discharge: HOME | End: 2021-06-22
Payer: COMMERCIAL

## 2021-06-22 PROCEDURE — 97110 THERAPEUTIC EXERCISES: CPT

## 2021-06-22 PROCEDURE — 97112 NEUROMUSCULAR REEDUCATION: CPT

## 2021-06-22 PROCEDURE — 97140 MANUAL THERAPY 1/> REGIONS: CPT

## 2021-06-22 NOTE — PROGRESS NOTES
PT DAILY TREATMENT NOTE    Patient Name: Brissa Montalvo  Date:2021  : 1955  [x]  Patient  Verified  Payor: Aminata Macdonald / Plan:  [facility] / Product Type: PPO /    In time:4:55  Out time:5:40  Total Treatment Time (min): 45  Total Timed Codes (min): 45  1:1 Treatment Time ( only): 45   Visit #: 5 of 12    Treatment Area: Neck pain [M54.2]    SUBJECTIVE  Pain Level (0-10 scale): 0  Any medication changes, allergies to medications, adverse drug reactions, diagnosis change, or new procedure performed?: [x] No    [] Yes (see summary sheet for update)  Subjective functional status/changes:   [] No changes reported  Pt reporting feeling great after last session. OBJECTIVE      20 min Therapeutic Exercise:  [x] See flow sheet :   Rationale: increase ROM and increase strength to improve the patients ability to perform daily activities with decreased pain and symptom levels     15 min Neuromuscular Re-education:  [x]  See flow sheet :   Rationale: increase strength, improve coordination and increase proprioception  to improve the patients ability to perform daily activities with decreased pain and symptom levels    10 min Manual Therapy:  SOR release, STM to left levator, scalenes SCM in supine, scap mobs in s/l    Rationale: decrease pain, increase ROM and increase tissue extensibility to improve the patients ability to perform daily activities with decreased pain and symptom levels  The manual therapy interventions were performed at a separate and distinct time from the therapeutic activities interventions.     With   [] TE   [] TA   [] neuro   [] other: Patient Education: [x] Review HEP    [] Progressed/Changed HEP based on:   [] positioning   [] body mechanics   [] transfers   [] heat/ice application    [] other:      Other Objective/Functional Measures:   FOTO 61  /50 beginning of session      Pain Level (0-10 scale) post treatment: 0    ASSESSMENT/Changes in Function: Pt tolerated session well with no pain only fatigue. Continued UT compensation noted however decreasing with tactile cues. Challenged with maintaining elbow extension with supine alternating SA press. Decreased trunk rotation to right noted with standing SA press. Patient will continue to benefit from skilled PT services to modify and progress therapeutic interventions, address functional mobility deficits, address ROM deficits, address strength deficits, analyze and address soft tissue restrictions, analyze and cue movement patterns, analyze and modify body mechanics/ergonomics, assess and modify postural abnormalities and instruct in home and community integration to attain remaining goals. []  See Plan of Care  []  See progress note/recertification  []  See Discharge Summary         Progress towards goals / Updated goals:  Short Term Goals: STG- To be accomplished in 5 treatment(s):  1.  Pt will be independent with HEP to encourage prophylaxis. Eval:held due to time and pt not feeling well  Current: Progressing 6/21/21 reports compliance     Long Term Goals: LTG- To be accomplished in 12 treatment(s):  1.  Pt will improve cervical ROM to [facility] to increase tolerance to daily activities. Eval:  AROM Right Left   Cervical Flex: 33, p!       Ext: 25       Rot 50 32   Lat flex 30 22   Current: No change 6/18/21                           Cervical Rot AROM:                           [id number]           BQKP: 30     2.  Pt will improve scapular stability so can lower bilateral UE without dyskinesia and dumping to increase tolerance to reaching overhead and daily activities. Eval:Scapular Rhythm: dyskinesia, dumping and visible fatigue bilaterally with lowering   Current: 6/21/21 - pt with should hiking and required verbal and tactile cuing for lower trap facilitation.      3.  Pt will be able to perform household activities such as sweeping floor, vacuuming without pain to increase tolerance to daily activites.   Eval:pain immediately with sweeping, limites tolerance to washing dishes - pt lives alone   Current: no change 6/18/21     4.  Pt FOTO score will increase by 5 points to show improvement in function.   Eval:53  Current: 61 progressing 6/22/21         PLAN  []  Upgrade activities as tolerated     [x]  Continue plan of care  []  Update interventions per flow sheet       []  Discharge due to:_  []  Other:_      Niki Right 6/22/2021  4:57 PM    Future Appointments   Date Time Provider [place] Alvarez   6/28/2021  4:45 PM Fadia Del Real, PT [facility]   6/29/2021  4:45 PM Fadia Del Real PT [facility]ENNY [facility]   8/12/2021  3:00 PM Riva Hodgkins, NP [id number] [facility]

## 2021-06-28 ENCOUNTER — HOSPITAL ENCOUNTER (OUTPATIENT)
Dept: PHYSICAL THERAPY | Age: 66
Discharge: HOME | End: 2021-06-28
Payer: COMMERCIAL

## 2021-06-28 PROCEDURE — 97140 MANUAL THERAPY 1/> REGIONS: CPT

## 2021-06-28 PROCEDURE — 97530 THERAPEUTIC ACTIVITIES: CPT

## 2021-06-28 PROCEDURE — 97112 NEUROMUSCULAR REEDUCATION: CPT

## 2021-06-28 PROCEDURE — 97110 THERAPEUTIC EXERCISES: CPT

## 2021-06-28 NOTE — PROGRESS NOTES
PT DAILY TREATMENT NOTE    Patient Name: Brooks Poon  Date:2021  : 1955  [x]  Patient  Verified  Payor: Romana Riling / Plan:  [facility] [place] / Product Type: PPO /    In time:4:44  Out time 5:38  Total Treatment Time (min): 54  Total Timed Codes (min): 54  1:1 Treatment Time ( only): 47   Visit #: 6 of 12    Treatment Area: Neck pain [M54.2]    SUBJECTIVE  Pain Level (0-10 scale): 0/10  Any medication changes, allergies to medications, adverse drug reactions, diagnosis change, or new procedure performed?: [x] No    [] Yes (see summary sheet for update)  Subjective functional status/changes:   [] No changes reported  Pt reports that her asthma is really bothering her today because of the hot weather. Reports that she is having some leg pain and is going to get ultrasound on  for blood clot, but does have confirmed femoral artery on the right. Reports that she is compliant with HEP. OBJECTIVE:  Pt enters gym in no apparent distress, pt took albuterol inhaler at the beginning of therapy session    Vitals: blood pressure: 150-50  SpO2: 97-98%  Heart rate: 71 bpm      21 min Therapeutic Exercise:  [x] See flow sheet :   Rationale: increase ROM and increase strength to improve the patients ability to perform daily activities with decreased pain and symptom levels     15 min Neuromuscular Re-education:  [x]  See flow sheet :   Rationale: improve coordination, improve balance, and increase proprioception  to improve the patients ability to perform daily activities with decreased pain and symptom levels.     8 min Therapeutic Activity:  [x]  See flow sheet :   Rationale:  increase strength, to assess ability to perform activities  to improve the patients ability to perform daily activities with decreased pain and symptom levels     10 min Manual Therapy:  Pt supine: SOR release, STM to left levator, scalenes SCM in supine,    Rationale: increase tissue extensibility, decrease trigger points, and increase postural awareness to improve the patients ability to perform daily activities with decreased pain and symptom levels. The manual therapy interventions were performed at a separate and distinct time from the therapeutic activities interventions. With   [x] TE   [] TA   [] neuro   [] other: Patient Education: [x] Review HEP    [] Progressed/Changed HEP based on:   [] positioning   [] body mechanics   [] transfers   [] heat/ice application    [] other:      Other Objective/Functional Measures: please see below     Pain Level (0-10 scale) post treatment: 0/10    ASSESSMENT/Changes in Function: Patient tolerated therapy session well as there were no adverse reactions today. Held on the UBE this therapy session as pt with higher than normal blood pressure. Pt required tactile cuing for IOTA PPT. Pt is progressing with therapy as indicated by pt tolerating increase in exercise repetitions and resistance. Although showing progress patient would benefit from continuation of skilled physical therapy to address the remaining limitations. Patient will continue to benefit from skilled PT services to  modify and progress therapeutic interventions, address functional mobility deficits, address ROM deficits, address strength deficits, analyze and address soft tissue restrictions, analyze and cue movement patterns, analyze and modify body mechanics/ergonomics, assess and modify postural abnormalities and instruct in home and community integration to attain remaining goals. []  See Plan of Care  []  See progress note/recertification  []  See Discharge Summary         Progress towards goals / Updated goals:  Short Term Goals: STG- To be accomplished in 5 treatment(s):  1.  Pt will be independent with HEP to encourage prophylaxis. Eval:held due to time and pt not feeling well  Current: Progressing 6/28/21 reports compliance     Long Term Goals: LTG- To be accomplished in 12 treatment(s):  1.  Pt will improve cervical ROM to [facility] to increase tolerance to daily activities. Eval:  AROM Right Left   Cervical Flex: 33, p!       Ext: 25       Rot 50 32   Lat flex 30 22   Current: 6/28/21 -progressing                          Cervical Rot AROM: degrees with goniometer                           GVMCA 55           Left: 55     2.  Pt will improve scapular stability so can lower bilateral UE without dyskinesia and dumping to increase tolerance to reaching overhead and daily activities. Eval:Scapular Rhythm: dyskinesia, dumping and visible fatigue bilaterally with lowering   Current: 6/28/21 - pt doing better with shoulder hiing.       3.  Pt will be able to perform household activities such as sweeping floor, vacuuming without pain to increase tolerance to daily activites. Eval:pain immediately with sweeping, limites tolerance to washing dishes - pt lives alone   Current: 6/28/21 pt reports sweeping and chores is getting better but still has pain and has to stop      4.  Pt FOTO score will increase by 5 points to show improvement in function.   Eval:53  Current: 64 progressing 6/22/21      PLAN  [x]  Upgrade activities as tolerated     [x]  Continue plan of care  [x]  Update interventions per flow sheet       []  Discharge due to:_  []  Other:_      Steffanie Suero, PT, DPT, CIMT 6/28/2021  4:13 PM    Future Appointments   Date Time Provider Jason Alvarez   6/28/2021  4:45 PM GENEVIEVE Yee [facility]   6/29/2021  4:45 PM GENEVIEVE Yee [facility]   8/12/2021  3:00 PM Catalina Hodge NP [id number] [facility]

## 2021-06-29 ENCOUNTER — HOSPITAL ENCOUNTER (OUTPATIENT)
Dept: PHYSICAL THERAPY | Age: 66
Discharge: HOME | End: 2021-06-29
Payer: COMMERCIAL

## 2021-06-29 PROCEDURE — 97110 THERAPEUTIC EXERCISES: CPT

## 2021-06-29 PROCEDURE — 97530 THERAPEUTIC ACTIVITIES: CPT

## 2021-06-29 PROCEDURE — 97112 NEUROMUSCULAR REEDUCATION: CPT

## 2021-06-29 PROCEDURE — 97140 MANUAL THERAPY 1/> REGIONS: CPT

## 2021-06-29 NOTE — PROGRESS NOTES
PT DAILY TREATMENT NOTE    Patient Name: Dee Leach  Date:2021  : 1955  [x]  Patient  Verified  Payor: BLUE CROSS / Plan:  [facility] [place] / Product Type: PPO /    In time:4:45  Out time:5:39  Total Treatment Time (min): 54  Total Timed Codes (min): 54  1:1 Treatment Time ([facility] only): 54   Visit #: 7 of 12    Treatment Area: Neck pain [M54.2]    SUBJECTIVE  Pain Level (0-10 scale): 0/10  Any medication changes, allergies to medications, adverse drug reactions, diagnosis change, or new procedure performed?: [x] No    [] Yes (see summary sheet for update)  Subjective functional status/changes:   [] No changes reported  Pt reports she is doing better. OBJECTIVE:  Pt enters gym in no apparent distress  Vitals: pre therapy: 122/48 mmHg; post therapy: 132/50 mmHG        21 min Therapeutic Exercise:  [x] See flow sheet :   Rationale: increase ROM and increase strength to improve the patients ability to perform daily activities with decreased pain and symptom levels      10 min Therapeutic Activity:  [x]  See flow sheet :   Rationale: increase ROM, increase strength  to improve the patients ability to perform daily activities with decreased pain and symptom levels     15 min Neuromuscular Re-education:  [x]  See flow sheet :   Rationale: improve coordination, improve balance, and increase proprioception  to improve the patients ability to perform daily activities with decreased pain and symptom levels. 8 min Manual Therapy:  Pt supine: SOR release, STM to left levator, scalenes SCM in supine, pt in left sidelying: scapular glides, MFR    Rationale: increase tissue extensibility, decrease trigger points, and increase postural awareness to improve the patients ability to perform daily activities with decreased pain and symptom levels. The manual therapy interventions were performed at a separate and distinct time from the therapeutic activities interventions.             With   [x] TE   [] TA [] neuro   [] other: Patient Education: [x] Review HEP    [] Progressed/Changed HEP based on:   [] positioning   [] body mechanics   [] transfers   [] heat/ice application    [] other:      Other Objective/Functional Measures: please see below     Pain Level (0-10 scale) post treatment: 0/10    ASSESSMENT/Changes in Function: Patient tolerated therapy session well as there were no adverse reactions today. Pt continues to have muscle hypertonicity. Pt requires frequent verbal cuing to perform exercise slowly and without shoulder hiking. Added bicep curls with GTB and 3# and pt felt fatigued afterwards. Pt is progressing with therapy as indicated by pt tolerating increase in exercise repetitions and resistance. Although showing progress patient would benefit from continuation of skilled physical therapy to address the remaining limitations. Patient will continue to benefit from skilled PT services to modify and progress therapeutic interventions, address functional mobility deficits, address ROM deficits, address strength deficits, analyze and address soft tissue restrictions, analyze and cue movement patterns, analyze and modify body mechanics/ergonomics and assess and modify postural abnormalities to attain remaining goals. []  See Plan of Care  []  See progress note/recertification  []  See Discharge Summary         Progress towards goals / Updated goals:  Short Term Goals: STG- To be accomplished in 5 treatment(s):  1.  Pt will be independent with HEP to encourage prophylaxis. Eval:held due to time and pt not feeling well  Current: Progressing 6/29/21 reports compliance     Long Term Goals: LTG- To be accomplished in 12 treatment(s):  1.  Pt will improve cervical ROM to [facility] to increase tolerance to daily activities.   Eval:  AROM Right Left   Cervical Flex: 33, p!       Ext: 25       Rot 50 32   Lat flex 30 22   Current: 6/28/21 -progressing                          Cervical Rot AROM: degrees with goniometer                           XVJOT 55           Left: 55     2.  Pt will improve scapular stability so can lower bilateral UE without dyskinesia and dumping to increase tolerance to reaching overhead and daily activities. Eval:Scapular Rhythm: dyskinesia, dumping and visible fatigue bilaterally with lowering   Current: 6/28/21 - pt doing better with shoulder hiing.       3.  Pt will be able to perform household activities such as sweeping floor, vacuuming without pain to increase tolerance to daily activites. Eval:pain immediately with sweeping, limites tolerance to washing dishes - pt lives alone   Current: 6/28/21 pt reports sweeping and chores is getting better but still has pain and has to stop      4.  Pt FOTO score will increase by 5 points to show improvement in function.   Eval:53  Current: 61 progressing 6/22/21      PLAN  [x]  Upgrade activities as tolerated     [x]  Continue plan of care  [x]  Update interventions per flow sheet       []  Discharge due to:_  []  Other:_      Flex Helton, PT, DPT, CIMT 6/29/2021  4:47 PM    Future Appointments   Date Time Provider Jason Alvarez   8/12/2021  3:00 PM NORMA Avila

## 2021-07-06 ENCOUNTER — HOSPITAL ENCOUNTER (OUTPATIENT)
Dept: PHYSICAL THERAPY | Age: 66
Discharge: HOME | End: 2021-07-06
Payer: COMMERCIAL

## 2021-07-06 PROCEDURE — 97140 MANUAL THERAPY 1/> REGIONS: CPT

## 2021-07-06 PROCEDURE — 97110 THERAPEUTIC EXERCISES: CPT

## 2021-07-06 PROCEDURE — 97112 NEUROMUSCULAR REEDUCATION: CPT

## 2021-07-06 NOTE — PROGRESS NOTES
PT DAILY TREATMENT NOTE    Patient Name: Vaughn Simpson  Date:2021  : 1955  [x]  Patient  Verified  Payor: Hilary Cheri / Plan:  [facility] / Product Type: PPO /    In time:4:42  Out time:5:25  Total Treatment Time (min): 43  Total Timed Codes (min): 43  1:1 Treatment Time ( only): 43   Visit #: 8 of 12    Treatment Area: Neck pain [M54.2]    SUBJECTIVE  Pain Level (0-10 scale): 0/10  Any medication changes, allergies to medications, adverse drug reactions, diagnosis change, or new procedure performed?: [x] No    [] Yes (see summary sheet for update)  Subjective functional status/changes:   [] No changes reported  Pt reports that she is compliant with HEP. Reports that looking at blind spot is fine. Reports that yesterday while cleaning the house, the center of the neck was bothering her a little bit. OBJECTIVE:  Pt enters gym in no apparent distress-12 minutes late      18 min Therapeutic Exercise:  [x] See flow sheet :   Rationale: increase ROM and increase strength to improve the patients ability to perform daily activities with decreased pain and symptom levels     15 min Neuromuscular Re-education:  [x]  See flow sheet :   Rationale: improve coordination, improve balance, and increase proprioception of scapula and pelvis  to improve the patients ability to perform daily activities with decreased pain and symptom levels. 10 min Manual Therapy:  Pt supine: SOR release, STM/MFR to getachew upper trap, levator scap, scalenes, gentle cervical distraction   Rationale: increase tissue extensibility, decrease trigger points, and increase postural awareness to improve the patients ability to perform daily activities with decreased pain and symptom levels. The manual therapy interventions were performed at a separate and distinct time from the therapeutic activities interventions.             With   [x] TE   [] TA   [] neuro   [] other: Patient Education: [x] Review HEP    [] Progressed/Changed HEP based on:   [] positioning   [] body mechanics   [] transfers   [] heat/ice application    [] other:      Other Objective/Functional Measures: please see below     Pain Level (0-10 scale) post treatment: 0/10    ASSESSMENT/Changes in Function: Patient tolerated therapy session well as there were no adverse reactions today. Pt had a hard time performing PPT in the standing position, even with verbal and tactile cuing. Pt feeling fatigued with inc in weight with sidelying sh ER. Pt requires verbal cuing to control movement with band exercises. Pt is progressing with therapy as indicated by pt tolerating increase in exercise repetitions and resistance. Although showing progress patient would benefit from continuation of skilled physical therapy to address the remaining limitations. Patient will continue to benefit from skilled PT services to  modify and progress therapeutic interventions, address functional mobility deficits, address ROM deficits, address strength deficits, analyze and address soft tissue restrictions, analyze and cue movement patterns, analyze and modify body mechanics/ergonomics, assess and modify postural abnormalities and instruct in home and community integration to attain remaining goals. []  See Plan of Care  []  See progress note/recertification  []  See Discharge Summary         Progress towards goals / Updated goals:  Short Term Goals: STG- To be accomplished in 5 treatment(s):  1.  Pt will be independent with HEP to encourage prophylaxis. Eval:held due to time and pt not feeling well  Current: Progressing 7/6/21 reports compliance     Long Term Goals: LTG- To be accomplished in 12 treatment(s):  1.  Pt will improve cervical ROM to [facility] to increase tolerance to daily activities.   Eval:  AROM Right Left   Cervical Flex: 33, p!       Ext: 25       Rot 50 32   Lat flex 30 22   Current: 6/28/21 -progressing                          Cervical Rot AROM: degrees with goniometer                           Right 55           Left: 55     2.  Pt will improve scapular stability so can lower bilateral UE without dyskinesia and dumping to increase tolerance to reaching overhead and daily activities. Eval:Scapular Rhythm: dyskinesia, dumping and visible fatigue bilaterally with lowering   Current: 6/28/21 - pt doing better with shoulder hiing.       3.  Pt will be able to perform household activities such as sweeping floor, vacuuming without pain to increase tolerance to daily activites. Eval:pain immediately with sweeping, limites tolerance to washing dishes - pt lives alone   Current: 7/6/21 pt reports doing chores and felt a little pain in the middle of the back     4.  Pt FOTO score will increase by 5 points to show improvement in function.   Eval:53  Current: 61 progressing 6/22/21      PLAN  [x]  Upgrade activities as tolerated     [x]  Continue plan of care  [x]  Update interventions per flow sheet       []  Discharge due to:_  []  Other:_      Jed Weems, PT, DPT, CIMT 7/6/2021  3:39 PM    Future Appointments   Date Time Provider Jason Alvarez   7/6/2021  4:30 PM Danie Little PT [facility]   7/8/2021  4:30 PM GENEVIEVE RainesHPTBCARLITOS [facility]   7/12/2021  5:15 PM Danie Little PT [facility]   7/14/2021  5:15 PM GENEVIEVE RainesHPTBCARLITOS [facility]   7/19/2021  5:15 PM Danie Little PT [facility]   8/12/2021  3:00 PM Israel Hernandez NP [address] [facility]

## 2021-07-08 ENCOUNTER — HOSPITAL ENCOUNTER (OUTPATIENT)
Dept: PHYSICAL THERAPY | Age: 66
Discharge: HOME | End: 2021-07-08
Payer: COMMERCIAL

## 2021-07-08 PROCEDURE — 97140 MANUAL THERAPY 1/> REGIONS: CPT

## 2021-07-08 PROCEDURE — 97110 THERAPEUTIC EXERCISES: CPT

## 2021-07-08 PROCEDURE — 97112 NEUROMUSCULAR REEDUCATION: CPT

## 2021-07-08 NOTE — PROGRESS NOTES
PT DAILY TREATMENT NOTE    Patient Name: Mike Michele  Date:2021  : 1955  [x]  Patient  Verified  Payor: Lui Burton / Plan:  [facility] [place] / Product Type: PPO /    In time:430  Out time:520  Total Treatment Time (min): 50  Total Timed Codes (min): 50  1:1 Treatment Time ([facility] only): 50    Visit #: 9 of 12    Treatment Area: Neck pain [M54.2]    SUBJECTIVE  Pain Level (0-10 scale): 0  Any medication changes, allergies to medications, adverse drug reactions, diagnosis change, or new procedure performed?: [x] No    [] Yes (see summary sheet for update)  Subjective functional status/changes:   [] No changes reported  Reports no pain but left neck/shoulder tightness    OBJECTIVE        15 min Therapeutic Exercise:  [x] See flow sheet :   Rationale: increase ROM and increase strength to improve the patients ability to perform ADL       15 min Neuromuscular Re-education:  [x]  See flow sheet :   Rationale: improve coordination, increase proprioception and repositioning, rib cage mobility  to improve the patients ability to perform ADL with improved alignment and reduction in tension    10 min Manual Therapy:  Functional massage CS including facet gliding/SOR, scalene/UT, STACEY AIC correction, Sternal mobilization,  STACEY infraclavicular rib pump with active breath   Obtained consent for hand placement    Rationale: decrease pain, increase ROM and increase tissue extensibility to improve functional mobility         With   [x] TE   [] TA   [] neuro   [] other: Patient Education: [x] Review HEP    [] Progressed/Changed HEP based on:   [] positioning   [] body mechanics   [] transfers   [] heat/ice application    [] other:      Other Objective/Functional Measures: STACEY Special Tests (pre/post)  HGIR:                                                 Right: 60/80             Left: 70/90    Pain Level (0-10 scale) post treatment: 0    ASSESSMENT/Changes in Function: patient needs vc for proper form with ex, alleviation of left UT tension post session    Patient will continue to benefit from skilled PT services to address ROM deficits, address strength deficits, analyze and address soft tissue restrictions, analyze and cue movement patterns and assess and modify postural abnormalities to attain remaining goals. [x]  See Plan of Care  []  See progress note/recertification           Progress towards goals / Updated goals:  Short Term Goals: STG- To be accomplished in 5 treatment(s):  1.  Pt will be independent with HEP to encourage prophylaxis. Eval:held due to time and pt not feeling well  Current: Progressing 7/6/21 reports compliance     Long Term Goals: LTG- To be accomplished in 12 treatment(s):  1.  Pt will improve cervical ROM to [facility] to increase tolerance to daily activities. Eval:  AROM Right Left   Cervical Flex: 33, p!       Ext: 25       Rot 50 32   Lat flex 30 22   Current: 6/28/21 -progressing                          Cervical Rot AROM: degrees with goniometer                           Right 55           Left: 55     2.  Pt will improve scapular stability so can lower bilateral UE without dyskinesia and dumping to increase tolerance to reaching overhead and daily activities. Eval:Scapular Rhythm: dyskinesia, dumping and visible fatigue bilaterally with lowering   Current: 6/28/21 - pt doing better with shoulder hiing.       3.  Pt will be able to perform household activities such as sweeping floor, vacuuming without pain to increase tolerance to daily activites. Eval:pain immediately with sweeping, limites tolerance to washing dishes - pt lives alone   Current: 7/6/21 pt reports doing chores and felt a little pain in the middle of the back     4.  Pt FOTO score will increase by 5 points to show improvement in function.   Eval:53  Current: 61 progressing 6/22/21         PLAN  []  Upgrade activities as tolerated     [x]  Continue plan of care  []  Update interventions per flow sheet       []  Discharge due to:_  []  Other:_      Artemio Yu, PT 7/8/2021  5:23 PM    Future Appointments   Date Time Provider Jason Alvarez   7/12/2021  5:15 PM Jonathan Rose, PT [facility]   7/14/2021  5:15 PM Harmony Savage, PT [facility]   7/19/2021  5:15 PM Jonathan Rose, PT [facility]   8/12/2021  3:00 PM Rochelle Coreas, NP [address] [facility]

## 2021-07-12 ENCOUNTER — HOSPITAL ENCOUNTER (OUTPATIENT)
Dept: PHYSICAL THERAPY | Age: 66
Discharge: HOME | End: 2021-07-12
Payer: COMMERCIAL

## 2021-07-12 PROCEDURE — 97112 NEUROMUSCULAR REEDUCATION: CPT

## 2021-07-12 PROCEDURE — 97140 MANUAL THERAPY 1/> REGIONS: CPT

## 2021-07-12 PROCEDURE — 97110 THERAPEUTIC EXERCISES: CPT

## 2021-07-12 NOTE — PROGRESS NOTES
PT DAILY TREATMENT NOTE    Patient Name: Zahra Griffin  Date:2021  : 1955  [x]  Patient  Verified  Payor: BLUE CROSS / Plan:  [facility] / Product Type: PPO /    In time:5:11  Out time:6:04  Total Treatment Time (min): 53  Total Timed Codes (min): 53  1:1 Treatment Time ( only): 48   Visit #: 10 of 12    Treatment Area: Neck pain [M54.2]    SUBJECTIVE  Pain Level (0-10 scale): 0/10  Any medication changes, allergies to medications, adverse drug reactions, diagnosis change, or new procedure performed?: [] No    [x] Yes (see summary sheet for update)  Subjective functional status/changes:   [] No changes reported  Pt reports that she felt fine after last therapy session. Reports that she is tired today because she has insomnia and only got a couple hours of sleep. Reports that she is able to look at blind spot while driving. OBJECTIVE:  Pt enters gym in no apparent distress          28 min Therapeutic Exercise:  [x] See flow sheet :   Rationale: increase ROM and increase strength to improve the patients ability to perform daily activities with decreased pain and symptom levels       15 min Neuromuscular Re-education:  [x]  See flow sheet :   Rationale: improve coordination, improve balance, and increase proprioception  to improve the patients ability to perform daily activities with decreased pain and symptom levels. 10 min Manual Therapy:   Pt supine: SOR release, STM/MFR to getachew upper trap, levator scap, scalenes, gentle cervical distraction  STACEY infraclavicular rib pump with active breath   Obtained consent for hand placement    Rationale: increase tissue extensibility, decrease trigger points, and increase postural awareness to improve the patients ability to perform daily activities with decreased pain and symptom levels. The manual therapy interventions were performed at a separate and distinct time from the therapeutic activities interventions.           With   [x] TE   [] TA   [] neuro   [] other: Patient Education: [x] Review HEP    [] Progressed/Changed HEP based on:   [] positioning   [] body mechanics   [] transfers   [] heat/ice application    [] other:      Other Objective/Functional Measures: please see below     Pain Level (0-10 scale) post treatment: 0/10    ASSESSMENT/Changes in Function: Patient tolerated therapy session well as there were no adverse reactions today. Pt noted to perform 90-90 SA with better quality this therapy session. Pt continues to have difficulty with performing standing IOTA and requires tactile, verbal, and demonstration cuing to perform correctly. Added standing reach to POC and pt was fatigued afterwards. Pt wishing not to do the TB arm exercises due to being tired from not sleeping. Pt is progressing with therapy as indicated by pt tolerating increase in exercise repetitions and resistance. Although showing progress patient would benefit from continuation of skilled physical therapy to address the remaining limitations. Patient will continue to benefit from skilled PT services to  modify and progress therapeutic interventions, address functional mobility deficits, address ROM deficits, address strength deficits, analyze and address soft tissue restrictions, analyze and cue movement patterns, analyze and modify body mechanics/ergonomics, assess and modify postural abnormalities and address imbalance/dizziness to attain remaining goals. []  See Plan of Care  []  See progress note/recertification  []  See Discharge Summary         Progress towards goals / Updated goals:  Short Term Goals: STG- To be accomplished in 5 treatment(s):  1.  Pt will be independent with HEP to encourage prophylaxis.   Eval:held due to time and pt not feeling well  Current: Progressing 7/12/21 reports compliance     Long Term Goals: LTG- To be accomplished in 12 treatment(s):  1.  Pt will improve cervical ROM to [facility] to increase tolerance to daily activities. Eval:  AROM Right Left   Cervical Flex: 33, p!       Ext: 25       Rot 50 32   Lat flex 30 22   Current: 6/28/21 -progressing                          Cervical Rot AROM: degrees with goniometer                           Right 55           Left: 55     2.  Pt will improve scapular stability so can lower bilateral UE without dyskinesia and dumping to increase tolerance to reaching overhead and daily activities. Eval:Scapular Rhythm: dyskinesia, dumping and visible fatigue bilaterally with lowering   Current: 7/12/21 - pt doing better with shoulder hiing.       3.  Pt will be able to perform household activities such as sweeping floor, vacuuming without pain to increase tolerance to daily activites. Eval:pain immediately with sweeping, limites tolerance to washing dishes - pt lives alone   Current: 7/12/21 pt reports driving and looking at blinds spot is good.     4.  Pt FOTO score will increase by 5 points to show improvement in function.   Eval:53  Current: 61 progressing 6/22/21  Current: 7/12/21: will perform next visit    PLAN  [x]  Upgrade activities as tolerated     [x]  Continue plan of care  [x]  Update interventions per flow sheet       []  Discharge due to:_  []  Other:_      Frandy Silva, PT, DPT, CIMT 7/12/2021  1:37 PM    Future Appointments   Date Time Provider Jason Alvarez   7/12/2021  5:15 PM Ml Alas, PT [facility]   7/14/2021  5:15 PM Hue Hughes PT [facility]   7/19/2021  5:15 PM GENEVIEVE Barnett [facility]   8/12/2021  3:00 PM Carissa Crews, NOR[facility]

## 2021-07-14 ENCOUNTER — HOSPITAL ENCOUNTER (OUTPATIENT)
Dept: PHYSICAL THERAPY | Age: 66
Discharge: HOME | End: 2021-07-14
Payer: COMMERCIAL

## 2021-07-14 PROCEDURE — 97140 MANUAL THERAPY 1/> REGIONS: CPT

## 2021-07-14 PROCEDURE — 97110 THERAPEUTIC EXERCISES: CPT

## 2021-07-14 PROCEDURE — 97530 THERAPEUTIC ACTIVITIES: CPT

## 2021-07-14 NOTE — PROGRESS NOTES
PT DAILY TREATMENT NOTE    Patient Name: Marleni Peng  Date:2021  : 1955  [x]  Patient  Verified  Payor: Jessica Leslie / Plan:  [facility] / Product Type: PPO /    In time:500  Out time:556  Total Treatment Time (min): 56  Total Timed Codes (min): 56  1:1 Treatment Time (MC/BCBS only): 56    Visit #: 11 of 12    Treatment Area: Neck pain [M54.2]    SUBJECTIVE  Pain Level (0-10 scale): 0  Any medication changes, allergies to medications, adverse drug reactions, diagnosis change, or new procedure performed?: [x] No    [] Yes (see summary sheet for update)  Subjective functional status/changes:   [] No changes reported  Reports no pain currently but feeling very tired after a long day at work and insomnia last night. Also reports left arm feeling more weak with occasional paresthesia and deficit in  strength compared to right. Limited tolerance to washing dishes and mopping floors due to increase in left arm symptoms. Also unable to sleep on left side due to increase in pain.      OBJECTIVE      20 min Therapeutic Exercise:  [x] See flow sheet :   Rationale: increase ROM, increase strength and improve coordination to improve the patients ability to perform ADL without pain    26 min Therapeutic Activity:  [x]  See flow sheet :reevaluation, patient education, breathing ex to improve diaphragmatic function and reduce use of CS musculature for breathing   Rationale: establish current status and overall response to therapy to establish further POC       10 min Manual Therapy:  STACEY AIC correction, Sternal mobilization with active pelvic tilt,  STACEY infraclavicular rib pump with active breath, CS distraction, left pec release   Obtained consent for hand placement    Rationale: decrease pain, increase ROM and increase tissue extensibility to improve functional mobility left upper quadrant              With   [] TE   [] TA   [] neuro   [] other: Patient Education: [x] Review HEP    [] Progressed/Changed HEP based on:   [] positioning   [] body mechanics   [] transfers   [] heat/ice application    [] other:      Other Objective/Functional Measures:   FOTO 59  Patient reports increased \"pressure, achy pain and feeling of fatigue\" throughout left arm with UBE for 5 min  Alessandro test (+) after 1 min with c/o increased left shoulder girdle/arm pain, mild paresthesia and feeling of fatigue indicating possible TOS component  CS ROM: Flex 35, Ext 45, SB left 20, right 25, Rot left 50 P, right  53, all CS ROM causing increase in left neck shoulder pain  Left arm trembling during QP activities herb shoulder taps due to deficit in strength   strength right 60#, left 40/35#  HGIR left 60 deg, right 90       Pain Level (0-10 scale) post treatment: 0 supine and seated    ASSESSMENT/Changes in Function: focus on patient education and MT this session with good tolerance and alleviation of left arm symptoms. Testing indicates possible TOS contributing to current symptoms. Patient does present with shallow breathing pattern, overuse of neck musculature and limited rib cage mobility. She would benefit from continued PT to address left upper quadrant tightness and postural alignment deficits. Patient will continue to benefit from skilled PT services to address ROM deficits, address strength deficits, analyze and address soft tissue restrictions, analyze and cue movement patterns and assess and modify postural abnormalities to attain remaining goals. [x]  See Plan of Care  [x]  See progress note/recertification  []  See Discharge Summary         Progress towards goals / Updated goals:  Short Term Goals: STG- To be accomplished in 5 treatment(s):  1.  Pt will be independent with HEP to encourage prophylaxis.   Eval:held due to time and pt not feeling well  Current: Progressing 7/12/21 reports compliance     Long Term Goals: LTG- To be accomplished in 12 treatment(s):  1.  Pt will improve cervical ROM to [facility] to increase tolerance to daily activities. Eval:  AROM Right Left   Cervical Flex: 33, p!       Ext: 25       Rot 50 32   Lat flex 30 22     Status on 7/14/21: progressing  AROM Right Left   Cervical Flex: 35 p!       Ext: 45       Rot 53 50   Lat flex 30 22      2.  Pt will improve scapular stability so can lower bilateral UE without dyskinesia and dumping to increase tolerance to reaching overhead and daily activities. Eval:Scapular Rhythm: dyskinesia, dumping and visible fatigue bilaterally with lowering   Status on 7/14/21: improvement in scapular stability with minor asymmetry however limited shoulder flex mobility with overhead reaching, progressing       3.  Pt will be able to perform household activities such as sweeping floor, vacuuming without pain to increase tolerance to daily activites. Eval:pain immediately with sweeping, limites tolerance to washing dishes - pt lives alone   Current: 7/12/21 pt reports driving and looking at blinds spot is good. Status on 7/14/21:reports onset of left neck shoulder pain with washing dishes and mopping floors, slow progress     4.  Pt FOTO score will increase by 5 points to show improvement in function.   Eval:53  Current: 7/12/21: 59/100, MET         PLAN  []  Upgrade activities as tolerated     [x]  Continue plan of care  []  Update interventions per flow sheet       []  Discharge due to:_  []  Other:_      Madelin Quevedo, PT 7/14/2021  5:11 PM    Future Appointments   Date Time Provider Jason Alvarez   7/14/2021  5:15 PM Sylvia Wilder, PT [facility]   7/19/2021  5:15 PM Herminia Grubbs, PT [facility]   8/12/2021  3:00 PM Ck Echols NP [address]

## 2021-07-14 NOTE — PROGRESS NOTES
[facility] at the [address], [facility] [place], [address] [facility]  Phone: [phone]      Fax:  [phone]    Progress Note  Patient name: Gladys Aleman Yesenia of Care: 2021   Referral Harsh Lai MD : [id number]               Medical Diagnosis: Neck pain [M54.2]    Onset Date:Chronic               Treatment Darrius Pack   Prior Hospitalization: see medical history Provider#: [id number]   Medications: Verified on Patient summary List    Comorbidities: Previous surgeries, HTN (uncontrollable), Pre- DM, hx of back pain    Prior Level of Function: Increase in pain washing dishes, vacuuming, sweeping, driving, lying supine          Visits from Start of Care: 11    Missed Visits: 0           Progress towards goals / Updated goals:  Short Term Goals: STG- To be accomplished in 5 treatment(s):  1.  Pt will be independent with HEP to encourage prophylaxis. Eval:held due to time and pt not feeling well  Current: Progressing 21 reports compliance     Long Term Goals: LTG- To be accomplished in 12 treatment(s):  1.  Pt will improve cervical ROM to [facility] to increase tolerance to daily activities. Eval:  AROM Right Left   Cervical Flex: 33, p!       Ext: 25       Rot 50 32   Lat flex 30 22     Status on 21: progressing  AROM Right Left   Cervical Flex: 35 p!       Ext: 45       Rot 53 50   Lat flex 30 22      2.  Pt will improve scapular stability so can lower bilateral UE without dyskinesia and dumping to increase tolerance to reaching overhead and daily activities.   Eval:Scapular Rhythm: dyskinesia, dumping and visible fatigue bilaterally with lowering   Status on 21: improvement in scapular stability with minor asymmetry however limited shoulder flex mobility with overhead reaching, progressing       3.  Pt will be able to perform household activities such as sweeping floor, vacuuming without pain to increase tolerance to daily activites. Eval:pain immediately with sweeping, limites tolerance to washing dishes - pt lives alone   Current: 7/12/21 pt reports driving and looking at blinds spot is good. Status on 7/14/21:reports onset of left neck shoulder pain with washing dishes and mopping floors, slow progress     4.  Pt FOTO score will increase by 5 points to show improvement in function. Eval:53  Current: 7/12/21: 59/100, MET         Key Functional Changes: improved CS mobility with ADL  Updated Goals: to be achieved in 4 weeks:   5. Patient demonstrates negative Alessandro test and HGIR 90 deg bilaterally indicating improved shoulder girdle mobility to return to PLOF  Status on 7/14/21: + Gunnar Triston test left indicating +TOS,  HGIR left limited to 60 deg, right 90 deg, shallow breathing pattern, limited rib cage mobility    6. Improved  strength to >or= 50# left as indicator for overall increase in left arm strength and function  Status on 7/14/21:  strength right (dominant) 60#, left 40/35#, subjective feeling of left arm weakness    ASSESSMENT/RECOMMENDATIONS:Ms. Ratna Mock has been showing nice progress in CS mobility, scapula stability, strength and overall function (FOTO 59/100) however continues with marked left neck shoulder tightness, left arm pain/paresthesia/weakness and + Alessandro test indicating possible TOS. She is responding well to treatment with focus mobilization to left upper quadrant and reported being pain free after her last visit. She continues with c/o left arm pain/paresthesia with S/L on left, activities like mopping and washing dishes. I recommend to continue with current treatment to address remaining goals and deficits.   [x]Continue therapy per initial plan/protocol at a frequency of  2 x per week for 4 weeks    Thank you for this referral.   Sundeep Littlejohn, PT 7/14/2021 7:03 PM

## 2021-07-17 ENCOUNTER — HOSPITAL ENCOUNTER (OUTPATIENT)
Dept: LAB | Age: 66
Discharge: HOME | End: 2021-07-17
Payer: COMMERCIAL

## 2021-07-17 LAB
25(OH)D3 SERPL-MCNC: 20.1 NG/ML (ref 30–100)
ALBUMIN SERPL-MCNC: 4 G/DL (ref 3.4–5)
ALBUMIN/GLOB SERPL: 1.4 {RATIO} (ref 0.8–1.7)
ALP SERPL-CCNC: 73 U/L (ref 45–117)
ALT SERPL-CCNC: 36 U/L (ref 13–56)
ANION GAP SERPL CALC-SCNC: 2 MMOL/L (ref 3–18)
AST SERPL-CCNC: 17 U/L (ref 10–38)
BASOPHILS # BLD: 0.1 K/UL (ref 0–0.1)
BASOPHILS NFR BLD: 1 % (ref 0–2)
BILIRUB SERPL-MCNC: 0.5 MG/DL (ref 0.2–1)
BUN SERPL-MCNC: 9 MG/DL (ref 7–18)
BUN/CREAT SERPL: 17 (ref 12–20)
CALCIUM SERPL-MCNC: 9.2 MG/DL (ref 8.5–10.1)
CHLORIDE SERPL-SCNC: 109 MMOL/L (ref 100–111)
CHOLEST SERPL-MCNC: 104 MG/DL
CO2 SERPL-SCNC: 31 MMOL/L (ref 21–32)
CREAT SERPL-MCNC: 0.52 MG/DL (ref 0.6–1.3)
DIFFERENTIAL METHOD BLD: ABNORMAL
EOSINOPHIL # BLD: 0.1 K/UL (ref 0–0.4)
EOSINOPHIL NFR BLD: 1 % (ref 0–5)
ERYTHROCYTE [DISTWIDTH] IN BLOOD BY AUTOMATED COUNT: 15.5 % (ref 11.6–14.5)
GLOBULIN SER CALC-MCNC: 2.9 G/DL (ref 2–4)
GLUCOSE SERPL-MCNC: 95 MG/DL (ref 74–99)
HBA1C MFR BLD: 5.8 % (ref 4.2–5.6)
HCT VFR BLD AUTO: 36 % (ref 35–45)
HDLC SERPL-MCNC: 43 MG/DL (ref 40–60)
HDLC SERPL: 2.4 {RATIO} (ref 0–5)
HGB BLD-MCNC: 10.7 G/DL (ref 12–16)
LDLC SERPL CALC-MCNC: 49.2 MG/DL (ref 0–100)
LIPID PROFILE,FLP: NORMAL
LYMPHOCYTES # BLD: 2.4 K/UL (ref 0.9–3.6)
LYMPHOCYTES NFR BLD: 26 % (ref 21–52)
MCH RBC QN AUTO: 22.3 PG (ref 24–34)
MCHC RBC AUTO-ENTMCNC: 29.7 G/DL (ref 31–37)
MCV RBC AUTO: 75.2 FL (ref 74–97)
MONOCYTES # BLD: 0.9 K/UL (ref 0.05–1.2)
MONOCYTES NFR BLD: 9 % (ref 3–10)
NEUTS SEG # BLD: 5.6 K/UL (ref 1.8–8)
NEUTS SEG NFR BLD: 62 % (ref 40–73)
PLATELET # BLD AUTO: 243 K/UL (ref 135–420)
PMV BLD AUTO: 12.2 FL (ref 9.2–11.8)
POTASSIUM SERPL-SCNC: 4.6 MMOL/L (ref 3.5–5.5)
PROT SERPL-MCNC: 6.9 G/DL (ref 6.4–8.2)
RBC # BLD AUTO: 4.79 M/UL (ref 4.2–5.3)
SODIUM SERPL-SCNC: 142 MMOL/L (ref 136–145)
TRIGL SERPL-MCNC: 59 MG/DL (ref ?–150)
TSH SERPL DL<=0.05 MIU/L-ACNC: 0.62 UIU/ML (ref 0.36–3.74)
VLDLC SERPL CALC-MCNC: 11.8 MG/DL
WBC # BLD AUTO: 9.1 K/UL (ref 4.6–13.2)

## 2021-07-17 PROCEDURE — 84443 ASSAY THYROID STIM HORMONE: CPT

## 2021-07-17 PROCEDURE — 83036 HEMOGLOBIN GLYCOSYLATED A1C: CPT

## 2021-07-17 PROCEDURE — 80061 LIPID PANEL: CPT

## 2021-07-17 PROCEDURE — 80053 COMPREHEN METABOLIC PANEL: CPT

## 2021-07-17 PROCEDURE — 85025 COMPLETE CBC W/AUTO DIFF WBC: CPT

## 2021-07-17 PROCEDURE — 82306 VITAMIN D 25 HYDROXY: CPT

## 2021-07-17 PROCEDURE — 36415 COLL VENOUS BLD VENIPUNCTURE: CPT

## 2021-07-19 ENCOUNTER — HOSPITAL ENCOUNTER (OUTPATIENT)
Dept: PHYSICAL THERAPY | Age: 66
End: 2021-07-19
Payer: COMMERCIAL

## 2021-07-26 ENCOUNTER — HOSPITAL ENCOUNTER (OUTPATIENT)
Dept: PHYSICAL THERAPY | Age: 66
End: 2021-07-26
Payer: COMMERCIAL

## 2021-07-27 ENCOUNTER — APPOINTMENT (OUTPATIENT)
Dept: PHYSICAL THERAPY | Age: 66
End: 2021-07-27
Payer: COMMERCIAL

## 2021-07-28 ENCOUNTER — APPOINTMENT (OUTPATIENT)
Dept: PHYSICAL THERAPY | Age: 66
End: 2021-07-28
Payer: COMMERCIAL

## 2021-08-02 ENCOUNTER — HOSPITAL ENCOUNTER (OUTPATIENT)
Dept: PHYSICAL THERAPY | Age: 66
Discharge: HOME | End: 2021-08-02
Payer: COMMERCIAL

## 2021-08-02 PROCEDURE — 97110 THERAPEUTIC EXERCISES: CPT

## 2021-08-02 PROCEDURE — 97112 NEUROMUSCULAR REEDUCATION: CPT

## 2021-08-02 PROCEDURE — 97140 MANUAL THERAPY 1/> REGIONS: CPT

## 2021-08-04 ENCOUNTER — APPOINTMENT (OUTPATIENT)
Dept: PHYSICAL THERAPY | Age: 66
End: 2021-08-04
Payer: COMMERCIAL

## 2021-08-12 ENCOUNTER — APPOINTMENT (OUTPATIENT)
Dept: PHYSICAL THERAPY | Age: 66
End: 2021-08-12
Payer: COMMERCIAL

## 2021-08-16 ENCOUNTER — HOSPITAL ENCOUNTER (OUTPATIENT)
Dept: PHYSICAL THERAPY | Age: 66
Discharge: HOME | End: 2021-08-16
Payer: COMMERCIAL

## 2021-08-16 PROCEDURE — 97110 THERAPEUTIC EXERCISES: CPT

## 2021-08-16 PROCEDURE — 97530 THERAPEUTIC ACTIVITIES: CPT

## 2021-08-16 PROCEDURE — 97112 NEUROMUSCULAR REEDUCATION: CPT

## 2021-08-16 NOTE — PROGRESS NOTES
PT DAILY TREATMENT NOTE    Patient Name: Starr Lai  Date:2021  : 1955  [x]  Patient  Verified  Payor: BLUE CROSS / Plan:  [facility] [place] [facility] / Product Type: PPO /    In time:5:11  Out time:6:04   Total Treatment Time (min): 53  Total Timed Codes (min): 53  1:1 Treatment Time ( only): 48  Visit #: 14 of 19    Treatment Area: Neck pain [M54.2]    SUBJECTIVE  Pain Level (0-10 scale): 0/10  Any medication changes, allergies to medications, adverse drug reactions, diagnosis change, or new procedure performed?: [x] No    [] Yes (see summary sheet for update)  Subjective functional status/changes:   [] No changes reported  Pt reports that she is compliant with HEP. Pt reports that worst pain in the last 48 hrs 0/10. Reports that she has difficulty with sleeping. Reports that when she is doing the dishes she has pain in the upper trap really bad. Reports that she has been to therapy before for this. Reports that she feels that therapy has done everything that they can do. Reports she feels it on the left upper trap. Reports that she wants today to be her last day. OBJECTIVE:  Pt enters gym in no apparent distress       25 min Therapeutic Exercise:  [x] See flow sheet :   Rationale: increase ROM and increase strength to improve the patients ability to perform daily activities with decreased pain and symptom levels      10 min Therapeutic Activity:  [x]  See flow sheet :   Rationale: increase ROM, increase strength  to improve the patients ability to perform daily activities with decreased pain and symptom levels     18 min Neuromuscular Re-education:  [x]  See flow sheet :   Rationale: improve coordination, improve balance, and increase proprioception  to improve the patients ability to perform daily activities with decreased pain and symptom levels.            With   [x] TE   [] TA   [] neuro   [] other: Patient Education: [x] Review HEP    [] Progressed/Changed HEP based on:   [] positioning [] body mechanics   [] transfers   [] heat/ice application    [] other:      Other Objective/Functional Measures:   CS ROM (inclinometer): Flex 45 (pain in the left upper trap), Ext 25 (decreased), SB left 25, right 25, Rot left 55 (goniometer), right  60 (goniometer)   strength right 65#, left 25/27#      Pain Level (0-10 scale) post treatment: 0/10, still bothering her    ASSESSMENT/Changes in Function: Pt is a 60 yr old female dx with neck pain. This is pts 14th visit including evaluation on 5/20/21. Pt has only been to 2 follow up visits since last progress note which was 7/14/21. Therapist is performing updated progress note as soon as allowed as pt hasn't been to therpay since 8/2/21. Therapy has included Therapeutic exercise, Therapeutic activities, Neuromuscular re-education, Physical agent/modality, Gait/balance training, Manual therapy and Patient education. Pt has progressed with therapy as pt reporting 0/10 pain this therapy session, but continues to complain of left upper trap pain with performing dishes. Pt with inc in certain cervical ROM, but did have a decline with cervical extension. Pt did also have a dec in  strength. Pt wishing to transition to HEP, therefore pt will be d/c'd from skilled PT and remain under care of MD.     Patient tolerated therapy session well as there were no adverse reactions today. Added GTB to QP SA and pt did well with resistance. Pt did require tactile cuing to perform D2 flexion in 90-90 position correctly. Pt is progressing with therapy as indicated by pt tolerating increase in exercise repetitions and resistance. []  See Plan of Care  []  See progress note/recertification  [x]  See Discharge Summary         Progress towards goals / Updated goals:  Short Term Goals: STG- To be accomplished in 5 treatment(s):  1.  Pt will be independent with HEP to encourage prophylaxis.   Eval:held due to time and pt not feeling well  Current: Progressing 8/16/21 reports compliance- goal MET     Long Term Goals: LTG- To be accomplished in 12 treatment(s):  1.  Pt will improve cervical ROM to [facility] to increase tolerance to daily activities. Eval:  AROM Right Left   Cervical Flex: 33, p!       Ext: 25       Rot 50 32   Lat flex 30 22      Status on 7/14/21: progressing  AROM Right Left   Cervical Flex: 35 p!       Ext: 45       Rot 53 50   Lat flex 30 22   Current: 8/2/21 Progressing   AROM (inclinometer) Right Left   Cervical Flex: 75 p! Along the left upper trap       Ext: 30 (decreased)       Rot 60 53   Lat flex 25 (decreased) 30   Current: 8/16/21: CS ROM (inclinometer): Flex 45 (pain in the left upper trap), Ext 25 (decreased), SB left 25, right 25, Rot left 55 (goniometer), right  60 (goniometer)     2.  Pt will improve scapular stability so can lower bilateral UE without dyskinesia and dumping to increase tolerance to reaching overhead and daily activities. Eval:Scapular Rhythm: dyskinesia, dumping and visible fatigue bilaterally with lowering   Status on 7/14/21: improvement in scapular stability with minor asymmetry however limited shoulder flex mobility with overhead reaching, progressing       3.  Pt will be able to perform household activities such as sweeping floor, vacuuming without pain to increase tolerance to daily activites. Eval:pain immediately with sweeping, limites tolerance to washing dishes - pt lives alone   Current: 7/12/21 pt reports driving and looking at blinds spot is good. Status on 7/14/21:reports onset of left neck shoulder pain with washing dishes and mopping floors, slow progress  Current: 8/16/21: reports she still has left neck upper trap pain with washing dishes     4.  Pt FOTO score will increase by 5 points to show improvement in function.   Eval:53  Current: 7/12/21: 59/100, MET           Key Functional Changes: improved CS mobility with ADL  Updated Goals: to be achieved in 4 weeks:              5. Patient demonstrates negative Alessandro test and HGIR 90 deg bilaterally indicating improved shoulder girdle mobility to return to PLOF  Status on 7/14/21: + Mukesh Grumbles test left indicating +TOS,  HGIR left limited to 60 deg, right 90 deg, shallow breathing pattern, limited rib cage mobility     6.  Improved  strength to >or= 50# left as indicator for overall increase in left arm strength and function  Status on 7/14/21:  strength right (dominant) 60#, left 40/35#, subjective feeling of left arm weakness  Current: 8/16/21   strength right 65#, left 25/27#        PLAN  []  Upgrade activities as tolerated     []  Continue plan of care  []  Update interventions per flow sheet       [x]  Discharge due to:_pt request  []  Other:_      Rafaela Kehr, PT, DPT, CIMT 8/16/2021  10:38 AM    Future Appointments   Date Time Provider Jason Alvarez   8/16/2021  5:15 PM Edith Head, PT [facility]   8/18/2021  7:45 AM Darrold Pack, PT, DPT [facility]   8/19/2021  5:15 PM Darrold Pack, PT, DPT [facility]   8/20/2021  3:45 PM Darrold Pack, PT, DPT [facility]   8/23/2021  5:15 PM Edith Head, PT [facility]   8/26/2021  5:15 PM Darrold Pack, PT, DPT [facility]   11/23/2021  3:20 PM Dotty Roberts DPT [facility]   12/8/2021  3:20 PM Dotty Roberts, DPT [id number] [facility]

## 2021-08-16 NOTE — PROGRESS NOTES
[facility] at the [address], [place] j[place], [address] [facility]  Phone: [phone]      Fax:  [phone]            Discharge Summary    Patient Tanesha Moore Start of Care: 2021   Referral Annelise Araujo MD : [id number]               Medical Diagnosis: Neck pain [M54.2]    Onset Date:Chronic               Treatment Rashmi Molina   Prior Hospitalization: see medical history Provider#: [id number]   Medications: Verified on Patient summary List    Comorbidities: Previous surgeries, HTN (uncontrollable), Pre- DM, hx of back pain    Prior Level of Function: Increase in pain washing dishes, vacuuming, sweeping, driving, lying supine     Visits from Start of Care: 14    Missed Visits: 4    Reporting Period : 21 to 21    Goals/Measure of Progress:  Short Term Goals: STG- To be accomplished in 5 treatment(s):  1.  Pt will be independent with HEP to encourage prophylaxis. Eval:held due to time and pt not feeling well  Current: Progressing 21 reports compliance- goal MET     Long Term Goals: LTG- To be accomplished in 12 treatment(s):  1.  Pt will improve cervical ROM to [facility] to increase tolerance to daily activities. Eval:  AROM Right Left   Cervical Flex: 33, p!       Ext: 25       Rot 50 32   Lat flex 30 22      Status on 21: progressing  AROM Right Left   Cervical Flex: 35 p!       Ext: 45       Rot 53 50   Lat flex 30 22   Current: 21 Progressing   AROM (inclinometer) Right Left   Cervical Flex: 75 p!  Along the left upper trap       Ext: 30 (decreased)       Rot 60 53   Lat flex 25 (decreased) 30   Current: 21: CS ROM (inclinometer): Flex 45 (pain in the left upper trap), Ext 25 (decreased), SB left 25, right 25, Rot left 55 (goniometer), right  60 (goniometer)     2.  Pt will improve scapular stability so can lower bilateral UE without dyskinesia and dumping to increase tolerance to reaching overhead and daily activities. Eval:Scapular Rhythm: dyskinesia, dumping and visible fatigue bilaterally with lowering   Status on 7/14/21: improvement in scapular stability with minor asymmetry however limited shoulder flex mobility with overhead reaching, progressing       3.  Pt will be able to perform household activities such as sweeping floor, vacuuming without pain to increase tolerance to daily activites. Eval:pain immediately with sweeping, limites tolerance to washing dishes - pt lives alone   Current: 7/12/21 pt reports driving and looking at blinds spot is good. Status on 7/14/21:reports onset of left neck shoulder pain with washing dishes and mopping floors, slow progress  Current: 8/16/21: reports she still has left neck upper trap pain with washing dishes     4.  Pt FOTO score will increase by 5 points to show improvement in function. Eval:53  Current: 7/12/21: 59/100, MET           Key Functional Changes: improved CS mobility with ADL  Updated Goals: to be achieved in 4 weeks:              5. Patient demonstrates negative Alessandro test and HGIR 90 deg bilaterally indicating improved shoulder girdle mobility to return to PLOF  Status on 7/14/21: + Mukesh Grumbles test left indicating +TOS,  HGIR left limited to 60 deg, right 90 deg, shallow breathing pattern, limited rib cage mobility     6. Improved  strength to >or= 50# left as indicator for overall increase in left arm strength and function  Status on 7/14/21:  strength right (dominant) 60#, left 40/35#, subjective feeling of left arm weakness  Current: 8/16/21   strength right 65#, left 25/27#           Assessment/ Summary of Care: Pt is a 60 yr old female dx with neck pain. This is pts 14th visit including evaluation on 5/20/21. Pt has only been to 2 follow up visits since last progress note which was 7/14/21. Therapist is performing updated progress note as soon as allowed as pt hasn't been to therpay since 8/2/21.  Therapy has included Therapeutic exercise, Therapeutic activities, Neuromuscular re-education, Physical agent/modality, Gait/balance training, Manual therapy and Patient education. Pt has progressed with therapy as pt reporting 0/10 pain this therapy session, but continues to complain of left upper trap pain with performing dishes. Pt with inc in certain cervical ROM, but did have a decline with cervical extension. Pt did also have a dec in  strength. Pt wishing to transition to HEP, therefore pt will be d/c'd from skilled PT and remain under care of MD.      Patient tolerated therapy session well as there were no adverse reactions today. Added GTB to QP SA and pt did well with resistance. Pt did require tactile cuing to perform D2 flexion in 90-90 position correctly. Pt is progressing with therapy as indicated by pt tolerating increase in exercise repetitions and resistance.      RECOMMENDATIONS:  [x]Discontinue therapy: per pt request []Patient has reached or is progressing toward set goals      []Patient is non-compliant or has abdicated      []Due to lack of appreciable progress towards set goals    Steffanie Suero, PT, DPT, CIMT  8/16/2021 6:11 PM

## 2021-08-18 ENCOUNTER — APPOINTMENT (OUTPATIENT)
Dept: PHYSICAL THERAPY | Age: 66
End: 2021-08-18
Payer: COMMERCIAL

## 2021-08-19 ENCOUNTER — APPOINTMENT (OUTPATIENT)
Dept: PHYSICAL THERAPY | Age: 66
End: 2021-08-19
Payer: COMMERCIAL

## 2021-08-20 ENCOUNTER — APPOINTMENT (OUTPATIENT)
Dept: PHYSICAL THERAPY | Age: 66
End: 2021-08-20
Payer: COMMERCIAL

## 2021-08-23 ENCOUNTER — APPOINTMENT (OUTPATIENT)
Dept: PHYSICAL THERAPY | Age: 66
End: 2021-08-23
Payer: COMMERCIAL

## 2021-08-26 ENCOUNTER — APPOINTMENT (OUTPATIENT)
Dept: PHYSICAL THERAPY | Age: 66
End: 2021-08-26
Payer: COMMERCIAL

## 2021-10-04 ENCOUNTER — TRANSCRIBE ORDER (OUTPATIENT)
Dept: SCHEDULING | Age: 66
End: 2021-10-04

## 2021-10-04 DIAGNOSIS — Z12.31 ENCOUNTER FOR SCREENING MAMMOGRAM FOR MALIGNANT NEOPLASM OF BREAST: Primary | ICD-10-CM

## 2021-10-28 ENCOUNTER — HOSPITAL ENCOUNTER (OUTPATIENT)
Dept: BONE DENSITY | Age: 66
Discharge: HOME | End: 2021-10-28
Attending: NURSE PRACTITIONER
Payer: COMMERCIAL

## 2021-10-28 ENCOUNTER — HOSPITAL ENCOUNTER (OUTPATIENT)
Dept: MAMMOGRAPHY | Age: 66
Discharge: HOME | End: 2021-10-28
Attending: NURSE PRACTITIONER
Payer: COMMERCIAL

## 2021-10-28 DIAGNOSIS — Z12.31 ENCOUNTER FOR SCREENING MAMMOGRAM FOR MALIGNANT NEOPLASM OF BREAST: ICD-10-CM

## 2021-10-28 PROCEDURE — 77080 DXA BONE DENSITY AXIAL: CPT

## 2021-10-28 PROCEDURE — 77063 BREAST TOMOSYNTHESIS BI: CPT

## 2022-11-21 ENCOUNTER — TRANSCRIBE ORDER (OUTPATIENT)
Dept: SCHEDULING | Age: 67
End: 2022-11-21

## 2022-11-21 DIAGNOSIS — Z12.31 VISIT FOR SCREENING MAMMOGRAM: Primary | ICD-10-CM

## 2022-11-22 ENCOUNTER — HOSPITAL ENCOUNTER (OUTPATIENT)
Dept: LAB | Age: 67
Discharge: HOME | End: 2022-11-22
Payer: COMMERCIAL

## 2022-11-22 ENCOUNTER — TRANSCRIBE ORDER (OUTPATIENT)
Dept: REGISTRATION | Age: 67
End: 2022-11-22

## 2022-11-22 DIAGNOSIS — E03.9 MYXEDEMA HEART DISEASE: ICD-10-CM

## 2022-11-22 DIAGNOSIS — I51.9 MYXEDEMA HEART DISEASE: ICD-10-CM

## 2022-11-22 DIAGNOSIS — E78.5 HYPERLIPEMIA: ICD-10-CM

## 2022-11-22 DIAGNOSIS — E11.9 DIABETES MELLITUS (HCC): ICD-10-CM

## 2022-11-22 DIAGNOSIS — I10 ESSENTIAL HYPERTENSION, MALIGNANT: ICD-10-CM

## 2022-11-22 DIAGNOSIS — I10 ESSENTIAL HYPERTENSION, MALIGNANT: Primary | ICD-10-CM

## 2022-11-22 LAB
ALBUMIN SERPL-MCNC: 4.1 G/DL (ref 3.4–5)
ALBUMIN/GLOB SERPL: 1.3 {RATIO} (ref 0.8–1.7)
ALP SERPL-CCNC: 76 U/L (ref 45–117)
ALT SERPL-CCNC: 32 U/L (ref 13–56)
ANION GAP SERPL CALC-SCNC: 0 MMOL/L (ref 3–18)
AST SERPL-CCNC: 15 U/L (ref 10–38)
BILIRUB SERPL-MCNC: 0.5 MG/DL (ref 0.2–1)
BUN SERPL-MCNC: 6 MG/DL (ref 7–18)
BUN/CREAT SERPL: 11 (ref 12–20)
CALCIUM SERPL-MCNC: 9.1 MG/DL (ref 8.5–10.1)
CHLORIDE SERPL-SCNC: 107 MMOL/L (ref 100–111)
CHOLEST SERPL-MCNC: 112 MG/DL
CO2 SERPL-SCNC: 33 MMOL/L (ref 21–32)
CREAT SERPL-MCNC: 0.54 MG/DL (ref 0.6–1.3)
GLOBULIN SER CALC-MCNC: 3.2 G/DL (ref 2–4)
GLUCOSE SERPL-MCNC: 85 MG/DL (ref 74–99)
HBA1C MFR BLD: 5.5 % (ref 4.2–5.6)
HDLC SERPL-MCNC: 46 MG/DL (ref 40–60)
HDLC SERPL: 2.4 {RATIO} (ref 0–5)
LDLC SERPL CALC-MCNC: 52.2 MG/DL (ref 0–100)
LIPID PROFILE,FLP: NORMAL
POTASSIUM SERPL-SCNC: 4.8 MMOL/L (ref 3.5–5.5)
PROT SERPL-MCNC: 7.3 G/DL (ref 6.4–8.2)
SODIUM SERPL-SCNC: 140 MMOL/L (ref 136–145)
TRIGL SERPL-MCNC: 69 MG/DL (ref ?–150)
TSH SERPL DL<=0.05 MIU/L-ACNC: 0.99 UIU/ML (ref 0.36–3.74)
VLDLC SERPL CALC-MCNC: 13.8 MG/DL

## 2022-11-22 PROCEDURE — 84443 ASSAY THYROID STIM HORMONE: CPT

## 2022-11-22 PROCEDURE — 80053 COMPREHEN METABOLIC PANEL: CPT

## 2022-11-22 PROCEDURE — 36415 COLL VENOUS BLD VENIPUNCTURE: CPT

## 2022-11-22 PROCEDURE — 80061 LIPID PANEL: CPT

## 2022-11-22 PROCEDURE — 83036 HEMOGLOBIN GLYCOSYLATED A1C: CPT

## 2022-11-23 ENCOUNTER — HOSPITAL ENCOUNTER (OUTPATIENT)
Dept: MAMMOGRAPHY | Age: 67
Discharge: HOME | End: 2022-11-23
Attending: NURSE PRACTITIONER
Payer: COMMERCIAL

## 2022-11-23 DIAGNOSIS — Z12.31 VISIT FOR SCREENING MAMMOGRAM: ICD-10-CM

## 2022-11-23 PROCEDURE — 77063 BREAST TOMOSYNTHESIS BI: CPT

## 2022-12-14 ENCOUNTER — HOSPITAL ENCOUNTER (OUTPATIENT)
Dept: LAB | Age: 67
Discharge: HOME | End: 2022-12-14
Payer: COMMERCIAL

## 2022-12-14 LAB — 25(OH)D3 SERPL-MCNC: 20.5 NG/ML (ref 30–100)

## 2022-12-14 PROCEDURE — 36415 COLL VENOUS BLD VENIPUNCTURE: CPT

## 2022-12-14 PROCEDURE — 82306 VITAMIN D 25 HYDROXY: CPT

## 2023-02-13 NOTE — PROGRESS NOTES
Review of Systems   Constitutional: Negative for chills, fever, malaise/fatigue and weight loss. Respiratory: Positive for cough, shortness of breath and wheezing. Negative for hemoptysis. Cardiovascular: Positive for orthopnea. Negative for chest pain, palpitations and leg swelling. Gastrointestinal: Positive for abdominal pain. Negative for blood in stool, constipation, diarrhea, heartburn, nausea and vomiting. Musculoskeletal: Negative for falls, joint pain and myalgias. Neurological: Positive for dizziness. hx, exam, labs, multiple calls was attempted pt's telepsych and pt's care are signed out to the next team.

## 2023-07-17 ENCOUNTER — HOSPITAL ENCOUNTER (EMERGENCY)
Facility: HOSPITAL | Age: 68
Discharge: HOME | End: 2023-07-17
Attending: EMERGENCY MEDICINE
Payer: COMMERCIAL

## 2023-07-17 VITALS
DIASTOLIC BLOOD PRESSURE: 67 MMHG | RESPIRATION RATE: 18 BRPM | BODY MASS INDEX: 18.14 KG/M2 | SYSTOLIC BLOOD PRESSURE: 177 MMHG | WEIGHT: 90 LBS | TEMPERATURE: 99 F | OXYGEN SATURATION: 99 % | HEART RATE: 82 BPM | HEIGHT: 59 IN

## 2023-07-17 DIAGNOSIS — R00.0 TACHYCARDIA: Primary | ICD-10-CM

## 2023-07-17 LAB
EKG ATRIAL RATE: 85 BPM
EKG DIAGNOSIS: NORMAL
EKG P AXIS: 72 DEGREES
EKG P-R INTERVAL: 168 MS
EKG Q-T INTERVAL: 356 MS
EKG QRS DURATION: 94 MS
EKG QTC CALCULATION (BAZETT): 423 MS
EKG R AXIS: 1 DEGREES
EKG T AXIS: 54 DEGREES
EKG VENTRICULAR RATE: 85 BPM

## 2023-07-17 PROCEDURE — 93010 ELECTROCARDIOGRAM REPORT: CPT | Performed by: INTERNAL MEDICINE

## 2023-07-17 PROCEDURE — 99283 EMERGENCY DEPT VISIT LOW MDM: CPT | Performed by: EMERGENCY MEDICINE

## 2023-07-17 PROCEDURE — 93005 ELECTROCARDIOGRAM TRACING: CPT | Performed by: EMERGENCY MEDICINE

## 2023-07-17 ASSESSMENT — PAIN - FUNCTIONAL ASSESSMENT: PAIN_FUNCTIONAL_ASSESSMENT: 0-10

## 2023-07-17 ASSESSMENT — PAIN SCALES - GENERAL: PAINLEVEL_OUTOF10: 5

## 2023-07-17 NOTE — ED TRIAGE NOTES
Patient arrived to ER with complaints of elevated heart rate when ambulating. Patient states she has an chuck on her phone that tells her that her heart rate is elevated. This has been happening for past 2 weeks. Has been to multiple ERs with same complaints, unable to find cardiologist to see her.

## 2023-07-17 NOTE — ED PROVIDER NOTES
2.5 mg by mouth daily      aspirin 81 MG EC tablet Take by mouth      atorvastatin (LIPITOR) 40 MG tablet TAKE ONE TABLET BY MOUTH DAILY      butalbital-acetaminophen-caffeine (FIORICET, ESGIC) -40 MG per tablet Take 1-2 tablets by mouth every 4 hours as needed      Capsaicin 0.1 % CREA APPLY A SMALL AMOUNT TO AFFECTED AREA FOUR TIMES A DAY AS NEEDED USE AS NEEDED PAIN      carboxymethylcellulose (REFRESH PLUS) 0.5 % SOLN ophthalmic solution Refresh Tears 0.5 % eye drops      cetirizine (ZYRTEC) 10 MG tablet Take 10 mg by mouth daily as needed      divalproex (DEPAKOTE) 500 MG DR tablet Take 500 mg by mouth 2 times daily      ergocalciferol (ERGOCALCIFEROL) 1.25 MG (45091 UT) capsule Take 50,000 Units by mouth every 7 days      famotidine (PEPCID) 20 MG tablet Take 20 mg by mouth daily as needed      fluticasone (FLONASE) 50 MCG/ACT nasal spray 2 sprays by Nasal route daily      hydrocortisone 2.5 % ointment APPLY A SMALL AMOUNT TO AFFECTED AREA 2 TIMES A DAY APPLY TO RASH ON FACE AS NEEDED FOR ITCH AND REDNESS      ipratropium (ATROVENT) 0.06 % nasal spray 2 sprays up each nostril twice per day      ipratropium-albuterol (DUONEB) 0.5-2.5 (3) MG/3ML SOLN nebulizer solution ipratropium 0.5 mg-albuterol 3 mg (2.5 mg base)/3 mL nebulization soln      lisinopril (PRINIVIL;ZESTRIL) 10 MG tablet Take 10 mg by mouth daily      LORazepam (ATIVAN) 0.5 MG tablet lorazepam 0.5 mg tablet      losartan (COZAAR) 25 MG tablet losartan 25 mg tablet      meclizine (ANTIVERT) 25 MG tablet Take by mouth 3 times daily as needed      metoprolol succinate (TOPROL XL) 25 MG extended release tablet Take 25 mg by mouth 2 times daily      metoprolol tartrate (LOPRESSOR) 25 MG tablet Take 25 mg by mouth 2 times daily      mometasone (NASONEX) 50 MCG/ACT nasal spray ceived the following from [facility]: Outside name: Nasonex 50 mcg/actuation nasal spray      montelukast (SINGULAIR) 10 MG tablet Take 10 mg by mouth daily

## 2023-07-17 NOTE — ED NOTES
Discharge instructions given to patient. Follow up information provided. Verbalized understanding .      Jhony Ordaz RN  07/17/23 0106

## 2023-07-19 ENCOUNTER — OFFICE VISIT (OUTPATIENT)
Age: 68
End: 2023-07-19
Payer: COMMERCIAL

## 2023-07-19 VITALS
BODY MASS INDEX: 18.14 KG/M2 | HEIGHT: 59 IN | OXYGEN SATURATION: 98 % | HEART RATE: 75 BPM | WEIGHT: 90 LBS | DIASTOLIC BLOOD PRESSURE: 60 MMHG | SYSTOLIC BLOOD PRESSURE: 138 MMHG

## 2023-07-19 DIAGNOSIS — R00.0 TACHYCARDIA: Primary | ICD-10-CM

## 2023-07-19 PROBLEM — F17.200 NICOTINE DEPENDENCE: Status: ACTIVE | Noted: 2023-07-19

## 2023-07-19 PROBLEM — F41.1 GENERALIZED ANXIETY DISORDER: Status: ACTIVE | Noted: 2023-07-19

## 2023-07-19 PROBLEM — F43.23 ADJUSTMENT DISORDER WITH MIXED ANXIETY AND DEPRESSED MOOD: Status: ACTIVE | Noted: 2023-07-19

## 2023-07-19 PROBLEM — I35.1 NONRHEUMATIC AORTIC (VALVE) INSUFFICIENCY: Status: ACTIVE | Noted: 2023-07-19

## 2023-07-19 PROBLEM — L30.9 DERMATITIS, UNSPECIFIED: Status: ACTIVE | Noted: 2023-07-19

## 2023-07-19 PROBLEM — R05.9 COUGH, UNSPECIFIED: Status: ACTIVE | Noted: 2023-07-19

## 2023-07-19 PROBLEM — I77.9 PERIPHERAL ARTERIAL OCCLUSIVE DISEASE (HCC): Status: ACTIVE | Noted: 2023-07-19

## 2023-07-19 PROBLEM — M19.90 OSTEOARTHROSIS: Status: ACTIVE | Noted: 2023-07-19

## 2023-07-19 PROBLEM — R06.00 DYSPNEA, UNSPECIFIED: Status: ACTIVE | Noted: 2023-07-19

## 2023-07-19 PROBLEM — E88.01 ALPHA-1-ANTITRYPSIN DEFICIENCY (HCC): Status: ACTIVE | Noted: 2023-07-19

## 2023-07-19 PROBLEM — J44.9 CHRONIC OBSTRUCTIVE PULMONARY DISEASE, UNSPECIFIED (HCC): Status: ACTIVE | Noted: 2023-07-19

## 2023-07-19 PROBLEM — M81.0 AGE-RELATED OSTEOPOROSIS WITHOUT CURRENT PATHOLOGICAL FRACTURE: Status: ACTIVE | Noted: 2023-07-19

## 2023-07-19 PROBLEM — R06.81 APNEA: Status: ACTIVE | Noted: 2023-07-19

## 2023-07-19 PROBLEM — R06.83 SNORING: Status: ACTIVE | Noted: 2023-07-19

## 2023-07-19 PROBLEM — R42 DIZZINESS AND GIDDINESS: Status: ACTIVE | Noted: 2023-07-19

## 2023-07-19 PROBLEM — T50.Z95A ADVERSE EFFECT OF OTHER VACCINES AND BIOLOGICAL SUBSTANCES, INITIAL ENCOUNTER: Status: ACTIVE | Noted: 2023-07-19

## 2023-07-19 PROBLEM — R07.9 CHEST PAIN, UNSPECIFIED: Status: ACTIVE | Noted: 2023-07-19

## 2023-07-19 PROBLEM — E78.5 DYSLIPIDEMIA: Status: ACTIVE | Noted: 2023-07-19

## 2023-07-19 PROBLEM — H69.83 DYSFUNCTION OF BOTH EUSTACHIAN TUBES: Status: ACTIVE | Noted: 2017-05-31

## 2023-07-19 PROBLEM — R42 VERTIGO: Status: ACTIVE | Noted: 2017-06-28

## 2023-07-19 PROBLEM — I10 ESSENTIAL (PRIMARY) HYPERTENSION: Status: ACTIVE | Noted: 2023-07-19

## 2023-07-19 PROBLEM — R51.9 HEADACHE: Status: ACTIVE | Noted: 2023-07-19

## 2023-07-19 PROBLEM — R73.03 PREDIABETES: Status: ACTIVE | Noted: 2023-07-19

## 2023-07-19 PROBLEM — F43.12 CHRONIC POST-TRAUMATIC STRESS DISORDER: Status: ACTIVE | Noted: 2023-07-19

## 2023-07-19 PROBLEM — G43.909 MIGRAINE, UNSPECIFIED, NOT INTRACTABLE, WITHOUT STATUS MIGRAINOSUS: Status: ACTIVE | Noted: 2023-07-19

## 2023-07-19 PROBLEM — K64.9 HEMORRHOIDS: Status: ACTIVE | Noted: 2023-07-19

## 2023-07-19 PROBLEM — M54.50 LOW BACK PAIN, UNSPECIFIED: Status: ACTIVE | Noted: 2023-07-19

## 2023-07-19 PROBLEM — J45.901 UNSPECIFIED ASTHMA WITH (ACUTE) EXACERBATION: Status: ACTIVE | Noted: 2023-07-19

## 2023-07-19 PROBLEM — J45.909 UNSPECIFIED ASTHMA, UNCOMPLICATED: Status: ACTIVE | Noted: 2023-07-19

## 2023-07-19 PROBLEM — M54.2 NECK PAIN: Status: ACTIVE | Noted: 2019-10-22

## 2023-07-19 PROBLEM — I73.9 PERIPHERAL VASCULAR DISEASE (HCC): Status: ACTIVE | Noted: 2023-07-19

## 2023-07-19 PROBLEM — H43.391 OTHER VITREOUS OPACITIES, RIGHT EYE: Status: ACTIVE | Noted: 2023-07-19

## 2023-07-19 PROBLEM — I10 HYPERTENSION: Status: ACTIVE | Noted: 2023-07-19

## 2023-07-19 PROBLEM — J43.9 PULMONARY EMPHYSEMA (HCC): Status: ACTIVE | Noted: 2023-07-19

## 2023-07-19 PROBLEM — D06.9 CERVICAL INTRAEPITHELIAL NEOPLASIA GRADE III WITH SEVERE DYSPLASIA: Status: ACTIVE | Noted: 2023-07-19

## 2023-07-19 PROBLEM — G43.909 MIGRAINES: Status: ACTIVE | Noted: 2023-07-19

## 2023-07-19 PROBLEM — G47.8 UNREFRESHED BY SLEEP: Status: ACTIVE | Noted: 2023-07-19

## 2023-07-19 PROBLEM — R10.11 RIGHT UPPER QUADRANT PAIN: Status: ACTIVE | Noted: 2023-07-19

## 2023-07-19 PROBLEM — R55 SYNCOPE AND COLLAPSE: Status: ACTIVE | Noted: 2023-07-19

## 2023-07-19 PROBLEM — M54.2 CERVICALGIA: Status: ACTIVE | Noted: 2023-07-19

## 2023-07-19 PROBLEM — G47.33 OBSTRUCTIVE SLEEP APNEA SYNDROME: Status: ACTIVE | Noted: 2023-06-29

## 2023-07-19 PROBLEM — H69.93 DYSFUNCTION OF BOTH EUSTACHIAN TUBES: Status: ACTIVE | Noted: 2017-05-31

## 2023-07-19 PROBLEM — F33.2 SEVERE RECURRENT MAJOR DEPRESSION (HCC): Status: ACTIVE | Noted: 2023-07-19

## 2023-07-19 PROBLEM — H90.A21 SENSORINEURAL HEARING LOSS, UNILATERAL, RIGHT EAR, WITH RESTRICTED HEARING ON THE CONTRALATERAL SIDE: Status: ACTIVE | Noted: 2023-07-19

## 2023-07-19 PROBLEM — H90.5 SENSORINEURAL HEARING LOSS: Status: ACTIVE | Noted: 2017-05-31

## 2023-07-19 PROBLEM — R40.0 DAYTIME SOMNOLENCE: Status: ACTIVE | Noted: 2023-07-19

## 2023-07-19 PROBLEM — R53.1 WEAKNESS: Status: ACTIVE | Noted: 2023-07-19

## 2023-07-19 PROBLEM — G47.33 OBSTRUCTIVE SLEEP APNEA (ADULT) (PEDIATRIC): Status: ACTIVE | Noted: 2023-07-19

## 2023-07-19 PROBLEM — F33.0 MAJOR DEPRESSIVE DISORDER, RECURRENT, MILD (HCC): Status: ACTIVE | Noted: 2023-07-19

## 2023-07-19 PROBLEM — H43.811 VITREOUS DEGENERATION, RIGHT EYE: Status: ACTIVE | Noted: 2023-07-19

## 2023-07-19 PROBLEM — Z20.822 CONTACT WITH AND (SUSPECTED) EXPOSURE TO COVID-19: Status: ACTIVE | Noted: 2023-07-19

## 2023-07-19 PROBLEM — U07.1 COVID-19: Status: ACTIVE | Noted: 2023-07-19

## 2023-07-19 PROBLEM — R51.9 HEADACHE, UNSPECIFIED: Status: ACTIVE | Noted: 2023-07-19

## 2023-07-19 PROBLEM — Z96.1 PRESENCE OF INTRAOCULAR LENS: Status: ACTIVE | Noted: 2023-07-19

## 2023-07-19 PROBLEM — M81.0 OSTEOPOROSIS: Status: ACTIVE | Noted: 2023-07-19

## 2023-07-19 PROBLEM — R87.610 ATYPICAL SQUAMOUS CELLS OF UNDETERMINED SIGNIFICANCE (ASCUS) ON PAPANICOLAOU SMEAR OF CERVIX: Status: ACTIVE | Noted: 2023-07-19

## 2023-07-19 PROBLEM — G25.81 RESTLESS LEGS: Status: ACTIVE | Noted: 2023-07-19

## 2023-07-19 PROBLEM — J45.909 ASTHMA: Status: ACTIVE | Noted: 2023-07-19

## 2023-07-19 PROBLEM — R25.2 CRAMP AND SPASM: Status: ACTIVE | Noted: 2023-07-19

## 2023-07-19 PROBLEM — J32.9 SINUSITIS, CHRONIC: Status: ACTIVE | Noted: 2017-05-31

## 2023-07-19 PROBLEM — S80.862A: Status: ACTIVE | Noted: 2023-07-19

## 2023-07-19 PROBLEM — Z77.090 EXPOSURE TO ASBESTOS: Status: ACTIVE | Noted: 2023-07-19

## 2023-07-19 PROBLEM — I11.9 HYPERTENSIVE CARDIOVASCULAR DISEASE: Status: ACTIVE | Noted: 2023-07-19

## 2023-07-19 PROBLEM — G47.00 INSOMNIA: Status: ACTIVE | Noted: 2023-07-19

## 2023-07-19 PROBLEM — K21.9 GASTRO-ESOPHAGEAL REFLUX DISEASE WITHOUT ESOPHAGITIS: Status: ACTIVE | Noted: 2023-07-19

## 2023-07-19 PROBLEM — R07.89 OTHER CHEST PAIN: Status: ACTIVE | Noted: 2023-07-19

## 2023-07-19 PROBLEM — R06.02 SHORTNESS OF BREATH: Status: ACTIVE | Noted: 2023-07-19

## 2023-07-19 PROBLEM — J30.9 ALLERGIC RHINITIS: Status: ACTIVE | Noted: 2017-06-28

## 2023-07-19 PROBLEM — N87.9 CERVICAL DYSPLASIA: Status: ACTIVE | Noted: 2023-07-19

## 2023-07-19 PROBLEM — L23.89 ALLERGIC CONTACT DERMATITIS DUE TO OTHER AGENTS: Status: ACTIVE | Noted: 2023-07-19

## 2023-07-19 PROBLEM — M25.512 ACUTE PAIN OF LEFT SHOULDER: Status: ACTIVE | Noted: 2019-10-22

## 2023-07-19 PROBLEM — R68.89 OTHER GENERAL SYMPTOMS AND SIGNS: Status: ACTIVE | Noted: 2023-07-19

## 2023-07-19 PROBLEM — F41.9 ANXIETY: Status: ACTIVE | Noted: 2023-07-19

## 2023-07-19 PROBLEM — I35.1 AORTIC REGURGITATION: Status: ACTIVE | Noted: 2023-07-19

## 2023-07-19 PROBLEM — F33.2 MAJOR DEPRESSIVE DISORDER, RECURRENT SEVERE WITHOUT PSYCHOTIC FEATURES (HCC): Status: ACTIVE | Noted: 2023-07-19

## 2023-07-19 PROCEDURE — 3078F DIAST BP <80 MM HG: CPT | Performed by: INTERNAL MEDICINE

## 2023-07-19 PROCEDURE — 99204 OFFICE O/P NEW MOD 45 MIN: CPT | Performed by: INTERNAL MEDICINE

## 2023-07-19 PROCEDURE — 93000 ELECTROCARDIOGRAM COMPLETE: CPT | Performed by: INTERNAL MEDICINE

## 2023-07-19 PROCEDURE — 1123F ACP DISCUSS/DSCN MKR DOCD: CPT | Performed by: INTERNAL MEDICINE

## 2023-07-19 PROCEDURE — 3075F SYST BP GE 130 - 139MM HG: CPT | Performed by: INTERNAL MEDICINE

## 2023-07-19 ASSESSMENT — ANXIETY QUESTIONNAIRES
1. FEELING NERVOUS, ANXIOUS, OR ON EDGE: 0
GAD7 TOTAL SCORE: 0
4. TROUBLE RELAXING: 0
2. NOT BEING ABLE TO STOP OR CONTROL WORRYING: 0
3. WORRYING TOO MUCH ABOUT DIFFERENT THINGS: 0
5. BEING SO RESTLESS THAT IT IS HARD TO SIT STILL: 0
7. FEELING AFRAID AS IF SOMETHING AWFUL MIGHT HAPPEN: 0
6. BECOMING EASILY ANNOYED OR IRRITABLE: 0

## 2023-07-19 ASSESSMENT — PATIENT HEALTH QUESTIONNAIRE - PHQ9
SUM OF ALL RESPONSES TO PHQ QUESTIONS 1-9: 0
SUM OF ALL RESPONSES TO PHQ QUESTIONS 1-9: 0
1. LITTLE INTEREST OR PLEASURE IN DOING THINGS: 0
2. FEELING DOWN, DEPRESSED OR HOPELESS: 0
SUM OF ALL RESPONSES TO PHQ QUESTIONS 1-9: 0
SUM OF ALL RESPONSES TO PHQ9 QUESTIONS 1 & 2: 0
SUM OF ALL RESPONSES TO PHQ QUESTIONS 1-9: 0

## 2023-07-19 NOTE — PROGRESS NOTES
HISTORY OF PRESENT ILLNESS  Willow Thomas  78 y.o. female     Chief Complaint   Patient presents with    New Patient    Follow-Up from Hospital     tachycardia       ASSESSMENT and PLAN    The encounter diagnosis was Tachycardia. Ms. Alex Holm, previous Dr. Joel Beal patient, has known history of dyslipidemia, hypertension, and PVD. She has history of nuclear scan back around 2015 showing EF 71% with perfusion defect involving the anterior wall. She was lost to follow-up since January 2019 and returned to our practice in July 2023. She has been seen by [facility] cardiology from 2019 until July 2023 and also she follows up with cardiology at the [facility] in [place]. She did have a nuclear scan done in August 2022 which showed negative perfusion defect with normal EF, and abnormal electrocardiographic findings. She had echocardiogram around 2021 which showed normal EF with moderate AI. From cardiac standpoint, her chest pains are somewhat atypical.  However, repeat nuclear scan/stress test has been requested to be done at the [facility] with next 2-3 weeks. She had already scheduled echocardiogram to be done next week. Also, both of these results will be available. For her palpitations, I will check 2-week event monitor. Blood pressures well controlled at 138/60. With a because pressure, she might have worsening AI. Rhythm is stable sinus at 75 bpm.  There is no evidence of decompensated CHF noted. Her weight today is 9 pounds. She states that this is at baseline. Unfortunate, she still smoke cigarettes. Her target LDL is less than 70. She remains on Lipitor 40 mg daily. She remains on baby aspirin daily. I will see her back in 6 months. Thank you. Orders Placed This Encounter   Procedures    EKG 12 Lead     Order Specific Question:   Reason for Exam?     Answer: Other        HPI  Today, Ms. Deandre Thomas comes in as a new patient.   She denies any chest pains at

## 2023-07-19 NOTE — PROGRESS NOTES
Alisha Sewell presents today for   Chief Complaint   Patient presents with    New Patient    Follow-Up from Hospital     tachycardia       Alisha Sewell preferred language for health care discussion is english/other. Is someone accompanying this pt? no    Is the patient using any DME equipment during OV? no    Depression Screening:  Depression: Not at risk    PHQ-2 Score: 0        Learning Assessment:  Who is the primary learner? Patient    What is the preferred language for health care of the primary learner? ENGLISH    How does the primary learner prefer to learn new concepts? DEMONSTRATION    Answered By patient    Relationship to Learner SELF           Pt currently taking Anticoagulant therapy? no    Pt currently taking Antiplatelet therapy ? Aspirin 81 mg daily      Coordination of Care:  1. Have you been to the ER, urgent care clinic since your last visit? Hospitalized since your last visit? no    2. Have you seen or consulted any other health care providers outside of the [address] [place] [address] since your last visit? Include any pap smears or colon screening.  no

## 2023-07-20 ENCOUNTER — HOSPITAL ENCOUNTER (OUTPATIENT)
Facility: HOSPITAL | Age: 68
Discharge: HOME | End: 2023-07-20
Payer: COMMERCIAL

## 2023-07-20 DIAGNOSIS — E13.29 OTHER SPECIFIED DIABETES MELLITUS WITH OTHER DIABETIC KIDNEY COMPLICATION, WITH LONG-TERM CURRENT USE OF INSULIN (HCC): ICD-10-CM

## 2023-07-20 DIAGNOSIS — E78.9 DISORDER OF LIPOPROTEIN AND LIPID METABOLISM: ICD-10-CM

## 2023-07-20 DIAGNOSIS — I10 ESSENTIAL HYPERTENSION, BENIGN: ICD-10-CM

## 2023-07-20 DIAGNOSIS — Z79.4 OTHER SPECIFIED DIABETES MELLITUS WITH OTHER DIABETIC KIDNEY COMPLICATION, WITH LONG-TERM CURRENT USE OF INSULIN (HCC): ICD-10-CM

## 2023-07-20 LAB
25(OH)D3 SERPL-MCNC: 29.1 NG/ML (ref 30–100)
ALBUMIN SERPL-MCNC: 4 G/DL (ref 3.4–5)
ALBUMIN/GLOB SERPL: 1.4 (ref 0.8–1.7)
ALP SERPL-CCNC: 69 U/L (ref 45–117)
ALT SERPL-CCNC: 42 U/L (ref 13–56)
ANION GAP SERPL CALC-SCNC: 5 MMOL/L (ref 3–18)
AST SERPL-CCNC: 18 U/L (ref 10–38)
BASOPHILS # BLD: 0.1 K/UL (ref 0–0.1)
BASOPHILS NFR BLD: 1 % (ref 0–2)
BILIRUB SERPL-MCNC: 0.4 MG/DL (ref 0.2–1)
BUN SERPL-MCNC: 7 MG/DL (ref 7–18)
BUN/CREAT SERPL: 13 (ref 12–20)
CALCIUM SERPL-MCNC: 9.1 MG/DL (ref 8.5–10.1)
CHLORIDE SERPL-SCNC: 103 MMOL/L (ref 100–111)
CHOLEST SERPL-MCNC: 96 MG/DL
CO2 SERPL-SCNC: 29 MMOL/L (ref 21–32)
CREAT SERPL-MCNC: 0.54 MG/DL (ref 0.6–1.3)
DIFFERENTIAL METHOD BLD: ABNORMAL
EOSINOPHIL # BLD: 0.2 K/UL (ref 0–0.4)
EOSINOPHIL NFR BLD: 2 % (ref 0–5)
ERYTHROCYTE [DISTWIDTH] IN BLOOD BY AUTOMATED COUNT: 15.2 % (ref 11.6–14.5)
EST. AVERAGE GLUCOSE BLD GHB EST-MCNC: 103 MG/DL
GLOBULIN SER CALC-MCNC: 2.9 G/DL (ref 2–4)
GLUCOSE SERPL-MCNC: 92 MG/DL (ref 74–99)
HBA1C MFR BLD: 5.2 % (ref 4.2–5.6)
HCT VFR BLD AUTO: 36.4 % (ref 35–45)
HDLC SERPL-MCNC: 44 MG/DL (ref 40–60)
HDLC SERPL: 2.2 (ref 0–5)
HGB BLD-MCNC: 11.2 G/DL (ref 12–16)
IMM GRANULOCYTES # BLD AUTO: 0 K/UL (ref 0–0.04)
IMM GRANULOCYTES NFR BLD AUTO: 0 % (ref 0–0.5)
LDLC SERPL CALC-MCNC: 40.8 MG/DL (ref 0–100)
LIPID PANEL: NORMAL
LYMPHOCYTES # BLD: 2.1 K/UL (ref 0.9–3.6)
LYMPHOCYTES NFR BLD: 25 % (ref 21–52)
MCH RBC QN AUTO: 22.9 PG (ref 24–34)
MCHC RBC AUTO-ENTMCNC: 30.8 G/DL (ref 31–37)
MCV RBC AUTO: 74.4 FL (ref 78–100)
MONOCYTES # BLD: 0.7 K/UL (ref 0.05–1.2)
MONOCYTES NFR BLD: 8 % (ref 3–10)
NEUTS SEG # BLD: 5.5 K/UL (ref 1.8–8)
NEUTS SEG NFR BLD: 64 % (ref 40–73)
NRBC # BLD: 0 K/UL (ref 0–0.01)
NRBC BLD-RTO: 0 PER 100 WBC
PLATELET # BLD AUTO: 271 K/UL (ref 135–420)
PMV BLD AUTO: 11.4 FL (ref 9.2–11.8)
POTASSIUM SERPL-SCNC: 4.4 MMOL/L (ref 3.5–5.5)
PROT SERPL-MCNC: 6.9 G/DL (ref 6.4–8.2)
RBC # BLD AUTO: 4.89 M/UL (ref 4.2–5.3)
SODIUM SERPL-SCNC: 137 MMOL/L (ref 136–145)
TRIGL SERPL-MCNC: 56 MG/DL
TSH SERPL DL<=0.05 MIU/L-ACNC: 0.65 UIU/ML (ref 0.36–3.74)
VLDLC SERPL CALC-MCNC: 11.2 MG/DL
WBC # BLD AUTO: 8.6 K/UL (ref 4.6–13.2)

## 2023-07-20 PROCEDURE — 36415 COLL VENOUS BLD VENIPUNCTURE: CPT

## 2023-07-20 PROCEDURE — 80061 LIPID PANEL: CPT

## 2023-07-20 PROCEDURE — 83036 HEMOGLOBIN GLYCOSYLATED A1C: CPT

## 2023-07-20 PROCEDURE — 80053 COMPREHEN METABOLIC PANEL: CPT

## 2023-07-20 PROCEDURE — 82306 VITAMIN D 25 HYDROXY: CPT

## 2023-07-20 PROCEDURE — 85025 COMPLETE CBC W/AUTO DIFF WBC: CPT

## 2023-07-20 PROCEDURE — 84443 ASSAY THYROID STIM HORMONE: CPT

## 2023-11-24 ENCOUNTER — HOSPITAL ENCOUNTER (OUTPATIENT)
Facility: HOSPITAL | Age: 68
Discharge: HOME | End: 2023-11-24
Payer: COMMERCIAL

## 2023-11-24 VITALS — BODY MASS INDEX: 18.14 KG/M2 | HEIGHT: 59 IN | WEIGHT: 90 LBS

## 2023-11-24 DIAGNOSIS — Z12.31 ENCOUNTER FOR SCREENING MAMMOGRAM FOR MALIGNANT NEOPLASM OF BREAST: ICD-10-CM

## 2023-11-24 PROCEDURE — 77063 BREAST TOMOSYNTHESIS BI: CPT

## 2024-02-21 ENCOUNTER — OFFICE VISIT (OUTPATIENT)
Age: 69
End: 2024-02-21
Payer: COMMERCIAL

## 2024-02-21 VITALS
SYSTOLIC BLOOD PRESSURE: 136 MMHG | WEIGHT: 93 LBS | OXYGEN SATURATION: 100 % | DIASTOLIC BLOOD PRESSURE: 68 MMHG | HEIGHT: 59 IN | BODY MASS INDEX: 18.75 KG/M2

## 2024-02-21 DIAGNOSIS — R00.0 TACHYCARDIA: Primary | ICD-10-CM

## 2024-02-21 PROCEDURE — 3078F DIAST BP <80 MM HG: CPT | Performed by: INTERNAL MEDICINE

## 2024-02-21 PROCEDURE — 93000 ELECTROCARDIOGRAM COMPLETE: CPT | Performed by: INTERNAL MEDICINE

## 2024-02-21 PROCEDURE — 1123F ACP DISCUSS/DSCN MKR DOCD: CPT | Performed by: INTERNAL MEDICINE

## 2024-02-21 PROCEDURE — 99214 OFFICE O/P EST MOD 30 MIN: CPT | Performed by: INTERNAL MEDICINE

## 2024-02-21 PROCEDURE — 3075F SYST BP GE 130 - 139MM HG: CPT | Performed by: INTERNAL MEDICINE

## 2024-02-21 ASSESSMENT — ANXIETY QUESTIONNAIRES
7. FEELING AFRAID AS IF SOMETHING AWFUL MIGHT HAPPEN: 0
2. NOT BEING ABLE TO STOP OR CONTROL WORRYING: 0
1. FEELING NERVOUS, ANXIOUS, OR ON EDGE: 0
GAD7 TOTAL SCORE: 0
4. TROUBLE RELAXING: 0
6. BECOMING EASILY ANNOYED OR IRRITABLE: 0
3. WORRYING TOO MUCH ABOUT DIFFERENT THINGS: 0
5. BEING SO RESTLESS THAT IT IS HARD TO SIT STILL: 0

## 2024-02-21 ASSESSMENT — PATIENT HEALTH QUESTIONNAIRE - PHQ9
10. IF YOU CHECKED OFF ANY PROBLEMS, HOW DIFFICULT HAVE THESE PROBLEMS MADE IT FOR YOU TO DO YOUR WORK, TAKE CARE OF THINGS AT HOME, OR GET ALONG WITH OTHER PEOPLE: 0
SUM OF ALL RESPONSES TO PHQ QUESTIONS 1-9: 0
SUM OF ALL RESPONSES TO PHQ QUESTIONS 1-9: 0
SUM OF ALL RESPONSES TO PHQ9 QUESTIONS 1 & 2: 0
SUM OF ALL RESPONSES TO PHQ QUESTIONS 1-9: 0
9. THOUGHTS THAT YOU WOULD BE BETTER OFF DEAD, OR OF HURTING YOURSELF: 0
1. LITTLE INTEREST OR PLEASURE IN DOING THINGS: 0
4. FEELING TIRED OR HAVING LITTLE ENERGY: 0
6. FEELING BAD ABOUT YOURSELF - OR THAT YOU ARE A FAILURE OR HAVE LET YOURSELF OR YOUR FAMILY DOWN: 0
7. TROUBLE CONCENTRATING ON THINGS, SUCH AS READING THE NEWSPAPER OR WATCHING TELEVISION: 0
5. POOR APPETITE OR OVEREATING: 0
2. FEELING DOWN, DEPRESSED OR HOPELESS: 0
3. TROUBLE FALLING OR STAYING ASLEEP: 0
8. MOVING OR SPEAKING SO SLOWLY THAT OTHER PEOPLE COULD HAVE NOTICED. OR THE OPPOSITE, BEING SO FIGETY OR RESTLESS THAT YOU HAVE BEEN MOVING AROUND A LOT MORE THAN USUAL: 0
SUM OF ALL RESPONSES TO PHQ QUESTIONS 1-9: 0

## 2024-02-21 ASSESSMENT — ENCOUNTER SYMPTOMS: SHORTNESS OF BREATH: 1

## 2024-02-21 NOTE — PROGRESS NOTES
Willow Drake presents today for   Chief Complaint   Patient presents with    Follow-up     6 months       Willow Drake preferred language for health care discussion is english/other.    Is someone accompanying this pt? no    Is the patient using any DME equipment during OV? no    Depression Screening:  Depression: Not at risk (2/21/2024)    PHQ-2     PHQ-2 Score: 0        Learning Assessment:  Who is the primary learner? Patient    What is the preferred language for health care of the primary learner? ENGLISH    How does the primary learner prefer to learn new concepts? DEMONSTRATION    Answered By patient    Relationship to Learner SELF           Pt currently taking Anticoagulant therapy? no    Pt currently taking Antiplatelet therapy ? Aspirin 81mg daily      Coordination of Care:  1. Have you been to the ER, urgent care clinic since your last visit? Hospitalized since your last visit? no    2. Have you seen or consulted any other health care providers outside of the [facility] since your last visit? Include any pap smears or colon screening. no    
daily      metoprolol succinate (TOPROL XL) 25 MG extended release tablet Take 1 tablet by mouth 2 times daily      montelukast (SINGULAIR) 10 MG tablet Take 1 tablet by mouth daily      tiotropium (SPIRIVA RESPIMAT) 2.5 MCG/ACT AERS inhaler Inhale 2 puffs into the lungs daily       No current facility-administered medications for this visit.       The patient has a family history of    Social History     Tobacco Use    Smoking status: Light Smoker     Current packs/day: 0.25     Types: Cigarettes    Smokeless tobacco: Never    Tobacco comments:     Quit smoking: now down to 1 cigarettes per day   Vaping Use    Vaping Use: Never used   Substance Use Topics    Alcohol use: No    Drug use: No       Lab Results   Component Value Date/Time    CHOL 96 07/20/2023 07:05 AM    HDL 44 07/20/2023 07:05 AM        BP Readings from Last 3 Encounters:   02/21/24 (!) 132/50   07/19/23 138/60   07/17/23 (!) 177/67        Pulse Readings from Last 3 Encounters:   07/19/23 75   07/17/23 82   05/13/21 62       Wt Readings from Last 3 Encounters:   02/21/24 42.2 kg (93 lb)   11/24/23 40.8 kg (90 lb)   07/19/23 40.8 kg (90 lb)         EKG: normal sinus rhythm, nonspecific ST and T waves changes, unchanged from previous tracings.     Cole Zaman MD   2/21/2024